# Patient Record
Sex: MALE | Race: BLACK OR AFRICAN AMERICAN | NOT HISPANIC OR LATINO | Employment: OTHER | URBAN - METROPOLITAN AREA
[De-identification: names, ages, dates, MRNs, and addresses within clinical notes are randomized per-mention and may not be internally consistent; named-entity substitution may affect disease eponyms.]

---

## 2018-04-25 ENCOUNTER — HOSPITAL ENCOUNTER (EMERGENCY)
Facility: OTHER | Age: 58
Discharge: HOME | End: 2018-04-25
Attending: EMERGENCY MEDICINE
Payer: MEDICAID

## 2018-04-25 VITALS
DIASTOLIC BLOOD PRESSURE: 78 MMHG | WEIGHT: 164 LBS | TEMPERATURE: 98 F | HEIGHT: 70 IN | BODY MASS INDEX: 23.48 KG/M2 | RESPIRATION RATE: 18 BRPM | HEART RATE: 60 BPM | SYSTOLIC BLOOD PRESSURE: 151 MMHG | OXYGEN SATURATION: 100 %

## 2018-04-25 DIAGNOSIS — M54.2 NECK PAIN: Primary | ICD-10-CM

## 2018-04-25 DIAGNOSIS — R93.89 ABNORMAL CT SCAN: ICD-10-CM

## 2018-04-25 LAB
ANION GAP SERPL CALC-SCNC: 10 MMOL/L
BASOPHILS # BLD AUTO: 0.03 K/UL
BASOPHILS NFR BLD: 0.5 %
BUN SERPL-MCNC: 15 MG/DL
CALCIUM SERPL-MCNC: 10 MG/DL
CHLORIDE SERPL-SCNC: 105 MMOL/L
CO2 SERPL-SCNC: 25 MMOL/L
CREAT SERPL-MCNC: 1.2 MG/DL
DIFFERENTIAL METHOD: ABNORMAL
EOSINOPHIL # BLD AUTO: 0.1 K/UL
EOSINOPHIL NFR BLD: 1.4 %
ERYTHROCYTE [DISTWIDTH] IN BLOOD BY AUTOMATED COUNT: 21.9 %
EST. GFR  (AFRICAN AMERICAN): >60 ML/MIN/1.73 M^2
EST. GFR  (NON AFRICAN AMERICAN): >60 ML/MIN/1.73 M^2
GLUCOSE SERPL-MCNC: 123 MG/DL
HCT VFR BLD AUTO: 37.8 %
HGB BLD-MCNC: 11.7 G/DL
INR PPP: 0.9
LYMPHOCYTES # BLD AUTO: 1.7 K/UL
LYMPHOCYTES NFR BLD: 26.4 %
MCH RBC QN AUTO: 25.4 PG
MCHC RBC AUTO-ENTMCNC: 31 G/DL
MCV RBC AUTO: 82 FL
MONOCYTES # BLD AUTO: 0.3 K/UL
MONOCYTES NFR BLD: 4.8 %
NEUTROPHILS # BLD AUTO: 4.3 K/UL
NEUTROPHILS NFR BLD: 66.7 %
PLATELET # BLD AUTO: 232 K/UL
PMV BLD AUTO: 11 FL
POTASSIUM SERPL-SCNC: 3.8 MMOL/L
PROTHROMBIN TIME: 10.2 SEC
RBC # BLD AUTO: 4.6 M/UL
SODIUM SERPL-SCNC: 140 MMOL/L
WBC # BLD AUTO: 6.41 K/UL

## 2018-04-25 PROCEDURE — 85025 COMPLETE CBC W/AUTO DIFF WBC: CPT

## 2018-04-25 PROCEDURE — 85610 PROTHROMBIN TIME: CPT

## 2018-04-25 PROCEDURE — 25500020 PHARM REV CODE 255: Performed by: EMERGENCY MEDICINE

## 2018-04-25 PROCEDURE — 80048 BASIC METABOLIC PNL TOTAL CA: CPT

## 2018-04-25 PROCEDURE — 99284 EMERGENCY DEPT VISIT MOD MDM: CPT

## 2018-04-25 RX ORDER — AMLODIPINE BESYLATE 10 MG/1
10 TABLET ORAL DAILY
COMMUNITY
End: 2020-03-11 | Stop reason: SDUPTHER

## 2018-04-25 RX ORDER — FLUOXETINE HYDROCHLORIDE 20 MG/1
20 CAPSULE ORAL DAILY
COMMUNITY
End: 2020-03-11 | Stop reason: SDDI

## 2018-04-25 RX ORDER — FERROUS SULFATE 325(65) MG
325 TABLET ORAL 2 TIMES DAILY
COMMUNITY
End: 2020-03-11 | Stop reason: SDDI

## 2018-04-25 RX ORDER — NAPROXEN SODIUM 220 MG/1
81 TABLET, FILM COATED ORAL DAILY
COMMUNITY
End: 2020-03-11 | Stop reason: SDUPTHER

## 2018-04-25 RX ORDER — ATORVASTATIN CALCIUM 80 MG/1
80 TABLET, FILM COATED ORAL DAILY
COMMUNITY
End: 2020-03-11 | Stop reason: SDUPTHER

## 2018-04-25 RX ORDER — CARVEDILOL 6.25 MG/1
6.25 TABLET ORAL 2 TIMES DAILY WITH MEALS
COMMUNITY
End: 2020-03-11 | Stop reason: SDUPTHER

## 2018-04-25 RX ADMIN — IOHEXOL 75 ML: 350 INJECTION, SOLUTION INTRAVENOUS at 12:04

## 2018-04-25 NOTE — ED TRIAGE NOTES
Left neck pain x2 weeks, painful when turning head to left, hx of left carotid stent placement on left side on 3/12/18, denies falling or heavy lifting, no change in range of motion or sensation from baseline

## 2018-04-25 NOTE — DISCHARGE INSTRUCTIONS
Straight to get her neurologist appointment at [facility] that is scheduled today.  Return to the emergency department for any new or worsening symptoms

## 2018-04-25 NOTE — ED NOTES
Pt rounding complete.  Patient lying in bed watching TV, Pain 6/10 on pain scale, Spouse remains at bedside,  Restroom and comfort needs addressed.  Pt updated on plan of care.  Call light within reach.  Will continue to monitor.

## 2018-04-25 NOTE — ED PROVIDER NOTES
"Encounter Date: 4/25/2018       History     Chief Complaint   Patient presents with    Neck Pain     L side neck pain x 4 days. denies any injury     57-year-old male with history of CVA, cerebral aneurysm status post repair, hyperlipidemia and hypertension presents emergency department with complaints of left-sided neck pain.  He states the pain is been present for approximately 1 week.  He reports that he had angiogram performed to the left side of his neck at [facility] March 12.  His wife states that they received a phone call stating that he would "need a second surgery because he was moving too much on the table."  She reports that he is on both aspirin and blood thinner.  He complains of pain that is a 5 out of 10.  No current treatment for the pain.  He admits to left sided upper and lower extremity weakness and numbness that is residual from his stroke however he denies any worsening in the symptoms.  He denies chest pain or shortness of breath.       The history is provided by the patient and the spouse.     Review of patient's allergies indicates:  No Known Allergies  Past Medical History:   Diagnosis Date    High cholesterol     Hypertension     Stroke     sept 3, 2017     Past Surgical History:   Procedure Laterality Date    CEREBRAL ANEURYSM REPAIR       History reviewed. No pertinent family history.  Social History   Substance Use Topics    Smoking status: Current Every Day Smoker    Smokeless tobacco: Never Used    Alcohol use Yes     Review of Systems   Constitutional: Negative for chills and fever.   HENT: Negative for sore throat.    Respiratory: Negative for shortness of breath.    Cardiovascular: Negative for chest pain.   Gastrointestinal: Negative for nausea and vomiting.   Genitourinary: Negative for dysuria.   Musculoskeletal: Positive for neck pain. Negative for back pain and neck stiffness.   Skin: Negative for rash.   Neurological: Positive for weakness and numbness. Negative for " dizziness, syncope, facial asymmetry, speech difficulty, light-headedness and headaches.   Hematological: Does not bruise/bleed easily.   Psychiatric/Behavioral: Negative for confusion.       Physical Exam     Initial Vitals [04/25/18 0956]   BP Pulse Resp Temp SpO2   139/78 76 18 97.9 °F (36.6 °C) 100 %      MAP       98.33         Physical Exam    Nursing note and vitals reviewed.  Constitutional: Vital signs are normal. He appears well-developed and well-nourished. He is not diaphoretic.  Non-toxic appearance. No distress.   HENT:   Head: Normocephalic and atraumatic.   Right Ear: External ear normal.   Left Ear: External ear normal.   Nose: Nose normal.   Mouth/Throat: Oropharynx is clear and moist.   Eyes: Conjunctivae, EOM and lids are normal. Pupils are equal, round, and reactive to light. No scleral icterus.   Neck: Normal range of motion and phonation normal. Neck supple. No spinous process tenderness and no muscular tenderness present. Normal range of motion present. No neck rigidity. Carotid bruit is not present. No JVD present.   Cardiovascular: Normal rate, regular rhythm, normal heart sounds and intact distal pulses. Exam reveals no gallop and no friction rub.    No murmur heard.  Pulmonary/Chest: Breath sounds normal. No respiratory distress. He has no wheezes. He has no rhonchi. He has no rales.   Abdominal: Normal appearance.   Musculoskeletal: Normal range of motion.   No obvious deformities, moving all extremities, normal gait   Neurological: He is alert and oriented to person, place, and time. No sensory deficit.   Skin: Skin is warm, dry and intact. No lesion and no rash noted. No erythema.   Psychiatric: He has a normal mood and affect. His speech is normal and behavior is normal. Judgment normal. Cognition and memory are normal.         ED Course   Procedures  Labs Reviewed   CBC W/ AUTO DIFFERENTIAL - Abnormal; Notable for the following:        Result Value    Hemoglobin 11.7 (*)      Hematocrit 37.8 (*)     MCH 25.4 (*)     MCHC 31.0 (*)     RDW 21.9 (*)     All other components within normal limits   BASIC METABOLIC PANEL - Abnormal; Notable for the following:     Glucose 123 (*)     All other components within normal limits   PROTIME-INR              Imaging Results          CTA Head and Neck (xpd) (Final result)     Abnormal  Result time 04/25/18 13:11:36    Final result by Billy Brian DO (04/25/18 13:11:36)                 Impression:      CT head: Operative change from remote right pterional craniotomy and right para clinoid region aneurysm clipping.    Allowing for artifact from postoperative metal there is no evidence for acute intracranial hemorrhage or sulcal effacement to suggest large territory recent infarction.    Hypoattenuation in the right corona radiata extending to the basal ganglia and thalamus most compatible with prior infarction and wallerian degeneration.    There is no abnormal parenchymal enhancement    CTA head: No evidence for high-grade proximal intracranial arterial stenosis or occlusion.    2-3 mm outpouching of the right communicating segment of the ICA which may represent infundibulum versus aneurysm.    There is limitation by artifact from aneurysm clip in the region of the right carotid terminus.  Clinical correlation correlation with conventional angiography performed at outside institution is advised..    Small caliber distal left vertebral artery which is likely developmentally hypoplastic functionally ending in PICA.    CTA neck: Left carotid stent with atherosclerotic plaquing right carotid bifurcation and proximal ICA with less than 50% ICA stenosis by NASCET criteria allowing for artifact from the stent.    Please note there is moderate narrowing the left proximal subclavian artery with band like opacity concerning for web or possible focal dissection flap.    High-grade stenosis left vertebral artery origin without occlusion.    Mild moderate  narrowing in the right proximal vertebral artery.    Please see above for additional details.      Electronically signed by: Billy DO Roc  Date:    04/25/2018  Time:    13:11             Narrative:    EXAMINATION:  CTA HEAD AND NECK (XPD)    CLINICAL HISTORY:  left neck pain s/p angioplasty with h/o CVA;    TECHNIQUE:  5 mm axial images of the head pre and post contrast with 0.625 mm axial CTA images of the head neck postcontrast.  Coronal and sagittal MPR and MIP imaging was performed 75 ml of Omnipaque 350 contrast was injected intravenously.    COMPARISON:  None    FINDINGS:  CT head with and  without contrast:    Remote operative change with right pterional craniotomy and right paraclinoid region aneurysm clip.  Hypodensity within the right centrum semiovale extending to the thalamus and along the right cortical spinal tract in light of history most compatible with prior infarction with ipsilateral wallerian degeneration.    There is no evidence for acute intracranial hemorrhage or sulcal effacement.  No midline shift or mass effect.    Head:    Anterior circulation: The bilateral distal cervical, petrous, cavernous, and supraclinoid segments of the ICAs are patent without significant focal stenosis.  There is a 2-3 mm inferiorly directed outpouching of the right communicating segment of the ICA which may represent infundibulum versus aneurysm without definite posterior communicating artery identified.  Clinical correlation and correlation with prior angiography advised.  There is limitation of the right M1 MCA and A1 segment of the FRANKY secondary to artifact from aneurysm clip which is in the region of the carotid terminus.  Artifact from the clip limits evaluation for residual recurrent aneurysm.    Allowing for limitation the bilateral anterior and middle cerebral arteries are patent.  There is questionable narrowing or early bifurcation the right distal M1 segment of the MCA.    There is hypoplasia of the  left A1 segment of the FRANKY.    Posterior circulation: The distal right vertebral artery is dominant.  Distal left vertebral artery is small in caliber and likely ends in PICA.    The basilar artery and posterior cerebral arteries are patent without focal high-grade stenosis..    CTA neck: The origin of the  right brachiocephalic and left common carotid artery and origin of the left subclavian artery from the arch are within normal limits.  Please note there is prominent bandlike opacity in the proximal left subclavian artery which may represent web  or focal dissection flap    The origin of the right vertebral artery from the right subclavian arteries within normal limits.  The right vertebral artery is patent throughout its course with mild moderate narrowing in its proximal V2 segment without occlusion.    The origin left vertebral artery from the left subclavian artery is significantly small concerning for stenosis with superimposed developmental hypoplasia.  There is small caliber flow in the distal V3 and proximal V4 segments with left distal vertebral artery ending in PICA.    Right carotid: The right common carotid artery, carotid bifurcation and extracranial portions of the internal carotid artery are patent without significant focal stenosis.    Left carotid: The left common carotid artery, carotid bifurcation and extracranial portions of the internal carotid arteries are patent with metallic stent along the common carotid and proximal ICA.  Allowing for artifact from the stent there is no evidence for significant left ICA stenosis..    There is less than 50% stenosis of the ICAs by NASCET criteria    Pharynx/larynx: Evaluation limited by scatter artifact from dental metal and motion allowing for limitation the nasopharynx, oropharynx, hypopharynx larynx and proximal trachea are within normal limits non-specific asymmetrical fullness in the left vallecula likely represents prominent lingual  tonsils..    Oral cavity and the buccal space      limited by dental metal.    Glands: Bilateral parotid and submandibular glands are within normal limits. Thyroid gland is unremarkable.    No evidence for adenopathy throughout the neck by size criteria.    Multilevel degenerative change of the cervical spine without evidence for acute fracture or subluxation.  No consolidation in the visualized lungs. This report was flagged in Epic as abnormal.                                  Medical Decision Making:   History:   I obtained history from: someone other than patient.       <> Summary of History: wife  Old Medical Records: I decided to obtain old medical records.  Initial Assessment:   57-year-old male with complaints consistent left-sided neck pain with extensive past medical history and abnormal CTA of the head and neck.  Vital signs stable, afebrile, neurovascular intact.  He is alert and healthy and nontoxic appearing.  He is in no apparent distress.  No audible bruits.  Palpable carotid pulse.  No palpable masses.  No erythema, warmth or edema.  No ecchymosis.  Pain at site of recent stent placement.  Clinical Tests:   Lab Tests: Ordered and Reviewed  Radiological Study: Ordered and Reviewed  ED Management:  CTA of the head and neck were obtained as well as basic labs.  CTA shows operative change from remote right pterional craniotomy with right paraclinoid region aneurysm clipping.  No obvious evidence of acute intracranial hemorrhage or sulcal effacement to suggest large territory recent infarction.  He does have a hypoattenuation in the right corona radiata extending to the basal ganglia and thalamus most compatible with prior infarct and valerian degeneration.  No abnormal parenchymal enhancement.  No evidence for high-grade proximal intracranial arterial stenosis or occlusion.  He does have a 2-3 mm outpouching of the right communicating segment of the ICA which may represent infundibulum versus aneurysm.   He has a left carotid stent with atherosclerotic plaquing right carotid bifurcation and proximal ICA with less than 50% ICA stenosis and moderate narrowing of the left proximal subclavian artery with bandlike opacity concerning for Cabrera possible focal dissection flap.  He has high-grade stenosis left vertebral artery origin without occlusion with mild/moderate narrowing in the right proximal vertebral artery. Patient's wife later admits that when called on Monday, he was told to return today for Pre-OP for surgery however admits that they came here instead.  I discussed findings on CTA of the head and neck.  Patient's wife then admits that he is scheduled for surgery on Monday.  At this time I feel patient needs to be seen by his neurologist at [facility] and is urged to go straight there for his appointment that is scheduled today at 3 PM.  Will get copy of CT for patient to bring with him to his appointment.  They state understanding and agree with this plan.  This patient was discussed in detail with the attending physician who agrees with treatment plan.  Other:   I have discussed this case with another health care provider.       <> Summary of the Discussion: Marquez  This note was created using Dragon Medical dictation.  There may be typographical errors secondary to dictation.                        Clinical Impression:     1. Neck pain    2. Abnormal CT scan          Disposition:   Disposition: Discharged  to go straight to [facility] Neurology for his scheduled appointment at 3PM. Stable at time of my evaluation                        Carmen Navarrete PA-C  04/25/18 0482

## 2020-03-11 ENCOUNTER — OFFICE VISIT (OUTPATIENT)
Dept: PRIMARY CARE CLINIC | Facility: CLINIC | Age: 60
End: 2020-03-11
Payer: MEDICARE

## 2020-03-11 VITALS
DIASTOLIC BLOOD PRESSURE: 70 MMHG | SYSTOLIC BLOOD PRESSURE: 127 MMHG | WEIGHT: 175.06 LBS | HEIGHT: 70 IN | TEMPERATURE: 98 F | BODY MASS INDEX: 25.06 KG/M2 | HEART RATE: 68 BPM

## 2020-03-11 DIAGNOSIS — Z01.818 PREOP EXAMINATION: ICD-10-CM

## 2020-03-11 DIAGNOSIS — I25.118 CORONARY ARTERY DISEASE OF NATIVE ARTERY OF NATIVE HEART WITH STABLE ANGINA PECTORIS: ICD-10-CM

## 2020-03-11 DIAGNOSIS — I10 ESSENTIAL HYPERTENSION: ICD-10-CM

## 2020-03-11 DIAGNOSIS — Z12.5 SCREENING FOR MALIGNANT NEOPLASM OF PROSTATE: ICD-10-CM

## 2020-03-11 DIAGNOSIS — E78.2 MIXED HYPERLIPIDEMIA: ICD-10-CM

## 2020-03-11 DIAGNOSIS — Z12.11 ENCOUNTER FOR SCREENING COLONOSCOPY: ICD-10-CM

## 2020-03-11 DIAGNOSIS — Z23 NEED FOR TD VACCINE: ICD-10-CM

## 2020-03-11 DIAGNOSIS — F17.210 CIGARETTE NICOTINE DEPENDENCE WITHOUT COMPLICATION: ICD-10-CM

## 2020-03-11 DIAGNOSIS — Z11.59 NEED FOR HEPATITIS C SCREENING TEST: ICD-10-CM

## 2020-03-11 DIAGNOSIS — Z71.6 ENCOUNTER FOR SMOKING CESSATION COUNSELING: ICD-10-CM

## 2020-03-11 DIAGNOSIS — Z11.4 ENCOUNTER FOR SCREENING FOR HIV: ICD-10-CM

## 2020-03-11 DIAGNOSIS — Z86.73 HISTORY OF STROKE: ICD-10-CM

## 2020-03-11 DIAGNOSIS — Z12.11 SPECIAL SCREENING FOR MALIGNANT NEOPLASMS, COLON: Primary | ICD-10-CM

## 2020-03-11 DIAGNOSIS — Z00.00 ANNUAL PHYSICAL EXAM: Primary | ICD-10-CM

## 2020-03-11 PROCEDURE — 90471 IMMUNIZATION ADMIN: CPT | Mod: HCNC,S$GLB,, | Performed by: FAMILY MEDICINE

## 2020-03-11 PROCEDURE — 3074F PR MOST RECENT SYSTOLIC BLOOD PRESSURE < 130 MM HG: ICD-10-PCS | Mod: HCNC,CPTII,S$GLB, | Performed by: FAMILY MEDICINE

## 2020-03-11 PROCEDURE — 90714 TD VACC NO PRESV 7 YRS+ IM: CPT | Mod: HCNC,S$GLB,, | Performed by: FAMILY MEDICINE

## 2020-03-11 PROCEDURE — 3074F SYST BP LT 130 MM HG: CPT | Mod: HCNC,CPTII,S$GLB, | Performed by: FAMILY MEDICINE

## 2020-03-11 PROCEDURE — 93005 EKG 12-LEAD: ICD-10-PCS | Mod: HCNC,S$GLB,, | Performed by: FAMILY MEDICINE

## 2020-03-11 PROCEDURE — 99386 PREV VISIT NEW AGE 40-64: CPT | Mod: 25,HCNC,S$GLB, | Performed by: FAMILY MEDICINE

## 2020-03-11 PROCEDURE — 93010 EKG 12-LEAD: ICD-10-PCS | Mod: HCNC,S$GLB,, | Performed by: INTERNAL MEDICINE

## 2020-03-11 PROCEDURE — 3078F DIAST BP <80 MM HG: CPT | Mod: HCNC,CPTII,S$GLB, | Performed by: FAMILY MEDICINE

## 2020-03-11 PROCEDURE — 99499 RISK ADDL DX/OHS AUDIT: ICD-10-PCS | Mod: HCNC,S$GLB,, | Performed by: FAMILY MEDICINE

## 2020-03-11 PROCEDURE — 99999 PR PBB SHADOW E&M-EST. PATIENT-LVL III: ICD-10-PCS | Mod: PBBFAC,HCNC,, | Performed by: FAMILY MEDICINE

## 2020-03-11 PROCEDURE — 93005 ELECTROCARDIOGRAM TRACING: CPT | Mod: HCNC,S$GLB,, | Performed by: FAMILY MEDICINE

## 2020-03-11 PROCEDURE — 3078F PR MOST RECENT DIASTOLIC BLOOD PRESSURE < 80 MM HG: ICD-10-PCS | Mod: HCNC,CPTII,S$GLB, | Performed by: FAMILY MEDICINE

## 2020-03-11 PROCEDURE — 99386 PR PREVENTIVE VISIT,NEW,40-64: ICD-10-PCS | Mod: 25,HCNC,S$GLB, | Performed by: FAMILY MEDICINE

## 2020-03-11 PROCEDURE — 93010 ELECTROCARDIOGRAM REPORT: CPT | Mod: HCNC,S$GLB,, | Performed by: INTERNAL MEDICINE

## 2020-03-11 PROCEDURE — 99999 PR PBB SHADOW E&M-EST. PATIENT-LVL III: CPT | Mod: PBBFAC,HCNC,, | Performed by: FAMILY MEDICINE

## 2020-03-11 PROCEDURE — 90714 TD VACCINE GREATER THAN OR EQUAL TO 7YO PRESERVATIVE FREE IM: ICD-10-PCS | Mod: HCNC,S$GLB,, | Performed by: FAMILY MEDICINE

## 2020-03-11 PROCEDURE — 90471 TD VACCINE GREATER THAN OR EQUAL TO 7YO PRESERVATIVE FREE IM: ICD-10-PCS | Mod: HCNC,S$GLB,, | Performed by: FAMILY MEDICINE

## 2020-03-11 PROCEDURE — 99499 UNLISTED E&M SERVICE: CPT | Mod: HCNC,S$GLB,, | Performed by: FAMILY MEDICINE

## 2020-03-11 RX ORDER — NAPROXEN SODIUM 220 MG/1
81 TABLET, FILM COATED ORAL DAILY
Qty: 90 TABLET | Refills: 3 | Status: SHIPPED | OUTPATIENT
Start: 2020-03-11 | End: 2021-03-04 | Stop reason: SDUPTHER

## 2020-03-11 RX ORDER — AMLODIPINE BESYLATE 10 MG/1
10 TABLET ORAL DAILY
Qty: 90 TABLET | Refills: 3 | Status: SHIPPED | OUTPATIENT
Start: 2020-03-11 | End: 2021-03-04 | Stop reason: SDUPTHER

## 2020-03-11 RX ORDER — POLYETHYLENE GLYCOL 3350, SODIUM SULFATE ANHYDROUS, SODIUM BICARBONATE, SODIUM CHLORIDE, POTASSIUM CHLORIDE 236; 22.74; 6.74; 5.86; 2.97 G/4L; G/4L; G/4L; G/4L; G/4L
4 POWDER, FOR SOLUTION ORAL ONCE
Qty: 4000 ML | Refills: 0 | Status: SHIPPED | OUTPATIENT
Start: 2020-03-11 | End: 2020-03-17

## 2020-03-11 RX ORDER — ATORVASTATIN CALCIUM 80 MG/1
80 TABLET, FILM COATED ORAL DAILY
Qty: 90 TABLET | Refills: 3 | Status: SHIPPED | OUTPATIENT
Start: 2020-03-11 | End: 2021-03-04 | Stop reason: SDUPTHER

## 2020-03-11 RX ORDER — CARVEDILOL 6.25 MG/1
6.25 TABLET ORAL 2 TIMES DAILY WITH MEALS
Qty: 180 TABLET | Refills: 3 | Status: SHIPPED | OUTPATIENT
Start: 2020-03-11 | End: 2021-03-04 | Stop reason: SDUPTHER

## 2020-03-11 NOTE — PROGRESS NOTES
Subjective:       Patient ID: Piotr Crespo is a 59 y.o. male.    Chief Complaint: Annual Exam    58 yo male presents for annual physical exam. This is his first visit with me.    He is not up to date with colonoscopy.  [facility] and is willing to release [facility]. States he did not receive Tetanus booster within last 10 years.    He is adherent to his medication and reports no side effects.    He usually gets PSA for prostate screening. No new or worsening urinary symptoms.    The following portions of the patient's history were reviewed and updated as appropriate: allergies, current medications, past family history, past medical history, past social history, past surgical history and problem list.    Review of Systems   Constitutional: Negative for activity change, appetite change, chills, diaphoresis, fatigue, fever and unexpected weight change.   HENT: Negative for congestion, dental problem, facial swelling, nosebleeds, postnasal drip, rhinorrhea, sinus pain, sore throat, tinnitus and trouble swallowing.    Eyes: Negative for pain, discharge, itching and visual disturbance.   Respiratory: Negative for apnea, chest tightness, shortness of breath, wheezing and stridor.    Cardiovascular: Negative for chest pain, palpitations and leg swelling.   Gastrointestinal: Negative for abdominal distention, abdominal pain, constipation, diarrhea, nausea, rectal pain and vomiting.   Endocrine: Negative for cold intolerance, heat intolerance and polyuria.   Genitourinary: Negative for difficulty urinating, dysuria, frequency, hematuria and urgency.   Musculoskeletal: Positive for arthralgias. Negative for gait problem, myalgias, neck pain and neck stiffness.   Skin: Negative for color change and rash.   Neurological: Negative for dizziness, tremors, seizures, syncope, facial asymmetry, weakness and headaches.   Hematological: Negative for adenopathy. Does not bruise/bleed easily.   Psychiatric/Behavioral:  "Negative for agitation, confusion, hallucinations, self-injury and suicidal ideas. The patient is not hyperactive.        Objective:       Vitals:    03/11/20 1010   BP: 127/70   BP Location: Right arm   Pulse: 68   Temp: 97.8 °F (36.6 °C)   Weight: 79.4 kg (175 lb 0.7 oz)   Height: 5' 9.5" (1.765 m)     Physical Exam   Constitutional: He is oriented to person, place, and time. He appears well-developed and well-nourished. No distress.   HENT:   Head: Normocephalic and atraumatic.   Right Ear: External ear normal.   Left Ear: External ear normal.   Mouth/Throat: No oropharyngeal exudate.   Eyes: Pupils are equal, round, and reactive to light. Conjunctivae and EOM are normal. Right eye exhibits no discharge. Left eye exhibits no discharge. No scleral icterus.   Neck: Normal range of motion. Neck supple. No thyromegaly present.   Cardiovascular: Normal rate, regular rhythm, normal heart sounds and intact distal pulses. Exam reveals no gallop and no friction rub.   No murmur heard.  Pulmonary/Chest: Effort normal and breath sounds normal. No respiratory distress. He exhibits no tenderness.   Abdominal: Soft. Bowel sounds are normal. He exhibits no distension and no mass. There is no tenderness. There is no rebound and no guarding.   Musculoskeletal: Normal range of motion. He exhibits no edema.   Lymphadenopathy:     He has no cervical adenopathy.   Neurological: He is alert and oriented to person, place, and time. He displays normal reflexes. No cranial nerve deficit. He exhibits normal muscle tone. Coordination normal.   Skin: Skin is warm. Capillary refill takes less than 2 seconds. No rash noted. He is not diaphoretic.   Psychiatric: He has a normal mood and affect. Judgment and thought content normal.       Assessment:       1. Annual physical exam    2. Encounter for screening colonoscopy    3. Preop examination    4. Need for hepatitis C screening test    5. Encounter for screening for HIV    6. History of " stroke    7. Essential hypertension    8. Coronary artery disease of native artery of native heart with stable angina pectoris    9. Mixed hyperlipidemia    10. Cigarette nicotine dependence without complication    11. Encounter for smoking cessation counseling    12. Screening for malignant neoplasm of prostate    13. Need for Td vaccine        Plan:       1. Annual physical exam  Age appropriate prevention guidelines implemented, unless patient refused  Encourage Advanced directives  Labs ordered   Immunizations reviewed. Encourage yearly influenza vaccine.  Patient Counseling:  --Nutrition: Encourage healthy food choices, adequate water intake, moderate sodium/caffeine intake, diet low in saturated fat and cholesterol. Importance of caloric balance and sufficient intake of fresh fruits, vegetables, fiber, calcium, iron, and 1 mg of folate supplement per day (for females capable of pregnancy).  --Exercise: Stressed the importance of regular exercise.   --Substance Abuse: Abstinence from tobacco products. No more than 2 alcoholic drinks per day in men or 1 alcoholic drink per day in women. Avoid illicit drugs, driving or other dangerous activities under the influence. In the event of abuse, treatment offered.   --Sexuality: Safe sex practices to avoid sexually transmitted diseases; careful partner selection, use of condoms and contraceptive alternatives, avoidance of unintended pregnancy in fertile women of child-bearing age  --Injury prevention: encourage safety belts, safety helmets, smoke detector.  --Dental health: Discussed importance of regular tooth brushing X2 daily, flossing X1 daily, and routine dental visits.  --Encourage use of sun screen, wear sun protective clothing  --Encourage routine eye exams    2. Encounter for screening colonoscopy  -     Case request GI: COLONOSCOPY    3. Preop examination  -     IN OFFICE EKG 12-LEAD (to [place]):  I have independently reviewed and interpreted the EKG which shows  a heart rate of 63 beats per minute, normal sinus rhythm, no acute ischemic changes    4. Need for hepatitis C screening test  -     Hepatitis C Antibody; Future    5. Encounter for screening for HIV  -     HIV 1/2 Ag/Ab (4th Gen); Future    6. History of stroke  Discussed the importance of smoking cessation. Low cholesterol, low salt food choices and exercise.  On aspirin.    7. Essential hypertension  Blood pressure within acceptable range  -     carvediloL (COREG) 6.25 MG tablet; Take 1 tablet (6.25 mg total) by mouth 2 (two) times daily with meals.  Dispense: 180 tablet; Refill: 3  -     amLODIPine (NORVASC) 10 MG tablet; Take 1 tablet (10 mg total) by mouth once daily.  Dispense: 90 tablet; Refill: 3  -     aspirin 81 MG Chew; Take 1 tablet (81 mg total) by mouth once daily.  Dispense: 90 tablet; Refill: 3  -     CBC auto differential; Future  -     Comprehensive metabolic panel; Future    8. Coronary artery disease of native artery of native heart with stable angina pectoris  -     TSH; Future  Stable from cardiopulmonary standpoint    9. Mixed hyperlipidemia  -     atorvastatin (LIPITOR) 80 MG tablet; Take 1 tablet (80 mg total) by mouth once daily.  Dispense: 90 tablet; Refill: 3  -     Lipid panel; Future  -     TSH; Future    10. Nicotine dependence  11. Smoking cessation  Three minutes spent discussing the importance of smoking cessation. Smoking cessation advised. We discussed the many concerns regarding smoking including risk of heart disease and cancer, among many others. Patient is in the precontemplative phase of smoking cessation and can contact us for assistance when a decision to quit smoking is made. He has Chantix at home. Discussed that once he sets his quit date then he should begin Chantix 1 week prior.  He does annual CT scans for screening for lung cancer at an outside facility.    12. Screening for malignant neoplasm of prostate  -     PSA, Screening; Future    13. Need for Td vaccine  -      (In Office Administered) Td Vaccine - Preservative Free    [facility]    Disclaimer: This note has been generated using voice-recognition software. There may be typographical errors that have been missed during proof-reading

## 2020-03-11 NOTE — PATIENT INSTRUCTIONS
Please call [phone] to schedule your Colonoscopy.      Shingles (Herpes Zoster)     Talk to your healthcare provider about the shingles vaccine.     Shingles is also called herpes zoster. It is a painful skin rash caused by the herpes zoster virus. This is the same virus that causes chickenpox. After a person has chickenpox, the virus remains inactive in the nerve cells. Years later, the virus can become active again and travel to the skin. Most people have shingles only once, but it is possible to have it more than once.  What are the risk factors for shingles?  Anyone who has ever had chickenpox can develop shingles. But your risk is greater if you:  · Are 50 years of age or older  · Have an illness that weakens your immune system, such as HIV/AIDS  · Have cancer, especially Hodgkin disease or lymphoma  · Take medicines that weaken your immune system  What are the symptoms of shingles?  · The first sign of shingles is usually pain, burning, tingling, or itching on one part of your face or body. You may also feel as if you have the flu, with fever and chills.  · A red rash with small blisters appears within a few days. The rash may appear as follows:   ¨ The blisters can occur anywhere, but theyre most common on the back, chest, or abdomen.  ¨ They usually appear on only one side of the body, spreading along the nerve pathway where the virus was inactive.   ¨ The rash can also form around an eye, along one side of the face or neck, or in the mouth.  ¨ In a few people, usually those with weakened immune systems, shingles appear on more than one part of the body at once.  · After a few days, the blisters become dry and form a crust. The crust falls off in days to weeks. The blisters generally do not leave scars.  How is shingles treated?  For most people, shingles heals on its own in a few weeks. But treatment is recommended to help relieve pain, speed healing, and reduce the risk of complications. Antiviral  medicines are prescribed within the first 72 hours of the appearance of the rash. To lessen symptoms:  · Apply ice packs (wrapped in a thin towel) or cool compresses, or soak in a cool bath.  · Use calamine lotion to calm itchy skin.  · Ask your healthcare provider about over-the-counter pain relievers. If your pain is severe, your healthcare provider may prescribe stronger pain medicines.  What are the complications of shingles?  Shingles often goes away with no lasting effects. But some people have serious problems long after the blisters have healed:  · Postherpetic neuralgia. This is the most common complication. It is severe nerve pain at the place where the rash used to be. It can last for months, or even years after you have had shingles. Medicines can be prescribed to help relieve the pain and improve quality of life.  · Bacterial infection. Shingles blisters may become infected with bacteria. Antibiotic medicine is used to treat the infection.  · Eye problems. A person with shingles on the face should see his or her healthcare provider right away. Shingles can cause serious problems with vision, and even blindness.  Very rarely shingles can also lead to pneumonia, hearing problems, brain inflammation, or even death.   When to seek medical care  Contact your healthcare provider if you experience any of the following:  · Symptoms that dont go away with treatment  · A rash or blisters near your eye  · Increased drainage, fever, or rash after treatment, or severe pain that doesnt go away   How can shingles be prevented?  You can only get shingles if you have had chicken pox in the past. Those who have never had chickenpox can get the virus from you. Although instead of developing shingles, the person may get chickenpox. Until your blisters form scabs, avoid contact with others, especially the following:  · Pregnant women who have never had chickenpox or the vaccine  · Infants who were born early (prematurely)  or who had low weight at birth  · People with weak immune system (for example, people receiving chemotherapy for cancer, people who have had organ transplants, or people with HIV infections)     The shingles vaccine  If youre 60 years of age or older, ask your healthcare provider if you should receive the shingles vaccine. The vaccine makes it less likely that you will develop shingles. If you do develop shingles, your symptoms will likely be milder than if you hadnt been vaccinated. Note: Although the vaccine is licensed for people 50 years of age or older, the CDC does not recommend the vaccine for those who are 50 to 59 years old.   Date Last Reviewed: 10/1/2016  © 1736-8712 [employer]. [address] [place] [address], [place] [address]. All rights reserved. This information is not intended as a substitute for professional medical care. Always follow your healthcare professional's instructions.    How to Quit Smoking  Smoking is one of the hardest habits to break. About half of all people who have ever smoked have been able to quit. Most people who still smoke want to quit. Here are some of the best ways to stop smoking.    Keep trying  Most smokers make many attempts at quitting before they are successful. Its important not to give up.  Go cold turkey  Most former smokers quit cold turkey (all at once). Trying to cut back gradually doesn't seem to work as well, perhaps because it continues the smoking habit. Also, it is possible to inhale more while smoking fewer cigarettes. This results in the same amount of nicotine in your body.  Get support  Support programs can be a big help, especially for heavy smokers. These groups offer lectures, ways to change behavior, and peer support. Here are some ways to find a support program:  · Free national quitline: 800-QUIT-NOW (928-800-2773).  · Hospital quit-smoking programs.  · American Lung Association: (543.115.9223).  · American Cancer Society  "(381.462.3599).  Support at home is important too. Nonsmokers can offer praise and encouragement. If the smoker in your life finds it hard to quit, encourage them to keep trying.  Over-the-counter medicines  Nicotine replacement therapy may make quitting easier. Certain aids, such as the nicotine patch, gum, and lozenges, are available without a prescription. It is best to use these under a doctors care, though. The skin patch provides a steady supply of nicotine. Nicotine gum and lozenges give temporary bursts of low levels of nicotine. Both methods reduce the craving for cigarettes. Warning: If you have nausea, vomiting, dizziness, weakness, or a fast heartbeat, stop using these products and see your doctor.  Prescription medicines  After reviewing your smoking patterns and past attempts to quit, your doctor may offer a prescription medicine such as bupropion, varenicline, a nicotine inhaler, or nasal spray. Each has advantages and side effects. Your doctor can review these with you.  Health benefits of quitting  The benefits of quitting start right away and keep improving the longer you go without smoking. These benefits occur at any age.  So whether you are 17 or 70, quitting is a good decision. Some of the benefits include:  · 20 minutes: Blood pressure and pulse return to normal.  · 8 hours: Oxygen levels return to normal.  · 2 days: Ability to smell and taste begin to improve as damaged nerves regrow.  · 2 to 3 weeks: Circulation and lung function improve.  · 1 to 9 months: Coughing, congestion, and shortness of breath decrease; tiredness decreases.  · 1 year: Risk of heart attack decreases by half.  · 5 years: Risk of lung cancer decreases by half; risk of stroke becomes the same as a nonsmokers.  For more on how to quit smoking, try these online resources:   · Smokefree.gov  · "Clearing the Air" booklet from the National Cancer [place]: " smokefree.gov/sites/default/files/pdf/clearing-the-air-accessible.pdf  Date Last Reviewed: 3/1/2017  © 6967-6207 [employer]. [address] [place] [address], [place] [address]. All rights reserved. This information is not intended as a substitute for professional medical care. Always follow your healthcare professional's instructions.

## 2020-03-12 ENCOUNTER — LAB VISIT (OUTPATIENT)
Dept: LAB | Facility: HOSPITAL | Age: 60
End: 2020-03-12
Attending: FAMILY MEDICINE
Payer: MEDICARE

## 2020-03-12 DIAGNOSIS — Z11.59 NEED FOR HEPATITIS C SCREENING TEST: ICD-10-CM

## 2020-03-12 DIAGNOSIS — Z12.5 SCREENING FOR MALIGNANT NEOPLASM OF PROSTATE: ICD-10-CM

## 2020-03-12 DIAGNOSIS — E78.5 HYPERLIPIDEMIA LDL GOAL <70: Primary | ICD-10-CM

## 2020-03-12 DIAGNOSIS — I10 ESSENTIAL HYPERTENSION: ICD-10-CM

## 2020-03-12 DIAGNOSIS — I25.118 CORONARY ARTERY DISEASE OF NATIVE ARTERY OF NATIVE HEART WITH STABLE ANGINA PECTORIS: ICD-10-CM

## 2020-03-12 DIAGNOSIS — Z11.4 ENCOUNTER FOR SCREENING FOR HIV: ICD-10-CM

## 2020-03-12 DIAGNOSIS — E78.2 MIXED HYPERLIPIDEMIA: ICD-10-CM

## 2020-03-12 LAB
ALBUMIN SERPL BCP-MCNC: 3.9 G/DL (ref 3.5–5.2)
ALP SERPL-CCNC: 76 U/L (ref 55–135)
ALT SERPL W/O P-5'-P-CCNC: 17 U/L (ref 10–44)
ANION GAP SERPL CALC-SCNC: 9 MMOL/L (ref 8–16)
AST SERPL-CCNC: 20 U/L (ref 10–40)
BASOPHILS # BLD AUTO: 0.05 K/UL (ref 0–0.2)
BASOPHILS NFR BLD: 0.6 % (ref 0–1.9)
BILIRUB SERPL-MCNC: 0.7 MG/DL (ref 0.1–1)
BUN SERPL-MCNC: 12 MG/DL (ref 6–20)
CALCIUM SERPL-MCNC: 9.7 MG/DL (ref 8.7–10.5)
CHLORIDE SERPL-SCNC: 103 MMOL/L (ref 95–110)
CHOLEST SERPL-MCNC: 132 MG/DL (ref 120–199)
CHOLEST/HDLC SERPL: 3.6 {RATIO} (ref 2–5)
CO2 SERPL-SCNC: 28 MMOL/L (ref 23–29)
COMPLEXED PSA SERPL-MCNC: 0.41 NG/ML (ref 0–4)
CREAT SERPL-MCNC: 1.1 MG/DL (ref 0.5–1.4)
DIFFERENTIAL METHOD: ABNORMAL
EOSINOPHIL # BLD AUTO: 0.1 K/UL (ref 0–0.5)
EOSINOPHIL NFR BLD: 1.1 % (ref 0–8)
ERYTHROCYTE [DISTWIDTH] IN BLOOD BY AUTOMATED COUNT: 14.4 % (ref 11.5–14.5)
EST. GFR  (AFRICAN AMERICAN): >60 ML/MIN/1.73 M^2
EST. GFR  (NON AFRICAN AMERICAN): >60 ML/MIN/1.73 M^2
GLUCOSE SERPL-MCNC: 104 MG/DL (ref 70–110)
HCT VFR BLD AUTO: 47 % (ref 40–54)
HDLC SERPL-MCNC: 37 MG/DL (ref 40–75)
HDLC SERPL: 28 % (ref 20–50)
HGB BLD-MCNC: 14.5 G/DL (ref 14–18)
IMM GRANULOCYTES # BLD AUTO: 0.03 K/UL (ref 0–0.04)
IMM GRANULOCYTES NFR BLD AUTO: 0.4 % (ref 0–0.5)
LDLC SERPL CALC-MCNC: 80 MG/DL (ref 63–159)
LYMPHOCYTES # BLD AUTO: 2.2 K/UL (ref 1–4.8)
LYMPHOCYTES NFR BLD: 27.5 % (ref 18–48)
MCH RBC QN AUTO: 29.8 PG (ref 27–31)
MCHC RBC AUTO-ENTMCNC: 30.9 G/DL (ref 32–36)
MCV RBC AUTO: 97 FL (ref 82–98)
MONOCYTES # BLD AUTO: 0.6 K/UL (ref 0.3–1)
MONOCYTES NFR BLD: 8 % (ref 4–15)
NEUTROPHILS # BLD AUTO: 5 K/UL (ref 1.8–7.7)
NEUTROPHILS NFR BLD: 62.4 % (ref 38–73)
NONHDLC SERPL-MCNC: 95 MG/DL
NRBC BLD-RTO: 0 /100 WBC
PLATELET # BLD AUTO: 154 K/UL (ref 150–350)
PMV BLD AUTO: 13.3 FL (ref 9.2–12.9)
POTASSIUM SERPL-SCNC: 4.3 MMOL/L (ref 3.5–5.1)
PROT SERPL-MCNC: 7.4 G/DL (ref 6–8.4)
RBC # BLD AUTO: 4.87 M/UL (ref 4.6–6.2)
SODIUM SERPL-SCNC: 140 MMOL/L (ref 136–145)
TRIGL SERPL-MCNC: 75 MG/DL (ref 30–150)
TSH SERPL DL<=0.005 MIU/L-ACNC: 1.07 UIU/ML (ref 0.4–4)
WBC # BLD AUTO: 7.99 K/UL (ref 3.9–12.7)

## 2020-03-12 PROCEDURE — 80061 LIPID PANEL: CPT | Mod: HCNC

## 2020-03-12 PROCEDURE — 80053 COMPREHEN METABOLIC PANEL: CPT | Mod: HCNC

## 2020-03-12 PROCEDURE — 86703 HIV-1/HIV-2 1 RESULT ANTBDY: CPT | Mod: HCNC

## 2020-03-12 PROCEDURE — 84153 ASSAY OF PSA TOTAL: CPT | Mod: HCNC

## 2020-03-12 PROCEDURE — 83036 HEMOGLOBIN GLYCOSYLATED A1C: CPT | Mod: HCNC

## 2020-03-12 PROCEDURE — 36415 COLL VENOUS BLD VENIPUNCTURE: CPT | Mod: HCNC,PN

## 2020-03-12 PROCEDURE — 84443 ASSAY THYROID STIM HORMONE: CPT | Mod: HCNC

## 2020-03-12 PROCEDURE — 85025 COMPLETE CBC W/AUTO DIFF WBC: CPT | Mod: HCNC

## 2020-03-12 PROCEDURE — 86803 HEPATITIS C AB TEST: CPT | Mod: HCNC

## 2020-03-12 RX ORDER — EZETIMIBE 10 MG/1
10 TABLET ORAL DAILY
Qty: 90 TABLET | Refills: 3 | Status: SHIPPED | OUTPATIENT
Start: 2020-03-12 | End: 2021-03-04 | Stop reason: SDUPTHER

## 2020-03-13 ENCOUNTER — TELEPHONE (OUTPATIENT)
Dept: PRIMARY CARE CLINIC | Facility: CLINIC | Age: 60
End: 2020-03-13

## 2020-03-13 LAB
ESTIMATED AVG GLUCOSE: 117 MG/DL (ref 68–131)
HBA1C MFR BLD HPLC: 5.7 % (ref 4–5.6)
HCV AB SERPL QL IA: NEGATIVE
HIV 1+2 AB+HIV1 P24 AG SERPL QL IA: NEGATIVE

## 2020-03-13 NOTE — TELEPHONE ENCOUNTER
----- Message from Vicky Dowling MD sent at 3/12/2020  6:59 PM CDT -----  Please let patient know that Zetia was sent to his pharmacy.    Message sent to patient's portal    Hello,    Normal thyroid function    Normal liver and kidney function testing    LDL, the bad cholesterol is 80.  We want the LDL cholesterol to be less than 70.  You will benefit from taking Zetia once affordable.  This is on an additional cholesterol medication which is used in combination with atorvastatin to minimize the risk of strokes.     Prostate testing within normal limits    No anemia

## 2020-04-17 PROBLEM — G47.33 OSA (OBSTRUCTIVE SLEEP APNEA): Status: ACTIVE | Noted: 2020-04-17

## 2020-04-17 PROBLEM — Z95.828 HISTORY OF LEFT COMMON CAROTID ARTERY STENT PLACEMENT: Status: ACTIVE | Noted: 2020-04-17

## 2020-04-17 PROBLEM — Z98.890 HISTORY OF LEFT COMMON CAROTID ARTERY STENT PLACEMENT: Status: ACTIVE | Noted: 2020-04-17

## 2020-04-17 PROBLEM — Z91.199 NONCOMPLIANCE WITH CPAP TREATMENT: Status: ACTIVE | Noted: 2020-04-17

## 2020-04-17 PROBLEM — F17.210 CIGARETTE NICOTINE DEPENDENCE WITHOUT COMPLICATION: Status: ACTIVE | Noted: 2020-04-17

## 2020-06-27 ENCOUNTER — LAB VISIT (OUTPATIENT)
Dept: URGENT CARE | Facility: CLINIC | Age: 60
End: 2020-06-27
Payer: MEDICARE

## 2020-06-27 VITALS — TEMPERATURE: 97 F | OXYGEN SATURATION: 99 % | HEART RATE: 75 BPM

## 2020-06-27 PROCEDURE — U0003 INFECTIOUS AGENT DETECTION BY NUCLEIC ACID (DNA OR RNA); SEVERE ACUTE RESPIRATORY SYNDROME CORONAVIRUS 2 (SARS-COV-2) (CORONAVIRUS DISEASE [COVID-19]), AMPLIFIED PROBE TECHNIQUE, MAKING USE OF HIGH THROUGHPUT TECHNOLOGIES AS DESCRIBED BY CMS-2020-01-R: HCPCS | Mod: HCNC

## 2020-06-28 LAB — SARS-COV-2 RNA RESP QL NAA+PROBE: NOT DETECTED

## 2020-06-30 ENCOUNTER — HOSPITAL ENCOUNTER (OUTPATIENT)
Facility: HOSPITAL | Age: 60
Discharge: HOME | End: 2020-06-30
Attending: INTERNAL MEDICINE | Admitting: INTERNAL MEDICINE
Payer: MEDICARE

## 2020-06-30 ENCOUNTER — ANESTHESIA EVENT (OUTPATIENT)
Dept: ENDOSCOPY | Facility: HOSPITAL | Age: 60
End: 2020-06-30
Payer: MEDICARE

## 2020-06-30 ENCOUNTER — ANESTHESIA (OUTPATIENT)
Dept: ENDOSCOPY | Facility: HOSPITAL | Age: 60
End: 2020-06-30
Payer: MEDICARE

## 2020-06-30 VITALS
HEIGHT: 70 IN | BODY MASS INDEX: 24.91 KG/M2 | WEIGHT: 174 LBS | RESPIRATION RATE: 18 BRPM | SYSTOLIC BLOOD PRESSURE: 141 MMHG | TEMPERATURE: 98 F | HEART RATE: 60 BPM | DIASTOLIC BLOOD PRESSURE: 78 MMHG | OXYGEN SATURATION: 97 %

## 2020-06-30 DIAGNOSIS — Z12.11 COLON CANCER SCREENING: ICD-10-CM

## 2020-06-30 PROCEDURE — 45385 COLONOSCOPY W/LESION REMOVAL: CPT | Mod: PT,HCNC,, | Performed by: INTERNAL MEDICINE

## 2020-06-30 PROCEDURE — 27201089 HC SNARE, DISP (ANY): Mod: HCNC | Performed by: INTERNAL MEDICINE

## 2020-06-30 PROCEDURE — 88305 TISSUE EXAM BY PATHOLOGIST: CPT | Mod: HCNC | Performed by: PATHOLOGY

## 2020-06-30 PROCEDURE — 88305 TISSUE EXAM BY PATHOLOGIST: CPT | Mod: 26,HCNC,, | Performed by: PATHOLOGY

## 2020-06-30 PROCEDURE — E9220 PRA ENDO ANESTHESIA: HCPCS | Mod: HCNC,PT,, | Performed by: NURSE ANESTHETIST, CERTIFIED REGISTERED

## 2020-06-30 PROCEDURE — E9220 PRA ENDO ANESTHESIA: ICD-10-PCS | Mod: HCNC,PT,, | Performed by: NURSE ANESTHETIST, CERTIFIED REGISTERED

## 2020-06-30 PROCEDURE — 25000003 PHARM REV CODE 250: Mod: HCNC | Performed by: INTERNAL MEDICINE

## 2020-06-30 PROCEDURE — 88305 TISSUE EXAM BY PATHOLOGIST: ICD-10-PCS | Mod: 26,HCNC,, | Performed by: PATHOLOGY

## 2020-06-30 PROCEDURE — 63600175 PHARM REV CODE 636 W HCPCS: Mod: HCNC | Performed by: NURSE ANESTHETIST, CERTIFIED REGISTERED

## 2020-06-30 PROCEDURE — 45385 COLONOSCOPY W/LESION REMOVAL: CPT | Mod: HCNC | Performed by: INTERNAL MEDICINE

## 2020-06-30 PROCEDURE — 45385 PR COLONOSCOPY,REMV LESN,SNARE: ICD-10-PCS | Mod: PT,HCNC,, | Performed by: INTERNAL MEDICINE

## 2020-06-30 PROCEDURE — 37000008 HC ANESTHESIA 1ST 15 MINUTES: Mod: HCNC | Performed by: INTERNAL MEDICINE

## 2020-06-30 PROCEDURE — 37000009 HC ANESTHESIA EA ADD 15 MINS: Mod: HCNC | Performed by: INTERNAL MEDICINE

## 2020-06-30 RX ORDER — SODIUM CHLORIDE 9 MG/ML
INJECTION, SOLUTION INTRAVENOUS CONTINUOUS
Status: DISCONTINUED | OUTPATIENT
Start: 2020-06-30 | End: 2020-06-30 | Stop reason: HOSPADM

## 2020-06-30 RX ORDER — PROPOFOL 10 MG/ML
VIAL (ML) INTRAVENOUS
Status: DISCONTINUED | OUTPATIENT
Start: 2020-06-30 | End: 2020-06-30

## 2020-06-30 RX ORDER — LIDOCAINE HYDROCHLORIDE 10 MG/ML
1 INJECTION, SOLUTION EPIDURAL; INFILTRATION; INTRACAUDAL; PERINEURAL ONCE
Status: DISCONTINUED | OUTPATIENT
Start: 2020-06-30 | End: 2020-06-30 | Stop reason: HOSPADM

## 2020-06-30 RX ORDER — PROPOFOL 10 MG/ML
VIAL (ML) INTRAVENOUS CONTINUOUS PRN
Status: DISCONTINUED | OUTPATIENT
Start: 2020-06-30 | End: 2020-06-30

## 2020-06-30 RX ORDER — SODIUM CHLORIDE 9 MG/ML
INJECTION, SOLUTION INTRAVENOUS CONTINUOUS
Status: CANCELLED | OUTPATIENT
Start: 2020-06-30

## 2020-06-30 RX ORDER — LIDOCAINE HCL/PF 100 MG/5ML
SYRINGE (ML) INTRAVENOUS
Status: DISCONTINUED | OUTPATIENT
Start: 2020-06-30 | End: 2020-06-30

## 2020-06-30 RX ADMIN — Medication 100 MG: at 01:06

## 2020-06-30 RX ADMIN — PROPOFOL 200 MCG/KG/MIN: 10 INJECTION, EMULSION INTRAVENOUS at 01:06

## 2020-06-30 RX ADMIN — PROPOFOL 100 MG: 10 INJECTION, EMULSION INTRAVENOUS at 01:06

## 2020-06-30 RX ADMIN — SODIUM CHLORIDE: 0.9 INJECTION, SOLUTION INTRAVENOUS at 01:06

## 2020-06-30 NOTE — ANESTHESIA POSTPROCEDURE EVALUATION
Anesthesia Post Evaluation    Patient: Piotr Crespo    Procedure(s) Performed: Procedure(s) (LRB):  COLONOSCOPY (N/A)    Final Anesthesia Type: general    Patient location during evaluation: PACU  Patient participation: Yes- Able to Participate  Level of consciousness: awake and alert  Post-procedure vital signs: reviewed and stable  Pain management: adequate  Airway patency: patent    PONV status at discharge: No PONV  Anesthetic complications: no      Cardiovascular status: hemodynamically stable  Respiratory status: spontaneous ventilation  Follow-up not needed.          Vitals Value Taken Time   /6 06/30/20 1356   Temp 36.6 °C (97.8 °F) 06/30/20 1356   Pulse 75 06/30/20 1356   Resp 18 06/30/20 1356   SpO2 93 % 06/30/20 1356         No case tracking events are documented in the log.      Pain/Connor Score: Connor Score: 10 (6/30/2020  1:58 PM)

## 2020-06-30 NOTE — [employer] PATIENT INSTRUCTIONS
Discharge Summary/Instructions after an Endoscopic Procedure  Patient Name: Piotr Crespo  Patient MRN: [id number]  Patient YOB: 1960 Tuesday, June 30, 2020  Yuan Pizano MD  RESTRICTIONS:  During your procedure today, you received medications for sedation.  These   medications may affect your judgment, balance and coordination.  Therefore,   for 24 hours, you have the following restrictions:   - DO NOT drive a car, operate machinery, make legal/financial decisions,   sign important papers or drink alcohol.    ACTIVITY:  Today: no heavy lifting, straining or running due to procedural   sedation/anesthesia.  The following day: return to full activity including work.  DIET:  Eat and drink normally unless instructed otherwise.     TREATMENT FOR COMMON SIDE EFFECTS:  - Mild abdominal pain, nausea, belching, bloating or excessive gas:  rest,   eat lightly and use a heating pad.  - Sore Throat: treat with throat lozenges and/or gargle with warm salt   water.  - Because air was used during the procedure, expelling large amounts of air   from your rectum or belching is normal.  - If a bowel prep was taken, you may not have a bowel movement for 1-3 days.    This is normal.  SYMPTOMS TO WATCH FOR AND REPORT TO YOUR PHYSICIAN:  1. Abdominal pain or bloating, other than gas cramps.  2. Chest pain.  3. Back pain.  4. Signs of infection such as: chills or fever occurring within 24 hours   after the procedure.  5. Rectal bleeding, which would show as bright red, maroon, or black stools.   (A tablespoon of blood from the rectum is not serious, especially if   hemorrhoids are present.)  6. Vomiting.  7. Weakness or dizziness.  GO DIRECTLY TO THE NEAREST EMERGENCY ROOM IF YOU HAVE ANY OF THE FOLLOWING:      Difficulty breathing              Chills and/or fever over 101 F   Persistent vomiting and/or vomiting blood   Severe abdominal pain   Severe chest pain   Black, tarry stools   Bleeding- more than one tablespoon   Any  other symptom or condition that you feel may need urgent attention  Your doctor recommends these additional instructions:  If any biopsies were taken, your doctors clinic will contact you in 1 to 2   weeks with any results.  - Patient has a contact number available for emergencies.  The signs and   symptoms of potential delayed complications were discussed with the   patient.  Return to normal activities tomorrow.  Written discharge   instructions were provided to the patient.   - Discharge patient to home (ambulatory).   - Resume previous diet.   - Continue present medications.   - Await pathology results.   - Repeat colonoscopy in 5 years for surveillance.  For questions, problems or results please call your physician - Yuan Pizano MD at Work:  [phone].  [facility], EMERGENCY ROOM PHONE NUMBER: [phone]  IF A COMPLICATION OR EMERGENCY SITUATION ARISES AND YOU ARE UNABLE TO REACH   YOUR PHYSICIAN - GO DIRECTLY TO THE EMERGENCY ROOM.  Yuan Pizano MD  6/30/2020 1:50:00 PM  This report has been verified and signed electronically.  [employer]

## 2020-06-30 NOTE — ANESTHESIA PREPROCEDURE EVALUATION
06/30/2020  Piotr Crespo is a 59 y.o., male.    Past Medical History:   Diagnosis Date    Essential hypertension 3/11/2020    High cholesterol     History of stroke 3/11/2020    Hypertension     Mixed hyperlipidemia 3/11/2020    Stroke     sept 3, 2017     Patient Active Problem List   Diagnosis    Mixed hyperlipidemia    Essential hypertension    History of stroke    Cigarette nicotine dependence without complication    KERRY (obstructive sleep apnea)    History of left common carotid artery stent placement    Noncompliance with CPAP treatment     Past Surgical History:   Procedure Laterality Date    CAROTID ARTERY ANGIOPLASTY Left     left Internal carotid artery stent    CEREBRAL ANEURYSM REPAIR           Anesthesia Evaluation    I have reviewed the Patient Summary Reports.      I have reviewed the Medications.     Review of Systems  Anesthesia Hx:  Denies Family Hx of Anesthesia complications.   Denies Personal Hx of Anesthesia complications.   Hematology/Oncology:  Hematology Normal   Oncology Normal     EENT/Dental:EENT/Dental Normal   Cardiovascular:   Hypertension    Pulmonary:  Pulmonary Normal    Renal/:  Renal/ Normal     Hepatic/GI:  Hepatic/GI Normal    Musculoskeletal:  Musculoskeletal Normal    Neurological:   CVA    Endocrine:  Endocrine Normal    Dermatological:  Skin Normal    Psych:  Psychiatric Normal           Physical Exam  General:  Well nourished    Airway/Jaw/Neck:  Airway Findings: Mouth Opening: Normal Tongue: Normal  General Airway Assessment: Adult  Mallampati: II  TM Distance: Normal, at least 6 cm  Jaw/Neck Findings:  Neck ROM: Normal ROM  Neck Findings: Normal     Dental:  Dental Findings: In tact   Chest/Lungs:  Chest/Lungs Clear    Heart/Vascular:  Heart Findings: Normal    Abdomen:  Abdomen Findings: Normal      Mental Status:  Mental Status Findings: Normal         Anesthesia Plan  Type of Anesthesia, risks & benefits discussed:  Anesthesia Type:  general  Patient's Preference:   Intra-op Monitoring Plan: standard ASA monitors  Intra-op Monitoring Plan Comments:   Post Op Pain Control Plan:   Post Op Pain Control Plan Comments:   Induction:   IV  Beta Blocker:  Patient is not currently on a Beta-Blocker (No further documentation required).       Informed Consent: Patient understands risks and agrees with Anesthesia plan.  Questions answered. Anesthesia consent signed with patient.  ASA Score: 2     Day of Surgery Review of History & Physical:    H&P update referred to the provider.         Ready For Surgery From Anesthesia Perspective.

## 2020-06-30 NOTE — TRANSFER OF CARE
"Anesthesia Transfer of Care Note    Patient: Piotr Crespo    Procedure(s) Performed: Procedure(s) (LRB):  COLONOSCOPY (N/A)    Patient location: PACU    Anesthesia Type: general    Transport from OR: Transported from OR on room air with adequate spontaneous ventilation    Post pain: adequate analgesia    Post assessment: no apparent anesthetic complications    Post vital signs: stable    Level of consciousness: sedated    Nausea/Vomiting: no nausea/vomiting    Complications: none    Transfer of care protocol was followed      Last vitals:   Visit Vitals  BP (!) 104/6 (BP Location: Left arm, Patient Position: Lying)   Pulse 75   Temp 36.6 °C (97.8 °F) (Temporal)   Resp 18   Ht 5' 10" (1.778 m)   Wt 78.9 kg (174 lb)   SpO2 (!) 93%   BMI 24.97 kg/m²     "

## 2020-06-30 NOTE — H&P
[facility]  Gastroenterology  H&P    Patient Name: Piotr Crespo  MRN: [id number]  Admission Date: 6/30/2020  Code Status: No Order    Attending Provider: audra lynch  Primary Care Physician: Vicky Dowling MD  Principal Problem:<principal problem not specified>    Subjective:     History of Present Illness: colon cancer screen    Past Medical History:   Diagnosis Date    Essential hypertension 3/11/2020    High cholesterol     History of stroke 3/11/2020    Hypertension     Mixed hyperlipidemia 3/11/2020    Stroke     sept 3, 2017       Past Surgical History:   Procedure Laterality Date    CAROTID ARTERY ANGIOPLASTY Left     left Internal carotid artery stent    CEREBRAL ANEURYSM REPAIR         Review of patient's allergies indicates:  No Known Allergies  Family History     Problem Relation (Age of Onset)    Bell's palsy Brother    Brain cancer Brother    Cancer Father, Brother    Hypertension Mother    No Known Problems Brother, Brother, Sister    Other Sister    Stroke Mother        Tobacco Use    Smoking status: Current Every Day Smoker     Packs/day: 0.40     Years: 45.00     Pack years: 18.00     Types: Cigarettes    Smokeless tobacco: Never Used   Substance and Sexual Activity    Alcohol use: Yes     Comment: once a month    Drug use: No    Sexual activity: Not on file     Review of Systems   Respiratory: Negative.    Cardiovascular: Negative.      Objective:     Vital Signs (Most Recent):  Temp: 98.1 °F (36.7 °C) (06/30/20 1257)  Pulse: 66 (06/30/20 1257)  Resp: 16 (06/30/20 1257)  BP: (!) 173/84 (06/30/20 1257)  SpO2: 99 % (06/30/20 1257) Vital Signs (24h Range):  Temp:  [98.1 °F (36.7 °C)] 98.1 °F (36.7 °C)  Pulse:  [66] 66  Resp:  [16] 16  SpO2:  [99 %] 99 %  BP: (173)/(84) 173/84     Weight: 78.9 kg (174 lb) (06/30/20 1257)  Body mass index is 24.97 kg/m².    No intake or output data in the 24 hours ending 06/30/20 1323    Lines/Drains/Airways     Peripheral Intravenous  Line                 Peripheral IV - Single Lumen 06/30/20 1301 20 G Right Forearm less than 1 day                Physical Exam  Cardiovascular:      Rate and Rhythm: Normal rate and regular rhythm.   Pulmonary:      Effort: Pulmonary effort is normal.      Breath sounds: Normal breath sounds.         Significant Labs:      Significant Imaging:      Assessment/Plan:     There are no hospital problems to display for this patient.      Indication for procedure:    ASA:II  Airway normal  Malampati class:per anes    Personal and family history negative for anesthesia problems    Plan:colon  Anesthesia plan: general      Yuan Pizano MD  Gastroenterology  [facility]

## 2020-07-06 LAB
FINAL PATHOLOGIC DIAGNOSIS: NORMAL
GROSS: NORMAL

## 2020-07-13 ENCOUNTER — NURSE TRIAGE (OUTPATIENT)
Dept: ADMINISTRATIVE | Facility: CLINIC | Age: 60
End: 2020-07-13

## 2020-07-13 NOTE — TELEPHONE ENCOUNTER
Reason for Disposition   Message left on identified voice mail    Protocols used: NO CONTACT OR DUPLICATE CONTACT CALL-A-AH    Attempted to contact pt on behalf of Post Procedural Symptom Tracker. No answer

## 2020-07-15 ENCOUNTER — TELEPHONE (OUTPATIENT)
Dept: PRIMARY CARE CLINIC | Facility: CLINIC | Age: 60
End: 2020-07-15

## 2020-07-15 NOTE — TELEPHONE ENCOUNTER
----- Message from Aung Bourgeois sent at 7/15/2020 11:44 AM CDT -----  Contact: Self [phone]  Patient would like an call back form the nurse in regards to men enhancement, please advise.

## 2020-07-17 ENCOUNTER — OFFICE VISIT (OUTPATIENT)
Dept: PRIMARY CARE CLINIC | Facility: CLINIC | Age: 60
End: 2020-07-17
Payer: MEDICARE

## 2020-07-17 DIAGNOSIS — R73.03 PREDIABETES: ICD-10-CM

## 2020-07-17 DIAGNOSIS — R20.0 NUMBNESS AND TINGLING IN BOTH HANDS: ICD-10-CM

## 2020-07-17 DIAGNOSIS — N52.01 ERECTILE DYSFUNCTION DUE TO ARTERIAL INSUFFICIENCY: Primary | ICD-10-CM

## 2020-07-17 DIAGNOSIS — R20.2 NUMBNESS AND TINGLING IN BOTH HANDS: ICD-10-CM

## 2020-07-17 PROCEDURE — 99441 PR PHYSICIAN TELEPHONE EVALUATION 5-10 MIN: ICD-10-PCS | Mod: HCNC,95,, | Performed by: FAMILY MEDICINE

## 2020-07-17 PROCEDURE — 99441 PR PHYSICIAN TELEPHONE EVALUATION 5-10 MIN: CPT | Mod: HCNC,95,, | Performed by: FAMILY MEDICINE

## 2020-07-17 NOTE — PROGRESS NOTES
"Established Patient - Audio Only Telehealth Visit     The patient location is: home  The chief complaint leading to consultation is: Wants to see a urologist for "male enhancement"  Visit type: Virtual visit with audio only (telephone)  Total time spent with patient: 7 mins       The reason for the audio only service rather than synchronous audio and video virtual visit was related to technical difficulties or patient preference/necessity.     Each patient to whom I provide medical services by telemedicine is:  (1) informed of the relationship between the physician and patient and the respective role of any other health care provider with respect to management of the patient; and (2) notified that they may decline to receive medical services by telemedicine and may withdraw from such care at any time. Patient verbally consented to receive this service via voice-only telephone call.       HPI:  59-year-old male states that he was told he will need a medication from male enhancement.  When asked to describe what he means by male enhancement patient describes symptoms consistent with erectile dysfunction. He has difficulty getting an erection.  He does not want to start a medication at this time after reviewing side effects but instead would like to discuss this with a Urology.    He has a history of stroke, hyperlipidemia, hypertension, nicotine dependence, obstructive sleep apnea, status post left carotid artery stent placement.  He is on appropriate therapy.    He states that he has numbness and tingling in his fingers.  He shakes out his hands at the wrist.  Symptoms have been ongoing for 1 year without any worsening features.  [facility]  He plans to discuss these findings with his neurologist.  He has never formally had nerve conduction study.       Assessment and plan:   1. Erectile dysfunction due to arterial insufficiency  -     Testosterone; Future  -     Testosterone, " Free; Future  Patient had lipid panel, TSH, CBC, CMP, A1c previously drawn.  -     Ambulatory referral/consult to Urology; Future; Expected date: 07/24/2020  He may discuss pills, vacuum, intraurethral alprostadil, injections or surgical options with urology.    2. Numbness and tingling in both hands  3. Patient is prediabetic A1c 5.7 on 3/12/2020  Differentials include carpal tunnel syndrome, peripheral neuropathy, circulatory dysfunction, Raynaud's phenomenon, arthritis  Offered patient x-ray, nerve conduction study.  He states that [facility] will [facility] and declined X-ray or EMG/nerve conduction study.  If no intervention is deemed necessary by neurologist, patient should let me know since a hand clinic evaluation would be warranted.    Disclaimer: This note has been generated using voice-recognition software. There may be typographical errors that have been missed during proof-reading       This service was not originating from a related E/M service provided within the previous 7 days nor will  to an E/M service or procedure within the next 24 hours or my soonest available appointment.  Prevailing standard of care was able to be met in this audio-only visit.

## 2020-07-23 ENCOUNTER — TELEPHONE (OUTPATIENT)
Dept: PRIMARY CARE CLINIC | Facility: CLINIC | Age: 60
End: 2020-07-23

## 2020-07-23 ENCOUNTER — LAB VISIT (OUTPATIENT)
Dept: LAB | Facility: HOSPITAL | Age: 60
End: 2020-07-23
Payer: MEDICARE

## 2020-07-23 DIAGNOSIS — N52.01 ERECTILE DYSFUNCTION DUE TO ARTERIAL INSUFFICIENCY: ICD-10-CM

## 2020-07-23 LAB — TESTOST SERPL-MCNC: 449 NG/DL (ref 304–1227)

## 2020-07-23 PROCEDURE — 84402 ASSAY OF FREE TESTOSTERONE: CPT | Mod: HCNC

## 2020-07-23 PROCEDURE — 36415 COLL VENOUS BLD VENIPUNCTURE: CPT | Mod: HCNC,PN

## 2020-07-23 PROCEDURE — 84403 ASSAY OF TOTAL TESTOSTERONE: CPT | Mod: HCNC

## 2020-07-23 NOTE — TELEPHONE ENCOUNTER
Disability forms were never discussed at his visit. Juan Jose had a stroke and last worked in 2017.  He gets disability paperwork completed every year.  Patient says that he has supporting documentation to support his disability.     I would like his Neurologist opinion on this and a functional capacity evaluation.     I can review his records and will let him know if he should schedule a follow up visit to discuss paperwork.

## 2020-07-27 ENCOUNTER — TELEPHONE (OUTPATIENT)
Dept: PRIMARY CARE CLINIC | Facility: CLINIC | Age: 60
End: 2020-07-27

## 2020-07-27 NOTE — TELEPHONE ENCOUNTER
----- Message from Vicky Dowling MD sent at 7/26/2020  4:04 PM CDT -----  Total testosterone is normal.  Please let patient know results were sent to portal.

## 2020-07-28 ENCOUNTER — OFFICE VISIT (OUTPATIENT)
Dept: UROLOGY | Facility: CLINIC | Age: 60
End: 2020-07-28
Payer: MEDICARE

## 2020-07-28 VITALS
BODY MASS INDEX: 24.91 KG/M2 | DIASTOLIC BLOOD PRESSURE: 79 MMHG | HEIGHT: 70 IN | SYSTOLIC BLOOD PRESSURE: 142 MMHG | HEART RATE: 71 BPM | WEIGHT: 174 LBS

## 2020-07-28 DIAGNOSIS — N52.01 ERECTILE DYSFUNCTION DUE TO ARTERIAL INSUFFICIENCY: ICD-10-CM

## 2020-07-28 LAB — TESTOST FREE SERPL-MCNC: 9.1 PG/ML

## 2020-07-28 PROCEDURE — 3008F PR BODY MASS INDEX (BMI) DOCUMENTED: ICD-10-PCS | Mod: HCNC,CPTII,S$GLB, | Performed by: NURSE PRACTITIONER

## 2020-07-28 PROCEDURE — 3078F DIAST BP <80 MM HG: CPT | Mod: HCNC,CPTII,S$GLB, | Performed by: NURSE PRACTITIONER

## 2020-07-28 PROCEDURE — 99203 PR OFFICE/OUTPT VISIT, NEW, LEVL III, 30-44 MIN: ICD-10-PCS | Mod: HCNC,S$GLB,, | Performed by: NURSE PRACTITIONER

## 2020-07-28 PROCEDURE — 3008F BODY MASS INDEX DOCD: CPT | Mod: HCNC,CPTII,S$GLB, | Performed by: NURSE PRACTITIONER

## 2020-07-28 PROCEDURE — 3077F SYST BP >= 140 MM HG: CPT | Mod: HCNC,CPTII,S$GLB, | Performed by: NURSE PRACTITIONER

## 2020-07-28 PROCEDURE — 3077F PR MOST RECENT SYSTOLIC BLOOD PRESSURE >= 140 MM HG: ICD-10-PCS | Mod: HCNC,CPTII,S$GLB, | Performed by: NURSE PRACTITIONER

## 2020-07-28 PROCEDURE — 3078F PR MOST RECENT DIASTOLIC BLOOD PRESSURE < 80 MM HG: ICD-10-PCS | Mod: HCNC,CPTII,S$GLB, | Performed by: NURSE PRACTITIONER

## 2020-07-28 PROCEDURE — 99203 OFFICE O/P NEW LOW 30 MIN: CPT | Mod: HCNC,S$GLB,, | Performed by: NURSE PRACTITIONER

## 2020-07-28 RX ORDER — TADALAFIL 20 MG/1
20 TABLET ORAL
Qty: 30 TABLET | Refills: 11 | Status: SHIPPED | OUTPATIENT
Start: 2020-07-28 | End: 2022-05-10

## 2020-07-28 NOTE — PROGRESS NOTES
Subjective:      Piotr Crespo is a 59 y.o. male who was referred by Dr. Dowling for evaluation of his ED.      Normal testosterone level earlier this month.     Erectile Dysfunction  Patient complains of erectile dysfunction. Onset of dysfunction was 2 years ago and was sudden in onset.  Patient states the nature of difficulty is both attaining and maintaining erection. Full erections occur never. Partial erections occur with intercourse. Libido is not affected. Risk factors for ED include antihypertensive medications and stroke. Patient denies history of diabetes mellitus, cranial, spinal, or pelvic trauma, pelvic radiation and hypogonadism. Previous treatment of ED includes none.    Denies bothersome urinary symptoms. Denies a family history of prostate cancer.     The following portions of the patient's history were reviewed and updated as appropriate: allergies, current medications, past family history, past medical history, past social history, past surgical history and problem list.    Review of Systems  Constitutional: no fever or chills  ENT: no nasal congestion or sore throat  Respiratory: no cough or shortness of breath  Cardiovascular: no chest pain or palpitations  Gastrointestinal: no nausea or vomiting, tolerating diet  Genitourinary: as per HPI  Hematologic/Lymphatic: no easy bruising or lymphadenopathy  Musculoskeletal: no arthralgias or myalgias  Neurological: no seizures or tremors  Behavioral/Psych: no auditory or visual hallucinations     Objective:   Vitals:   Vitals:    07/28/20 0930   BP: (!) 142/79   Pulse: 71       Physical Exam   General: alert and oriented, no acute distress  Head: normocephalic, atraumatic  Neck: supple, no lymphadenopathy, normal ROM, no masses  Respiratory: Symmetric expansion, non-labored breathing  Cardiovascular: regular rate and rhythm, nomal pulses, no peripheral edema  Abdomen: soft, non tender, non distended, no palpable masses, no hernias, no hepatomegaly  or splenomegaly  Genitourinary: PT DECLINED  Lymphatic: no inguinal nodes  Skin: normal coloration and turgor, no rashes, no suspicious skin lesions noted  Neuro: alert and oriented x3, no gross deficits  Psych: normal judgment and insight, normal mood/affect and non-anxious    Lab Review   Urinalysis demonstrates : no specimen     Lab Results   Component Value Date    WBC 7.99 03/12/2020    HGB 14.5 03/12/2020    HCT 47.0 03/12/2020    MCV 97 03/12/2020     03/12/2020     Lab Results   Component Value Date    CREATININE 1.1 03/12/2020    BUN 12 03/12/2020     Lab Results   Component Value Date    PSA 0.41 03/12/2020     Imaging   None    Assessment:   Erectile dysfunction due to arterial insufficiency     Plan:   Piotr was seen today for establish care.    Diagnoses and all orders for this visit:    Erectile dysfunction due to arterial insufficiency  -     Ambulatory referral/consult to Urology  -     tadalafiL (CIALIS) 20 MG Tab; Take 1 tablet (20 mg total) by mouth every 72 hours as needed.    Plan:  --Explained that his ED is likely related to his age, stroke and BP medications  --Trial of generic cialis, reviewed instructions for use   --Pt declines prostate cancer screening today   --Pt will message with update

## 2020-07-28 NOTE — LETTER
July 28, 2020      Vicky Dowling MD  [address]  [facility] [address]           [facility] [address]  Phone: [phone]  Fax: [phone]          Patient: Piotr Crespo   MR Number: [id number]   YOB: 1960   Date of Visit: 7/28/2020       Dear Dr. Vicky Dowling:    Thank you for referring Piotr Crespo to me for evaluation. Attached you will find relevant portions of my assessment and plan of care.    If you have questions, please do not hesitate to call me. I look forward to following Piotr Crespo along with you.    Sincerely,    Shayla Pascual, RITESH    Enclosure  CC:  No Recipients    If you would like to receive this communication electronically, please contact [email or web address] or [phone] to request more information on [employer] access.    For providers and/or their staff who would like to refer a patient to [facility], please contact us through our one-stop-shop provider referral line, [facility], at [phone].    If you feel you have received this communication in error or would no longer like to receive these types of communications, please e-mail [email or web address]

## 2020-08-14 ENCOUNTER — TELEPHONE (OUTPATIENT)
Dept: PRIMARY CARE CLINIC | Facility: CLINIC | Age: 60
End: 2020-08-14

## 2020-08-20 ENCOUNTER — OFFICE VISIT (OUTPATIENT)
Dept: PRIMARY CARE CLINIC | Facility: CLINIC | Age: 60
End: 2020-08-20
Payer: MEDICARE

## 2020-08-20 VITALS
OXYGEN SATURATION: 98 % | HEIGHT: 69 IN | DIASTOLIC BLOOD PRESSURE: 60 MMHG | HEART RATE: 66 BPM | BODY MASS INDEX: 26.35 KG/M2 | SYSTOLIC BLOOD PRESSURE: 104 MMHG | TEMPERATURE: 98 F | WEIGHT: 177.94 LBS

## 2020-08-20 DIAGNOSIS — F17.210 CIGARETTE NICOTINE DEPENDENCE WITHOUT COMPLICATION: ICD-10-CM

## 2020-08-20 DIAGNOSIS — Z95.828 HISTORY OF LEFT COMMON CAROTID ARTERY STENT PLACEMENT: ICD-10-CM

## 2020-08-20 DIAGNOSIS — Z86.73 HISTORY OF STROKE: ICD-10-CM

## 2020-08-20 DIAGNOSIS — R73.03 PREDIABETES: ICD-10-CM

## 2020-08-20 DIAGNOSIS — G47.33 OSA (OBSTRUCTIVE SLEEP APNEA): ICD-10-CM

## 2020-08-20 DIAGNOSIS — R29.898 LEFT HAND WEAKNESS: Primary | ICD-10-CM

## 2020-08-20 DIAGNOSIS — Z98.890 HISTORY OF LEFT COMMON CAROTID ARTERY STENT PLACEMENT: ICD-10-CM

## 2020-08-20 DIAGNOSIS — N52.01 ERECTILE DYSFUNCTION DUE TO ARTERIAL INSUFFICIENCY: ICD-10-CM

## 2020-08-20 DIAGNOSIS — E78.2 MIXED HYPERLIPIDEMIA: ICD-10-CM

## 2020-08-20 DIAGNOSIS — I10 ESSENTIAL HYPERTENSION: ICD-10-CM

## 2020-08-20 PROCEDURE — 3078F PR MOST RECENT DIASTOLIC BLOOD PRESSURE < 80 MM HG: ICD-10-PCS | Mod: HCNC,CPTII,S$GLB, | Performed by: FAMILY MEDICINE

## 2020-08-20 PROCEDURE — 99499 RISK ADDL DX/OHS AUDIT: ICD-10-PCS | Mod: S$GLB,,, | Performed by: FAMILY MEDICINE

## 2020-08-20 PROCEDURE — 3078F DIAST BP <80 MM HG: CPT | Mod: HCNC,CPTII,S$GLB, | Performed by: FAMILY MEDICINE

## 2020-08-20 PROCEDURE — 3008F BODY MASS INDEX DOCD: CPT | Mod: HCNC,CPTII,S$GLB, | Performed by: FAMILY MEDICINE

## 2020-08-20 PROCEDURE — 99999 PR PBB SHADOW E&M-EST. PATIENT-LVL IV: ICD-10-PCS | Mod: PBBFAC,HCNC,, | Performed by: FAMILY MEDICINE

## 2020-08-20 PROCEDURE — 3074F PR MOST RECENT SYSTOLIC BLOOD PRESSURE < 130 MM HG: ICD-10-PCS | Mod: HCNC,CPTII,S$GLB, | Performed by: FAMILY MEDICINE

## 2020-08-20 PROCEDURE — 3008F PR BODY MASS INDEX (BMI) DOCUMENTED: ICD-10-PCS | Mod: HCNC,CPTII,S$GLB, | Performed by: FAMILY MEDICINE

## 2020-08-20 PROCEDURE — 99999 PR PBB SHADOW E&M-EST. PATIENT-LVL IV: CPT | Mod: PBBFAC,HCNC,, | Performed by: FAMILY MEDICINE

## 2020-08-20 PROCEDURE — 3074F SYST BP LT 130 MM HG: CPT | Mod: HCNC,CPTII,S$GLB, | Performed by: FAMILY MEDICINE

## 2020-08-20 PROCEDURE — 99214 OFFICE O/P EST MOD 30 MIN: CPT | Mod: HCNC,S$GLB,, | Performed by: FAMILY MEDICINE

## 2020-08-20 PROCEDURE — 99499 UNLISTED E&M SERVICE: CPT | Mod: S$GLB,,, | Performed by: FAMILY MEDICINE

## 2020-08-20 PROCEDURE — 99214 PR OFFICE/OUTPT VISIT, EST, LEVL IV, 30-39 MIN: ICD-10-PCS | Mod: HCNC,S$GLB,, | Performed by: FAMILY MEDICINE

## 2020-08-20 NOTE — PROGRESS NOTES
Subjective:       Patient ID: Piotr Crespo is a 59 y.o. male.    Chief Complaint: Neurologic Problem      60 yo male with history of stroke with residual left hand weakness and spasticity, coronary artery disease, hypertension, mixed hyperlipidemia, and nicotine dependence.    He reports that [facility] has [facility]. He has involuntary jerks of the left upper extremity and is unable to appreciate light touch in left hand but is able to appreciate pain and temperature.    The following portions of the patient's history were reviewed and updated as appropriate: allergies, current medications, past family history, past medical history, past social history, past surgical history and problem list.    Review of Systems   Constitutional: Negative for activity change, appetite change, chills, diaphoresis, fatigue, fever and unexpected weight change.   HENT: Negative for nasal congestion, dental problem, facial swelling, nosebleeds, postnasal drip, rhinorrhea, sore throat, tinnitus and trouble swallowing.    Eyes: Negative for pain, discharge, itching and visual disturbance.   Respiratory: Negative for apnea, chest tightness, shortness of breath, wheezing and stridor.    Cardiovascular: Negative for chest pain, palpitations and leg swelling.   Gastrointestinal: Negative for abdominal distention, abdominal pain, constipation, diarrhea, nausea, rectal pain and vomiting.   Endocrine: Negative for cold intolerance, heat intolerance and polyuria.   Genitourinary: Negative for difficulty urinating, dysuria, frequency, hematuria and urgency.   Musculoskeletal: Positive for arthralgias. Negative for gait problem.   Integumentary:  Negative for color change and rash.   Neurological: Positive for weakness. Negative for dizziness, seizures, syncope, facial asymmetry and headaches.   Hematological: Negative for adenopathy. Does not bruise/bleed easily.   Psychiatric/Behavioral: Negative for agitation, confusion,  "hallucinations, self-injury and suicidal ideas. The patient is not hyperactive.           Objective:       Vitals:    08/20/20 1115   BP: 104/60   Pulse: 66   Temp: 97.7 °F (36.5 °C)   TempSrc: Oral   SpO2: 98%   Weight: 80.7 kg (177 lb 14.6 oz)   Height: 5' 8.5" (1.74 m)     Physical Exam  Constitutional:       General: He is not in acute distress.     Appearance: He is well-developed. He is not diaphoretic.   HENT:      Head: Normocephalic and atraumatic.      Right Ear: External ear normal.      Left Ear: External ear normal.   Eyes:      General: No scleral icterus.        Right eye: No discharge.         Left eye: No discharge.      Conjunctiva/sclera: Conjunctivae normal.      Pupils: Pupils are equal, round, and reactive to light.   Neck:      Musculoskeletal: Normal range of motion and neck supple.      Thyroid: No thyromegaly.   Cardiovascular:      Rate and Rhythm: Normal rate and regular rhythm.      Heart sounds: Normal heart sounds. No murmur. No friction rub. No gallop.    Pulmonary:      Effort: Pulmonary effort is normal. No respiratory distress.      Breath sounds: Normal breath sounds.   Chest:      Chest wall: No tenderness.   Abdominal:      General: Bowel sounds are normal. There is no distension.      Palpations: Abdomen is soft. There is no mass.      Tenderness: There is no abdominal tenderness. There is no guarding or rebound.   Lymphadenopathy:      Cervical: No cervical adenopathy.   Skin:     General: Skin is warm.      Capillary Refill: Capillary refill takes less than 2 seconds.      Findings: No rash.   Neurological:      Mental Status: He is alert and oriented to person, place, and time.      Cranial Nerves: No cranial nerve deficit.      Motor: No abnormal muscle tone.      Deep Tendon Reflexes: Reflexes normal.      Comments: Diminished  strength in left hand; normal appreciation of painful stimulus in left upper extremity but diminished when using monofilament testing; flexion " deformity most pronounced in left 4th and 5th digits.   Psychiatric:         Thought Content: Thought content normal.         Judgment: Judgment normal.          Assessment:       1. Left hand weakness    2. History of stroke    3. Mixed hyperlipidemia    4. Essential hypertension    5. History of left common carotid artery stent placement    6. KERRY (obstructive sleep apnea)    7. Cigarette nicotine dependence without complication    8. Prediabetes    9. Erectile dysfunction due to arterial insufficiency        Plan:       1. Left hand weakness  Continue Occupational therapy. 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 In my opinion patient would not be able to perform work as a .    2. History of stroke  Medically optimized. BP controlled. Encouraged to quit smoking.    3. Mixed hyperlipidemia  On statin and zetia    4. Essential hypertension  BP on target. Continue antihypertensive therapy.    5. History of left common carotid artery stent placement  On aspirin. No further events.    6. KERRY (obstructive sleep apnea)  He is aware of complications of untreated sleep apnea.    7. Cigarette nicotine dependence without complication  Smoking cessation advised. Patient is in the precontemplative phase of smoking cessation and can contact us for assistance when a decision to quit smoking is made.     8. Prediabetes  A1c 5.7 on 3/12/2020  Impaired fasting glucose is essentially early type 2 diabetes and needs to be treated as such with main emphasis on diet and exercise.    9. Erectile dysfunction due to arterial insufficiency  On cialis prn             Disclaimer: This note has been generated using voice-recognition software. There may be typographical errors that have been missed during proof-reading

## 2020-09-29 ENCOUNTER — PATIENT MESSAGE (OUTPATIENT)
Dept: OTHER | Facility: OTHER | Age: 60
End: 2020-09-29

## 2020-12-01 ENCOUNTER — OFFICE VISIT (OUTPATIENT)
Dept: URGENT CARE | Facility: CLINIC | Age: 60
End: 2020-12-01
Payer: MEDICARE

## 2020-12-01 ENCOUNTER — TELEPHONE (OUTPATIENT)
Dept: PRIMARY CARE CLINIC | Facility: CLINIC | Age: 60
End: 2020-12-01

## 2020-12-01 VITALS
BODY MASS INDEX: 26.22 KG/M2 | TEMPERATURE: 98 F | SYSTOLIC BLOOD PRESSURE: 144 MMHG | HEART RATE: 74 BPM | HEIGHT: 69 IN | RESPIRATION RATE: 16 BRPM | DIASTOLIC BLOOD PRESSURE: 82 MMHG | WEIGHT: 177 LBS | OXYGEN SATURATION: 96 %

## 2020-12-01 DIAGNOSIS — H57.9 ITCHY EYES: ICD-10-CM

## 2020-12-01 DIAGNOSIS — Z11.59 SCREENING FOR VIRAL DISEASE: Primary | ICD-10-CM

## 2020-12-01 DIAGNOSIS — R51.9 NONINTRACTABLE HEADACHE, UNSPECIFIED CHRONICITY PATTERN, UNSPECIFIED HEADACHE TYPE: ICD-10-CM

## 2020-12-01 LAB
CTP QC/QA: YES
GLUCOSE SERPL-MCNC: 85 MG/DL (ref 70–110)
POC ANION GAP: 15 MMOL/L (ref 10–20)
POC BUN: 13 MMOL/L (ref 8–26)
POC CHLORIDE: 102 MMOL/L (ref 98–109)
POC CREATININE: NORMAL MG/DL (ref 0.6–1.3)
POC HEMATOCRIT: 48 %PCV (ref 42–52)
POC HEMOGLOBIN: 16.3 G/DL (ref 13.5–18)
POC ICA: 1.21 MMOL/L (ref 1.12–1.32)
POC POTASSIUM: 4.1 MMOL/L (ref 3.5–4.9)
POC SODIUM: 141 MMOL/L (ref 138–146)
POC TCO2: 29 MMOL/L (ref 24–29)
SARS-COV-2 RDRP RESP QL NAA+PROBE: NEGATIVE

## 2020-12-01 PROCEDURE — 80047 BASIC METABLC PNL IONIZED CA: CPT | Mod: QW,S$GLB,, | Performed by: NURSE PRACTITIONER

## 2020-12-01 PROCEDURE — 3008F PR BODY MASS INDEX (BMI) DOCUMENTED: ICD-10-PCS | Mod: CPTII,S$GLB,, | Performed by: NURSE PRACTITIONER

## 2020-12-01 PROCEDURE — 3008F BODY MASS INDEX DOCD: CPT | Mod: CPTII,S$GLB,, | Performed by: NURSE PRACTITIONER

## 2020-12-01 PROCEDURE — U0002: ICD-10-PCS | Mod: QW,S$GLB,, | Performed by: NURSE PRACTITIONER

## 2020-12-01 PROCEDURE — 99214 PR OFFICE/OUTPT VISIT, EST, LEVL IV, 30-39 MIN: ICD-10-PCS | Mod: S$GLB,,, | Performed by: NURSE PRACTITIONER

## 2020-12-01 PROCEDURE — U0002 COVID-19 LAB TEST NON-CDC: HCPCS | Mod: QW,S$GLB,, | Performed by: NURSE PRACTITIONER

## 2020-12-01 PROCEDURE — 80047 POCT CHEMISTRY PANEL: ICD-10-PCS | Mod: QW,S$GLB,, | Performed by: NURSE PRACTITIONER

## 2020-12-01 PROCEDURE — 99214 OFFICE O/P EST MOD 30 MIN: CPT | Mod: S$GLB,,, | Performed by: NURSE PRACTITIONER

## 2020-12-01 NOTE — TELEPHONE ENCOUNTER
----- Message from Jenny Ball sent at 12/1/2020 12:21 PM CST -----  Regarding: Pt self Mobile/Home [phone]  Patient is calling in regards to him saying that he had blurry vision, headache, and pain in his left rib area on today. Patient went to [facility] on in [facility].

## 2020-12-01 NOTE — PROGRESS NOTES
"Subjective:       Patient ID: Piotr Crespo is a 59 y.o. male.    Vitals:  height is 5' 8.5" (1.74 m) and weight is 80.3 kg (177 lb). His temperature is 97.5 °F (36.4 °C). His blood pressure is 144/82 (abnormal) and his pulse is 74. His respiration is 16 and oxygen saturation is 96%.     Chief Complaint: URI    Pt states that he has been feeling ill since 11/27/20 and reports having a headaches that has been treatable since then. Reports also having blurred vision since then. Reports he had a stroke 3 years ago states no new weakness or dizziness.     URI   This is a new problem. The current episode started in the past 7 days. The problem has been gradually worsening. There has been no fever. Associated symptoms include headaches. Pertinent negatives include no congestion, coughing, ear pain, nausea, rash, sinus pain, sore throat, vomiting or wheezing. He has tried NSAIDs for the symptoms. The treatment provided moderate relief.       Constitution: Negative for chills, sweating, fatigue and fever.   HENT: Positive for sinus pressure. Negative for ear pain, congestion, sinus pain, sore throat and voice change.    Neck: Negative for painful lymph nodes.   Eyes: Positive for blurred vision. Negative for eye redness.   Respiratory: Negative for chest tightness, cough, sputum production, bloody sputum, COPD, shortness of breath, stridor, wheezing and asthma.    Gastrointestinal: Negative for nausea and vomiting.   Musculoskeletal: Positive for muscle ache.   Skin: Negative for rash.   Allergic/Immunologic: Negative for seasonal allergies and asthma.   Neurological: Positive for headaches.   Hematologic/Lymphatic: Negative for swollen lymph nodes.       Objective:      Physical Exam   Constitutional: He is oriented to person, place, and time. He appears well-developed. He is cooperative.  Non-toxic appearance. He does not appear ill. No distress.   HENT:   Head: Normocephalic and atraumatic.   Ears:   Right Ear: Hearing, " tympanic membrane, external ear and ear canal normal.   Left Ear: Hearing, tympanic membrane, external ear and ear canal normal.   Nose: Nose normal. No mucosal edema, rhinorrhea or nasal deformity. No epistaxis. Right sinus exhibits no maxillary sinus tenderness and no frontal sinus tenderness. Left sinus exhibits no maxillary sinus tenderness and no frontal sinus tenderness.   Mouth/Throat: Uvula is midline, oropharynx is clear and moist and mucous membranes are normal. No trismus in the jaw. Normal dentition. No uvula swelling. No posterior oropharyngeal erythema.   Eyes: Pupils are equal, round, and reactive to light. Conjunctivae and lids are normal. Right eye exhibits no discharge. Left eye exhibits no discharge. Right conjunctiva is not injected. Left conjunctiva is not injected. No scleral icterus.      Comments: Reports itchy and water eyes extraocular movement intact  Neck: Trachea normal, normal range of motion, full passive range of motion without pain and phonation normal. Neck supple.   Cardiovascular: Normal rate, regular rhythm, normal heart sounds and normal pulses.   Pulmonary/Chest: Effort normal and breath sounds normal. No respiratory distress.   Abdominal: Soft. Normal appearance and bowel sounds are normal. He exhibits no distension, no pulsatile midline mass and no mass. There is no abdominal tenderness.   Musculoskeletal: Normal range of motion.         General: No deformity.   Neurological: He is alert and oriented to person, place, and time. He has normal sensation and intact cranial nerves. He displays weakness, atrophy and abnormal reflex. He exhibits normal muscle tone. He has a normal Finger-Nose-Finger Test. Gait and coordination normal. Coordination normal.      Comments: History of stroke with left sided residual weakness.    4/5 strength in left arm and hand    atropy of left forearm    Skin: Skin is warm, dry, intact, not diaphoretic and not pale. Psychiatric: His speech is normal  "and behavior is normal. Judgment and thought content normal.   Nursing note and vitals reviewed.        Results for orders placed or performed in visit on 12/01/20   POCT COVID-19 Rapid Screening   Result Value Ref Range    POC Rapid COVID Negative Negative     Acceptable Yes    POCT Chemistry Panel   Result Value Ref Range    POC Sodium 141 138 - 146 MMOL/L    POC Potassium 4.1 3.5 - 4.9 MMOL/L    POC Chloride 102 98 - 109 MMOL/L    POC BUN 13 8 - 26 MMOL/L    POC Glucose 85 70 - 110 MG/DL    POC Creatinine 1..2 0.6 - 1.3 mg/dL    POC iCA 1.21 1.12 - 1.32 MMOL/L    POC TCO2 29 24 - 29 MMOL/L    POC Hematocrit 48 42 - 52 %PCV    POC Hemoglobin 16.3 13.5 - 18 g/dL    POC Anion Gap 15 10.0 - 20 MMOL/L     Assessment:       1. Screening for viral disease    2. Nonintractable headache, unspecified chronicity pattern, unspecified headache type    3. Itchy eyes        Plan:       Chart reviewed patient states had CTA of head and neck on 11/13/2020. No changes were noted.     Patient denies any new weakness. Asked patient to verify what he meant by "blurred vision" states eyes are itchy and watery.     Patient educated to follow up with primary as soon as possible     Strict ER precautions.    Discussed patient with Dr. Neumann who is in agreement with plan of care.     Screening for viral disease  -     POCT COVID-19 Rapid Screening    Nonintractable headache, unspecified chronicity pattern, unspecified headache type  -     POCT Chemistry Panel    Itchy eyes      Patient Instructions   Use an antihistamine such as Claritin, Zyrtec or Allegra to dry you out.     Take tylenol for minor pain.     Increase fluid intake.     Patient to follow up as soon as possible with primary care provider.       Go to the ER for any worsening symptoms including headache, visual changes, uncontrolled vomiting, uncontrolled dizziness, weakness, numbness, chest pain, shortness of breath.      You must understand that you've " "received an Urgent Care treatment only and that you may be released before all your medical problems are known or treated. You, the patient, will arrange for follow up care as instructed.  Follow up with your PCP or specialty clinic as directed in the next 1-2 weeks if not improved or as needed.  You can call [phone] to schedule an appointment with the appropriate provider.  If your condition worsens we recommend that you receive another evaluation at the emergency room immediately or contact your primary medical clinics after hours call service to discuss your concerns.  Please return here or go to the Emergency Department for any concerns or worsening of condition.      Headache, Unspecified    A number of things can cause headaches. The cause of your headache isnt clear. But it doesnt seem to be a sign of any serious illness.  You could have a tension headache or a migraine headache.  Stress can cause a tension headache. This can happen if you tense the muscles of your shoulders, neck, and scalp without knowing it. If this stress lasts long enough, you may develop a tension headache.  It is not clear why migraines occur, but certain things called" triggers" can raise the risk of having a migraine attack. Migraine triggers may include emotional stress or depression, or by hormone changes during the menstrual cycle. Other triggers include birth control pills and other medicines, alcohol or caffeine, foods with tyramine (such as aged cheese, wine), eyestrain, weather changes, missed meals, and lack of sleep or oversleeping.  Other causes of headache include:  · Viral illness with high fever  · Head injury with concussion  · Sinus, ear, or throat infection  · Dental pain and jaw joint (TMJ) pain  More serious but less common causes of headache include stroke, brain hemorrhage, brain tumor, meningitis, and encephalitis.  Home care  Follow these tips when taking care of yourself at home:  · Dont drive " yourself home if you were given pain medicine for your headache. Instead, have someone else drive you home. Try to sleep when you get home. You should feel much better when you wake up.  · Apply heat to the back of your neck to ease a neck muscle spasm. Take care of a migraine headache by putting an ice pack on your forehead or at the base of your skull.  · If you have nausea or vomiting, eat a light diet until your headache eases.  · If you have a migraine headache, use sunglasses when in the daylight or around bright indoor lighting until your symptoms get better. Bright glaring light can make this type of headache worse.  Follow-up care  Follow up with your healthcare provider, or as advised. Talk with your provider if you have frequent headaches. He or she can help figure out a treatment plan. By knowing the earliest signs of headache, and starting treatment right away, you may be able to stop the pain yourself.  When to seek medical advice  Call your healthcare provider right away if any of these occur:  · Your head pain suddenly gets worse after sexual intercourse or strenuous activity  · Your head pain doesnt get better within 24 hours  · You arent able to keep liquids down (repeated vomiting)  · Fever of 100.4ºF (38ºC) or higher, or as directed by your healthcare provider  · Stiff neck  · Extreme drowsiness, confusion, or fainting  · Dizziness or dizziness with spinning sensation (vertigo)  · Weakness in an arm or leg or one side of your face  · You have trouble talking or seeing  Date Last Reviewed: 8/1/2016 © 2000-2017 [employer]. [address] [place] [address], [place] [address]. All rights reserved. This information is not intended as a substitute for professional medical care. Always follow your healthcare professional's instructions.

## 2020-12-01 NOTE — PATIENT INSTRUCTIONS
"Use an antihistamine such as Claritin, Zyrtec or Allegra to dry you out.     Take tylenol for minor pain.     Increase fluid intake.     Patient to follow up as soon as possible with primary care provider.       Go to the ER for any worsening symptoms including headache, visual changes, uncontrolled vomiting, uncontrolled dizziness, weakness, numbness, chest pain, shortness of breath.      You must understand that you've received an Urgent Care treatment only and that you may be released before all your medical problems are known or treated. You, the patient, will arrange for follow up care as instructed.  Follow up with your PCP or specialty clinic as directed in the next 1-2 weeks if not improved or as needed.  You can call [phone] to schedule an appointment with the appropriate provider.  If your condition worsens we recommend that you receive another evaluation at the emergency room immediately or contact your primary medical clinics after hours call service to discuss your concerns.  Please return here or go to the Emergency Department for any concerns or worsening of condition.      Headache, Unspecified    A number of things can cause headaches. The cause of your headache isnt clear. But it doesnt seem to be a sign of any serious illness.  You could have a tension headache or a migraine headache.  Stress can cause a tension headache. This can happen if you tense the muscles of your shoulders, neck, and scalp without knowing it. If this stress lasts long enough, you may develop a tension headache.  It is not clear why migraines occur, but certain things called" triggers" can raise the risk of having a migraine attack. Migraine triggers may include emotional stress or depression, or by hormone changes during the menstrual cycle. Other triggers include birth control pills and other medicines, alcohol or caffeine, foods with tyramine (such as aged cheese, wine), eyestrain, weather changes, missed meals, " and lack of sleep or oversleeping.  Other causes of headache include:  · Viral illness with high fever  · Head injury with concussion  · Sinus, ear, or throat infection  · Dental pain and jaw joint (TMJ) pain  More serious but less common causes of headache include stroke, brain hemorrhage, brain tumor, meningitis, and encephalitis.  Home care  Follow these tips when taking care of yourself at home:  · Dont drive yourself home if you were given pain medicine for your headache. Instead, have someone else drive you home. Try to sleep when you get home. You should feel much better when you wake up.  · Apply heat to the back of your neck to ease a neck muscle spasm. Take care of a migraine headache by putting an ice pack on your forehead or at the base of your skull.  · If you have nausea or vomiting, eat a light diet until your headache eases.  · If you have a migraine headache, use sunglasses when in the daylight or around bright indoor lighting until your symptoms get better. Bright glaring light can make this type of headache worse.  Follow-up care  Follow up with your healthcare provider, or as advised. Talk with your provider if you have frequent headaches. He or she can help figure out a treatment plan. By knowing the earliest signs of headache, and starting treatment right away, you may be able to stop the pain yourself.  When to seek medical advice  Call your healthcare provider right away if any of these occur:  · Your head pain suddenly gets worse after sexual intercourse or strenuous activity  · Your head pain doesnt get better within 24 hours  · You arent able to keep liquids down (repeated vomiting)  · Fever of 100.4ºF (38ºC) or higher, or as directed by your healthcare provider  · Stiff neck  · Extreme drowsiness, confusion, or fainting  · Dizziness or dizziness with spinning sensation (vertigo)  · Weakness in an arm or leg or one side of your face  · You have trouble talking or seeing  Date Last  Reviewed: 8/1/2016  © 8230-7671 [employer], [employer]. [address] [place] [address], [place] [address]. All rights reserved. This information is not intended as a substitute for professional medical care. Always follow your healthcare professional's instructions.

## 2020-12-03 ENCOUNTER — OFFICE VISIT (OUTPATIENT)
Dept: PRIMARY CARE CLINIC | Facility: CLINIC | Age: 60
End: 2020-12-03
Payer: MEDICARE

## 2020-12-03 ENCOUNTER — TELEPHONE (OUTPATIENT)
Dept: PRIMARY CARE CLINIC | Facility: CLINIC | Age: 60
End: 2020-12-03

## 2020-12-03 VITALS
BODY MASS INDEX: 25.89 KG/M2 | HEART RATE: 68 BPM | SYSTOLIC BLOOD PRESSURE: 120 MMHG | WEIGHT: 174.81 LBS | DIASTOLIC BLOOD PRESSURE: 60 MMHG | TEMPERATURE: 99 F | HEIGHT: 69 IN | OXYGEN SATURATION: 99 %

## 2020-12-03 DIAGNOSIS — E78.5 HYPERLIPIDEMIA LDL GOAL <70: ICD-10-CM

## 2020-12-03 DIAGNOSIS — H54.7 VISION DECREASED: Primary | ICD-10-CM

## 2020-12-03 DIAGNOSIS — I10 ESSENTIAL HYPERTENSION: ICD-10-CM

## 2020-12-03 DIAGNOSIS — E78.2 MIXED HYPERLIPIDEMIA: ICD-10-CM

## 2020-12-03 PROCEDURE — 3078F PR MOST RECENT DIASTOLIC BLOOD PRESSURE < 80 MM HG: ICD-10-PCS | Mod: HCNC,CPTII,S$GLB, | Performed by: FAMILY MEDICINE

## 2020-12-03 PROCEDURE — 99212 PR OFFICE/OUTPT VISIT, EST, LEVL II, 10-19 MIN: ICD-10-PCS | Mod: HCNC,S$GLB,, | Performed by: FAMILY MEDICINE

## 2020-12-03 PROCEDURE — 3078F DIAST BP <80 MM HG: CPT | Mod: HCNC,CPTII,S$GLB, | Performed by: FAMILY MEDICINE

## 2020-12-03 PROCEDURE — 3074F SYST BP LT 130 MM HG: CPT | Mod: HCNC,CPTII,S$GLB, | Performed by: FAMILY MEDICINE

## 2020-12-03 PROCEDURE — 3008F BODY MASS INDEX DOCD: CPT | Mod: HCNC,CPTII,S$GLB, | Performed by: FAMILY MEDICINE

## 2020-12-03 PROCEDURE — 99212 OFFICE O/P EST SF 10 MIN: CPT | Mod: HCNC,S$GLB,, | Performed by: FAMILY MEDICINE

## 2020-12-03 PROCEDURE — 99999 PR PBB SHADOW E&M-EST. PATIENT-LVL V: ICD-10-PCS | Mod: PBBFAC,HCNC,, | Performed by: FAMILY MEDICINE

## 2020-12-03 PROCEDURE — 1126F AMNT PAIN NOTED NONE PRSNT: CPT | Mod: HCNC,S$GLB,, | Performed by: FAMILY MEDICINE

## 2020-12-03 PROCEDURE — 99999 PR PBB SHADOW E&M-EST. PATIENT-LVL V: CPT | Mod: PBBFAC,HCNC,, | Performed by: FAMILY MEDICINE

## 2020-12-03 PROCEDURE — 3008F PR BODY MASS INDEX (BMI) DOCUMENTED: ICD-10-PCS | Mod: HCNC,CPTII,S$GLB, | Performed by: FAMILY MEDICINE

## 2020-12-03 PROCEDURE — 3074F PR MOST RECENT SYSTOLIC BLOOD PRESSURE < 130 MM HG: ICD-10-PCS | Mod: HCNC,CPTII,S$GLB, | Performed by: FAMILY MEDICINE

## 2020-12-03 PROCEDURE — 1126F PR PAIN SEVERITY QUANTIFIED, NO PAIN PRESENT: ICD-10-PCS | Mod: HCNC,S$GLB,, | Performed by: FAMILY MEDICINE

## 2020-12-03 RX ORDER — IBUPROFEN 200 MG
TABLET ORAL
COMMUNITY
Start: 2020-11-03 | End: 2024-02-02

## 2020-12-03 RX ORDER — UREA 200 MG/G
CREAM TOPICAL
COMMUNITY
Start: 2020-10-14

## 2020-12-03 RX ORDER — FAMOTIDINE 20 MG/1
TABLET, FILM COATED ORAL
COMMUNITY
Start: 2020-11-09 | End: 2021-10-14 | Stop reason: ALTCHOICE

## 2020-12-03 RX ORDER — DIPHENHYDRAMINE HCL 25 MG
CAPSULE ORAL
COMMUNITY
Start: 2020-11-03

## 2020-12-03 RX ORDER — NICOTINE 7MG/24HR
PATCH, TRANSDERMAL 24 HOURS TRANSDERMAL
COMMUNITY
Start: 2020-11-04 | End: 2024-02-02

## 2020-12-03 RX ORDER — ALUMINUM HYDROXIDE, MAGNESIUM HYDROXIDE, AND SIMETHICONE 2400; 240; 2400 MG/30ML; MG/30ML; MG/30ML
SUSPENSION ORAL
COMMUNITY
Start: 2020-11-09 | End: 2022-07-21 | Stop reason: ALTCHOICE

## 2020-12-03 NOTE — PROGRESS NOTES
Subjective:       Patient ID: Piotr Crespo is a 59 y.o. male.    Chief Complaint: Transitional Care (URGENT CARE FOLLOW UP) and Medication Refill      60 yo male when to urgent care on 12/1/2020 for URI symptoms and stated he had blurred vision, itchy and watery eyes along with a headache.     Since his urgent care visit he has no headache.   He states that his vision is blurry when reading fine prints. He does not wear his prescription lenses. [facility] He just does not like to wear his lenses. He has no trouble with vision with far sight.     No new neurological deficit. [facility]. States that he always has new doctors with no continuity of care.    The following portions of the patient's history were reviewed and updated as appropriate: allergies, current medications, past family history, past medical history, past social history, past surgical history and problem list.    Review of Systems   Constitutional: Negative for activity change, appetite change, chills, diaphoresis, fatigue, fever and unexpected weight change.   HENT: Negative for nasal congestion, dental problem, facial swelling, nosebleeds, postnasal drip, rhinorrhea, sore throat, tinnitus and trouble swallowing.    Eyes: Positive for visual disturbance. Negative for pain, discharge and itching.   Respiratory: Negative for apnea, chest tightness, shortness of breath, wheezing and stridor.    Cardiovascular: Negative for chest pain, palpitations and leg swelling.   Gastrointestinal: Negative for abdominal distention, abdominal pain, constipation, diarrhea, nausea, rectal pain and vomiting.   Endocrine: Negative for cold intolerance, heat intolerance and polyuria.   Genitourinary: Negative for difficulty urinating, dysuria, frequency, hematuria and urgency.   Musculoskeletal: Positive for arthralgias. Negative for gait problem, myalgias, neck pain and neck stiffness.  "  Integumentary:  Negative for color change and rash.   Neurological: Positive for weakness. Negative for dizziness, tremors, seizures, syncope, facial asymmetry and headaches.   Hematological: Negative for adenopathy. Does not bruise/bleed easily.   Psychiatric/Behavioral: Negative for agitation, confusion, hallucinations, self-injury and suicidal ideas. The patient is not hyperactive.           Objective:       Vitals:    12/03/20 1132   BP: 120/60   Pulse: 68   Temp: 98.5 °F (36.9 °C)   TempSrc: Oral   SpO2: 99%   Weight: 79.3 kg (174 lb 13.2 oz)   Height: 5' 8.5" (1.74 m)     Physical Exam  Constitutional:       General: He is not in acute distress.     Appearance: He is not ill-appearing, toxic-appearing or diaphoretic.   HENT:      Head: Atraumatic.      Right Ear: Tympanic membrane normal.      Left Ear: Tympanic membrane normal.   Eyes:      General: No scleral icterus.        Right eye: No discharge.         Left eye: No discharge.      Extraocular Movements: Extraocular movements intact.      Conjunctiva/sclera: Conjunctivae normal.      Pupils: Pupils are equal, round, and reactive to light.   Cardiovascular:      Rate and Rhythm: Normal rate and regular rhythm.      Pulses: Normal pulses.      Heart sounds: Normal heart sounds.   Pulmonary:      Effort: Pulmonary effort is normal.      Breath sounds: Normal breath sounds.   Abdominal:      General: Bowel sounds are normal.      Palpations: Abdomen is soft.      Tenderness: There is no rebound.   Musculoskeletal:      Right lower leg: No edema.      Left lower leg: No edema.   Neurological:      Mental Status: He is alert and oriented to person, place, and time.      Sensory: No sensory deficit.      Deep Tendon Reflexes: Reflexes normal.      Comments: Power 4+ throughout   Psychiatric:         Mood and Affect: Mood normal.         Thought Content: Thought content normal.         Judgment: Judgment normal.         Assessment:       1. Vision decreased  "       Plan:       1. Vision decreased  20/25 and not wearing his readers. [facility]  He is neurological intact.    Disclaimer: This note has been generated using voice-recognition software. There may be typographical errors that have been missed during proof-reading

## 2020-12-03 NOTE — PROGRESS NOTES
Vision Screening without Correction     Right Eye (OD):  20/30  Left Eye (OS):  20/30  Both Eyes (OU):  20/25

## 2020-12-03 NOTE — TELEPHONE ENCOUNTER
----- Message from Cha Ardon MA sent at 12/3/2020 12:19 PM CST -----  Contact: [phone]  Requesting an RX refill or new RX.  Is this a refill or new RX: refill  RX name and strength:amLODIPine (NORVASC) 10 MG tablet  Is this a 30 day or 90 day RX: 90  Pharmacy name and phone # (copy/paste from chart):  [facility] [phone] (Phone)  [phone] (Fax)  Comments:   Requesting an RX refill or new RX.  Is this a refill or new RX:   RX name and strength:aspirin 81 MG Chew  Is this a 30 day or 90 day RX: 90  Pharmacy name and phone # (copy/paste from chart):  [facility] [phone] (Phone)  [phone] (Fax)  Comments:   Requesting an RX refill or new RX.  Is this a refill or new RX: refill  RX name and strength:carvediloL (COREG) 6.25 MG tablet  Is this a 30 day or 90 day RX: 90  Pharmacy name and phone # (copy/paste from chart):   [facility] [phone] (Phone)  [phone] (Fax)  Comments:   Requesting an RX refill or new RX.  Is this a refill or new RX: refill  RX name and strength:ezetimibe (ZETIA) 10 mg tablet  Is this a 30 day or 90 day RX: 90  Pharmacy name and phone # (copy/paste from chart):  [facility] [phone] (Phone)  [phone] (Fax)  Comments: Requesting an RX refill or new RX.  Is this a refill or new RX:   RX name and strength:atorvastatin (LIPITOR) 80 MG tablet  Is this a 30 day or 90 day RX: 90  Pharmacy name and phone # (copy/paste from chart):  [facility] [phone] (Phone)  [phone] (Fax)  Comments:

## 2020-12-11 ENCOUNTER — PATIENT MESSAGE (OUTPATIENT)
Dept: OTHER | Facility: OTHER | Age: 60
End: 2020-12-11

## 2021-03-04 DIAGNOSIS — E78.5 HYPERLIPIDEMIA LDL GOAL <70: ICD-10-CM

## 2021-03-04 DIAGNOSIS — E78.2 MIXED HYPERLIPIDEMIA: ICD-10-CM

## 2021-03-04 DIAGNOSIS — I10 ESSENTIAL HYPERTENSION: ICD-10-CM

## 2021-03-04 RX ORDER — ATORVASTATIN CALCIUM 80 MG/1
80 TABLET, FILM COATED ORAL DAILY
Qty: 90 TABLET | Refills: 3 | Status: SHIPPED | OUTPATIENT
Start: 2021-03-04 | End: 2021-10-12 | Stop reason: SDUPTHER

## 2021-03-04 RX ORDER — NAPROXEN SODIUM 220 MG/1
81 TABLET, FILM COATED ORAL DAILY
Qty: 90 TABLET | Refills: 3 | Status: SHIPPED | OUTPATIENT
Start: 2021-03-04 | End: 2022-05-10 | Stop reason: SDUPTHER

## 2021-03-04 RX ORDER — AMLODIPINE BESYLATE 10 MG/1
10 TABLET ORAL DAILY
Qty: 90 TABLET | Refills: 3 | Status: SHIPPED | OUTPATIENT
Start: 2021-03-04 | End: 2021-10-12 | Stop reason: SDUPTHER

## 2021-03-04 RX ORDER — CARVEDILOL 6.25 MG/1
6.25 TABLET ORAL 2 TIMES DAILY WITH MEALS
Qty: 180 TABLET | Refills: 3 | Status: SHIPPED | OUTPATIENT
Start: 2021-03-04 | End: 2021-10-12 | Stop reason: SDUPTHER

## 2021-03-04 RX ORDER — EZETIMIBE 10 MG/1
10 TABLET ORAL DAILY
Qty: 90 TABLET | Refills: 3 | Status: SHIPPED | OUTPATIENT
Start: 2021-03-04 | End: 2021-12-07 | Stop reason: SDUPTHER

## 2021-03-26 ENCOUNTER — TELEPHONE (OUTPATIENT)
Dept: PRIMARY CARE CLINIC | Facility: CLINIC | Age: 61
End: 2021-03-26

## 2021-03-26 DIAGNOSIS — R29.898 LEFT HAND WEAKNESS: Primary | ICD-10-CM

## 2021-03-30 ENCOUNTER — TELEPHONE (OUTPATIENT)
Dept: PRIMARY CARE CLINIC | Facility: CLINIC | Age: 61
End: 2021-03-30

## 2021-03-31 ENCOUNTER — TELEPHONE (OUTPATIENT)
Dept: PRIMARY CARE CLINIC | Facility: CLINIC | Age: 61
End: 2021-03-31

## 2021-04-05 ENCOUNTER — TELEPHONE (OUTPATIENT)
Dept: PRIMARY CARE CLINIC | Facility: CLINIC | Age: 61
End: 2021-04-05

## 2021-04-09 ENCOUNTER — TELEPHONE (OUTPATIENT)
Dept: PRIMARY CARE CLINIC | Facility: CLINIC | Age: 61
End: 2021-04-09

## 2021-04-19 ENCOUNTER — TELEPHONE (OUTPATIENT)
Dept: PRIMARY CARE CLINIC | Facility: CLINIC | Age: 61
End: 2021-04-19

## 2021-04-19 DIAGNOSIS — R29.898 LEFT HAND WEAKNESS: Primary | ICD-10-CM

## 2021-04-23 ENCOUNTER — CLINICAL SUPPORT (OUTPATIENT)
Dept: REHABILITATION | Facility: HOSPITAL | Age: 61
End: 2021-04-23
Payer: MEDICARE

## 2021-04-23 DIAGNOSIS — R26.9 IMPAIRED GAIT: Primary | ICD-10-CM

## 2021-04-23 DIAGNOSIS — R29.898 LEFT HAND WEAKNESS: ICD-10-CM

## 2021-04-23 DIAGNOSIS — Z86.73 HISTORY OF STROKE: ICD-10-CM

## 2021-04-23 DIAGNOSIS — G81.94 LEFT HEMIPARESIS: ICD-10-CM

## 2021-04-23 PROCEDURE — 97165 OT EVAL LOW COMPLEX 30 MIN: CPT | Mod: PO

## 2021-04-24 PROBLEM — G81.94 LEFT HEMIPARESIS: Status: ACTIVE | Noted: 2021-04-24

## 2021-04-28 ENCOUNTER — CLINICAL SUPPORT (OUTPATIENT)
Dept: REHABILITATION | Facility: HOSPITAL | Age: 61
End: 2021-04-28
Payer: MEDICARE

## 2021-04-28 ENCOUNTER — PATIENT MESSAGE (OUTPATIENT)
Dept: RESEARCH | Facility: HOSPITAL | Age: 61
End: 2021-04-28

## 2021-04-28 DIAGNOSIS — Z86.73 HISTORY OF STROKE: ICD-10-CM

## 2021-04-28 DIAGNOSIS — G81.94 LEFT HEMIPARESIS: ICD-10-CM

## 2021-04-28 PROCEDURE — 97530 THERAPEUTIC ACTIVITIES: CPT | Mod: PO

## 2021-04-28 PROCEDURE — 97112 NEUROMUSCULAR REEDUCATION: CPT | Mod: PO

## 2021-04-29 ENCOUNTER — LAB VISIT (OUTPATIENT)
Dept: LAB | Facility: HOSPITAL | Age: 61
End: 2021-04-29
Attending: FAMILY MEDICINE
Payer: MEDICARE

## 2021-04-29 ENCOUNTER — OFFICE VISIT (OUTPATIENT)
Dept: PRIMARY CARE CLINIC | Facility: CLINIC | Age: 61
End: 2021-04-29
Payer: MEDICARE

## 2021-04-29 VITALS
TEMPERATURE: 98 F | OXYGEN SATURATION: 98 % | BODY MASS INDEX: 26.02 KG/M2 | SYSTOLIC BLOOD PRESSURE: 126 MMHG | HEIGHT: 69 IN | DIASTOLIC BLOOD PRESSURE: 78 MMHG | HEART RATE: 63 BPM | WEIGHT: 175.69 LBS

## 2021-04-29 DIAGNOSIS — H53.9 VISION DISTURBANCE: ICD-10-CM

## 2021-04-29 DIAGNOSIS — F33.1 MODERATE EPISODE OF RECURRENT MAJOR DEPRESSIVE DISORDER: ICD-10-CM

## 2021-04-29 DIAGNOSIS — I25.10 CORONARY ARTERY DISEASE INVOLVING NATIVE CORONARY ARTERY OF NATIVE HEART WITHOUT ANGINA PECTORIS: ICD-10-CM

## 2021-04-29 DIAGNOSIS — F17.210 CIGARETTE NICOTINE DEPENDENCE WITHOUT COMPLICATION: ICD-10-CM

## 2021-04-29 DIAGNOSIS — R20.0 NUMBNESS OF LEFT HAND: ICD-10-CM

## 2021-04-29 DIAGNOSIS — Z12.5 SCREENING FOR MALIGNANT NEOPLASM OF PROSTATE: ICD-10-CM

## 2021-04-29 DIAGNOSIS — Z86.73 HISTORY OF STROKE: Primary | ICD-10-CM

## 2021-04-29 DIAGNOSIS — E78.2 MIXED HYPERLIPIDEMIA: ICD-10-CM

## 2021-04-29 DIAGNOSIS — N52.01 ERECTILE DYSFUNCTION DUE TO ARTERIAL INSUFFICIENCY: ICD-10-CM

## 2021-04-29 DIAGNOSIS — I10 ESSENTIAL HYPERTENSION: ICD-10-CM

## 2021-04-29 DIAGNOSIS — R73.03 PREDIABETES: ICD-10-CM

## 2021-04-29 DIAGNOSIS — G81.94 LEFT HEMIPARESIS: ICD-10-CM

## 2021-04-29 LAB
ALBUMIN SERPL BCP-MCNC: 4 G/DL (ref 3.5–5.2)
ALBUMIN/CREAT UR: 7.3 UG/MG (ref 0–30)
ALP SERPL-CCNC: 66 U/L (ref 55–135)
ALT SERPL W/O P-5'-P-CCNC: 15 U/L (ref 10–44)
ANION GAP SERPL CALC-SCNC: 6 MMOL/L (ref 8–16)
AST SERPL-CCNC: 17 U/L (ref 10–40)
BASOPHILS # BLD AUTO: 0.05 K/UL (ref 0–0.2)
BASOPHILS NFR BLD: 0.8 % (ref 0–1.9)
BILIRUB SERPL-MCNC: 0.5 MG/DL (ref 0.1–1)
BILIRUB UR QL STRIP: NEGATIVE
BUN SERPL-MCNC: 15 MG/DL (ref 8–23)
CALCIUM SERPL-MCNC: 9.4 MG/DL (ref 8.7–10.5)
CHLORIDE SERPL-SCNC: 108 MMOL/L (ref 95–110)
CHOLEST SERPL-MCNC: 106 MG/DL (ref 120–199)
CHOLEST/HDLC SERPL: 3.1 {RATIO} (ref 2–5)
CLARITY UR REFRACT.AUTO: CLEAR
CO2 SERPL-SCNC: 29 MMOL/L (ref 23–29)
COLOR UR AUTO: YELLOW
COMPLEXED PSA SERPL-MCNC: 0.43 NG/ML (ref 0–4)
CREAT SERPL-MCNC: 1.1 MG/DL (ref 0.5–1.4)
CREAT UR-MCNC: 150 MG/DL (ref 23–375)
DIFFERENTIAL METHOD: ABNORMAL
EOSINOPHIL # BLD AUTO: 0.1 K/UL (ref 0–0.5)
EOSINOPHIL NFR BLD: 1.4 % (ref 0–8)
ERYTHROCYTE [DISTWIDTH] IN BLOOD BY AUTOMATED COUNT: 13.4 % (ref 11.5–14.5)
EST. GFR  (AFRICAN AMERICAN): >60 ML/MIN/1.73 M^2
EST. GFR  (NON AFRICAN AMERICAN): >60 ML/MIN/1.73 M^2
ESTIMATED AVG GLUCOSE: 123 MG/DL (ref 68–131)
GLUCOSE SERPL-MCNC: 99 MG/DL (ref 70–110)
GLUCOSE UR QL STRIP: NEGATIVE
HBA1C MFR BLD: 5.9 % (ref 4–5.6)
HCT VFR BLD AUTO: 45.6 % (ref 40–54)
HDLC SERPL-MCNC: 34 MG/DL (ref 40–75)
HDLC SERPL: 32.1 % (ref 20–50)
HGB BLD-MCNC: 14.6 G/DL (ref 14–18)
HGB UR QL STRIP: NEGATIVE
IMM GRANULOCYTES # BLD AUTO: 0.02 K/UL (ref 0–0.04)
IMM GRANULOCYTES NFR BLD AUTO: 0.3 % (ref 0–0.5)
KETONES UR QL STRIP: NEGATIVE
LDLC SERPL CALC-MCNC: 61.6 MG/DL (ref 63–159)
LEUKOCYTE ESTERASE UR QL STRIP: NEGATIVE
LYMPHOCYTES # BLD AUTO: 2.7 K/UL (ref 1–4.8)
LYMPHOCYTES NFR BLD: 40.7 % (ref 18–48)
MCH RBC QN AUTO: 30 PG (ref 27–31)
MCHC RBC AUTO-ENTMCNC: 32 G/DL (ref 32–36)
MCV RBC AUTO: 94 FL (ref 82–98)
MICROALBUMIN UR DL<=1MG/L-MCNC: 11 UG/ML
MONOCYTES # BLD AUTO: 0.5 K/UL (ref 0.3–1)
MONOCYTES NFR BLD: 8 % (ref 4–15)
NEUTROPHILS # BLD AUTO: 3.2 K/UL (ref 1.8–7.7)
NEUTROPHILS NFR BLD: 48.8 % (ref 38–73)
NITRITE UR QL STRIP: NEGATIVE
NONHDLC SERPL-MCNC: 72 MG/DL
NRBC BLD-RTO: 0 /100 WBC
PH UR STRIP: 5 [PH] (ref 5–8)
PLATELET # BLD AUTO: 154 K/UL (ref 150–450)
PMV BLD AUTO: 13 FL (ref 9.2–12.9)
POTASSIUM SERPL-SCNC: 5 MMOL/L (ref 3.5–5.1)
PROT SERPL-MCNC: 7.4 G/DL (ref 6–8.4)
PROT UR QL STRIP: NEGATIVE
RBC # BLD AUTO: 4.86 M/UL (ref 4.6–6.2)
SODIUM SERPL-SCNC: 143 MMOL/L (ref 136–145)
SP GR UR STRIP: 1.02 (ref 1–1.03)
TRIGL SERPL-MCNC: 52 MG/DL (ref 30–150)
TSH SERPL DL<=0.005 MIU/L-ACNC: 1.49 UIU/ML (ref 0.4–4)
URN SPEC COLLECT METH UR: NORMAL
WBC # BLD AUTO: 6.53 K/UL (ref 3.9–12.7)

## 2021-04-29 PROCEDURE — 83036 HEMOGLOBIN GLYCOSYLATED A1C: CPT | Performed by: FAMILY MEDICINE

## 2021-04-29 PROCEDURE — 99999 PR PBB SHADOW E&M-EST. PATIENT-LVL V: CPT | Mod: PBBFAC,,, | Performed by: FAMILY MEDICINE

## 2021-04-29 PROCEDURE — 85025 COMPLETE CBC W/AUTO DIFF WBC: CPT | Performed by: FAMILY MEDICINE

## 2021-04-29 PROCEDURE — 3008F PR BODY MASS INDEX (BMI) DOCUMENTED: ICD-10-PCS | Mod: CPTII,S$GLB,, | Performed by: FAMILY MEDICINE

## 2021-04-29 PROCEDURE — 84153 ASSAY OF PSA TOTAL: CPT | Performed by: FAMILY MEDICINE

## 2021-04-29 PROCEDURE — 1125F AMNT PAIN NOTED PAIN PRSNT: CPT | Mod: S$GLB,,, | Performed by: FAMILY MEDICINE

## 2021-04-29 PROCEDURE — 99214 OFFICE O/P EST MOD 30 MIN: CPT | Mod: S$GLB,,, | Performed by: FAMILY MEDICINE

## 2021-04-29 PROCEDURE — 1125F PR PAIN SEVERITY QUANTIFIED, PAIN PRESENT: ICD-10-PCS | Mod: S$GLB,,, | Performed by: FAMILY MEDICINE

## 2021-04-29 PROCEDURE — 3008F BODY MASS INDEX DOCD: CPT | Mod: CPTII,S$GLB,, | Performed by: FAMILY MEDICINE

## 2021-04-29 PROCEDURE — 84443 ASSAY THYROID STIM HORMONE: CPT | Performed by: FAMILY MEDICINE

## 2021-04-29 PROCEDURE — 80053 COMPREHEN METABOLIC PANEL: CPT | Performed by: FAMILY MEDICINE

## 2021-04-29 PROCEDURE — 99999 PR PBB SHADOW E&M-EST. PATIENT-LVL V: ICD-10-PCS | Mod: PBBFAC,,, | Performed by: FAMILY MEDICINE

## 2021-04-29 PROCEDURE — 36415 COLL VENOUS BLD VENIPUNCTURE: CPT | Mod: PN | Performed by: FAMILY MEDICINE

## 2021-04-29 PROCEDURE — 82043 UR ALBUMIN QUANTITATIVE: CPT | Performed by: FAMILY MEDICINE

## 2021-04-29 PROCEDURE — 80061 LIPID PANEL: CPT | Performed by: FAMILY MEDICINE

## 2021-04-29 PROCEDURE — 81003 URINALYSIS AUTO W/O SCOPE: CPT | Performed by: FAMILY MEDICINE

## 2021-04-29 PROCEDURE — 82570 ASSAY OF URINE CREATININE: CPT | Performed by: FAMILY MEDICINE

## 2021-04-29 PROCEDURE — 99214 PR OFFICE/OUTPT VISIT, EST, LEVL IV, 30-39 MIN: ICD-10-PCS | Mod: S$GLB,,, | Performed by: FAMILY MEDICINE

## 2021-04-29 RX ORDER — GABAPENTIN 300 MG/1
300 CAPSULE ORAL NIGHTLY
Qty: 90 CAPSULE | Refills: 3 | Status: SHIPPED | OUTPATIENT
Start: 2021-04-29 | End: 2021-12-07 | Stop reason: SDUPTHER

## 2021-04-30 ENCOUNTER — CLINICAL SUPPORT (OUTPATIENT)
Dept: REHABILITATION | Facility: HOSPITAL | Age: 61
End: 2021-04-30
Payer: MEDICARE

## 2021-04-30 ENCOUNTER — TELEPHONE (OUTPATIENT)
Dept: PRIMARY CARE CLINIC | Facility: CLINIC | Age: 61
End: 2021-04-30

## 2021-04-30 DIAGNOSIS — Z86.73 HISTORY OF STROKE: ICD-10-CM

## 2021-04-30 DIAGNOSIS — G81.94 LEFT HEMIPARESIS: ICD-10-CM

## 2021-04-30 PROCEDURE — 97112 NEUROMUSCULAR REEDUCATION: CPT | Mod: PO

## 2021-04-30 PROCEDURE — 97110 THERAPEUTIC EXERCISES: CPT | Mod: PO

## 2021-05-04 ENCOUNTER — TELEPHONE (OUTPATIENT)
Dept: PRIMARY CARE CLINIC | Facility: CLINIC | Age: 61
End: 2021-05-04

## 2021-05-04 ENCOUNTER — CLINICAL SUPPORT (OUTPATIENT)
Dept: REHABILITATION | Facility: HOSPITAL | Age: 61
End: 2021-05-04
Payer: MEDICARE

## 2021-05-04 DIAGNOSIS — G81.94 LEFT HEMIPARESIS: ICD-10-CM

## 2021-05-04 DIAGNOSIS — Z86.73 HISTORY OF STROKE: ICD-10-CM

## 2021-05-04 PROCEDURE — 97112 NEUROMUSCULAR REEDUCATION: CPT | Mod: PO

## 2021-05-07 ENCOUNTER — CLINICAL SUPPORT (OUTPATIENT)
Dept: REHABILITATION | Facility: HOSPITAL | Age: 61
End: 2021-05-07
Payer: MEDICARE

## 2021-05-07 DIAGNOSIS — G81.94 LEFT HEMIPARESIS: ICD-10-CM

## 2021-05-07 DIAGNOSIS — Z86.73 HISTORY OF STROKE: ICD-10-CM

## 2021-05-07 PROCEDURE — 97112 NEUROMUSCULAR REEDUCATION: CPT | Mod: PO

## 2021-05-11 ENCOUNTER — CLINICAL SUPPORT (OUTPATIENT)
Dept: REHABILITATION | Facility: HOSPITAL | Age: 61
End: 2021-05-11
Payer: MEDICARE

## 2021-05-11 DIAGNOSIS — Z86.73 HISTORY OF STROKE: ICD-10-CM

## 2021-05-11 DIAGNOSIS — G81.94 LEFT HEMIPARESIS: ICD-10-CM

## 2021-05-11 PROCEDURE — 97530 THERAPEUTIC ACTIVITIES: CPT | Mod: PO

## 2021-05-11 PROCEDURE — 97112 NEUROMUSCULAR REEDUCATION: CPT | Mod: PO

## 2021-05-13 ENCOUNTER — CLINICAL SUPPORT (OUTPATIENT)
Dept: REHABILITATION | Facility: HOSPITAL | Age: 61
End: 2021-05-13
Payer: MEDICARE

## 2021-05-13 DIAGNOSIS — G81.94 LEFT HEMIPARESIS: ICD-10-CM

## 2021-05-13 DIAGNOSIS — Z86.73 HISTORY OF STROKE: ICD-10-CM

## 2021-05-13 PROCEDURE — 97530 THERAPEUTIC ACTIVITIES: CPT | Mod: PO

## 2021-05-13 PROCEDURE — 97112 NEUROMUSCULAR REEDUCATION: CPT | Mod: PO

## 2021-05-17 ENCOUNTER — CLINICAL SUPPORT (OUTPATIENT)
Dept: REHABILITATION | Facility: HOSPITAL | Age: 61
End: 2021-05-17
Payer: MEDICARE

## 2021-05-17 DIAGNOSIS — Z86.73 HISTORY OF STROKE: ICD-10-CM

## 2021-05-17 DIAGNOSIS — G81.94 LEFT HEMIPARESIS: ICD-10-CM

## 2021-05-17 PROCEDURE — 97530 THERAPEUTIC ACTIVITIES: CPT | Mod: PO

## 2021-05-17 PROCEDURE — 97112 NEUROMUSCULAR REEDUCATION: CPT | Mod: PO

## 2021-05-20 ENCOUNTER — CLINICAL SUPPORT (OUTPATIENT)
Dept: REHABILITATION | Facility: HOSPITAL | Age: 61
End: 2021-05-20
Payer: MEDICARE

## 2021-05-20 DIAGNOSIS — G81.94 LEFT HEMIPARESIS: ICD-10-CM

## 2021-05-20 DIAGNOSIS — Z86.73 HISTORY OF STROKE: ICD-10-CM

## 2021-05-20 PROCEDURE — 97112 NEUROMUSCULAR REEDUCATION: CPT | Mod: PO

## 2021-05-20 PROCEDURE — 97530 THERAPEUTIC ACTIVITIES: CPT | Mod: PO

## 2021-05-25 ENCOUNTER — CLINICAL SUPPORT (OUTPATIENT)
Dept: REHABILITATION | Facility: HOSPITAL | Age: 61
End: 2021-05-25
Attending: FAMILY MEDICINE
Payer: MEDICARE

## 2021-05-25 DIAGNOSIS — G81.94 LEFT HEMIPARESIS: ICD-10-CM

## 2021-05-25 DIAGNOSIS — Z86.73 HISTORY OF STROKE: ICD-10-CM

## 2021-05-25 PROCEDURE — 97530 THERAPEUTIC ACTIVITIES: CPT | Mod: PO

## 2021-05-25 PROCEDURE — 97112 NEUROMUSCULAR REEDUCATION: CPT | Mod: PO

## 2021-05-27 ENCOUNTER — CLINICAL SUPPORT (OUTPATIENT)
Dept: REHABILITATION | Facility: HOSPITAL | Age: 61
End: 2021-05-27
Payer: MEDICARE

## 2021-05-27 DIAGNOSIS — G81.94 LEFT HEMIPARESIS: ICD-10-CM

## 2021-05-27 DIAGNOSIS — Z86.73 HISTORY OF STROKE: ICD-10-CM

## 2021-05-27 PROCEDURE — 97530 THERAPEUTIC ACTIVITIES: CPT | Mod: PO

## 2021-05-27 PROCEDURE — 97112 NEUROMUSCULAR REEDUCATION: CPT | Mod: PO

## 2021-06-01 ENCOUNTER — CLINICAL SUPPORT (OUTPATIENT)
Dept: REHABILITATION | Facility: HOSPITAL | Age: 61
End: 2021-06-01
Payer: MEDICARE

## 2021-06-01 DIAGNOSIS — Z74.09 IMPAIRED FUNCTIONAL MOBILITY, BALANCE, GAIT, AND ENDURANCE: ICD-10-CM

## 2021-06-01 DIAGNOSIS — Z86.73 HISTORY OF STROKE: ICD-10-CM

## 2021-06-01 DIAGNOSIS — G81.94 LEFT HEMIPARESIS: ICD-10-CM

## 2021-06-01 DIAGNOSIS — R26.9 IMPAIRED GAIT: ICD-10-CM

## 2021-06-01 PROCEDURE — 97530 THERAPEUTIC ACTIVITIES: CPT | Mod: PO

## 2021-06-01 PROCEDURE — 97112 NEUROMUSCULAR REEDUCATION: CPT | Mod: PO

## 2021-06-01 PROCEDURE — 97161 PT EVAL LOW COMPLEX 20 MIN: CPT | Mod: PO

## 2021-06-03 ENCOUNTER — CLINICAL SUPPORT (OUTPATIENT)
Dept: REHABILITATION | Facility: HOSPITAL | Age: 61
End: 2021-06-03
Payer: MEDICARE

## 2021-06-03 DIAGNOSIS — Z86.73 HISTORY OF STROKE: ICD-10-CM

## 2021-06-03 DIAGNOSIS — G81.94 LEFT HEMIPARESIS: ICD-10-CM

## 2021-06-03 PROCEDURE — 97530 THERAPEUTIC ACTIVITIES: CPT | Mod: PO

## 2021-06-03 PROCEDURE — 97112 NEUROMUSCULAR REEDUCATION: CPT | Mod: PO

## 2021-06-08 ENCOUNTER — DOCUMENTATION ONLY (OUTPATIENT)
Dept: REHABILITATION | Facility: HOSPITAL | Age: 61
End: 2021-06-08

## 2021-06-08 ENCOUNTER — CLINICAL SUPPORT (OUTPATIENT)
Dept: REHABILITATION | Facility: HOSPITAL | Age: 61
End: 2021-06-08
Payer: MEDICARE

## 2021-06-08 DIAGNOSIS — G81.94 LEFT HEMIPARESIS: ICD-10-CM

## 2021-06-08 DIAGNOSIS — Z86.73 HISTORY OF STROKE: ICD-10-CM

## 2021-06-08 PROCEDURE — 97112 NEUROMUSCULAR REEDUCATION: CPT | Mod: PO

## 2021-06-08 PROCEDURE — 97530 THERAPEUTIC ACTIVITIES: CPT | Mod: PO

## 2021-06-10 ENCOUNTER — CLINICAL SUPPORT (OUTPATIENT)
Dept: REHABILITATION | Facility: HOSPITAL | Age: 61
End: 2021-06-10
Payer: MEDICARE

## 2021-06-10 DIAGNOSIS — Z86.73 HISTORY OF STROKE: ICD-10-CM

## 2021-06-10 DIAGNOSIS — G81.94 LEFT HEMIPARESIS: ICD-10-CM

## 2021-06-10 DIAGNOSIS — Z74.09 IMPAIRED FUNCTIONAL MOBILITY, BALANCE, GAIT, AND ENDURANCE: ICD-10-CM

## 2021-06-10 PROCEDURE — 97110 THERAPEUTIC EXERCISES: CPT | Mod: PO

## 2021-06-10 PROCEDURE — 97530 THERAPEUTIC ACTIVITIES: CPT | Mod: PO

## 2021-06-10 PROCEDURE — 97112 NEUROMUSCULAR REEDUCATION: CPT | Mod: PO

## 2021-06-18 ENCOUNTER — CLINICAL SUPPORT (OUTPATIENT)
Dept: REHABILITATION | Facility: HOSPITAL | Age: 61
End: 2021-06-18
Payer: MEDICARE

## 2021-06-18 DIAGNOSIS — G81.94 LEFT HEMIPARESIS: ICD-10-CM

## 2021-06-18 DIAGNOSIS — Z86.73 HISTORY OF STROKE: ICD-10-CM

## 2021-06-18 DIAGNOSIS — Z74.09 IMPAIRED FUNCTIONAL MOBILITY, BALANCE, GAIT, AND ENDURANCE: ICD-10-CM

## 2021-06-18 PROCEDURE — 97112 NEUROMUSCULAR REEDUCATION: CPT | Mod: PO

## 2021-06-18 PROCEDURE — 97110 THERAPEUTIC EXERCISES: CPT | Mod: PO

## 2021-06-18 PROCEDURE — 97530 THERAPEUTIC ACTIVITIES: CPT | Mod: PO

## 2021-06-21 ENCOUNTER — CLINICAL SUPPORT (OUTPATIENT)
Dept: REHABILITATION | Facility: HOSPITAL | Age: 61
End: 2021-06-21
Payer: MEDICARE

## 2021-06-21 DIAGNOSIS — G81.94 LEFT HEMIPARESIS: ICD-10-CM

## 2021-06-21 DIAGNOSIS — Z86.73 HISTORY OF STROKE: ICD-10-CM

## 2021-06-21 PROCEDURE — 97530 THERAPEUTIC ACTIVITIES: CPT | Mod: PO

## 2021-06-21 PROCEDURE — 97112 NEUROMUSCULAR REEDUCATION: CPT | Mod: PO

## 2021-06-24 ENCOUNTER — CLINICAL SUPPORT (OUTPATIENT)
Dept: REHABILITATION | Facility: HOSPITAL | Age: 61
End: 2021-06-24
Payer: MEDICARE

## 2021-06-24 DIAGNOSIS — G81.94 LEFT HEMIPARESIS: ICD-10-CM

## 2021-06-24 DIAGNOSIS — Z86.73 HISTORY OF STROKE: ICD-10-CM

## 2021-07-01 ENCOUNTER — OFFICE VISIT (OUTPATIENT)
Dept: OPTOMETRY | Facility: CLINIC | Age: 61
End: 2021-07-01
Payer: MEDICARE

## 2021-07-01 DIAGNOSIS — I10 ESSENTIAL HYPERTENSION: Primary | ICD-10-CM

## 2021-07-01 DIAGNOSIS — H52.4 PRESBYOPIA: ICD-10-CM

## 2021-07-01 DIAGNOSIS — H52.222 REGULAR ASTIGMATISM OF LEFT EYE: ICD-10-CM

## 2021-07-01 DIAGNOSIS — H57.12 EYE DISCOMFORT, LEFT: ICD-10-CM

## 2021-07-01 PROCEDURE — 99999 PR PBB SHADOW E&M-EST. PATIENT-LVL III: CPT | Mod: PBBFAC,,, | Performed by: OPTOMETRIST

## 2021-07-01 PROCEDURE — 92015 DETERMINE REFRACTIVE STATE: CPT | Mod: S$GLB,,, | Performed by: OPTOMETRIST

## 2021-07-01 PROCEDURE — 92004 COMPRE OPH EXAM NEW PT 1/>: CPT | Mod: S$GLB,,, | Performed by: OPTOMETRIST

## 2021-07-01 PROCEDURE — 92004 PR EYE EXAM, NEW PATIENT,COMPREHESV: ICD-10-PCS | Mod: S$GLB,,, | Performed by: OPTOMETRIST

## 2021-07-01 PROCEDURE — 1126F PR PAIN SEVERITY QUANTIFIED, NO PAIN PRESENT: ICD-10-PCS | Mod: S$GLB,,, | Performed by: OPTOMETRIST

## 2021-07-01 PROCEDURE — 99999 PR PBB SHADOW E&M-EST. PATIENT-LVL III: ICD-10-PCS | Mod: PBBFAC,,, | Performed by: OPTOMETRIST

## 2021-07-01 PROCEDURE — 92015 PR REFRACTION: ICD-10-PCS | Mod: S$GLB,,, | Performed by: OPTOMETRIST

## 2021-07-01 PROCEDURE — 1126F AMNT PAIN NOTED NONE PRSNT: CPT | Mod: S$GLB,,, | Performed by: OPTOMETRIST

## 2021-07-01 RX ORDER — SENNOSIDES 25 MG/1
TABLET, FILM COATED ORAL
COMMUNITY
Start: 2021-05-12 | End: 2022-07-21 | Stop reason: ALTCHOICE

## 2021-07-01 RX ORDER — CLOTRIMAZOLE 1 G/ML
SOLUTION TOPICAL
COMMUNITY
Start: 2021-02-09

## 2021-07-02 ENCOUNTER — CLINICAL SUPPORT (OUTPATIENT)
Dept: REHABILITATION | Facility: HOSPITAL | Age: 61
End: 2021-07-02
Payer: MEDICARE

## 2021-07-02 DIAGNOSIS — Z74.09 IMPAIRED FUNCTIONAL MOBILITY, BALANCE, GAIT, AND ENDURANCE: ICD-10-CM

## 2021-07-02 PROCEDURE — 97110 THERAPEUTIC EXERCISES: CPT | Mod: PO

## 2021-07-02 PROCEDURE — 97112 NEUROMUSCULAR REEDUCATION: CPT | Mod: PO

## 2021-07-08 ENCOUNTER — OFFICE VISIT (OUTPATIENT)
Dept: NEUROLOGY | Facility: CLINIC | Age: 61
End: 2021-07-08
Payer: MEDICARE

## 2021-07-08 VITALS
HEIGHT: 69 IN | HEART RATE: 69 BPM | SYSTOLIC BLOOD PRESSURE: 116 MMHG | WEIGHT: 177.38 LBS | BODY MASS INDEX: 26.27 KG/M2 | DIASTOLIC BLOOD PRESSURE: 74 MMHG

## 2021-07-08 DIAGNOSIS — R73.03 PREDIABETES: ICD-10-CM

## 2021-07-08 DIAGNOSIS — Z74.09 IMPAIRED FUNCTIONAL MOBILITY, BALANCE, GAIT, AND ENDURANCE: ICD-10-CM

## 2021-07-08 DIAGNOSIS — G81.94 LEFT HEMIPARESIS: Primary | ICD-10-CM

## 2021-07-08 DIAGNOSIS — F17.210 CIGARETTE NICOTINE DEPENDENCE WITHOUT COMPLICATION: ICD-10-CM

## 2021-07-08 DIAGNOSIS — Z86.73 HISTORY OF STROKE: ICD-10-CM

## 2021-07-08 DIAGNOSIS — I10 ESSENTIAL HYPERTENSION: ICD-10-CM

## 2021-07-08 DIAGNOSIS — E78.2 MIXED HYPERLIPIDEMIA: ICD-10-CM

## 2021-07-08 DIAGNOSIS — H54.7 VISION DECREASED: ICD-10-CM

## 2021-07-08 PROCEDURE — 99999 PR PBB SHADOW E&M-EST. PATIENT-LVL IV: ICD-10-PCS | Mod: PBBFAC,GC,, | Performed by: STUDENT IN AN ORGANIZED HEALTH CARE EDUCATION/TRAINING PROGRAM

## 2021-07-08 PROCEDURE — 99999 PR PBB SHADOW E&M-EST. PATIENT-LVL IV: CPT | Mod: PBBFAC,GC,, | Performed by: STUDENT IN AN ORGANIZED HEALTH CARE EDUCATION/TRAINING PROGRAM

## 2021-07-08 PROCEDURE — 99204 PR OFFICE/OUTPT VISIT, NEW, LEVL IV, 45-59 MIN: ICD-10-PCS | Mod: GC,S$GLB,, | Performed by: STUDENT IN AN ORGANIZED HEALTH CARE EDUCATION/TRAINING PROGRAM

## 2021-07-08 PROCEDURE — 99204 OFFICE O/P NEW MOD 45 MIN: CPT | Mod: GC,S$GLB,, | Performed by: STUDENT IN AN ORGANIZED HEALTH CARE EDUCATION/TRAINING PROGRAM

## 2021-07-09 ENCOUNTER — CLINICAL SUPPORT (OUTPATIENT)
Dept: REHABILITATION | Facility: HOSPITAL | Age: 61
End: 2021-07-09
Payer: MEDICARE

## 2021-07-09 DIAGNOSIS — Z74.09 IMPAIRED FUNCTIONAL MOBILITY, BALANCE, GAIT, AND ENDURANCE: ICD-10-CM

## 2021-07-09 PROCEDURE — 97112 NEUROMUSCULAR REEDUCATION: CPT | Mod: PO

## 2021-07-09 PROCEDURE — 97110 THERAPEUTIC EXERCISES: CPT | Mod: PO

## 2021-07-13 ENCOUNTER — CLINICAL SUPPORT (OUTPATIENT)
Dept: REHABILITATION | Facility: HOSPITAL | Age: 61
End: 2021-07-13
Payer: MEDICARE

## 2021-07-13 DIAGNOSIS — Z86.73 HISTORY OF STROKE: ICD-10-CM

## 2021-07-13 DIAGNOSIS — G81.94 LEFT HEMIPARESIS: ICD-10-CM

## 2021-07-13 PROCEDURE — 97530 THERAPEUTIC ACTIVITIES: CPT | Mod: PO

## 2021-07-13 PROCEDURE — 97112 NEUROMUSCULAR REEDUCATION: CPT | Mod: PO

## 2021-07-15 ENCOUNTER — CLINICAL SUPPORT (OUTPATIENT)
Dept: REHABILITATION | Facility: HOSPITAL | Age: 61
End: 2021-07-15
Payer: MEDICARE

## 2021-07-15 DIAGNOSIS — Z74.09 IMPAIRED FUNCTIONAL MOBILITY, BALANCE, GAIT, AND ENDURANCE: ICD-10-CM

## 2021-07-15 DIAGNOSIS — G81.94 LEFT HEMIPARESIS: ICD-10-CM

## 2021-07-15 DIAGNOSIS — Z86.73 HISTORY OF STROKE: ICD-10-CM

## 2021-07-15 PROCEDURE — 97112 NEUROMUSCULAR REEDUCATION: CPT | Mod: PO

## 2021-07-15 PROCEDURE — 97110 THERAPEUTIC EXERCISES: CPT | Mod: PO

## 2021-07-15 PROCEDURE — 97530 THERAPEUTIC ACTIVITIES: CPT | Mod: PO

## 2021-07-22 ENCOUNTER — CLINICAL SUPPORT (OUTPATIENT)
Dept: REHABILITATION | Facility: HOSPITAL | Age: 61
End: 2021-07-22
Payer: MEDICARE

## 2021-07-22 DIAGNOSIS — G81.94 LEFT HEMIPARESIS: ICD-10-CM

## 2021-07-22 DIAGNOSIS — Z86.73 HISTORY OF STROKE: ICD-10-CM

## 2021-07-22 DIAGNOSIS — Z74.09 IMPAIRED FUNCTIONAL MOBILITY, BALANCE, GAIT, AND ENDURANCE: ICD-10-CM

## 2021-07-22 PROCEDURE — 97530 THERAPEUTIC ACTIVITIES: CPT | Mod: PO

## 2021-07-22 PROCEDURE — 97112 NEUROMUSCULAR REEDUCATION: CPT | Mod: PO

## 2021-07-22 PROCEDURE — 97110 THERAPEUTIC EXERCISES: CPT | Mod: PO

## 2021-07-27 ENCOUNTER — CLINICAL SUPPORT (OUTPATIENT)
Dept: REHABILITATION | Facility: HOSPITAL | Age: 61
End: 2021-07-27
Payer: MEDICARE

## 2021-07-27 DIAGNOSIS — Z74.09 IMPAIRED FUNCTIONAL MOBILITY, BALANCE, GAIT, AND ENDURANCE: ICD-10-CM

## 2021-07-27 PROCEDURE — 97112 NEUROMUSCULAR REEDUCATION: CPT | Mod: PO

## 2021-07-27 PROCEDURE — 97110 THERAPEUTIC EXERCISES: CPT | Mod: PO

## 2021-07-29 ENCOUNTER — OFFICE VISIT (OUTPATIENT)
Dept: PRIMARY CARE CLINIC | Facility: CLINIC | Age: 61
End: 2021-07-29
Payer: MEDICARE

## 2021-07-29 VITALS
SYSTOLIC BLOOD PRESSURE: 114 MMHG | HEART RATE: 65 BPM | WEIGHT: 177.25 LBS | TEMPERATURE: 98 F | DIASTOLIC BLOOD PRESSURE: 70 MMHG | OXYGEN SATURATION: 98 % | HEIGHT: 69 IN | BODY MASS INDEX: 26.25 KG/M2

## 2021-07-29 DIAGNOSIS — H52.4 PRESBYOPIA: ICD-10-CM

## 2021-07-29 DIAGNOSIS — F17.210 NICOTINE DEPENDENCE, CIGARETTES, UNCOMPLICATED: ICD-10-CM

## 2021-07-29 DIAGNOSIS — Z12.2 ENCOUNTER FOR SCREENING FOR LUNG CANCER: ICD-10-CM

## 2021-07-29 DIAGNOSIS — N52.01 ERECTILE DYSFUNCTION DUE TO ARTERIAL INSUFFICIENCY: ICD-10-CM

## 2021-07-29 DIAGNOSIS — R20.0 NUMBNESS OF LEFT HAND: ICD-10-CM

## 2021-07-29 DIAGNOSIS — I10 ESSENTIAL HYPERTENSION: ICD-10-CM

## 2021-07-29 DIAGNOSIS — E78.2 MIXED HYPERLIPIDEMIA: ICD-10-CM

## 2021-07-29 DIAGNOSIS — Z86.73 HISTORY OF STROKE: Primary | ICD-10-CM

## 2021-07-29 DIAGNOSIS — R35.1 BENIGN PROSTATIC HYPERPLASIA WITH NOCTURIA: ICD-10-CM

## 2021-07-29 DIAGNOSIS — F33.1 MODERATE EPISODE OF RECURRENT MAJOR DEPRESSIVE DISORDER: ICD-10-CM

## 2021-07-29 DIAGNOSIS — R09.A2 GLOBUS SENSATION: ICD-10-CM

## 2021-07-29 DIAGNOSIS — G81.94 LEFT HEMIPARESIS: ICD-10-CM

## 2021-07-29 DIAGNOSIS — N40.1 BENIGN PROSTATIC HYPERPLASIA WITH NOCTURIA: ICD-10-CM

## 2021-07-29 DIAGNOSIS — I25.10 CORONARY ARTERY DISEASE INVOLVING NATIVE CORONARY ARTERY OF NATIVE HEART WITHOUT ANGINA PECTORIS: ICD-10-CM

## 2021-07-29 DIAGNOSIS — Z12.5 SCREENING PSA (PROSTATE SPECIFIC ANTIGEN): ICD-10-CM

## 2021-07-29 DIAGNOSIS — R73.03 PREDIABETES: ICD-10-CM

## 2021-07-29 PROCEDURE — 1159F PR MEDICATION LIST DOCUMENTED IN MEDICAL RECORD: ICD-10-PCS | Mod: CPTII,S$GLB,, | Performed by: FAMILY MEDICINE

## 2021-07-29 PROCEDURE — 3074F PR MOST RECENT SYSTOLIC BLOOD PRESSURE < 130 MM HG: ICD-10-PCS | Mod: CPTII,S$GLB,, | Performed by: FAMILY MEDICINE

## 2021-07-29 PROCEDURE — 3008F BODY MASS INDEX DOCD: CPT | Mod: CPTII,S$GLB,, | Performed by: FAMILY MEDICINE

## 2021-07-29 PROCEDURE — 99214 PR OFFICE/OUTPT VISIT, EST, LEVL IV, 30-39 MIN: ICD-10-PCS | Mod: S$GLB,,, | Performed by: FAMILY MEDICINE

## 2021-07-29 PROCEDURE — 3044F PR MOST RECENT HEMOGLOBIN A1C LEVEL <7.0%: ICD-10-PCS | Mod: CPTII,S$GLB,, | Performed by: FAMILY MEDICINE

## 2021-07-29 PROCEDURE — 1159F MED LIST DOCD IN RCRD: CPT | Mod: CPTII,S$GLB,, | Performed by: FAMILY MEDICINE

## 2021-07-29 PROCEDURE — 3078F PR MOST RECENT DIASTOLIC BLOOD PRESSURE < 80 MM HG: ICD-10-PCS | Mod: CPTII,S$GLB,, | Performed by: FAMILY MEDICINE

## 2021-07-29 PROCEDURE — 99214 OFFICE O/P EST MOD 30 MIN: CPT | Mod: S$GLB,,, | Performed by: FAMILY MEDICINE

## 2021-07-29 PROCEDURE — 1160F PR REVIEW ALL MEDS BY PRESCRIBER/CLIN PHARMACIST DOCUMENTED: ICD-10-PCS | Mod: CPTII,S$GLB,, | Performed by: FAMILY MEDICINE

## 2021-07-29 PROCEDURE — 1125F AMNT PAIN NOTED PAIN PRSNT: CPT | Mod: CPTII,S$GLB,, | Performed by: FAMILY MEDICINE

## 2021-07-29 PROCEDURE — 3078F DIAST BP <80 MM HG: CPT | Mod: CPTII,S$GLB,, | Performed by: FAMILY MEDICINE

## 2021-07-29 PROCEDURE — 1125F PR PAIN SEVERITY QUANTIFIED, PAIN PRESENT: ICD-10-PCS | Mod: CPTII,S$GLB,, | Performed by: FAMILY MEDICINE

## 2021-07-29 PROCEDURE — 3008F PR BODY MASS INDEX (BMI) DOCUMENTED: ICD-10-PCS | Mod: CPTII,S$GLB,, | Performed by: FAMILY MEDICINE

## 2021-07-29 PROCEDURE — 99999 PR PBB SHADOW E&M-EST. PATIENT-LVL V: CPT | Mod: PBBFAC,,, | Performed by: FAMILY MEDICINE

## 2021-07-29 PROCEDURE — 3074F SYST BP LT 130 MM HG: CPT | Mod: CPTII,S$GLB,, | Performed by: FAMILY MEDICINE

## 2021-07-29 PROCEDURE — 1160F RVW MEDS BY RX/DR IN RCRD: CPT | Mod: CPTII,S$GLB,, | Performed by: FAMILY MEDICINE

## 2021-07-29 PROCEDURE — 3044F HG A1C LEVEL LT 7.0%: CPT | Mod: CPTII,S$GLB,, | Performed by: FAMILY MEDICINE

## 2021-07-29 PROCEDURE — 99999 PR PBB SHADOW E&M-EST. PATIENT-LVL V: ICD-10-PCS | Mod: PBBFAC,,, | Performed by: FAMILY MEDICINE

## 2021-07-29 RX ORDER — TAMSULOSIN HYDROCHLORIDE 0.4 MG/1
0.4 CAPSULE ORAL DAILY
Qty: 90 CAPSULE | Refills: 3 | Status: SHIPPED | OUTPATIENT
Start: 2021-07-29 | End: 2021-12-07 | Stop reason: SDUPTHER

## 2021-07-30 ENCOUNTER — CLINICAL SUPPORT (OUTPATIENT)
Dept: REHABILITATION | Facility: HOSPITAL | Age: 61
End: 2021-07-30
Payer: MEDICARE

## 2021-07-30 DIAGNOSIS — G81.94 LEFT HEMIPARESIS: ICD-10-CM

## 2021-07-30 DIAGNOSIS — Z86.73 HISTORY OF STROKE: ICD-10-CM

## 2021-07-30 PROCEDURE — 97530 THERAPEUTIC ACTIVITIES: CPT | Mod: PO

## 2021-07-30 PROCEDURE — 97112 NEUROMUSCULAR REEDUCATION: CPT | Mod: PO

## 2021-08-03 ENCOUNTER — CLINICAL SUPPORT (OUTPATIENT)
Dept: REHABILITATION | Facility: HOSPITAL | Age: 61
End: 2021-08-03
Payer: MEDICARE

## 2021-08-03 ENCOUNTER — TELEPHONE (OUTPATIENT)
Dept: PRIMARY CARE CLINIC | Facility: CLINIC | Age: 61
End: 2021-08-03

## 2021-08-03 DIAGNOSIS — G81.94 LEFT HEMIPARESIS: ICD-10-CM

## 2021-08-03 DIAGNOSIS — Z86.73 HISTORY OF STROKE: ICD-10-CM

## 2021-08-03 PROCEDURE — 97112 NEUROMUSCULAR REEDUCATION: CPT | Mod: PO

## 2021-08-03 PROCEDURE — 97530 THERAPEUTIC ACTIVITIES: CPT | Mod: PO

## 2021-08-05 ENCOUNTER — CLINICAL SUPPORT (OUTPATIENT)
Dept: REHABILITATION | Facility: HOSPITAL | Age: 61
End: 2021-08-05
Payer: MEDICARE

## 2021-08-05 DIAGNOSIS — Z86.73 HISTORY OF STROKE: ICD-10-CM

## 2021-08-05 DIAGNOSIS — G81.94 LEFT HEMIPARESIS: ICD-10-CM

## 2021-08-05 PROCEDURE — 97530 THERAPEUTIC ACTIVITIES: CPT | Mod: PO

## 2021-08-05 PROCEDURE — 97112 NEUROMUSCULAR REEDUCATION: CPT | Mod: PO

## 2021-08-10 ENCOUNTER — CLINICAL SUPPORT (OUTPATIENT)
Dept: REHABILITATION | Facility: HOSPITAL | Age: 61
End: 2021-08-10
Payer: MEDICARE

## 2021-08-10 DIAGNOSIS — Z86.73 HISTORY OF STROKE: ICD-10-CM

## 2021-08-10 DIAGNOSIS — G81.94 LEFT HEMIPARESIS: ICD-10-CM

## 2021-08-10 PROCEDURE — 97112 NEUROMUSCULAR REEDUCATION: CPT | Mod: PO

## 2021-08-10 PROCEDURE — 97530 THERAPEUTIC ACTIVITIES: CPT | Mod: PO

## 2021-08-17 ENCOUNTER — CLINICAL SUPPORT (OUTPATIENT)
Dept: REHABILITATION | Facility: HOSPITAL | Age: 61
End: 2021-08-17
Payer: MEDICARE

## 2021-08-17 DIAGNOSIS — G81.94 LEFT HEMIPARESIS: ICD-10-CM

## 2021-08-17 DIAGNOSIS — Z86.73 HISTORY OF STROKE: ICD-10-CM

## 2021-08-17 PROCEDURE — 97530 THERAPEUTIC ACTIVITIES: CPT | Mod: PO

## 2021-08-17 PROCEDURE — 97112 NEUROMUSCULAR REEDUCATION: CPT | Mod: PO

## 2021-08-19 ENCOUNTER — CLINICAL SUPPORT (OUTPATIENT)
Dept: REHABILITATION | Facility: HOSPITAL | Age: 61
End: 2021-08-19
Payer: MEDICARE

## 2021-08-19 DIAGNOSIS — G81.94 LEFT HEMIPARESIS: ICD-10-CM

## 2021-08-19 DIAGNOSIS — Z86.73 HISTORY OF STROKE: ICD-10-CM

## 2021-08-19 PROCEDURE — 97112 NEUROMUSCULAR REEDUCATION: CPT | Mod: PO

## 2021-08-19 PROCEDURE — 97530 THERAPEUTIC ACTIVITIES: CPT | Mod: PO

## 2021-08-26 ENCOUNTER — CLINICAL SUPPORT (OUTPATIENT)
Dept: REHABILITATION | Facility: HOSPITAL | Age: 61
End: 2021-08-26
Payer: MEDICARE

## 2021-08-26 DIAGNOSIS — G81.94 LEFT HEMIPARESIS: ICD-10-CM

## 2021-08-26 DIAGNOSIS — Z86.73 HISTORY OF STROKE: ICD-10-CM

## 2021-08-26 PROCEDURE — 97112 NEUROMUSCULAR REEDUCATION: CPT | Mod: PO

## 2021-08-26 PROCEDURE — 97530 THERAPEUTIC ACTIVITIES: CPT | Mod: PO

## 2021-09-14 ENCOUNTER — TELEPHONE (OUTPATIENT)
Dept: REHABILITATION | Facility: HOSPITAL | Age: 61
End: 2021-09-14

## 2021-09-20 ENCOUNTER — CLINICAL SUPPORT (OUTPATIENT)
Dept: REHABILITATION | Facility: HOSPITAL | Age: 61
End: 2021-09-20
Payer: MEDICARE

## 2021-09-20 DIAGNOSIS — Z86.73 HISTORY OF STROKE: ICD-10-CM

## 2021-09-20 DIAGNOSIS — G81.94 LEFT HEMIPARESIS: ICD-10-CM

## 2021-09-20 PROCEDURE — 97530 THERAPEUTIC ACTIVITIES: CPT | Mod: HCNC,PO

## 2021-09-20 PROCEDURE — 97112 NEUROMUSCULAR REEDUCATION: CPT | Mod: HCNC,PO

## 2021-09-27 ENCOUNTER — CLINICAL SUPPORT (OUTPATIENT)
Dept: REHABILITATION | Facility: HOSPITAL | Age: 61
End: 2021-09-27
Payer: MEDICARE

## 2021-09-27 DIAGNOSIS — G81.94 LEFT HEMIPARESIS: ICD-10-CM

## 2021-09-27 DIAGNOSIS — Z86.73 HISTORY OF STROKE: ICD-10-CM

## 2021-09-27 PROCEDURE — 97530 THERAPEUTIC ACTIVITIES: CPT | Mod: HCNC,PO

## 2021-09-27 PROCEDURE — 97112 NEUROMUSCULAR REEDUCATION: CPT | Mod: HCNC,PO

## 2021-09-30 ENCOUNTER — CLINICAL SUPPORT (OUTPATIENT)
Dept: REHABILITATION | Facility: HOSPITAL | Age: 61
End: 2021-09-30
Payer: MEDICARE

## 2021-09-30 DIAGNOSIS — G81.94 LEFT HEMIPARESIS: ICD-10-CM

## 2021-09-30 DIAGNOSIS — Z86.73 HISTORY OF STROKE: ICD-10-CM

## 2021-09-30 PROCEDURE — 97112 NEUROMUSCULAR REEDUCATION: CPT | Mod: HCNC,PO

## 2021-09-30 PROCEDURE — 97535 SELF CARE MNGMENT TRAINING: CPT | Mod: HCNC,PO

## 2021-10-04 ENCOUNTER — OFFICE VISIT (OUTPATIENT)
Dept: UROLOGY | Facility: CLINIC | Age: 61
End: 2021-10-04
Attending: UROLOGY
Payer: MEDICARE

## 2021-10-04 VITALS
HEIGHT: 69 IN | HEART RATE: 76 BPM | WEIGHT: 177 LBS | BODY MASS INDEX: 26.22 KG/M2 | DIASTOLIC BLOOD PRESSURE: 74 MMHG | SYSTOLIC BLOOD PRESSURE: 128 MMHG

## 2021-10-04 DIAGNOSIS — N52.01 ERECTILE DYSFUNCTION DUE TO ARTERIAL INSUFFICIENCY: ICD-10-CM

## 2021-10-04 DIAGNOSIS — R35.1 BENIGN PROSTATIC HYPERPLASIA WITH NOCTURIA: Primary | ICD-10-CM

## 2021-10-04 DIAGNOSIS — N40.1 BENIGN PROSTATIC HYPERPLASIA WITH NOCTURIA: Primary | ICD-10-CM

## 2021-10-04 DIAGNOSIS — Z12.5 SCREENING FOR PROSTATE CANCER: ICD-10-CM

## 2021-10-04 PROCEDURE — 1160F RVW MEDS BY RX/DR IN RCRD: CPT | Mod: HCNC,CPTII,S$GLB, | Performed by: UROLOGY

## 2021-10-04 PROCEDURE — 3074F SYST BP LT 130 MM HG: CPT | Mod: HCNC,CPTII,S$GLB, | Performed by: UROLOGY

## 2021-10-04 PROCEDURE — 3066F NEPHROPATHY DOC TX: CPT | Mod: HCNC,CPTII,S$GLB, | Performed by: UROLOGY

## 2021-10-04 PROCEDURE — 99214 PR OFFICE/OUTPT VISIT, EST, LEVL IV, 30-39 MIN: ICD-10-PCS | Mod: HCNC,S$GLB,, | Performed by: UROLOGY

## 2021-10-04 PROCEDURE — 1160F PR REVIEW ALL MEDS BY PRESCRIBER/CLIN PHARMACIST DOCUMENTED: ICD-10-PCS | Mod: HCNC,CPTII,S$GLB, | Performed by: UROLOGY

## 2021-10-04 PROCEDURE — 3008F BODY MASS INDEX DOCD: CPT | Mod: HCNC,CPTII,S$GLB, | Performed by: UROLOGY

## 2021-10-04 PROCEDURE — 3044F HG A1C LEVEL LT 7.0%: CPT | Mod: HCNC,CPTII,S$GLB, | Performed by: UROLOGY

## 2021-10-04 PROCEDURE — 3078F PR MOST RECENT DIASTOLIC BLOOD PRESSURE < 80 MM HG: ICD-10-PCS | Mod: HCNC,CPTII,S$GLB, | Performed by: UROLOGY

## 2021-10-04 PROCEDURE — 3061F PR NEG MICROALBUMINURIA RESULT DOCUMENTED/REVIEW: ICD-10-PCS | Mod: HCNC,CPTII,S$GLB, | Performed by: UROLOGY

## 2021-10-04 PROCEDURE — 3074F PR MOST RECENT SYSTOLIC BLOOD PRESSURE < 130 MM HG: ICD-10-PCS | Mod: HCNC,CPTII,S$GLB, | Performed by: UROLOGY

## 2021-10-04 PROCEDURE — 3044F PR MOST RECENT HEMOGLOBIN A1C LEVEL <7.0%: ICD-10-PCS | Mod: HCNC,CPTII,S$GLB, | Performed by: UROLOGY

## 2021-10-04 PROCEDURE — 3008F PR BODY MASS INDEX (BMI) DOCUMENTED: ICD-10-PCS | Mod: HCNC,CPTII,S$GLB, | Performed by: UROLOGY

## 2021-10-04 PROCEDURE — 3061F NEG MICROALBUMINURIA REV: CPT | Mod: HCNC,CPTII,S$GLB, | Performed by: UROLOGY

## 2021-10-04 PROCEDURE — 3078F DIAST BP <80 MM HG: CPT | Mod: HCNC,CPTII,S$GLB, | Performed by: UROLOGY

## 2021-10-04 PROCEDURE — 1159F MED LIST DOCD IN RCRD: CPT | Mod: HCNC,CPTII,S$GLB, | Performed by: UROLOGY

## 2021-10-04 PROCEDURE — 99214 OFFICE O/P EST MOD 30 MIN: CPT | Mod: HCNC,S$GLB,, | Performed by: UROLOGY

## 2021-10-04 PROCEDURE — 1159F PR MEDICATION LIST DOCUMENTED IN MEDICAL RECORD: ICD-10-PCS | Mod: HCNC,CPTII,S$GLB, | Performed by: UROLOGY

## 2021-10-04 PROCEDURE — 3066F PR DOCUMENTATION OF TREATMENT FOR NEPHROPATHY: ICD-10-PCS | Mod: HCNC,CPTII,S$GLB, | Performed by: UROLOGY

## 2021-10-12 DIAGNOSIS — I10 ESSENTIAL HYPERTENSION: ICD-10-CM

## 2021-10-12 DIAGNOSIS — E78.2 MIXED HYPERLIPIDEMIA: ICD-10-CM

## 2021-10-12 RX ORDER — AMLODIPINE BESYLATE 10 MG/1
10 TABLET ORAL DAILY
Qty: 90 TABLET | Refills: 3 | Status: SHIPPED | OUTPATIENT
Start: 2021-10-12 | End: 2022-05-10 | Stop reason: SDUPTHER

## 2021-10-12 RX ORDER — ATORVASTATIN CALCIUM 80 MG/1
80 TABLET, FILM COATED ORAL DAILY
Qty: 90 TABLET | Refills: 3 | Status: SHIPPED | OUTPATIENT
Start: 2021-10-12 | End: 2021-10-25 | Stop reason: SDUPTHER

## 2021-10-12 RX ORDER — CARVEDILOL 6.25 MG/1
6.25 TABLET ORAL 2 TIMES DAILY WITH MEALS
Qty: 180 TABLET | Refills: 3 | Status: SHIPPED | OUTPATIENT
Start: 2021-10-12 | End: 2022-05-10 | Stop reason: SDUPTHER

## 2021-10-14 ENCOUNTER — OFFICE VISIT (OUTPATIENT)
Dept: OTOLARYNGOLOGY | Facility: CLINIC | Age: 61
End: 2021-10-14
Payer: MEDICARE

## 2021-10-14 VITALS
TEMPERATURE: 98 F | WEIGHT: 174.69 LBS | SYSTOLIC BLOOD PRESSURE: 128 MMHG | BODY MASS INDEX: 26.18 KG/M2 | DIASTOLIC BLOOD PRESSURE: 70 MMHG | HEART RATE: 68 BPM

## 2021-10-14 DIAGNOSIS — K21.9 LARYNGOPHARYNGEAL REFLUX (LPR): ICD-10-CM

## 2021-10-14 DIAGNOSIS — H93.293 ABNORMAL AUDITORY PERCEPTION OF BOTH EARS: ICD-10-CM

## 2021-10-14 DIAGNOSIS — J34.2 NASAL SEPTAL DEVIATION: ICD-10-CM

## 2021-10-14 DIAGNOSIS — R13.10 DYSPHAGIA, UNSPECIFIED TYPE: Primary | ICD-10-CM

## 2021-10-14 DIAGNOSIS — Z86.73 HISTORY OF STROKE: ICD-10-CM

## 2021-10-14 DIAGNOSIS — R09.A2 GLOBUS SENSATION: ICD-10-CM

## 2021-10-14 PROCEDURE — 3008F BODY MASS INDEX DOCD: CPT | Mod: HCNC,CPTII,S$GLB, | Performed by: SPECIALIST

## 2021-10-14 PROCEDURE — 99499 UNLISTED E&M SERVICE: CPT | Mod: S$GLB,,, | Performed by: SPECIALIST

## 2021-10-14 PROCEDURE — 1159F MED LIST DOCD IN RCRD: CPT | Mod: HCNC,CPTII,S$GLB, | Performed by: SPECIALIST

## 2021-10-14 PROCEDURE — 99499 RISK ADDL DX/OHS AUDIT: ICD-10-PCS | Mod: S$GLB,,, | Performed by: SPECIALIST

## 2021-10-14 PROCEDURE — 3044F HG A1C LEVEL LT 7.0%: CPT | Mod: HCNC,CPTII,S$GLB, | Performed by: SPECIALIST

## 2021-10-14 PROCEDURE — 3074F SYST BP LT 130 MM HG: CPT | Mod: HCNC,CPTII,S$GLB, | Performed by: SPECIALIST

## 2021-10-14 PROCEDURE — 99999 PR PBB SHADOW E&M-EST. PATIENT-LVL V: ICD-10-PCS | Mod: PBBFAC,HCNC,, | Performed by: SPECIALIST

## 2021-10-14 PROCEDURE — 3061F NEG MICROALBUMINURIA REV: CPT | Mod: HCNC,CPTII,S$GLB, | Performed by: SPECIALIST

## 2021-10-14 PROCEDURE — 99204 PR OFFICE/OUTPT VISIT, NEW, LEVL IV, 45-59 MIN: ICD-10-PCS | Mod: 25,HCNC,S$GLB, | Performed by: SPECIALIST

## 2021-10-14 PROCEDURE — 3066F NEPHROPATHY DOC TX: CPT | Mod: HCNC,CPTII,S$GLB, | Performed by: SPECIALIST

## 2021-10-14 PROCEDURE — 3074F PR MOST RECENT SYSTOLIC BLOOD PRESSURE < 130 MM HG: ICD-10-PCS | Mod: HCNC,CPTII,S$GLB, | Performed by: SPECIALIST

## 2021-10-14 PROCEDURE — 1160F PR REVIEW ALL MEDS BY PRESCRIBER/CLIN PHARMACIST DOCUMENTED: ICD-10-PCS | Mod: HCNC,CPTII,S$GLB, | Performed by: SPECIALIST

## 2021-10-14 PROCEDURE — 3078F DIAST BP <80 MM HG: CPT | Mod: HCNC,CPTII,S$GLB, | Performed by: SPECIALIST

## 2021-10-14 PROCEDURE — 3008F PR BODY MASS INDEX (BMI) DOCUMENTED: ICD-10-PCS | Mod: HCNC,CPTII,S$GLB, | Performed by: SPECIALIST

## 2021-10-14 PROCEDURE — 3078F PR MOST RECENT DIASTOLIC BLOOD PRESSURE < 80 MM HG: ICD-10-PCS | Mod: HCNC,CPTII,S$GLB, | Performed by: SPECIALIST

## 2021-10-14 PROCEDURE — 31575 DIAGNOSTIC LARYNGOSCOPY: CPT | Mod: HCNC,S$GLB,, | Performed by: SPECIALIST

## 2021-10-14 PROCEDURE — 99999 PR PBB SHADOW E&M-EST. PATIENT-LVL V: CPT | Mod: PBBFAC,HCNC,, | Performed by: SPECIALIST

## 2021-10-14 PROCEDURE — 1160F RVW MEDS BY RX/DR IN RCRD: CPT | Mod: HCNC,CPTII,S$GLB, | Performed by: SPECIALIST

## 2021-10-14 PROCEDURE — 3066F PR DOCUMENTATION OF TREATMENT FOR NEPHROPATHY: ICD-10-PCS | Mod: HCNC,CPTII,S$GLB, | Performed by: SPECIALIST

## 2021-10-14 PROCEDURE — 3044F PR MOST RECENT HEMOGLOBIN A1C LEVEL <7.0%: ICD-10-PCS | Mod: HCNC,CPTII,S$GLB, | Performed by: SPECIALIST

## 2021-10-14 PROCEDURE — 99204 OFFICE O/P NEW MOD 45 MIN: CPT | Mod: 25,HCNC,S$GLB, | Performed by: SPECIALIST

## 2021-10-14 PROCEDURE — 1159F PR MEDICATION LIST DOCUMENTED IN MEDICAL RECORD: ICD-10-PCS | Mod: HCNC,CPTII,S$GLB, | Performed by: SPECIALIST

## 2021-10-14 PROCEDURE — 31575 LARYNGOSCOPY: ICD-10-PCS | Mod: HCNC,S$GLB,, | Performed by: SPECIALIST

## 2021-10-14 PROCEDURE — 3061F PR NEG MICROALBUMINURIA RESULT DOCUMENTED/REVIEW: ICD-10-PCS | Mod: HCNC,CPTII,S$GLB, | Performed by: SPECIALIST

## 2021-10-14 RX ORDER — PANTOPRAZOLE SODIUM 40 MG/1
TABLET, DELAYED RELEASE ORAL
Qty: 60 TABLET | Refills: 11 | Status: SHIPPED | OUTPATIENT
Start: 2021-10-14 | End: 2021-10-26 | Stop reason: SDUPTHER

## 2021-10-25 DIAGNOSIS — E78.2 MIXED HYPERLIPIDEMIA: ICD-10-CM

## 2021-10-25 RX ORDER — ATORVASTATIN CALCIUM 80 MG/1
80 TABLET, FILM COATED ORAL DAILY
Qty: 90 TABLET | Refills: 3 | Status: SHIPPED | OUTPATIENT
Start: 2021-10-25 | End: 2022-03-19

## 2021-10-26 RX ORDER — PANTOPRAZOLE SODIUM 40 MG/1
TABLET, DELAYED RELEASE ORAL
Qty: 180 TABLET | Refills: 3 | Status: SHIPPED | OUTPATIENT
Start: 2021-10-26 | End: 2022-07-21 | Stop reason: ALTCHOICE

## 2021-12-07 DIAGNOSIS — N40.1 BENIGN PROSTATIC HYPERPLASIA WITH NOCTURIA: ICD-10-CM

## 2021-12-07 DIAGNOSIS — R35.1 BENIGN PROSTATIC HYPERPLASIA WITH NOCTURIA: ICD-10-CM

## 2021-12-07 DIAGNOSIS — E78.5 HYPERLIPIDEMIA LDL GOAL <70: ICD-10-CM

## 2021-12-07 DIAGNOSIS — R20.0 NUMBNESS OF LEFT HAND: ICD-10-CM

## 2021-12-08 RX ORDER — EZETIMIBE 10 MG/1
10 TABLET ORAL DAILY
Qty: 90 TABLET | Refills: 1 | Status: SHIPPED | OUTPATIENT
Start: 2021-12-08 | End: 2022-03-19

## 2021-12-08 RX ORDER — TAMSULOSIN HYDROCHLORIDE 0.4 MG/1
0.4 CAPSULE ORAL DAILY
Qty: 90 CAPSULE | Refills: 2 | Status: SHIPPED | OUTPATIENT
Start: 2021-12-08 | End: 2022-05-10 | Stop reason: SDUPTHER

## 2021-12-09 RX ORDER — GABAPENTIN 300 MG/1
300 CAPSULE ORAL NIGHTLY
Qty: 90 CAPSULE | Refills: 1 | Status: SHIPPED | OUTPATIENT
Start: 2021-12-09 | End: 2022-05-10 | Stop reason: SDUPTHER

## 2022-01-03 PROBLEM — Z95.828 HISTORY OF LEFT COMMON CAROTID ARTERY STENT PLACEMENT: Status: RESOLVED | Noted: 2020-04-17 | Resolved: 2022-01-03

## 2022-01-03 PROBLEM — Z98.890 HISTORY OF LEFT COMMON CAROTID ARTERY STENT PLACEMENT: Status: RESOLVED | Noted: 2020-04-17 | Resolved: 2022-01-03

## 2022-01-03 PROBLEM — Z12.11 COLON CANCER SCREENING: Status: RESOLVED | Noted: 2020-06-30 | Resolved: 2022-01-03

## 2022-01-03 PROBLEM — N13.8 BPH WITH URINARY OBSTRUCTION: Status: ACTIVE | Noted: 2021-07-29

## 2022-01-03 PROBLEM — Z86.73 HISTORY OF STROKE: Status: RESOLVED | Noted: 2020-03-11 | Resolved: 2022-01-03

## 2022-01-04 ENCOUNTER — OFFICE VISIT (OUTPATIENT)
Dept: URGENT CARE | Facility: CLINIC | Age: 62
End: 2022-01-04
Payer: MEDICARE

## 2022-01-04 VITALS
SYSTOLIC BLOOD PRESSURE: 136 MMHG | HEIGHT: 69 IN | HEART RATE: 63 BPM | WEIGHT: 172 LBS | DIASTOLIC BLOOD PRESSURE: 71 MMHG | BODY MASS INDEX: 25.48 KG/M2 | TEMPERATURE: 98 F | RESPIRATION RATE: 16 BRPM | OXYGEN SATURATION: 99 %

## 2022-01-04 DIAGNOSIS — J02.9 SORE THROAT: Primary | ICD-10-CM

## 2022-01-04 DIAGNOSIS — S46.811A TRAPEZIUS STRAIN, RIGHT, INITIAL ENCOUNTER: ICD-10-CM

## 2022-01-04 DIAGNOSIS — S29.011A MUSCLE STRAIN OF CHEST WALL, INITIAL ENCOUNTER: ICD-10-CM

## 2022-01-04 DIAGNOSIS — R05.9 COUGH: ICD-10-CM

## 2022-01-04 LAB
CTP QC/QA: YES
SARS-COV-2 RDRP RESP QL NAA+PROBE: NEGATIVE

## 2022-01-04 PROCEDURE — 1159F MED LIST DOCD IN RCRD: CPT | Mod: CPTII,S$GLB,, | Performed by: PHYSICIAN ASSISTANT

## 2022-01-04 PROCEDURE — 99213 OFFICE O/P EST LOW 20 MIN: CPT | Mod: S$GLB,,, | Performed by: PHYSICIAN ASSISTANT

## 2022-01-04 PROCEDURE — 1160F PR REVIEW ALL MEDS BY PRESCRIBER/CLIN PHARMACIST DOCUMENTED: ICD-10-PCS | Mod: CPTII,S$GLB,, | Performed by: PHYSICIAN ASSISTANT

## 2022-01-04 PROCEDURE — U0002: ICD-10-PCS | Mod: QW,S$GLB,, | Performed by: PHYSICIAN ASSISTANT

## 2022-01-04 PROCEDURE — 3075F PR MOST RECENT SYSTOLIC BLOOD PRESS GE 130-139MM HG: ICD-10-PCS | Mod: CPTII,S$GLB,, | Performed by: PHYSICIAN ASSISTANT

## 2022-01-04 PROCEDURE — 3008F PR BODY MASS INDEX (BMI) DOCUMENTED: ICD-10-PCS | Mod: CPTII,S$GLB,, | Performed by: PHYSICIAN ASSISTANT

## 2022-01-04 PROCEDURE — 1160F RVW MEDS BY RX/DR IN RCRD: CPT | Mod: CPTII,S$GLB,, | Performed by: PHYSICIAN ASSISTANT

## 2022-01-04 PROCEDURE — U0002 COVID-19 LAB TEST NON-CDC: HCPCS | Mod: QW,S$GLB,, | Performed by: PHYSICIAN ASSISTANT

## 2022-01-04 PROCEDURE — 99213 PR OFFICE/OUTPT VISIT, EST, LEVL III, 20-29 MIN: ICD-10-PCS | Mod: S$GLB,,, | Performed by: PHYSICIAN ASSISTANT

## 2022-01-04 PROCEDURE — 3075F SYST BP GE 130 - 139MM HG: CPT | Mod: CPTII,S$GLB,, | Performed by: PHYSICIAN ASSISTANT

## 2022-01-04 PROCEDURE — 1159F PR MEDICATION LIST DOCUMENTED IN MEDICAL RECORD: ICD-10-PCS | Mod: CPTII,S$GLB,, | Performed by: PHYSICIAN ASSISTANT

## 2022-01-04 PROCEDURE — 3078F DIAST BP <80 MM HG: CPT | Mod: CPTII,S$GLB,, | Performed by: PHYSICIAN ASSISTANT

## 2022-01-04 PROCEDURE — 3078F PR MOST RECENT DIASTOLIC BLOOD PRESSURE < 80 MM HG: ICD-10-PCS | Mod: CPTII,S$GLB,, | Performed by: PHYSICIAN ASSISTANT

## 2022-01-04 PROCEDURE — 3008F BODY MASS INDEX DOCD: CPT | Mod: CPTII,S$GLB,, | Performed by: PHYSICIAN ASSISTANT

## 2022-01-04 NOTE — PATIENT INSTRUCTIONS
- Rest.    - Drink plenty of fluids.  - Viral upper respiratory infections typically run their course in 10-14 days.     - Tylenol or Ibuprofen as directed as needed for fever/pain. Avoid tylenol if you have a history of liver disease. Do not take ibuprofen if you have a history of GI bleeding, kidney disease, or if you take blood thinners.     - You can take over-the-counter claritin, zyrtec, allegra, or xyzal as directed. These are antihistamines that can help with runny nose, nasal congestion, sneezing, and helps to dry up post-nasal drip, which usually causes sore throat and cough.   - If you do NOT have high blood pressure, you may use a decongestant form (D)  of this medication (ie. Claritin- D, zyrtec-D, allegra-D) or if you do not take the D form, you can take sudafed (pseudoephedrine) over the counter, which is a decongestant. Do NOT take two decongestant (D) medications at the same time (such as mucinex-D and claritin-D or plain sudafed and claritin D)    - You can use Flonase (fluticasone) nasal spray as directed for sinus congestion and postnasal drip. This is a steroid nasal spray that works locally over time to decrease the inflammation in your nose/sinuses and help with allergic symptoms. This is not an quick- relief spray like afrin, but it works well if used daily.  Discontinue if you develop nose bleed  - use nasal saline prior to Flonase.  - Use Ocean Spray Nasal Saline 1-3 puffs each nostril every 2-3 hours then blow out onto tissue. This is to irrigate the nasal passage way to clear the sinus openings. Use until sinus problem resolved.    - you can take plain Mucinex (guaifenesin) 1200 mg twice a day to help loosen mucous.     -warm salt water gargles can help with sore throat    - warm tea with honey can help with cough. Honey is a natural cough suppressant.    - Dextromethorphan (DM) is a cough suppressant over the counter (ie. mucinex DM, robitussin, delsym; dayquil/nyquil has DM as  well.    - Follow up with your PCP or specialty clinic as directed in the next 1-2 weeks if not improved or as needed.  You can call [phone] to schedule an appointment with the appropriate provider.      - Go to the ER if you develop new or worsening symptoms.     - You must understand that you have received an Urgent Care treatment only and that you may be released before all of your medical problems are known or treated.   - You, the patient, will arrange for follow up care as instructed.   - If your condition worsens or fails to improve we recommend that you receive another evaluation at the ER immediately or contact your PCP to discuss your concerns or return here.

## 2022-01-04 NOTE — PROGRESS NOTES
"Subjective:       Patient ID: Piotr Crespo is a 61 y.o. male.    Vitals:  height is 5' 8.5" (1.74 m) and weight is 78 kg (172 lb). His temperature is 98 °F (36.7 °C). His blood pressure is 136/71 and his pulse is 63. His respiration is 16 and oxygen saturation is 99%.     Chief Complaint: Generalized Body Aches    Patient presents today requesting COVID testing.  Patient states that yesterday he had headaches, sore throat and cough associated with body aches, most notable of right shoulder and left back/lateral chest wall.  States that body aches have improved/nearly resolved.  Denies any substernal chest pain, shortness of breath.  No fever.  Patient states that his wife felt sick over the weekend as well, but is feeling better too.  Patient states that he just wants to make sure that he does not have COVID, because he does not want to spread anything to his grandchildren.      URI   This is a new problem. The current episode started in the past 7 days (2d). The problem has been rapidly improving. There has been no fever. Associated symptoms include coughing, headaches and a sore throat. Pertinent negatives include no abdominal pain, congestion, diarrhea, dysuria, ear pain, joint pain, joint swelling, nausea, neck pain, plugged ear sensation, rash, rhinorrhea, sinus pain, sneezing, swollen glands, vomiting or wheezing. He has tried NSAIDs for the symptoms.       Constitution: Negative for chills, sweating, fatigue and fever.   HENT: Positive for sore throat. Negative for ear pain, congestion and sinus pain.    Neck: Negative for neck pain and neck stiffness.   Cardiovascular: Negative for leg swelling and palpitations.   Eyes: Negative for eye itching, eye pain and eye redness.   Respiratory: Positive for cough. Negative for chest tightness, sputum production, shortness of breath, stridor and wheezing.    Gastrointestinal: Negative for abdominal pain, nausea, vomiting and diarrhea.   Genitourinary: Negative for " dysuria.   Musculoskeletal: Positive for muscle ache.   Skin: Negative for rash.   Allergic/Immunologic: Negative for sneezing.   Neurological: Positive for headaches. Negative for dizziness, light-headedness, altered mental status and loss of consciousness.   Psychiatric/Behavioral: Negative for altered mental status.       Objective:      Physical Exam   Constitutional: He is oriented to person, place, and time. He appears well-developed and well-nourished. He is cooperative.  Non-toxic appearance. He does not have a sickly appearance. He does not appear ill. No distress.      Comments:Pleasant, sitting comfortably in no acute distress.   HENT:   Head: Normocephalic and atraumatic.   Ears:   Right Ear: Hearing, tympanic membrane, external ear and ear canal normal.   Left Ear: Hearing, tympanic membrane, external ear and ear canal normal.   Nose: Nose normal. No mucosal edema, rhinorrhea or nasal deformity. No epistaxis. Right sinus exhibits no maxillary sinus tenderness and no frontal sinus tenderness. Left sinus exhibits no maxillary sinus tenderness and no frontal sinus tenderness.   Mouth/Throat: Uvula is midline, oropharynx is clear and moist and mucous membranes are normal. No trismus in the jaw. Normal dentition. No uvula swelling. No oropharyngeal exudate, posterior oropharyngeal edema or posterior oropharyngeal erythema.   Eyes: Conjunctivae and lids are normal. No scleral icterus.   Neck: Trachea normal and phonation normal. Neck supple. No edema present. No erythema present. No neck rigidity present.   Cardiovascular: Normal rate, regular rhythm, normal heart sounds, intact distal pulses and normal pulses.   Pulses:       Radial pulses are 2+ on the right side and 2+ on the left side.   Pulmonary/Chest: Effort normal and breath sounds normal. No accessory muscle usage or stridor. No tachypnea and no bradypnea. No respiratory distress. He has no decreased breath sounds. He has no wheezes. He has no  rhonchi. He has no rales.   Abdominal: Normal appearance.   Musculoskeletal: Normal range of motion.         General: No deformity or edema. Normal range of motion.        Arms:       Comments: No swelling/edema of extremities.   Neurological: He is alert and oriented to person, place, and time. He exhibits normal muscle tone. Coordination normal.   Skin: Skin is warm, dry, intact, not diaphoretic and not pale.   Psychiatric: He has a normal mood and affect. His speech is normal and behavior is normal. Judgment and thought content normal. Cognition and memory  Nursing note and vitals reviewed.          Results for orders placed or performed in visit on 01/04/22   POCT COVID-19 Rapid Screening   Result Value Ref Range    POC Rapid COVID Negative Negative     Acceptable Yes        Assessment:       1. Sore throat    2. Cough    3. Muscle strain of chest wall, initial encounter    4. Trapezius strain, right, initial encounter        Discussed home care, follow-up precautions with patient.  Plan:       Sore throat  -     POCT COVID-19 Rapid Screening    Cough    Muscle strain of chest wall, initial encounter    Trapezius strain, right, initial encounter      Patient Instructions   - Rest.    - Drink plenty of fluids.  - Viral upper respiratory infections typically run their course in 10-14 days.     - Tylenol or Ibuprofen as directed as needed for fever/pain. Avoid tylenol if you have a history of liver disease. Do not take ibuprofen if you have a history of GI bleeding, kidney disease, or if you take blood thinners.     - You can take over-the-counter claritin, zyrtec, allegra, or xyzal as directed. These are antihistamines that can help with runny nose, nasal congestion, sneezing, and helps to dry up post-nasal drip, which usually causes sore throat and cough.   - If you do NOT have high blood pressure, you may use a decongestant form (D)  of this medication (ie. Claritin- D, zyrtec-D, allegra-D) or if  you do not take the D form, you can take sudafed (pseudoephedrine) over the counter, which is a decongestant. Do NOT take two decongestant (D) medications at the same time (such as mucinex-D and claritin-D or plain sudafed and claritin D)    - You can use Flonase (fluticasone) nasal spray as directed for sinus congestion and postnasal drip. This is a steroid nasal spray that works locally over time to decrease the inflammation in your nose/sinuses and help with allergic symptoms. This is not an quick- relief spray like afrin, but it works well if used daily.  Discontinue if you develop nose bleed  - use nasal saline prior to Flonase.  - Use Ocean Spray Nasal Saline 1-3 puffs each nostril every 2-3 hours then blow out onto tissue. This is to irrigate the nasal passage way to clear the sinus openings. Use until sinus problem resolved.    - you can take plain Mucinex (guaifenesin) 1200 mg twice a day to help loosen mucous.     -warm salt water gargles can help with sore throat    - warm tea with honey can help with cough. Honey is a natural cough suppressant.    - Dextromethorphan (DM) is a cough suppressant over the counter (ie. mucinex DM, robitussin, delsym; dayquil/nyquil has DM as well.    - Follow up with your PCP or specialty clinic as directed in the next 1-2 weeks if not improved or as needed.  You can call [phone] to schedule an appointment with the appropriate provider.      - Go to the ER if you develop new or worsening symptoms.     - You must understand that you have received an Urgent Care treatment only and that you may be released before all of your medical problems are known or treated.   - You, the patient, will arrange for follow up care as instructed.   - If your condition worsens or fails to improve we recommend that you receive another evaluation at the ER immediately or contact your PCP to discuss your concerns or return here.

## 2022-01-04 NOTE — PROGRESS NOTES
"Subjective:       Patient ID: Piotr Crespo is a 61 y.o. male.    Vitals:  height is 5' 8.5" (1.74 m) and weight is 78 kg (172 lb). His temperature is 98 °F (36.7 °C). His blood pressure is 136/71 and his pulse is 63. His respiration is 16 and oxygen saturation is 99%.     Chief Complaint: Generalized Body Aches    HPI  ROS    Objective:      Physical Exam      Assessment:       1. Encounter for laboratory testing for COVID-19 virus          Plan:         Encounter for laboratory testing for COVID-19 virus  -     POCT COVID-19 Rapid Screening                   "

## 2022-01-13 ENCOUNTER — TELEPHONE (OUTPATIENT)
Dept: PRIMARY CARE CLINIC | Facility: CLINIC | Age: 62
End: 2022-01-13
Payer: MEDICARE

## 2022-01-13 NOTE — TELEPHONE ENCOUNTER
----- Message from Bradly Matthew sent at 1/13/2022  3:07 PM CST -----  Contact: [phone] pt  Pt would like to speak with the office. [facility] and [facility] completed it showed something on the right side of his lung. Pt is concerned. Please call and advise

## 2022-01-13 NOTE — TELEPHONE ENCOUNTER
Spoke with patient, had [facility] at the [facility] which showed a nodule on lung.  We did not receive a copy of the report, will need for recommendations.    Patient will drop off report tomorrow.

## 2022-01-27 ENCOUNTER — TELEPHONE (OUTPATIENT)
Dept: PRIMARY CARE CLINIC | Facility: CLINIC | Age: 62
End: 2022-01-27
Payer: MEDICARE

## 2022-01-27 NOTE — TELEPHONE ENCOUNTER
----- Message from Jen Fortune sent at 1/27/2022 10:38 AM CST -----  Contact: [phone]  Patient called, stated that he [facility], and has not receive a call back from pcp. Please call and advise. Thank you

## 2022-02-09 ENCOUNTER — TELEPHONE (OUTPATIENT)
Dept: PRIMARY CARE CLINIC | Facility: CLINIC | Age: 62
End: 2022-02-09
Payer: MEDICARE

## 2022-02-09 NOTE — TELEPHONE ENCOUNTER
----- Message from Karie Bush sent at 2/8/2022  4:51 PM CST -----  Contact: Pt [phone]  Patient called, stated that he lubna his ct scan results from [facility] and has not receive a call back from pcp. Please call and advise. Thank you

## 2022-02-09 NOTE — TELEPHONE ENCOUNTER
Let pt know that the report said :There is a new nodule 4mm in right lung, benign but recommends another CT in 6 months.

## 2022-03-19 DIAGNOSIS — E78.5 HYPERLIPIDEMIA LDL GOAL <70: ICD-10-CM

## 2022-03-19 DIAGNOSIS — E78.2 MIXED HYPERLIPIDEMIA: ICD-10-CM

## 2022-03-19 RX ORDER — EZETIMIBE 10 MG/1
10 TABLET ORAL DAILY
Qty: 90 TABLET | Refills: 0 | Status: SHIPPED | OUTPATIENT
Start: 2022-03-19 | End: 2022-05-10 | Stop reason: SDUPTHER

## 2022-03-19 RX ORDER — ATORVASTATIN CALCIUM 80 MG/1
80 TABLET, FILM COATED ORAL NIGHTLY
Qty: 90 TABLET | Refills: 0 | Status: SHIPPED | OUTPATIENT
Start: 2022-03-19 | End: 2022-05-10 | Stop reason: SDUPTHER

## 2022-03-19 NOTE — TELEPHONE ENCOUNTER
No new care gaps identified.  Powered by [employer]. Reference number: [id number].   3/19/2022 10:30:07 AM CDT

## 2022-03-19 NOTE — TELEPHONE ENCOUNTER
Care Due:                  Date            Visit Type   Department     Provider  --------------------------------------------------------------------------------                                EP -                              PRIMARY      [facility] PRIMARY  Last Visit: 07-      CARE ([facility])   [facility]           Vicky Dowling                               -                              PRIMARY      [facility]  Next Visit: 05-      CARE ([facility])   [facility]           Vicky  HectorSarina                                                            Last  Test          Frequency    Reason                     Performed    Due Date  --------------------------------------------------------------------------------    CMP.........  12 months..  atorvastatin, ezetimibe..  04- 04-    Lipid Panel.  12 months..  atorvastatin, ezetimibe..  04- 04-    Powered by [employer]. Reference number: [id number].   3/19/2022 10:29:34 AM CDT

## 2022-05-10 ENCOUNTER — OFFICE VISIT (OUTPATIENT)
Dept: PRIMARY CARE CLINIC | Facility: CLINIC | Age: 62
End: 2022-05-10
Payer: MEDICARE

## 2022-05-10 ENCOUNTER — LAB VISIT (OUTPATIENT)
Dept: LAB | Facility: HOSPITAL | Age: 62
End: 2022-05-10
Attending: FAMILY MEDICINE
Payer: MEDICARE

## 2022-05-10 VITALS
HEART RATE: 60 BPM | SYSTOLIC BLOOD PRESSURE: 126 MMHG | RESPIRATION RATE: 18 BRPM | OXYGEN SATURATION: 99 % | DIASTOLIC BLOOD PRESSURE: 70 MMHG | HEIGHT: 68 IN | WEIGHT: 181.19 LBS | TEMPERATURE: 98 F | BODY MASS INDEX: 27.46 KG/M2

## 2022-05-10 DIAGNOSIS — I10 ESSENTIAL HYPERTENSION: ICD-10-CM

## 2022-05-10 DIAGNOSIS — H52.222 REGULAR ASTIGMATISM OF LEFT EYE: ICD-10-CM

## 2022-05-10 DIAGNOSIS — N52.01 ERECTILE DYSFUNCTION DUE TO ARTERIAL INSUFFICIENCY: ICD-10-CM

## 2022-05-10 DIAGNOSIS — I25.10 CORONARY ARTERY DISEASE INVOLVING NATIVE CORONARY ARTERY OF NATIVE HEART WITHOUT ANGINA PECTORIS: ICD-10-CM

## 2022-05-10 DIAGNOSIS — N40.1 BENIGN PROSTATIC HYPERPLASIA WITH NOCTURIA: ICD-10-CM

## 2022-05-10 DIAGNOSIS — H52.4 PRESBYOPIA: ICD-10-CM

## 2022-05-10 DIAGNOSIS — R35.1 BENIGN PROSTATIC HYPERPLASIA WITH NOCTURIA: ICD-10-CM

## 2022-05-10 DIAGNOSIS — R20.0 NUMBNESS OF LEFT HAND: ICD-10-CM

## 2022-05-10 DIAGNOSIS — E78.5 HYPERLIPIDEMIA LDL GOAL <70: ICD-10-CM

## 2022-05-10 DIAGNOSIS — Z86.73 HISTORY OF STROKE: Primary | ICD-10-CM

## 2022-05-10 DIAGNOSIS — F33.1 MODERATE EPISODE OF RECURRENT MAJOR DEPRESSIVE DISORDER: ICD-10-CM

## 2022-05-10 DIAGNOSIS — R73.03 PREDIABETES: ICD-10-CM

## 2022-05-10 DIAGNOSIS — F17.210 CIGARETTE NICOTINE DEPENDENCE WITHOUT COMPLICATION: ICD-10-CM

## 2022-05-10 DIAGNOSIS — G81.94 LEFT HEMIPARESIS: ICD-10-CM

## 2022-05-10 LAB
ALBUMIN/CREAT UR: 7.8 UG/MG (ref 0–30)
CREAT UR-MCNC: 115 MG/DL (ref 23–375)
MICROALBUMIN UR DL<=1MG/L-MCNC: 9 UG/ML

## 2022-05-10 PROCEDURE — 99999 PR PBB SHADOW E&M-EST. PATIENT-LVL V: ICD-10-PCS | Mod: PBBFAC,,, | Performed by: FAMILY MEDICINE

## 2022-05-10 PROCEDURE — 99999 PR PBB SHADOW E&M-EST. PATIENT-LVL V: CPT | Mod: PBBFAC,,, | Performed by: FAMILY MEDICINE

## 2022-05-10 PROCEDURE — 3078F DIAST BP <80 MM HG: CPT | Mod: CPTII,S$GLB,, | Performed by: FAMILY MEDICINE

## 2022-05-10 PROCEDURE — 1160F PR REVIEW ALL MEDS BY PRESCRIBER/CLIN PHARMACIST DOCUMENTED: ICD-10-PCS | Mod: CPTII,S$GLB,, | Performed by: FAMILY MEDICINE

## 2022-05-10 PROCEDURE — 1159F MED LIST DOCD IN RCRD: CPT | Mod: CPTII,S$GLB,, | Performed by: FAMILY MEDICINE

## 2022-05-10 PROCEDURE — 1159F PR MEDICATION LIST DOCUMENTED IN MEDICAL RECORD: ICD-10-PCS | Mod: CPTII,S$GLB,, | Performed by: FAMILY MEDICINE

## 2022-05-10 PROCEDURE — 1160F RVW MEDS BY RX/DR IN RCRD: CPT | Mod: CPTII,S$GLB,, | Performed by: FAMILY MEDICINE

## 2022-05-10 PROCEDURE — 82570 ASSAY OF URINE CREATININE: CPT | Performed by: FAMILY MEDICINE

## 2022-05-10 PROCEDURE — 82043 UR ALBUMIN QUANTITATIVE: CPT | Performed by: FAMILY MEDICINE

## 2022-05-10 PROCEDURE — 99499 UNLISTED E&M SERVICE: CPT | Mod: S$GLB,,, | Performed by: FAMILY MEDICINE

## 2022-05-10 PROCEDURE — 3008F PR BODY MASS INDEX (BMI) DOCUMENTED: ICD-10-PCS | Mod: CPTII,S$GLB,, | Performed by: FAMILY MEDICINE

## 2022-05-10 PROCEDURE — 99499 RISK ADDL DX/OHS AUDIT: ICD-10-PCS | Mod: S$GLB,,, | Performed by: FAMILY MEDICINE

## 2022-05-10 PROCEDURE — 99214 OFFICE O/P EST MOD 30 MIN: CPT | Mod: S$GLB,,, | Performed by: FAMILY MEDICINE

## 2022-05-10 PROCEDURE — 99214 PR OFFICE/OUTPT VISIT, EST, LEVL IV, 30-39 MIN: ICD-10-PCS | Mod: S$GLB,,, | Performed by: FAMILY MEDICINE

## 2022-05-10 PROCEDURE — 3008F BODY MASS INDEX DOCD: CPT | Mod: CPTII,S$GLB,, | Performed by: FAMILY MEDICINE

## 2022-05-10 PROCEDURE — 3074F PR MOST RECENT SYSTOLIC BLOOD PRESSURE < 130 MM HG: ICD-10-PCS | Mod: CPTII,S$GLB,, | Performed by: FAMILY MEDICINE

## 2022-05-10 PROCEDURE — 3074F SYST BP LT 130 MM HG: CPT | Mod: CPTII,S$GLB,, | Performed by: FAMILY MEDICINE

## 2022-05-10 PROCEDURE — 3078F PR MOST RECENT DIASTOLIC BLOOD PRESSURE < 80 MM HG: ICD-10-PCS | Mod: CPTII,S$GLB,, | Performed by: FAMILY MEDICINE

## 2022-05-10 RX ORDER — NAPROXEN SODIUM 220 MG/1
81 TABLET, FILM COATED ORAL DAILY
Qty: 90 TABLET | Refills: 3 | Status: SHIPPED | OUTPATIENT
Start: 2022-05-10 | End: 2023-05-23 | Stop reason: SDUPTHER

## 2022-05-10 RX ORDER — CARVEDILOL 6.25 MG/1
6.25 TABLET ORAL 2 TIMES DAILY WITH MEALS
Qty: 180 TABLET | Refills: 3 | Status: SHIPPED | OUTPATIENT
Start: 2022-05-10 | End: 2023-05-23 | Stop reason: SDUPTHER

## 2022-05-10 RX ORDER — ATORVASTATIN CALCIUM 80 MG/1
80 TABLET, FILM COATED ORAL NIGHTLY
Qty: 90 TABLET | Refills: 3 | Status: SHIPPED | OUTPATIENT
Start: 2022-05-10 | End: 2023-03-08 | Stop reason: SDUPTHER

## 2022-05-10 RX ORDER — EZETIMIBE 10 MG/1
10 TABLET ORAL DAILY
Qty: 90 TABLET | Refills: 3 | Status: SHIPPED | OUTPATIENT
Start: 2022-05-10 | End: 2023-03-08 | Stop reason: SDUPTHER

## 2022-05-10 RX ORDER — SILDENAFIL 100 MG/1
50 TABLET, FILM COATED ORAL
COMMUNITY
Start: 2021-11-10 | End: 2022-05-10

## 2022-05-10 RX ORDER — TAMSULOSIN HYDROCHLORIDE 0.4 MG/1
0.4 CAPSULE ORAL DAILY
Qty: 90 CAPSULE | Refills: 3 | Status: SHIPPED | OUTPATIENT
Start: 2022-05-10

## 2022-05-10 RX ORDER — AMLODIPINE BESYLATE 10 MG/1
10 TABLET ORAL DAILY
Qty: 90 TABLET | Refills: 3 | Status: SHIPPED | OUTPATIENT
Start: 2022-05-10 | End: 2023-05-23 | Stop reason: SDUPTHER

## 2022-05-10 RX ORDER — GABAPENTIN 300 MG/1
300 CAPSULE ORAL NIGHTLY
Qty: 90 CAPSULE | Refills: 3 | Status: SHIPPED | OUTPATIENT
Start: 2022-05-10 | End: 2022-07-21 | Stop reason: ALTCHOICE

## 2022-05-10 NOTE — PROGRESS NOTES
Subjective:       Patient ID: Piotr Crespo is a 61 y.o. male.    Chief Complaint: Annual Exam      62 yo male with history of stroke in R cerebral hemisphere in 2017 with residual left hand numbness and decreased  strength, R MCA aneurysm in 2006, prediabetes, left carotid stent, coronary artery disease, hypertension, mixed hyperlipidemia, erectile dysfunction, BPH, depression, pulmonary nodule and nicotine dependence.     He reports continued difficulty with  strength & numbness of left hand but some improvement in range of motion. He has some improvement in lower extremity strength. No falls. He continues with home independent PT/OT exercises.  [facility] performed with small, left lung nodule in Dec 2021 and plans to [facility] (6 month follow up). He continues to smoke. He is not interested in the smoking cessation program.   He has tried nicotine replacement therapy (still has nicotine patches and gum). He tried Chantix in the past.     Next eye exam due in July 2022.    He reports no medication adverse events.    The following portions of the patient's history were reviewed and updated as appropriate: allergies, current medications, past family history, past medical history, past social history, past surgical history and problem list.      Review of Systems   Constitutional: Negative for activity change, appetite change, chills, diaphoresis, fatigue, fever and unexpected weight change.   HENT: Negative for nasal congestion, dental problem, facial swelling, nosebleeds, postnasal drip, rhinorrhea, sore throat, tinnitus and trouble swallowing.    Eyes: Positive for visual disturbance. Negative for pain, discharge and itching.   Respiratory: Negative for apnea, chest tightness, shortness of breath, wheezing and stridor.    Cardiovascular: Negative for chest pain, palpitations and leg swelling.   Gastrointestinal: Negative for abdominal distention,  "abdominal pain, constipation, diarrhea, nausea, rectal pain and vomiting.   Endocrine: Negative for cold intolerance, heat intolerance and polyuria.   Genitourinary: Negative for difficulty urinating, dysuria, frequency, hematuria and urgency.   Musculoskeletal: Positive for arthralgias. Negative for gait problem, myalgias, neck pain and neck stiffness.   Integumentary:  Negative for color change and rash.   Neurological: Positive for weakness. Negative for dizziness, tremors, seizures, syncope, facial asymmetry and headaches.   Hematological: Negative for adenopathy. Does not bruise/bleed easily.   Psychiatric/Behavioral: Negative for agitation, confusion, hallucinations, self-injury and suicidal ideas. The patient is not hyperactive.           Objective:       Vitals:    05/10/22 0951   BP: 126/70   BP Location: Right arm   Patient Position: Sitting   BP Method: Medium (Manual)   Pulse: 60   Resp: 18   Temp: 98 °F (36.7 °C)   SpO2: 99%   Weight: 82.2 kg (181 lb 3.5 oz)   Height: 5' 8" (1.727 m)     Physical Exam  Constitutional:       General: He is not in acute distress.     Appearance: He is not ill-appearing, toxic-appearing or diaphoretic.   HENT:      Head: Atraumatic.      Right Ear: Tympanic membrane normal.      Left Ear: Tympanic membrane normal.   Eyes:      General: No scleral icterus.        Right eye: No discharge.         Left eye: No discharge.      Extraocular Movements: Extraocular movements intact.      Conjunctiva/sclera: Conjunctivae normal.      Pupils: Pupils are equal, round, and reactive to light.   Cardiovascular:      Rate and Rhythm: Normal rate and regular rhythm.      Pulses: Normal pulses.      Heart sounds: Normal heart sounds.   Pulmonary:      Effort: Pulmonary effort is normal.      Breath sounds: Normal breath sounds.   Abdominal:      General: Bowel sounds are normal.      Palpations: Abdomen is soft.      Tenderness: There is no rebound.   Musculoskeletal:      Right lower leg: " No edema.      Left lower leg: No edema.   Neurological:      Mental Status: He is alert and oriented to person, place, and time.      Sensory: Diminished sensation to light touch in left C6/7     Deep Tendon Reflexes: Left sided hyperreflexia      Comments: Power 5+ in right upper and lower extremity, slightly diminished left  strength in comparison to right  Psychiatric:         Mood and Affect: Mood normal.         Thought Content: Thought content normal.         Judgment: Judgment normal.         Assessment:       1. History of stroke    2. Left hemiparesis    3. Numbness of left hand    4. Essential hypertension    5. Hyperlipidemia LDL goal <70    6. Prediabetes    7. Coronary artery disease involving native coronary artery of native heart without angina pectoris    8. Erectile dysfunction due to arterial insufficiency    9. Benign prostatic hyperplasia with nocturia    10. Lung nodule < 6cm on CT    11. Cigarette nicotine dependence without complication    12. Moderate episode of recurrent major depressive disorder    13. Presbyopia    14. Regular astigmatism of left eye        Plan:       1. History of stroke  2. Left hemiparesis  No new or worsening deficits. Continue home PT/OT. Follows with Neurology.    3. Numbness of left hand  -     gabapentin (NEURONTIN) 300 MG capsule; Take 1 capsule (300 mg total) by mouth every evening.  Dispense: 90 capsule; Refill: 3    4. Essential hypertension  -     carvediloL (COREG) 6.25 MG tablet; Take 1 tablet (6.25 mg total) by mouth 2 (two) times daily with meals.  Dispense: 180 tablet; Refill: 3  -     aspirin 81 MG Chew; Chew and swallow 1 tablet (81 mg total) by mouth once daily.  Dispense: 90 tablet; Refill: 3  -     amLODIPine (NORVASC) 10 MG tablet; Take 1 tablet (10 mg total) by mouth once daily.  Dispense: 90 tablet; Refill: 3    5. Hyperlipidemia LDL goal <70  -     ezetimibe (ZETIA) 10 mg tablet; Take 1 tablet (10 mg total) by mouth once daily.  Dispense:  90 tablet; Refill: 3  -     atorvastatin (LIPITOR) 80 MG tablet; Take 1 tablet (80 mg total) by mouth every evening.  Dispense: 90 tablet; Refill: 3    6. Prediabetes  Encourage healthy eating and exercise    7. Coronary artery disease involving native coronary artery of native heart without angina pectoris  On ASA, coreg, statin. Stable and asymptomatic.    8. Erectile dysfunction due to arterial insufficiency  Not using cialis or viagra.     9. Benign prostatic hyperplasia with nocturia  -     tamsulosin (FLOMAX) 0.4 mg Cap; Take 1 capsule (0.4 mg total) by mouth once daily.  Dispense: 90 capsule; Refill: 3  Follow with Urology    10. Lung nodule < 6cm on CT  [facility] with [facility] in 6 months    11. Cigarette nicotine dependence without complication  Encouraged to stop smoking. He is not interested in smoking cessation program and not interested in quitting.    12. Moderate episode of recurrent major depressive disorder  Was referred to psychology in April, 2021; has yet to make appointment. He plans on going to [employer]. Not interested in pharmacotherapy. No suicidal or homicidal ideation.    13. Presbyopia  -     Ambulatory referral/consult to Optometry; Future; Expected date: 05/17/2022    14. Regular astigmatism of left eye  -     Ambulatory referral/consult to Optometry; Future; Expected date: 05/17/2022      Disclaimer: This note has been generated using voice-recognition software. There may be typographical errors that have been missed during proof-reading

## 2022-05-18 ENCOUNTER — TELEPHONE (OUTPATIENT)
Dept: PRIMARY CARE CLINIC | Facility: CLINIC | Age: 62
End: 2022-05-18
Payer: MEDICARE

## 2022-05-18 NOTE — TELEPHONE ENCOUNTER
----- Message from Vicky Dowling MD sent at 5/11/2022  3:02 PM CDT -----  Please let patient know of message below    Hello,    There is no significant protein in the urine.  This is good!.   Take care!

## 2022-05-23 ENCOUNTER — TELEPHONE (OUTPATIENT)
Dept: PRIMARY CARE CLINIC | Facility: CLINIC | Age: 62
End: 2022-05-23
Payer: MEDICARE

## 2022-05-23 NOTE — LETTER
May 23, 2022    Piotr Crespo  [address]  [place] [address]             [facility]  [address]  [facility] [address]  Phone: [phone]  Fax: [phone] Dear Mr. Piotr Crespo:    Below are the results from your recent visit:    Resulted Orders   Microalbumin/Creatinine Ratio, Urine   Result Value Ref Range    Microalbumin, Urine 9.0 ug/mL    Creatinine, Urine 115.0 23.0 - 375.0 mg/dL    Microalb/Creat Ratio 7.8 0.0 - 30.0 ug/mg    Narrative    Specimen Source->Urine     Your results are normal.  Please don't hesitate to call our office if you have any questions or concerns.      Sincerely,        Vicky Dowling MD

## 2022-07-21 ENCOUNTER — OFFICE VISIT (OUTPATIENT)
Dept: PRIMARY CARE CLINIC | Facility: CLINIC | Age: 62
End: 2022-07-21
Payer: MEDICARE

## 2022-07-21 VITALS
WEIGHT: 182.13 LBS | SYSTOLIC BLOOD PRESSURE: 125 MMHG | HEART RATE: 58 BPM | TEMPERATURE: 98 F | OXYGEN SATURATION: 99 % | HEIGHT: 68 IN | RESPIRATION RATE: 18 BRPM | DIASTOLIC BLOOD PRESSURE: 70 MMHG | BODY MASS INDEX: 27.6 KG/M2

## 2022-07-21 DIAGNOSIS — F17.210 CIGARETTE NICOTINE DEPENDENCE WITHOUT COMPLICATION: ICD-10-CM

## 2022-07-21 DIAGNOSIS — I10 ESSENTIAL HYPERTENSION: Primary | ICD-10-CM

## 2022-07-21 DIAGNOSIS — E78.2 MIXED HYPERLIPIDEMIA: ICD-10-CM

## 2022-07-21 DIAGNOSIS — R73.03 PREDIABETES: ICD-10-CM

## 2022-07-21 PROBLEM — E55.9 VITAMIN D DEFICIENCY: Status: ACTIVE | Noted: 2022-07-21

## 2022-07-21 PROCEDURE — 99999 PR PBB SHADOW E&M-EST. PATIENT-LVL V: CPT | Mod: PBBFAC,,, | Performed by: FAMILY MEDICINE

## 2022-07-21 PROCEDURE — 3078F DIAST BP <80 MM HG: CPT | Mod: CPTII,S$GLB,, | Performed by: FAMILY MEDICINE

## 2022-07-21 PROCEDURE — 3044F PR MOST RECENT HEMOGLOBIN A1C LEVEL <7.0%: ICD-10-PCS | Mod: CPTII,S$GLB,, | Performed by: FAMILY MEDICINE

## 2022-07-21 PROCEDURE — 3074F SYST BP LT 130 MM HG: CPT | Mod: CPTII,S$GLB,, | Performed by: FAMILY MEDICINE

## 2022-07-21 PROCEDURE — 3061F NEG MICROALBUMINURIA REV: CPT | Mod: CPTII,S$GLB,, | Performed by: FAMILY MEDICINE

## 2022-07-21 PROCEDURE — 3044F HG A1C LEVEL LT 7.0%: CPT | Mod: CPTII,S$GLB,, | Performed by: FAMILY MEDICINE

## 2022-07-21 PROCEDURE — 99499 RISK ADDL DX/OHS AUDIT: ICD-10-PCS | Mod: S$GLB,,, | Performed by: FAMILY MEDICINE

## 2022-07-21 PROCEDURE — 3074F PR MOST RECENT SYSTOLIC BLOOD PRESSURE < 130 MM HG: ICD-10-PCS | Mod: CPTII,S$GLB,, | Performed by: FAMILY MEDICINE

## 2022-07-21 PROCEDURE — 3066F PR DOCUMENTATION OF TREATMENT FOR NEPHROPATHY: ICD-10-PCS | Mod: CPTII,S$GLB,, | Performed by: FAMILY MEDICINE

## 2022-07-21 PROCEDURE — 99214 PR OFFICE/OUTPT VISIT, EST, LEVL IV, 30-39 MIN: ICD-10-PCS | Mod: S$GLB,,, | Performed by: FAMILY MEDICINE

## 2022-07-21 PROCEDURE — 3008F PR BODY MASS INDEX (BMI) DOCUMENTED: ICD-10-PCS | Mod: CPTII,S$GLB,, | Performed by: FAMILY MEDICINE

## 2022-07-21 PROCEDURE — 3066F NEPHROPATHY DOC TX: CPT | Mod: CPTII,S$GLB,, | Performed by: FAMILY MEDICINE

## 2022-07-21 PROCEDURE — 1160F PR REVIEW ALL MEDS BY PRESCRIBER/CLIN PHARMACIST DOCUMENTED: ICD-10-PCS | Mod: CPTII,S$GLB,, | Performed by: FAMILY MEDICINE

## 2022-07-21 PROCEDURE — 3061F PR NEG MICROALBUMINURIA RESULT DOCUMENTED/REVIEW: ICD-10-PCS | Mod: CPTII,S$GLB,, | Performed by: FAMILY MEDICINE

## 2022-07-21 PROCEDURE — 1160F RVW MEDS BY RX/DR IN RCRD: CPT | Mod: CPTII,S$GLB,, | Performed by: FAMILY MEDICINE

## 2022-07-21 PROCEDURE — 99499 UNLISTED E&M SERVICE: CPT | Mod: S$GLB,,, | Performed by: FAMILY MEDICINE

## 2022-07-21 PROCEDURE — 3078F PR MOST RECENT DIASTOLIC BLOOD PRESSURE < 80 MM HG: ICD-10-PCS | Mod: CPTII,S$GLB,, | Performed by: FAMILY MEDICINE

## 2022-07-21 PROCEDURE — 3008F BODY MASS INDEX DOCD: CPT | Mod: CPTII,S$GLB,, | Performed by: FAMILY MEDICINE

## 2022-07-21 PROCEDURE — 99214 OFFICE O/P EST MOD 30 MIN: CPT | Mod: S$GLB,,, | Performed by: FAMILY MEDICINE

## 2022-07-21 PROCEDURE — 1159F PR MEDICATION LIST DOCUMENTED IN MEDICAL RECORD: ICD-10-PCS | Mod: CPTII,S$GLB,, | Performed by: FAMILY MEDICINE

## 2022-07-21 PROCEDURE — 99999 PR PBB SHADOW E&M-EST. PATIENT-LVL V: ICD-10-PCS | Mod: PBBFAC,,, | Performed by: FAMILY MEDICINE

## 2022-07-21 PROCEDURE — 1159F MED LIST DOCD IN RCRD: CPT | Mod: CPTII,S$GLB,, | Performed by: FAMILY MEDICINE

## 2022-07-21 NOTE — PROGRESS NOTES
[facility] Note    Chief Complaint      Chief Complaint   Patient presents with    Establish Care       History of Present Illness      Piotr Crespo is a 61 y.o. male who presents today for:    Screening:   Colon- colonoscopy 2020 repeat in 5yrs   PSA-  WNL 5/22    [facility] -7/22emphy[facility]ma, no nodules, repeat 1 year     Immunizations:   COVID- boost 7/22   Flu- due september   Shingles- needs second dose   Pneumonia- completed 13 and 23 2017   Tetanus- 2014  HTN - controlled on current meds, no complaints micro neg  HLD - on statin and zetia, LDL 5/22 61  IFG - monitor, diet discussed  Tobacco - using gum and starting patch. Motivated to quit because emphysema on CT    Patient Care Team:  Sandi Clark MD as PCP - General (Internal Medicine)  Ellen Ruiz MA as Care Coordinator     Health Maintenance:  Immunization History   Administered Date(s) Administered    Dtap, Unspecified Formulation 09/15/2014    Hepatitis A, Adult 09/19/2005    Influenza 09/19/2005    Influenza - Quadrivalent 10/05/2017    Pneumococcal Conjugate - 13 Valent 09/14/2017    Pneumococcal Polysaccharide - 23 Valent 03/21/2016    Td (ADULT) 09/19/2005    Td - PF (ADULT) 03/11/2020    Tdap 09/15/2014    Zoster Recombinant 09/24/2019      Health Maintenance   Topic Date Due    LDCT Lung Screen  01/21/2021    High Dose Statin  07/21/2023    Aspirin/Antiplatelet Therapy  07/21/2023    Lipid Panel  05/10/2027    TETANUS VACCINE  03/11/2030    Hepatitis C Screening  Completed        Past Medical History:  Past Medical History:   Diagnosis Date    Erectile dysfunction due to arterial insufficiency 7/17/2020    Essential hypertension 3/11/2020    High cholesterol     History of left common carotid artery stent placement 4/17/2020    History of stroke 3/11/2020    Hypertension     Mixed hyperlipidemia 3/11/2020    Prediabetes 7/17/2020    Stroke     sept 3, 2017       Past Surgical History:   has a past  surgical history that includes Cerebral aneurysm repair; Carotid angioplasty (Left); Colonoscopy (N/A, 06/30/2020); and Lumbar discectomy.    Family History:  family history includes Bell's palsy in his brother; Brain cancer in his brother; Cancer in his brother, brother, and father; Dementia in his mother; Hypertension in his mother; No Known Problems in his brother, brother, and sister; Other in his sister; Stroke in his mother.     Social History:  Social History     Tobacco Use    Smoking status: Current Every Day Smoker     Packs/day: 0.40     Years: 45.00     Pack years: 18.00     Types: Cigarettes    Smokeless tobacco: Never Used   Substance Use Topics    Alcohol use: Yes     Comment: once a month    Drug use: No       Review of Systems   Constitutional: Negative for fever.   Respiratory: Negative for shortness of breath.    Cardiovascular: Negative for chest pain.   Gastrointestinal: Negative for change in bowel habit and change in bowel habit.   Genitourinary: Negative for bladder incontinence.        Medications:    Current Outpatient Medications:     amLODIPine (NORVASC) 10 MG tablet, Take 1 tablet (10 mg total) by mouth once daily., Disp: 90 tablet, Rfl: 3    aspirin 81 MG Chew, Chew and swallow 1 tablet (81 mg total) by mouth once daily., Disp: 90 tablet, Rfl: 3    atorvastatin (LIPITOR) 80 MG tablet, Take 1 tablet (80 mg total) by mouth every evening., Disp: 90 tablet, Rfl: 3    carvediloL (COREG) 6.25 MG tablet, Take 1 tablet (6.25 mg total) by mouth 2 (two) times daily with meals., Disp: 180 tablet, Rfl: 3    cholecalciferol, vitamin D3, 100 mcg (4,000 unit) Tab, TAKE ONE TABLET BY MOUTH EVERY DAY AS A VITAMIN SUPPLEMENT, Disp: , Rfl:     clotrimazole (LOTRIMIN) 1 % Soln, APPLY SMALL AMOUNT TOPICALLY TWICE A DAY FOR FUNGAL INFECTION, Disp: , Rfl:     ezetimibe (ZETIA) 10 mg tablet, Take 1 tablet (10 mg total) by mouth once daily., Disp: 90 tablet, Rfl: 3    nicotine (NICODERM CQ) 14 mg/24  "hr, APPLY 1 PATCH TOPICALLY ONCE DAILY FOR 28 DAYS FOR SMOKING CESSATION. DO NOT SMOKE WHILE USING PATCHES. COMPLETE THIS STRENGTH PRIOR TO STARTING 7 MG NICOTINE PATCH., Disp: , Rfl:     nicotine (NICODERM CQ) 7 mg/24 hr, APPLY 1 PATCH TOPICALLY EVERY DAY FOR 28 DAYS FOR SMOKING CESSATION. DO NOT SMOKE WHILE USING PATCHES. START THIS STRENGTH AFTER COMPLETION OF COURSE OF 14 MG NICOTINE PATCHES., Disp: , Rfl:     nicotine polacrilex (NICORETTE) 4 MG Gum, CHEW ONE 4MG PIECE IN MOUTH EVERY HOUR AS NEEDED, Disp: , Rfl:     tamsulosin (FLOMAX) 0.4 mg Cap, Take 1 capsule (0.4 mg total) by mouth once daily., Disp: 90 capsule, Rfl: 3    urea 20 % Crea, APPLY LIBERAL AMOUNT TOPICALLY TWICE A DAY AS NEEDED FOR CALLOUSED AREA., Disp: , Rfl:      Allergies:  Review of patient's allergies indicates:  No Known Allergies    Physical Exam      Vital Signs  Temp: 97.8 °F (36.6 °C)  Pulse: (!) 58  Resp: 18  SpO2: 99 %  BP: 125/70  BP Location: Right arm  Patient Position: Sitting  Pain Score: 0-No pain  Height and Weight  Height: 5' 8" (172.7 cm)  Weight: 82.6 kg (182 lb 1.6 oz)  BSA (Calculated - sq m): 1.99 sq meters  BMI (Calculated): 27.7  Weight in (lb) to have BMI = 25: 164.1      Patient Position: Sitting      Physical Exam  Constitutional:       General: He is not in acute distress.     Appearance: Normal appearance.   HENT:      Right Ear: Tympanic membrane, ear canal and external ear normal.      Left Ear: Tympanic membrane, ear canal and external ear normal.      Nose: Nose normal.      Mouth/Throat:      Mouth: Mucous membranes are moist.      Pharynx: Oropharynx is clear. No oropharyngeal exudate or posterior oropharyngeal erythema.   Eyes:      Extraocular Movements: Extraocular movements intact.      Conjunctiva/sclera: Conjunctivae normal.      Pupils: Pupils are equal, round, and reactive to light.   Cardiovascular:      Rate and Rhythm: Normal rate and regular rhythm.      Pulses: Normal pulses.      Heart " sounds: No murmur heard.    No friction rub. No gallop.   Pulmonary:      Effort: Pulmonary effort is normal.      Breath sounds: No wheezing, rhonchi or rales.   Abdominal:      General: Abdomen is flat. Bowel sounds are normal. There is no distension.      Palpations: Abdomen is soft. There is no mass.      Tenderness: There is no abdominal tenderness.   Musculoskeletal:      Cervical back: Neck supple.      Right lower leg: No edema.      Left lower leg: No edema.   Lymphadenopathy:      Cervical: No cervical adenopathy.   Skin:     General: Skin is warm and dry.   Neurological:      Mental Status: He is alert.      Comments: Left hemiparesis   Psychiatric:         Mood and Affect: Mood normal.         Behavior: Behavior normal.          Laboratory:  CBC:  Recent Labs   Lab 03/12/20 0915 04/29/21  1032 05/10/22  1030   WBC 7.99 6.53 6.27   RBC 4.87 4.86 4.89   Hemoglobin 14.5 14.6 14.6   Hematocrit 47.0 45.6 45.3   Platelets 154 154 152   MCV 97 94 93   MCH 29.8 30.0 29.9   MCHC 30.9 L 32.0 32.2       CMP:  Recent Labs   Lab 03/12/20  0915 04/29/21  1032 05/10/22  1030   Glucose 104 99 95   Calcium 9.7 9.4 9.7   Albumin 3.9 4.0 4.1   Total Protein 7.4 7.4 7.1   Sodium 140 143 139   Potassium 4.3 5.0 4.8   CO2 28 29 29   Chloride 103 108 103   BUN 12 15 13   Creatinine 1.1 1.1 1.1   Alkaline Phosphatase 76 66 60   ALT 17 15 28   AST 20 17 21   Total Bilirubin 0.7 0.5 0.4           URINALYSIS:  Recent Labs   Lab 07/29/21  1103   Color, UA Yellow   Specific Gravity, UA 1.025   pH, UA 6.0   Protein, UA Negative   Nitrite, UA Negative   Leukocytes, UA Negative        LIPIDS:  Recent Labs   Lab 03/12/20 0915 04/29/21  1032 05/10/22  1030   TSH 1.066 1.490 1.332   HDL 37 L 34 L 42   Cholesterol 132 106 L 117 L   Triglycerides 75 52 61   LDL Cholesterol 80.0 61.6 L 62.8 L   HDL/Cholesterol Ratio 28.0 32.1 35.9   Non-HDL Cholesterol 95 72 75   Total Cholesterol/HDL Ratio 3.6 3.1 2.8       TSH:  Recent Labs   Lab  03/12/20  0915 04/29/21  1032 05/10/22  1030   TSH 1.066 1.490 1.332       A1C:  Recent Labs   Lab 03/12/20  0915 04/29/21  1032 05/10/22  1030   Hemoglobin A1C 5.7 H 5.9 H 5.9 H       Urine Microalbumin/Cr:  Recent Labs   Lab 04/29/21  1007 05/10/22  1038   Microalb/Creat Ratio 7.3 7.8         Other:   Recent Labs   Lab 07/23/20  0859   Testosterone, Total 449     Recent Labs   Lab 03/12/20  0915 04/29/21  1032 05/10/22  1030   PSA, Screen 0.41 0.43 0.50   Hepatitis C Ab Negative  --   --        Assessment/Plan     Piotr Crespo is a 61 y.o.male with:    Essential hypertension  - stable, continue current medication    Mixed hyperlipidemia  - stable, continue current medication    Prediabetes  LSM discussed  Cigarette nicotine dependence without complication  Continue gum prn  and start patch       Chronic conditions status updated as per HPI.  Other than changes above, cont current medications and maintain follow up with specialists.  Return to clinic in Follow up in about 6 months (around 1/21/2023).      Sandi Clark MD  [facility]

## 2022-07-21 NOTE — PATIENT INSTRUCTIONS
Continue smoking cessation  Continue current medications  Watch the sugars and carbohydrates in the diet, dietary recommendations are attached

## 2022-10-31 ENCOUNTER — TELEPHONE (OUTPATIENT)
Dept: PRIMARY CARE CLINIC | Facility: CLINIC | Age: 62
End: 2022-10-31
Payer: MEDICARE

## 2022-10-31 NOTE — TELEPHONE ENCOUNTER
----- Message from Kalie Ardon sent at 10/31/2022  2:53 PM CDT -----  Contact: pt [phone]  Pt is requesting to have lab orders put in and scheduled for the week of 01/02/23 early morning. Pt  would like to have labs done at the [facility] location. Please call.            Thank you

## 2023-01-11 ENCOUNTER — OFFICE VISIT (OUTPATIENT)
Dept: PRIMARY CARE CLINIC | Facility: CLINIC | Age: 63
End: 2023-01-11
Payer: MEDICARE

## 2023-01-11 VITALS
OXYGEN SATURATION: 99 % | BODY MASS INDEX: 27.3 KG/M2 | DIASTOLIC BLOOD PRESSURE: 76 MMHG | HEART RATE: 65 BPM | HEIGHT: 69 IN | WEIGHT: 184.31 LBS | SYSTOLIC BLOOD PRESSURE: 108 MMHG | TEMPERATURE: 98 F

## 2023-01-11 DIAGNOSIS — F17.210 CIGARETTE NICOTINE DEPENDENCE WITHOUT COMPLICATION: ICD-10-CM

## 2023-01-11 DIAGNOSIS — Z76.89 ENCOUNTER TO ESTABLISH CARE WITH NEW DOCTOR: ICD-10-CM

## 2023-01-11 DIAGNOSIS — I25.118 CORONARY ARTERY DISEASE OF NATIVE ARTERY OF NATIVE HEART WITH STABLE ANGINA PECTORIS: ICD-10-CM

## 2023-01-11 DIAGNOSIS — N13.8 BPH WITH URINARY OBSTRUCTION: ICD-10-CM

## 2023-01-11 DIAGNOSIS — N40.1 BPH WITH URINARY OBSTRUCTION: ICD-10-CM

## 2023-01-11 DIAGNOSIS — F33.1 MODERATE EPISODE OF RECURRENT MAJOR DEPRESSIVE DISORDER: ICD-10-CM

## 2023-01-11 DIAGNOSIS — R91.1 LUNG NODULE: ICD-10-CM

## 2023-01-11 DIAGNOSIS — Z00.00 ENCOUNTER FOR PREVENTATIVE ADULT HEALTH CARE EXAMINATION: Primary | ICD-10-CM

## 2023-01-11 DIAGNOSIS — G81.94 LEFT HEMIPARESIS: ICD-10-CM

## 2023-01-11 DIAGNOSIS — Z74.09 IMPAIRED FUNCTIONAL MOBILITY, BALANCE, GAIT, AND ENDURANCE: ICD-10-CM

## 2023-01-11 DIAGNOSIS — E78.2 MIXED HYPERLIPIDEMIA: ICD-10-CM

## 2023-01-11 DIAGNOSIS — I10 ESSENTIAL HYPERTENSION: ICD-10-CM

## 2023-01-11 PROCEDURE — 99999 PR PBB SHADOW E&M-EST. PATIENT-LVL IV: ICD-10-PCS | Mod: PBBFAC,HCNC,, | Performed by: STUDENT IN AN ORGANIZED HEALTH CARE EDUCATION/TRAINING PROGRAM

## 2023-01-11 PROCEDURE — 3078F DIAST BP <80 MM HG: CPT | Mod: HCNC,CPTII,S$GLB, | Performed by: STUDENT IN AN ORGANIZED HEALTH CARE EDUCATION/TRAINING PROGRAM

## 2023-01-11 PROCEDURE — 1159F MED LIST DOCD IN RCRD: CPT | Mod: HCNC,CPTII,S$GLB, | Performed by: STUDENT IN AN ORGANIZED HEALTH CARE EDUCATION/TRAINING PROGRAM

## 2023-01-11 PROCEDURE — 1159F PR MEDICATION LIST DOCUMENTED IN MEDICAL RECORD: ICD-10-PCS | Mod: HCNC,CPTII,S$GLB, | Performed by: STUDENT IN AN ORGANIZED HEALTH CARE EDUCATION/TRAINING PROGRAM

## 2023-01-11 PROCEDURE — 99999 PR PBB SHADOW E&M-EST. PATIENT-LVL IV: CPT | Mod: PBBFAC,HCNC,, | Performed by: STUDENT IN AN ORGANIZED HEALTH CARE EDUCATION/TRAINING PROGRAM

## 2023-01-11 PROCEDURE — 3008F BODY MASS INDEX DOCD: CPT | Mod: HCNC,CPTII,S$GLB, | Performed by: STUDENT IN AN ORGANIZED HEALTH CARE EDUCATION/TRAINING PROGRAM

## 2023-01-11 PROCEDURE — 3078F PR MOST RECENT DIASTOLIC BLOOD PRESSURE < 80 MM HG: ICD-10-PCS | Mod: HCNC,CPTII,S$GLB, | Performed by: STUDENT IN AN ORGANIZED HEALTH CARE EDUCATION/TRAINING PROGRAM

## 2023-01-11 PROCEDURE — 99396 PREV VISIT EST AGE 40-64: CPT | Mod: HCNC,S$GLB,, | Performed by: STUDENT IN AN ORGANIZED HEALTH CARE EDUCATION/TRAINING PROGRAM

## 2023-01-11 PROCEDURE — 3008F PR BODY MASS INDEX (BMI) DOCUMENTED: ICD-10-PCS | Mod: HCNC,CPTII,S$GLB, | Performed by: STUDENT IN AN ORGANIZED HEALTH CARE EDUCATION/TRAINING PROGRAM

## 2023-01-11 PROCEDURE — 3074F PR MOST RECENT SYSTOLIC BLOOD PRESSURE < 130 MM HG: ICD-10-PCS | Mod: HCNC,CPTII,S$GLB, | Performed by: STUDENT IN AN ORGANIZED HEALTH CARE EDUCATION/TRAINING PROGRAM

## 2023-01-11 PROCEDURE — 99396 PR PREVENTIVE VISIT,EST,40-64: ICD-10-PCS | Mod: HCNC,S$GLB,, | Performed by: STUDENT IN AN ORGANIZED HEALTH CARE EDUCATION/TRAINING PROGRAM

## 2023-01-11 PROCEDURE — 1160F PR REVIEW ALL MEDS BY PRESCRIBER/CLIN PHARMACIST DOCUMENTED: ICD-10-PCS | Mod: HCNC,CPTII,S$GLB, | Performed by: STUDENT IN AN ORGANIZED HEALTH CARE EDUCATION/TRAINING PROGRAM

## 2023-01-11 PROCEDURE — 1160F RVW MEDS BY RX/DR IN RCRD: CPT | Mod: HCNC,CPTII,S$GLB, | Performed by: STUDENT IN AN ORGANIZED HEALTH CARE EDUCATION/TRAINING PROGRAM

## 2023-01-11 PROCEDURE — 3074F SYST BP LT 130 MM HG: CPT | Mod: HCNC,CPTII,S$GLB, | Performed by: STUDENT IN AN ORGANIZED HEALTH CARE EDUCATION/TRAINING PROGRAM

## 2023-01-11 NOTE — PROGRESS NOTES
Primary Care  Annual Office Visit - In Person  1/11/2023  Lory Oliveirahlovu        Patient is a 62 y.o.   Piotr Crespo  has a past medical history of Erectile dysfunction due to arterial insufficiency (7/17/2020), Essential hypertension (3/11/2020), High cholesterol, History of left common carotid artery stent placement (4/17/2020), History of stroke (3/11/2020), Hypertension, Mixed hyperlipidemia (3/11/2020), Prediabetes (7/17/2020), and Stroke.    Patient has no acute complaints today     Patient smokes 8 cigarettes/day at most 1/2 PPD   He has been smoking for 25 year. Last low dose CT was 6 months ago     Social History     Social History Narrative    Not on file     Piotr Crespo family history includes Bell's palsy in his brother; Brain cancer in his brother; Cancer in his brother, brother, and father; Dementia in his mother; Hypertension in his mother; No Known Problems in his brother, brother, and sister; Other in his sister; Stroke in his mother.    Active Medications  Review of patient's allergies indicates:  No Known Allergies  Current Outpatient Medications   Medication Instructions    amLODIPine (NORVASC) 10 mg, Oral, Daily    aspirin 81 MG Chew Chew and swallow 1 tablet (81 mg total) by mouth once daily.    atorvastatin (LIPITOR) 80 mg, Oral, Nightly    carvediloL (COREG) 6.25 mg, Oral, 2 times daily with meals    cholecalciferol, vitamin D3, 100 mcg (4,000 unit) Tab TAKE ONE TABLET BY MOUTH EVERY DAY AS A VITAMIN SUPPLEMENT    clotrimazole (LOTRIMIN) 1 % Soln APPLY SMALL AMOUNT TOPICALLY TWICE A DAY FOR FUNGAL INFECTION    ezetimibe (ZETIA) 10 mg, Oral, Daily    nicotine (NICODERM CQ) 14 mg/24 hr APPLY 1 PATCH TOPICALLY ONCE DAILY FOR 28 DAYS FOR SMOKING CESSATION. DO NOT SMOKE WHILE USING PATCHES. COMPLETE THIS STRENGTH PRIOR TO STARTING 7 MG NICOTINE PATCH.    nicotine (NICODERM CQ) 7 mg/24 hr APPLY 1 PATCH TOPICALLY EVERY DAY FOR 28 DAYS FOR SMOKING CESSATION. DO NOT SMOKE WHILE USING PATCHES. START THIS  STRENGTH AFTER COMPLETION OF COURSE OF 14 MG NICOTINE PATCHES.    nicotine polacrilex (NICORETTE) 4 MG Gum CHEW ONE 4MG PIECE IN MOUTH EVERY HOUR AS NEEDED    tamsulosin (FLOMAX) 0.4 mg, Oral, Daily    urea 20 % Crea APPLY LIBERAL AMOUNT TOPICALLY TWICE A DAY AS NEEDED FOR CALLOUSED AREA.       Annual Review of Preventative Care      Health Maintenance Due   Topic Date Due    LDCT Lung Screen  01/21/2021    COVID-19 Vaccine (5 - Booster for Pfizer series) 09/01/2022     Last PSA:   Lab Results   Component Value Date    PSA 0.50 05/10/2022    PSA 0.43 04/29/2021    PSA 0.41 03/12/2020     Diabetes Screening:    Lab Results   Component Value Date    HGBA1C 5.9 (H) 05/10/2022    HGBA1C 5.9 (H) 04/29/2021    HGBA1C 5.7 (H) 03/12/2020     BP Readings from Last 3 Encounters:   01/11/23 108/76   07/21/22 125/70   05/10/22 126/70     Wt Readings from Last 3 Encounters:   01/11/23 0938 83.6 kg (184 lb 4.9 oz)   07/21/22 1024 82.6 kg (182 lb 1.6 oz)   05/10/22 0951 82.2 kg (181 lb 3.5 oz)         Review of Systems   All other systems reviewed and are negative.    Physical Exam  Vitals reviewed.   Cardiovascular:      Rate and Rhythm: Normal rate and regular rhythm.      Pulses: Normal pulses.      Heart sounds: Normal heart sounds.   Pulmonary:      Effort: Pulmonary effort is normal.      Breath sounds: Normal breath sounds.   Abdominal:      General: Abdomen is flat. Bowel sounds are normal.      Palpations: Abdomen is soft.   Musculoskeletal:      Right lower leg: No edema.      Left lower leg: No edema.   Neurological:      Motor: No weakness.               Assessment and Plan     Hx CVA in 2017   CAD  He has residual weakness on left side. Motor strength 5/5 upper and lower extremities   Continue aspirin, Lipitor, and Zetia     HTN   Well controlled   Continue amlodipine 10 mg daily and coreg 6.25 mg BID     Personal Hx of Nicotine Dependence   Benefits to cessation discussed. Patient strongly advised to quit smoking. At  this time they are willing to quit.   Declined referral to smoking cessation program   3 minutes spent counseling patient regarding quitting smoking to improve overall health.    Lung cancer screening   [facility] with [facility]   Plan to fax records   Repeat in June     BPH   Continue flomax    MDD   Patient states that mood has been stable. No issues with sleep or appetite                               Upcoming Scheduled Appointments and Follow Up:    No future appointments.    Follow Up [facility] (with who? when?): Follow up in about 6 months (around 7/11/2023).        Extended Emergency Contact Information  Primary Emergency Contact: Jina Crespo  Address: [address] [place] [address] [id number] [place] [address] [facility]  Home Phone: [phone]  Mobile Phone: [phone]  Relation: Spouse      Lory Pugh MD   Attending Physician   Primary Care  1/11/2023 - 10:11 AM    I spent a total of 17 minutes on the day of the visit.This includes face to face time and non-face to face time preparing to see the patient (eg, review of tests), obtaining and/or reviewing separately obtained history, documenting clinical information in the electronic or other health record, independently interpreting results and communicating results to the patient/family/caregiver, or care coordinator.

## 2023-01-30 ENCOUNTER — PATIENT OUTREACH (OUTPATIENT)
Dept: ADMINISTRATIVE | Facility: HOSPITAL | Age: 63
End: 2023-01-30
Payer: MEDICARE

## 2023-02-07 DIAGNOSIS — Z00.00 ENCOUNTER FOR MEDICARE ANNUAL WELLNESS EXAM: ICD-10-CM

## 2023-02-09 DIAGNOSIS — Z00.00 ENCOUNTER FOR MEDICARE ANNUAL WELLNESS EXAM: ICD-10-CM

## 2023-03-08 DIAGNOSIS — E78.5 HYPERLIPIDEMIA LDL GOAL <70: ICD-10-CM

## 2023-03-08 RX ORDER — ATORVASTATIN CALCIUM 80 MG/1
80 TABLET, FILM COATED ORAL NIGHTLY
Qty: 90 TABLET | Refills: 3 | Status: SHIPPED | OUTPATIENT
Start: 2023-03-08 | End: 2023-05-23 | Stop reason: SDUPTHER

## 2023-03-08 RX ORDER — EZETIMIBE 10 MG/1
10 TABLET ORAL DAILY
Qty: 90 TABLET | Refills: 3 | Status: SHIPPED | OUTPATIENT
Start: 2023-03-08 | End: 2023-05-23 | Stop reason: SDUPTHER

## 2023-05-10 ENCOUNTER — OFFICE VISIT (OUTPATIENT)
Dept: PRIMARY CARE CLINIC | Facility: CLINIC | Age: 63
End: 2023-05-10
Payer: MEDICARE

## 2023-05-10 VITALS
OXYGEN SATURATION: 98 % | WEIGHT: 178.81 LBS | SYSTOLIC BLOOD PRESSURE: 116 MMHG | DIASTOLIC BLOOD PRESSURE: 66 MMHG | HEIGHT: 69 IN | TEMPERATURE: 98 F | BODY MASS INDEX: 26.48 KG/M2 | HEART RATE: 64 BPM

## 2023-05-10 DIAGNOSIS — I10 ESSENTIAL HYPERTENSION: ICD-10-CM

## 2023-05-10 DIAGNOSIS — G81.94 LEFT HEMIPARESIS: ICD-10-CM

## 2023-05-10 DIAGNOSIS — J43.9 PULMONARY EMPHYSEMA, UNSPECIFIED EMPHYSEMA TYPE: ICD-10-CM

## 2023-05-10 DIAGNOSIS — R73.01 IMPAIRED FASTING GLUCOSE: ICD-10-CM

## 2023-05-10 DIAGNOSIS — R79.9 ABNORMAL FINDING OF BLOOD CHEMISTRY, UNSPECIFIED: ICD-10-CM

## 2023-05-10 DIAGNOSIS — R91.1 LUNG NODULE: ICD-10-CM

## 2023-05-10 DIAGNOSIS — Z74.09 IMPAIRED FUNCTIONAL MOBILITY, BALANCE, GAIT, AND ENDURANCE: ICD-10-CM

## 2023-05-10 DIAGNOSIS — N13.8 BPH WITH URINARY OBSTRUCTION: ICD-10-CM

## 2023-05-10 DIAGNOSIS — Z12.5 SCREENING FOR PROSTATE CANCER: ICD-10-CM

## 2023-05-10 DIAGNOSIS — N40.1 BPH WITH URINARY OBSTRUCTION: ICD-10-CM

## 2023-05-10 DIAGNOSIS — Z86.73 HISTORY OF STROKE: ICD-10-CM

## 2023-05-10 DIAGNOSIS — Z00.00 ENCOUNTER FOR PREVENTATIVE ADULT HEALTH CARE EXAMINATION: ICD-10-CM

## 2023-05-10 DIAGNOSIS — I25.118 CORONARY ARTERY DISEASE OF NATIVE ARTERY OF NATIVE HEART WITH STABLE ANGINA PECTORIS: ICD-10-CM

## 2023-05-10 DIAGNOSIS — E78.2 MIXED HYPERLIPIDEMIA: ICD-10-CM

## 2023-05-10 DIAGNOSIS — F17.210 CIGARETTE NICOTINE DEPENDENCE WITHOUT COMPLICATION: Primary | ICD-10-CM

## 2023-05-10 PROCEDURE — 3078F PR MOST RECENT DIASTOLIC BLOOD PRESSURE < 80 MM HG: ICD-10-PCS | Mod: CPTII,S$GLB,, | Performed by: STUDENT IN AN ORGANIZED HEALTH CARE EDUCATION/TRAINING PROGRAM

## 2023-05-10 PROCEDURE — 99213 PR OFFICE/OUTPT VISIT, EST, LEVL III, 20-29 MIN: ICD-10-PCS | Mod: S$GLB,,, | Performed by: STUDENT IN AN ORGANIZED HEALTH CARE EDUCATION/TRAINING PROGRAM

## 2023-05-10 PROCEDURE — 3078F DIAST BP <80 MM HG: CPT | Mod: CPTII,S$GLB,, | Performed by: STUDENT IN AN ORGANIZED HEALTH CARE EDUCATION/TRAINING PROGRAM

## 2023-05-10 PROCEDURE — 3074F PR MOST RECENT SYSTOLIC BLOOD PRESSURE < 130 MM HG: ICD-10-PCS | Mod: CPTII,S$GLB,, | Performed by: STUDENT IN AN ORGANIZED HEALTH CARE EDUCATION/TRAINING PROGRAM

## 2023-05-10 PROCEDURE — 99999 PR PBB SHADOW E&M-EST. PATIENT-LVL IV: CPT | Mod: PBBFAC,,, | Performed by: STUDENT IN AN ORGANIZED HEALTH CARE EDUCATION/TRAINING PROGRAM

## 2023-05-10 PROCEDURE — 3008F PR BODY MASS INDEX (BMI) DOCUMENTED: ICD-10-PCS | Mod: CPTII,S$GLB,, | Performed by: STUDENT IN AN ORGANIZED HEALTH CARE EDUCATION/TRAINING PROGRAM

## 2023-05-10 PROCEDURE — 3074F SYST BP LT 130 MM HG: CPT | Mod: CPTII,S$GLB,, | Performed by: STUDENT IN AN ORGANIZED HEALTH CARE EDUCATION/TRAINING PROGRAM

## 2023-05-10 PROCEDURE — 1159F PR MEDICATION LIST DOCUMENTED IN MEDICAL RECORD: ICD-10-PCS | Mod: CPTII,S$GLB,, | Performed by: STUDENT IN AN ORGANIZED HEALTH CARE EDUCATION/TRAINING PROGRAM

## 2023-05-10 PROCEDURE — 99213 OFFICE O/P EST LOW 20 MIN: CPT | Mod: S$GLB,,, | Performed by: STUDENT IN AN ORGANIZED HEALTH CARE EDUCATION/TRAINING PROGRAM

## 2023-05-10 PROCEDURE — 1159F MED LIST DOCD IN RCRD: CPT | Mod: CPTII,S$GLB,, | Performed by: STUDENT IN AN ORGANIZED HEALTH CARE EDUCATION/TRAINING PROGRAM

## 2023-05-10 PROCEDURE — 3008F BODY MASS INDEX DOCD: CPT | Mod: CPTII,S$GLB,, | Performed by: STUDENT IN AN ORGANIZED HEALTH CARE EDUCATION/TRAINING PROGRAM

## 2023-05-10 PROCEDURE — 99999 PR PBB SHADOW E&M-EST. PATIENT-LVL IV: ICD-10-PCS | Mod: PBBFAC,,, | Performed by: STUDENT IN AN ORGANIZED HEALTH CARE EDUCATION/TRAINING PROGRAM

## 2023-05-10 NOTE — PROGRESS NOTES
Primary Care  Return/Acute Office Visit - In Person  5/10/2023  Lory Ndhlovu      \[facility] [place] [facility]\""    Patient is a 62 y.o.   Piotr Crespo  has a past medical history of Erectile dysfunction due to arterial insufficiency (7/17/2020), Essential hypertension (3/11/2020), High cholesterol, History of left common carotid artery stent placement (4/17/2020), History of stroke (3/11/2020), Hypertension, Mixed hyperlipidemia (3/11/2020), Prediabetes (7/17/2020), and Stroke.    Patient presents with   Chief Complaint   Patient presents with    Foot Pain     Right foot, ongoing     Patient presenting for follow up     He denies any complaints today         Social History     Social History Narrative    Not on file     Piotr Crespo family history includes Bell's palsy in his brother; Brain cancer in his brother; Cancer in his brother, brother, and father; Dementia in his mother; Hypertension in his mother; No Known Problems in his brother, brother, and sister; Other in his sister; Stroke in his mother.    Active Medications:  Review of patient's allergies indicates:  No Known Allergies  Current Outpatient Medications   Medication Instructions    amLODIPine (NORVASC) 10 mg, Oral, Daily    aspirin 81 MG Chew Chew and swallow 1 tablet (81 mg total) by mouth once daily.    atorvastatin (LIPITOR) 80 mg, Oral, Nightly    carvediloL (COREG) 6.25 mg, Oral, 2 times daily with meals    cholecalciferol, vitamin D3, 100 mcg (4,000 unit) Tab TAKE ONE TABLET BY MOUTH EVERY DAY AS A VITAMIN SUPPLEMENT    clotrimazole (LOTRIMIN) 1 % Soln APPLY SMALL AMOUNT TOPICALLY TWICE A DAY FOR FUNGAL INFECTION    ezetimibe (ZETIA) 10 mg, Oral, Daily    nicotine (NICODERM CQ) 14 mg/24 hr APPLY 1 PATCH TOPICALLY ONCE DAILY FOR 28 DAYS FOR SMOKING CESSATION. DO NOT SMOKE WHILE USING PATCHES. COMPLETE THIS STRENGTH PRIOR TO STARTING 7 MG NICOTINE PATCH.    nicotine (NICODERM CQ) 7 mg/24 hr APPLY 1 PATCH TOPICALLY EVERY DAY FOR 28 DAYS FOR SMOKING CESSATION. DO NOT SMOKE  "WHILE USING PATCHES. START THIS STRENGTH AFTER COMPLETION OF COURSE OF 14 MG NICOTINE PATCHES.    nicotine polacrilex (NICORETTE) 4 MG Gum CHEW ONE 4MG PIECE IN MOUTH EVERY HOUR AS NEEDED    tamsulosin (FLOMAX) 0.4 mg, Oral, Daily    urea 20 % Crea APPLY LIBERAL AMOUNT TOPICALLY TWICE A DAY AS NEEDED FOR CALLOUSED AREA.       Review of Systems   All other systems reviewed and are negative.    Vitals:    05/10/23 1000   BP: 116/66   BP Location: Right arm   Pulse: 64   Temp: 98.2 °F (36.8 °C)   SpO2: 98%   Weight: 81.1 kg (178 lb 12.7 oz)   Height: 5' 9" (1.753 m)       Physical Exam  Vitals reviewed.   Constitutional:       General: He is not in acute distress.  Cardiovascular:      Rate and Rhythm: Normal rate and regular rhythm.      Pulses: Normal pulses.      Heart sounds: Normal heart sounds.   Pulmonary:      Effort: Pulmonary effort is normal.      Breath sounds: Normal breath sounds.   Abdominal:      General: Abdomen is flat. Bowel sounds are normal.      Palpations: Abdomen is soft.   Musculoskeletal:      Right lower leg: No edema.      Left lower leg: No edema.   Neurological:      General: No focal deficit present.      Mental Status: He is oriented to person, place, and time.      Gait: Gait abnormal.        Assessment and Plan     Hx CVA in 2017   CAD  He has residual weakness on left side. Motor strength 5/5 upper and lower extremities   Continue aspirin, Lipitor, and Zetia      HTN   Well controlled   Continue amlodipine 10 mg daily and coreg 6.25 mg BID      Personal Hx of Nicotine Dependence   Emphysema   Declined referral to smoking cessation program      Lung cancer screening   CT      BPH   Continue flomax        Orders Placed This Visit  Orders Placed This Encounter   Procedures    CT Chest Lung Screening Low Dose     Standing Status:   Future     Standing Expiration Date:   5/10/2024     Order Specific Question:   Is there documentation of shared decision making for this lung screening exam? "     Answer:   Yes     Order Specific Question:   Is the patient a current smoker?     Answer:   Yes     Order Specific Question:   Does the patient have a 20-pack/year or greater smoke history?     Answer:   Yes     Order Specific Question:   Is the patient between the age 50-80 years old?     Answer:   Yes     Order Specific Question:   Does the patient show any signs or symptoms of lung cancer?     Answer:   No     Order Specific Question:   Is this the first (baseline) CT or an annual exam?     Answer:   Annual [2]     Order Specific Question:   May the Radiologist modify the order per protocol to meet the clinical needs of the patient?     Answer:   Yes     Order Specific Question:   Is this a low dose screening chest CT?     Answer:   Yes    Lipid Panel     Standing Status:   Future     Standing Expiration Date:   7/8/2024    Hemoglobin A1C     Standing Status:   Future     Standing Expiration Date:   7/8/2024    Basic Metabolic Panel     Standing Status:   Future     Standing Expiration Date:   7/8/2024           Upcoming Scheduled Appointments and Follow Up:    Future Appointments   Date Time Provider Department Center   5/19/2023 10:00 AM [facility] CT OP LIMIT 450 LBS [facility]   8/4/2023 12:00 PM LAB, [facility] LAB [facility]   8/11/2023  9:00 AM Lory Pugh MD [facility]       Follow Up [facility] (with who? when?): Follow up in about 3 months (around 8/10/2023).      Extended Emergency Contact Information  Primary Emergency Contact: Jina Crespo  Address: [address] [place] [address] of [facility]  Home Phone: [phone]  Mobile Phone: [phone]  Relation: Spouse      Lory Pugh MD   Attending Physician  Primary Care  5/10/2023 - 10:17 AM    I spent a total of 12 minutes on the day of the visit.This includes face to face time and non-face to face time preparing to see the patient (eg, review of tests), obtaining and/or  reviewing separately obtained history, documenting clinical information in the electronic or other health record, independently interpreting results and communicating results to the patient/family/caregiver, or care coordinator.

## 2023-05-23 ENCOUNTER — TELEPHONE (OUTPATIENT)
Dept: PRIMARY CARE CLINIC | Facility: CLINIC | Age: 63
End: 2023-05-23
Payer: MEDICARE

## 2023-05-23 DIAGNOSIS — E78.5 HYPERLIPIDEMIA LDL GOAL <70: ICD-10-CM

## 2023-05-23 DIAGNOSIS — I10 ESSENTIAL HYPERTENSION: ICD-10-CM

## 2023-05-23 RX ORDER — CARVEDILOL 6.25 MG/1
6.25 TABLET ORAL 2 TIMES DAILY WITH MEALS
Qty: 180 TABLET | Refills: 3 | Status: SHIPPED | OUTPATIENT
Start: 2023-05-23 | End: 2024-03-13

## 2023-05-23 RX ORDER — ATORVASTATIN CALCIUM 80 MG/1
80 TABLET, FILM COATED ORAL NIGHTLY
Qty: 90 TABLET | Refills: 3 | Status: SHIPPED | OUTPATIENT
Start: 2023-05-23 | End: 2024-03-13

## 2023-05-23 RX ORDER — AMLODIPINE BESYLATE 10 MG/1
10 TABLET ORAL DAILY
Qty: 90 TABLET | Refills: 3 | Status: ON HOLD | OUTPATIENT
Start: 2023-05-23 | End: 2023-06-26 | Stop reason: SDUPTHER

## 2023-05-23 RX ORDER — EZETIMIBE 10 MG/1
10 TABLET ORAL DAILY
Qty: 90 TABLET | Refills: 3 | Status: SHIPPED | OUTPATIENT
Start: 2023-05-23 | End: 2024-03-13

## 2023-05-23 RX ORDER — NAPROXEN SODIUM 220 MG/1
81 TABLET, FILM COATED ORAL DAILY
Qty: 90 TABLET | Refills: 3 | Status: ON HOLD | OUTPATIENT
Start: 2023-05-23 | End: 2023-06-26 | Stop reason: SDUPTHER

## 2023-05-23 NOTE — TELEPHONE ENCOUNTER
----- Message from Gladis Mclaen sent at 5/23/2023  1:51 PM CDT -----  Contact: [phone]  Pt is calling he states [facility] is needing [facility] medication list to be sent please give return call

## 2023-05-23 NOTE — TELEPHONE ENCOUNTER
Spoke to pt. He'll call back with phone,, fax number to [facility] to have medication list sent over.

## 2023-05-23 NOTE — TELEPHONE ENCOUNTER
----- Message from Aimee Garnett sent at 5/23/2023  2:30 PM CDT -----  Contact: [phone]  Pt needs new Rx sent to       [facility] - [place], [place] - [address]  [address]  [facility] [address]  Phone: [phone] Fax: [phone]       Rx are 90 day tablets     amLODIPine (NORVASC) 10 MG tablet 90 tablet     carvediloL (COREG) 6.25 MG tablet 180 tablet     atorvastatin (LIPITOR) 80 MG tablet 90 tablet     aspirin 81 MG Chew 90 tablet     ezetimibe (ZETIA) 10 mg tablet 90 tablet       Please call pt and advise @ # [phone]

## 2023-06-23 ENCOUNTER — HOSPITAL ENCOUNTER (EMERGENCY)
Facility: HOSPITAL | Age: 63
End: 2023-06-23
Attending: STUDENT IN AN ORGANIZED HEALTH CARE EDUCATION/TRAINING PROGRAM
Payer: MEDICARE

## 2023-06-23 ENCOUNTER — HOSPITAL ENCOUNTER (INPATIENT)
Facility: HOSPITAL | Age: 63
LOS: 3 days | Discharge: HOME | DRG: 282 | End: 2023-06-26
Attending: INTERNAL MEDICINE | Admitting: INTERNAL MEDICINE
Payer: MEDICARE

## 2023-06-23 VITALS
TEMPERATURE: 98 F | WEIGHT: 178 LBS | BODY MASS INDEX: 26.36 KG/M2 | SYSTOLIC BLOOD PRESSURE: 133 MMHG | HEART RATE: 60 BPM | RESPIRATION RATE: 18 BRPM | HEIGHT: 69 IN | OXYGEN SATURATION: 96 % | DIASTOLIC BLOOD PRESSURE: 63 MMHG

## 2023-06-23 DIAGNOSIS — R03.0 ELEVATED BLOOD PRESSURE READING: ICD-10-CM

## 2023-06-23 DIAGNOSIS — R07.9 CHEST PAIN, UNSPECIFIED TYPE: ICD-10-CM

## 2023-06-23 DIAGNOSIS — R07.89 CHEST WALL PAIN: ICD-10-CM

## 2023-06-23 DIAGNOSIS — I10 ESSENTIAL HYPERTENSION: ICD-10-CM

## 2023-06-23 DIAGNOSIS — R94.31 ABNORMAL EKG: ICD-10-CM

## 2023-06-23 DIAGNOSIS — R07.9 CHEST PAIN: ICD-10-CM

## 2023-06-23 DIAGNOSIS — I21.4 NSTEMI (NON-ST ELEVATED MYOCARDIAL INFARCTION): ICD-10-CM

## 2023-06-23 DIAGNOSIS — I25.10 CAD (CORONARY ARTERY DISEASE): ICD-10-CM

## 2023-06-23 DIAGNOSIS — I21.4 NSTEMI (NON-ST ELEVATED MYOCARDIAL INFARCTION): Primary | ICD-10-CM

## 2023-06-23 LAB
ALBUMIN SERPL BCP-MCNC: 4.6 G/DL (ref 3.5–5.2)
ALP SERPL-CCNC: 76 U/L (ref 55–135)
ALT SERPL W/O P-5'-P-CCNC: 20 U/L (ref 10–44)
ANION GAP SERPL CALC-SCNC: 12 MMOL/L (ref 8–16)
AST SERPL-CCNC: 23 U/L (ref 10–40)
BASOPHILS # BLD AUTO: 0.03 K/UL (ref 0–0.2)
BASOPHILS NFR BLD: 0.4 % (ref 0–1.9)
BILIRUB SERPL-MCNC: 0.6 MG/DL (ref 0.1–1)
BNP SERPL-MCNC: 11 PG/ML (ref 0–99)
BUN SERPL-MCNC: 14 MG/DL (ref 8–23)
CALCIUM SERPL-MCNC: 9.7 MG/DL (ref 8.7–10.5)
CHLORIDE SERPL-SCNC: 105 MMOL/L (ref 95–110)
CO2 SERPL-SCNC: 24 MMOL/L (ref 23–29)
CREAT SERPL-MCNC: 1.2 MG/DL (ref 0.5–1.4)
DIFFERENTIAL METHOD: ABNORMAL
EOSINOPHIL # BLD AUTO: 0.1 K/UL (ref 0–0.5)
EOSINOPHIL NFR BLD: 0.6 % (ref 0–8)
ERYTHROCYTE [DISTWIDTH] IN BLOOD BY AUTOMATED COUNT: 13.4 % (ref 11.5–14.5)
EST. GFR  (NO RACE VARIABLE): >60 ML/MIN/1.73 M^2
GLUCOSE SERPL-MCNC: 94 MG/DL (ref 70–110)
HCT VFR BLD AUTO: 45.2 % (ref 40–54)
HGB BLD-MCNC: 14.9 G/DL (ref 14–18)
IMM GRANULOCYTES # BLD AUTO: 0.03 K/UL (ref 0–0.04)
IMM GRANULOCYTES NFR BLD AUTO: 0.4 % (ref 0–0.5)
LYMPHOCYTES # BLD AUTO: 1.8 K/UL (ref 1–4.8)
LYMPHOCYTES NFR BLD: 21.1 % (ref 18–48)
MCH RBC QN AUTO: 30.5 PG (ref 27–31)
MCHC RBC AUTO-ENTMCNC: 33 G/DL (ref 32–36)
MCV RBC AUTO: 93 FL (ref 82–98)
MONOCYTES # BLD AUTO: 0.6 K/UL (ref 0.3–1)
MONOCYTES NFR BLD: 6.5 % (ref 4–15)
NEUTROPHILS # BLD AUTO: 6 K/UL (ref 1.8–7.7)
NEUTROPHILS NFR BLD: 71 % (ref 38–73)
NRBC BLD-RTO: 0 /100 WBC
PLATELET # BLD AUTO: 143 K/UL (ref 150–450)
PMV BLD AUTO: 11.9 FL (ref 9.2–12.9)
POTASSIUM SERPL-SCNC: 4.3 MMOL/L (ref 3.5–5.1)
PROT SERPL-MCNC: 7.9 G/DL (ref 6–8.4)
RBC # BLD AUTO: 4.88 M/UL (ref 4.6–6.2)
SARS-COV-2 RDRP RESP QL NAA+PROBE: NEGATIVE
SODIUM SERPL-SCNC: 141 MMOL/L (ref 136–145)
TROPONIN I SERPL DL<=0.01 NG/ML-MCNC: 0.04 NG/ML (ref 0–0.03)
TROPONIN I SERPL DL<=0.01 NG/ML-MCNC: 1.1 NG/ML (ref 0–0.03)
WBC # BLD AUTO: 8.45 K/UL (ref 3.9–12.7)

## 2023-06-23 PROCEDURE — 94760 N-INVAS EAR/PLS OXIMETRY 1: CPT | Mod: HCNC

## 2023-06-23 PROCEDURE — 93005 ELECTROCARDIOGRAM TRACING: CPT | Mod: HCNC

## 2023-06-23 PROCEDURE — 93010 EKG 12-LEAD: ICD-10-PCS | Mod: HCNC,76,, | Performed by: SPECIALIST

## 2023-06-23 PROCEDURE — 93010 ELECTROCARDIOGRAM REPORT: CPT | Mod: HCNC,76,, | Performed by: SPECIALIST

## 2023-06-23 PROCEDURE — 25000003 PHARM REV CODE 250: Mod: HCNC | Performed by: STUDENT IN AN ORGANIZED HEALTH CARE EDUCATION/TRAINING PROGRAM

## 2023-06-23 PROCEDURE — 84484 ASSAY OF TROPONIN QUANT: CPT | Mod: 91,HCNC | Performed by: STUDENT IN AN ORGANIZED HEALTH CARE EDUCATION/TRAINING PROGRAM

## 2023-06-23 PROCEDURE — U0002 COVID-19 LAB TEST NON-CDC: HCPCS | Mod: HCNC | Performed by: STUDENT IN AN ORGANIZED HEALTH CARE EDUCATION/TRAINING PROGRAM

## 2023-06-23 PROCEDURE — 21400001 HC TELEMETRY ROOM

## 2023-06-23 PROCEDURE — 36415 COLL VENOUS BLD VENIPUNCTURE: CPT | Mod: HCNC | Performed by: STUDENT IN AN ORGANIZED HEALTH CARE EDUCATION/TRAINING PROGRAM

## 2023-06-23 PROCEDURE — 96374 THER/PROPH/DIAG INJ IV PUSH: CPT | Mod: HCNC

## 2023-06-23 PROCEDURE — 96372 THER/PROPH/DIAG INJ SC/IM: CPT | Performed by: STUDENT IN AN ORGANIZED HEALTH CARE EDUCATION/TRAINING PROGRAM

## 2023-06-23 PROCEDURE — 83880 ASSAY OF NATRIURETIC PEPTIDE: CPT | Mod: HCNC | Performed by: STUDENT IN AN ORGANIZED HEALTH CARE EDUCATION/TRAINING PROGRAM

## 2023-06-23 PROCEDURE — 80053 COMPREHEN METABOLIC PANEL: CPT | Mod: HCNC | Performed by: STUDENT IN AN ORGANIZED HEALTH CARE EDUCATION/TRAINING PROGRAM

## 2023-06-23 PROCEDURE — 63600175 PHARM REV CODE 636 W HCPCS: Mod: HCNC | Performed by: STUDENT IN AN ORGANIZED HEALTH CARE EDUCATION/TRAINING PROGRAM

## 2023-06-23 PROCEDURE — 96375 TX/PRO/DX INJ NEW DRUG ADDON: CPT | Mod: HCNC

## 2023-06-23 PROCEDURE — 25000242 PHARM REV CODE 250 ALT 637 W/ HCPCS: Mod: HCNC | Performed by: STUDENT IN AN ORGANIZED HEALTH CARE EDUCATION/TRAINING PROGRAM

## 2023-06-23 PROCEDURE — 93010 ELECTROCARDIOGRAM REPORT: CPT | Mod: HCNC,,, | Performed by: SPECIALIST

## 2023-06-23 PROCEDURE — 99291 CRITICAL CARE FIRST HOUR: CPT | Mod: HCNC

## 2023-06-23 PROCEDURE — 85025 COMPLETE CBC W/AUTO DIFF WBC: CPT | Mod: HCNC | Performed by: STUDENT IN AN ORGANIZED HEALTH CARE EDUCATION/TRAINING PROGRAM

## 2023-06-23 RX ORDER — ASPIRIN 325 MG
325 TABLET ORAL
Status: COMPLETED | OUTPATIENT
Start: 2023-06-23 | End: 2023-06-23

## 2023-06-23 RX ORDER — HYDRALAZINE HYDROCHLORIDE 20 MG/ML
10 INJECTION INTRAMUSCULAR; INTRAVENOUS
Status: COMPLETED | OUTPATIENT
Start: 2023-06-23 | End: 2023-06-23

## 2023-06-23 RX ORDER — NITROGLYCERIN 0.4 MG/1
0.4 TABLET SUBLINGUAL
Status: COMPLETED | OUTPATIENT
Start: 2023-06-23 | End: 2023-06-23

## 2023-06-23 RX ORDER — MORPHINE SULFATE 2 MG/ML
2 INJECTION, SOLUTION INTRAMUSCULAR; INTRAVENOUS
Status: COMPLETED | OUTPATIENT
Start: 2023-06-23 | End: 2023-06-23

## 2023-06-23 RX ORDER — ENOXAPARIN SODIUM 100 MG/ML
1 INJECTION SUBCUTANEOUS
Status: COMPLETED | OUTPATIENT
Start: 2023-06-23 | End: 2023-06-23

## 2023-06-23 RX ADMIN — HYDRALAZINE HYDROCHLORIDE 10 MG: 20 INJECTION INTRAMUSCULAR; INTRAVENOUS at 08:06

## 2023-06-23 RX ADMIN — NITROGLYCERIN 1 INCH: 20 OINTMENT TOPICAL at 07:06

## 2023-06-23 RX ADMIN — MORPHINE SULFATE 2 MG: 2 INJECTION, SOLUTION INTRAMUSCULAR; INTRAVENOUS at 08:06

## 2023-06-23 RX ADMIN — ENOXAPARIN SODIUM 80 MG: 80 INJECTION SUBCUTANEOUS at 09:06

## 2023-06-23 RX ADMIN — ASPIRIN 325 MG: 325 TABLET ORAL at 07:06

## 2023-06-23 RX ADMIN — NITROGLYCERIN 0.4 MG: 0.4 TABLET, ORALLY DISINTEGRATING SUBLINGUAL at 08:06

## 2023-06-23 NOTE — Clinical Note
..Radial flush given by . Radial flush consists of: 3,000 units heparin, 200 mcg nitroglycerin, and 5 cc NS flush.

## 2023-06-23 NOTE — Clinical Note
The catheter was repositioned into the ostium   right coronary artery. An angiography was performed of the left coronary arteries. Multiple views were taken. The angiography was performed via power injection. The injected amount was 6 mL contrast at 3 mL/s. The PSI from the power injection was 300.

## 2023-06-24 ENCOUNTER — CLINICAL SUPPORT (OUTPATIENT)
Dept: CARDIOLOGY | Facility: HOSPITAL | Age: 63
DRG: 282 | End: 2023-06-24
Attending: INTERNAL MEDICINE
Payer: MEDICARE

## 2023-06-24 VITALS — WEIGHT: 175 LBS | BODY MASS INDEX: 25.92 KG/M2 | HEIGHT: 69 IN

## 2023-06-24 PROBLEM — R07.9 CHEST PAIN: Status: ACTIVE | Noted: 2023-06-24

## 2023-06-24 LAB
ANION GAP SERPL CALC-SCNC: 7 MMOL/L (ref 8–16)
AORTIC ROOT ANNULUS: 3.5 CM
AORTIC VALVE CUSP SEPERATION: 2 CM
APTT PPP: 32 SEC (ref 21–32)
ASCENDING AORTA: 2.5 CM
AV INDEX (PROSTH): 0.81
AV MEAN GRADIENT: 3 MMHG
AV PEAK GRADIENT: 5 MMHG
AV VALVE AREA: 2.54 CM2
AV VELOCITY RATIO: 0.87
BSA FOR ECHO PROCEDURE: 1.97 M2
BUN SERPL-MCNC: 15 MG/DL (ref 8–23)
CALCIUM SERPL-MCNC: 8.9 MG/DL (ref 8.7–10.5)
CHLORIDE SERPL-SCNC: 106 MMOL/L (ref 95–110)
CO2 SERPL-SCNC: 25 MMOL/L (ref 23–29)
CREAT SERPL-MCNC: 1 MG/DL (ref 0.5–1.4)
CV ECHO LV RWT: 0.31 CM
DOP CALC AO PEAK VEL: 1.09 M/S
DOP CALC AO VTI: 24.2 CM
DOP CALC LVOT AREA: 3.1 CM2
DOP CALC LVOT DIAMETER: 2 CM
DOP CALC LVOT PEAK VEL: 0.95 M/S
DOP CALC LVOT STROKE VOLUME: 61.54 CM3
DOP CALC MV VTI: 34 CM
DOP CALCLVOT PEAK VEL VTI: 19.6 CM
E WAVE DECELERATION TIME: 206 MSEC
E/A RATIO: 1.17
E/E' RATIO: 8.38 M/S
ECHO LV POSTERIOR WALL: 0.84 CM (ref 0.6–1.1)
EJECTION FRACTION: 58 %
ERYTHROCYTE [DISTWIDTH] IN BLOOD BY AUTOMATED COUNT: 13.7 % (ref 11.5–14.5)
EST. GFR  (NO RACE VARIABLE): >60 ML/MIN/1.73 M^2
FRACTIONAL SHORTENING: 31 % (ref 28–44)
GLUCOSE SERPL-MCNC: 149 MG/DL (ref 70–110)
GLUCOSE SERPL-MCNC: 152 MG/DL (ref 70–110)
HCT VFR BLD AUTO: 43.1 % (ref 40–54)
HGB BLD-MCNC: 14.1 G/DL (ref 14–18)
INTERVENTRICULAR SEPTUM: 0.84 CM (ref 0.6–1.1)
IVRT: 116 MSEC
LEFT INTERNAL DIMENSION IN SYSTOLE: 3.71 CM (ref 2.1–4)
LEFT VENTRICLE DIASTOLIC VOLUME INDEX: 72.31 ML/M2
LEFT VENTRICLE DIASTOLIC VOLUME: 141 ML
LEFT VENTRICLE MASS INDEX: 85 G/M2
LEFT VENTRICLE SYSTOLIC VOLUME INDEX: 30 ML/M2
LEFT VENTRICLE SYSTOLIC VOLUME: 58.5 ML
LEFT VENTRICULAR INTERNAL DIMENSION IN DIASTOLE: 5.4 CM (ref 3.5–6)
LEFT VENTRICULAR MASS: 164.86 G
LV LATERAL E/E' RATIO: 6.77 M/S
LV SEPTAL E/E' RATIO: 11 M/S
LVOT MG: 2 MMHG
LVOT MV: 0.63 CM/S
MCH RBC QN AUTO: 30.2 PG (ref 27–31)
MCHC RBC AUTO-ENTMCNC: 32.7 G/DL (ref 32–36)
MCV RBC AUTO: 92 FL (ref 82–98)
MV MEAN GRADIENT: 2 MMHG
MV PEAK A VEL: 0.75 M/S
MV PEAK E VEL: 0.88 M/S
MV PEAK GRADIENT: 4 MMHG
MV STENOSIS PRESSURE HALF TIME: 85 MS
MV VALVE AREA BY CONTINUITY EQUATION: 1.81 CM2
MV VALVE AREA P 1/2 METHOD: 2.59 CM2
OHS LV EJECTION FRACTION SIMPSONS BIPLANE MOD: 5 %
PISA MRMAX VEL: 4.8 M/S
PISA TR MAX VEL: 2.36 M/S
PLATELET # BLD AUTO: 134 K/UL (ref 150–450)
PMV BLD AUTO: 12.4 FL (ref 9.2–12.9)
POTASSIUM SERPL-SCNC: 3.5 MMOL/L (ref 3.5–5.1)
PV MV: 0.64 M/S
PV PEAK VELOCITY: 0.97 CM/S
RA MAJOR: 4.25 CM
RA PRESSURE: 3 MMHG
RA WIDTH: 2.04 CM
RBC # BLD AUTO: 4.67 M/UL (ref 4.6–6.2)
RIGHT VENTRICULAR END-DIASTOLIC DIMENSION: 3.21 CM
RIGHT VENTRICULAR LENGTH IN DIASTOLE (APICAL 4-CHAMBER VIEW): 4.7 CM
RV TISSUE DOPPLER FREE WALL SYSTOLIC VELOCITY 1 (APICAL 4 CHAMBER VIEW): 12 CM/S
SINUS: 2.65 CM
SODIUM SERPL-SCNC: 138 MMOL/L (ref 136–145)
STJ: 2.64 CM
TDI LATERAL: 0.13 M/S
TDI SEPTAL: 0.08 M/S
TDI: 0.11 M/S
TR MAX PG: 22 MMHG
TRICUSPID ANNULAR PLANE SYSTOLIC EXCURSION: 3.1 CM
TROPONIN I SERPL HS-MCNC: 3038.8 PG/ML (ref 0–14.9)
TROPONIN I SERPL HS-MCNC: ABNORMAL PG/ML (ref 0–14.9)
TV REST PULMONARY ARTERY PRESSURE: 25 MMHG
WBC # BLD AUTO: 8.36 K/UL (ref 3.9–12.7)

## 2023-06-24 PROCEDURE — C1894 INTRO/SHEATH, NON-LASER: HCPCS | Performed by: INTERNAL MEDICINE

## 2023-06-24 PROCEDURE — 99152 MOD SED SAME PHYS/QHP 5/>YRS: CPT | Performed by: INTERNAL MEDICINE

## 2023-06-24 PROCEDURE — 93010 ELECTROCARDIOGRAM REPORT: CPT | Mod: 76,,, | Performed by: SPECIALIST

## 2023-06-24 PROCEDURE — 93458 PR CATH PLACE/CORON ANGIO, IMG SUPER/INTERP,W LEFT HEART VENTRICULOGRAPHY: ICD-10-PCS | Mod: 26,,, | Performed by: INTERNAL MEDICINE

## 2023-06-24 PROCEDURE — 36415 COLL VENOUS BLD VENIPUNCTURE: CPT | Performed by: INTERNAL MEDICINE

## 2023-06-24 PROCEDURE — 25000003 PHARM REV CODE 250: Performed by: INTERNAL MEDICINE

## 2023-06-24 PROCEDURE — 93458 L HRT ARTERY/VENTRICLE ANGIO: CPT | Performed by: INTERNAL MEDICINE

## 2023-06-24 PROCEDURE — 93306 ECHO (CUPID ONLY): ICD-10-PCS | Mod: 26,,, | Performed by: SPECIALIST

## 2023-06-24 PROCEDURE — 63600175 PHARM REV CODE 636 W HCPCS: Performed by: INTERNAL MEDICINE

## 2023-06-24 PROCEDURE — 93010 ELECTROCARDIOGRAM REPORT: CPT | Mod: ,,, | Performed by: SPECIALIST

## 2023-06-24 PROCEDURE — 93306 TTE W/DOPPLER COMPLETE: CPT

## 2023-06-24 PROCEDURE — 99153 MOD SED SAME PHYS/QHP EA: CPT | Performed by: INTERNAL MEDICINE

## 2023-06-24 PROCEDURE — 99900031 HC PATIENT EDUCATION (STAT)

## 2023-06-24 PROCEDURE — C1769 GUIDE WIRE: HCPCS | Performed by: INTERNAL MEDICINE

## 2023-06-24 PROCEDURE — 99152 MOD SED SAME PHYS/QHP 5/>YRS: CPT | Mod: ,,, | Performed by: INTERNAL MEDICINE

## 2023-06-24 PROCEDURE — 93010 EKG 12-LEAD: ICD-10-PCS | Mod: ,,, | Performed by: SPECIALIST

## 2023-06-24 PROCEDURE — C1887 CATHETER, GUIDING: HCPCS | Performed by: INTERNAL MEDICINE

## 2023-06-24 PROCEDURE — 27201423 OPTIME MED/SURG SUP & DEVICES STERILE SUPPLY: Performed by: INTERNAL MEDICINE

## 2023-06-24 PROCEDURE — 93005 ELECTROCARDIOGRAM TRACING: CPT | Performed by: SPECIALIST

## 2023-06-24 PROCEDURE — 99900035 HC TECH TIME PER 15 MIN (STAT)

## 2023-06-24 PROCEDURE — 84484 ASSAY OF TROPONIN QUANT: CPT | Mod: 91 | Performed by: INTERNAL MEDICINE

## 2023-06-24 PROCEDURE — 85027 COMPLETE CBC AUTOMATED: CPT | Performed by: INTERNAL MEDICINE

## 2023-06-24 PROCEDURE — 84484 ASSAY OF TROPONIN QUANT: CPT | Performed by: INTERNAL MEDICINE

## 2023-06-24 PROCEDURE — 99223 PR INITIAL HOSPITAL CARE,LEVL III: ICD-10-PCS | Mod: ,,, | Performed by: INTERNAL MEDICINE

## 2023-06-24 PROCEDURE — 85730 THROMBOPLASTIN TIME PARTIAL: CPT | Performed by: INTERNAL MEDICINE

## 2023-06-24 PROCEDURE — 99152 PR MOD CONSCIOUS SEDATION, SAME PHYS, 5+ YRS, FIRST 15 MIN: ICD-10-PCS | Mod: ,,, | Performed by: INTERNAL MEDICINE

## 2023-06-24 PROCEDURE — 93306 TTE W/DOPPLER COMPLETE: CPT | Mod: 26,,, | Performed by: SPECIALIST

## 2023-06-24 PROCEDURE — 93458 L HRT ARTERY/VENTRICLE ANGIO: CPT | Mod: 26,,, | Performed by: INTERNAL MEDICINE

## 2023-06-24 PROCEDURE — 36415 COLL VENOUS BLD VENIPUNCTURE: CPT | Performed by: STUDENT IN AN ORGANIZED HEALTH CARE EDUCATION/TRAINING PROGRAM

## 2023-06-24 PROCEDURE — 80048 BASIC METABOLIC PNL TOTAL CA: CPT | Performed by: INTERNAL MEDICINE

## 2023-06-24 PROCEDURE — 99223 1ST HOSP IP/OBS HIGH 75: CPT | Mod: ,,, | Performed by: INTERNAL MEDICINE

## 2023-06-24 PROCEDURE — 21400001 HC TELEMETRY ROOM

## 2023-06-24 PROCEDURE — 25500020 PHARM REV CODE 255: Performed by: INTERNAL MEDICINE

## 2023-06-24 PROCEDURE — 63600175 PHARM REV CODE 636 W HCPCS: Performed by: NURSE PRACTITIONER

## 2023-06-24 PROCEDURE — 25000003 PHARM REV CODE 250: Performed by: NURSE PRACTITIONER

## 2023-06-24 PROCEDURE — 94799 UNLISTED PULMONARY SVC/PX: CPT

## 2023-06-24 PROCEDURE — 94761 N-INVAS EAR/PLS OXIMETRY MLT: CPT

## 2023-06-24 RX ORDER — CLOPIDOGREL BISULFATE 75 MG/1
TABLET ORAL
Status: DISCONTINUED | OUTPATIENT
Start: 2023-06-24 | End: 2023-06-24 | Stop reason: HOSPADM

## 2023-06-24 RX ORDER — SODIUM CHLORIDE 9 MG/ML
INJECTION, SOLUTION INTRAVENOUS CONTINUOUS
Status: DISCONTINUED | OUTPATIENT
Start: 2023-06-24 | End: 2023-06-25

## 2023-06-24 RX ORDER — ONDANSETRON 4 MG/1
8 TABLET, ORALLY DISINTEGRATING ORAL EVERY 8 HOURS PRN
Status: DISCONTINUED | OUTPATIENT
Start: 2023-06-24 | End: 2023-06-26 | Stop reason: HOSPADM

## 2023-06-24 RX ORDER — SODIUM CHLORIDE 0.9 % (FLUSH) 0.9 %
10 SYRINGE (ML) INJECTION EVERY 12 HOURS PRN
Status: DISCONTINUED | OUTPATIENT
Start: 2023-06-24 | End: 2023-06-26 | Stop reason: HOSPADM

## 2023-06-24 RX ORDER — CARVEDILOL 6.25 MG/1
6.25 TABLET ORAL 2 TIMES DAILY WITH MEALS
Status: DISCONTINUED | OUTPATIENT
Start: 2023-06-24 | End: 2023-06-26 | Stop reason: HOSPADM

## 2023-06-24 RX ORDER — TAMSULOSIN HYDROCHLORIDE 0.4 MG/1
0.4 CAPSULE ORAL DAILY
Status: DISCONTINUED | OUTPATIENT
Start: 2023-06-24 | End: 2023-06-25

## 2023-06-24 RX ORDER — SODIUM CHLORIDE 9 MG/ML
INJECTION, SOLUTION INTRAVENOUS ONCE
Status: CANCELLED | OUTPATIENT
Start: 2023-06-24 | End: 2023-06-24

## 2023-06-24 RX ORDER — FENTANYL CITRATE 50 UG/ML
INJECTION, SOLUTION INTRAMUSCULAR; INTRAVENOUS
Status: DISCONTINUED | OUTPATIENT
Start: 2023-06-24 | End: 2023-06-24 | Stop reason: HOSPADM

## 2023-06-24 RX ORDER — NITROGLYCERIN 0.4 MG/1
0.4 TABLET SUBLINGUAL EVERY 5 MIN PRN
Status: DISCONTINUED | OUTPATIENT
Start: 2023-06-24 | End: 2023-06-26 | Stop reason: HOSPADM

## 2023-06-24 RX ORDER — IBUPROFEN 200 MG
16 TABLET ORAL
Status: DISCONTINUED | OUTPATIENT
Start: 2023-06-24 | End: 2023-06-26 | Stop reason: HOSPADM

## 2023-06-24 RX ORDER — CLOPIDOGREL BISULFATE 75 MG/1
75 TABLET ORAL DAILY
Status: DISCONTINUED | OUTPATIENT
Start: 2023-06-25 | End: 2023-06-26 | Stop reason: HOSPADM

## 2023-06-24 RX ORDER — EZETIMIBE 10 MG/1
10 TABLET ORAL DAILY
Status: DISCONTINUED | OUTPATIENT
Start: 2023-06-24 | End: 2023-06-26 | Stop reason: HOSPADM

## 2023-06-24 RX ORDER — IBUPROFEN 200 MG
24 TABLET ORAL
Status: DISCONTINUED | OUTPATIENT
Start: 2023-06-24 | End: 2023-06-26 | Stop reason: HOSPADM

## 2023-06-24 RX ORDER — DIPHENHYDRAMINE HCL 25 MG
50 CAPSULE ORAL
Status: CANCELLED | OUTPATIENT
Start: 2023-06-24

## 2023-06-24 RX ORDER — SODIUM CHLORIDE 450 MG/100ML
INJECTION, SOLUTION INTRAVENOUS CONTINUOUS
Status: DISCONTINUED | OUTPATIENT
Start: 2023-06-24 | End: 2023-06-24

## 2023-06-24 RX ORDER — ATORVASTATIN CALCIUM 40 MG/1
80 TABLET, FILM COATED ORAL NIGHTLY
Status: DISCONTINUED | OUTPATIENT
Start: 2023-06-24 | End: 2023-06-26 | Stop reason: HOSPADM

## 2023-06-24 RX ORDER — AMLODIPINE BESYLATE 5 MG/1
10 TABLET ORAL DAILY
Status: DISCONTINUED | OUTPATIENT
Start: 2023-06-24 | End: 2023-06-26

## 2023-06-24 RX ORDER — TIROFIBAN HYDROCHLORIDE 50 UG/ML
0.15 INJECTION INTRAVENOUS CONTINUOUS
Status: DISCONTINUED | OUTPATIENT
Start: 2023-06-24 | End: 2023-06-25

## 2023-06-24 RX ORDER — GLUCAGON 1 MG
1 KIT INJECTION
Status: DISCONTINUED | OUTPATIENT
Start: 2023-06-24 | End: 2023-06-26 | Stop reason: HOSPADM

## 2023-06-24 RX ORDER — NAPROXEN SODIUM 220 MG/1
81 TABLET, FILM COATED ORAL DAILY
Status: DISCONTINUED | OUTPATIENT
Start: 2023-06-24 | End: 2023-06-26 | Stop reason: HOSPADM

## 2023-06-24 RX ORDER — TIROFIBAN HYDROCHLORIDE 50 UG/ML
INJECTION INTRAVENOUS
Status: DISCONTINUED | OUTPATIENT
Start: 2023-06-24 | End: 2023-06-26 | Stop reason: HOSPADM

## 2023-06-24 RX ORDER — MIDAZOLAM HYDROCHLORIDE 1 MG/ML
INJECTION INTRAMUSCULAR; INTRAVENOUS
Status: DISCONTINUED | OUTPATIENT
Start: 2023-06-24 | End: 2023-06-24 | Stop reason: HOSPADM

## 2023-06-24 RX ORDER — ACETAMINOPHEN 325 MG/1
650 TABLET ORAL EVERY 4 HOURS PRN
Status: DISCONTINUED | OUTPATIENT
Start: 2023-06-24 | End: 2023-06-26 | Stop reason: HOSPADM

## 2023-06-24 RX ORDER — HEPARIN SODIUM,PORCINE/D5W 25000/250
0-40 INTRAVENOUS SOLUTION INTRAVENOUS CONTINUOUS
Status: DISCONTINUED | OUTPATIENT
Start: 2023-06-24 | End: 2023-06-24

## 2023-06-24 RX ORDER — LIDOCAINE HYDROCHLORIDE 10 MG/ML
INJECTION INFILTRATION; PERINEURAL
Status: DISCONTINUED | OUTPATIENT
Start: 2023-06-24 | End: 2023-06-24 | Stop reason: HOSPADM

## 2023-06-24 RX ORDER — NALOXONE HCL 0.4 MG/ML
0.02 VIAL (ML) INJECTION
Status: DISCONTINUED | OUTPATIENT
Start: 2023-06-24 | End: 2023-06-26 | Stop reason: HOSPADM

## 2023-06-24 RX ORDER — CLOPIDOGREL BISULFATE 75 MG/1
300 TABLET ORAL ONCE
Status: DISCONTINUED | OUTPATIENT
Start: 2023-06-24 | End: 2023-06-26 | Stop reason: HOSPADM

## 2023-06-24 RX ADMIN — HEPARIN SODIUM 12 UNITS/KG/HR: 10000 INJECTION, SOLUTION INTRAVENOUS at 09:06

## 2023-06-24 RX ADMIN — ASPIRIN 81 MG 81 MG: 81 TABLET ORAL at 08:06

## 2023-06-24 RX ADMIN — TAMSULOSIN HYDROCHLORIDE 0.4 MG: 0.4 CAPSULE ORAL at 08:06

## 2023-06-24 RX ADMIN — ATORVASTATIN CALCIUM 80 MG: 40 TABLET, FILM COATED ORAL at 08:06

## 2023-06-24 RX ADMIN — SODIUM CHLORIDE: 0.45 INJECTION, SOLUTION INTRAVENOUS at 01:06

## 2023-06-24 RX ADMIN — TIROFIBAN 0.15 MCG/KG/MIN: 5 INJECTION, SOLUTION INTRAVENOUS at 01:06

## 2023-06-24 RX ADMIN — AMLODIPINE BESYLATE 10 MG: 5 TABLET ORAL at 08:06

## 2023-06-24 RX ADMIN — NITROGLYCERIN 0.5 INCH: 20 OINTMENT TOPICAL at 06:06

## 2023-06-24 RX ADMIN — CARVEDILOL 6.25 MG: 6.25 TABLET, FILM COATED ORAL at 08:06

## 2023-06-24 RX ADMIN — NITROGLYCERIN 0.5 INCH: 20 OINTMENT TOPICAL at 11:06

## 2023-06-24 RX ADMIN — SODIUM CHLORIDE: 0.9 INJECTION, SOLUTION INTRAVENOUS at 08:06

## 2023-06-24 RX ADMIN — EZETIMIBE 10 MG: 10 TABLET ORAL at 08:06

## 2023-06-24 RX ADMIN — NITROGLYCERIN 0.5 INCH: 20 OINTMENT TOPICAL at 03:06

## 2023-06-24 NOTE — NURSING
Nurses Note -- 4 Eyes      6/24/2023   2:07 AM      Skin assessed during: Admit      [x] No Altered Skin Integrity Present    []Prevention Measures Documented      [] Yes- Altered Skin Integrity Present or Discovered   [] LDA Added if Not in Epic (Describe Wound)   [] New Altered Skin Integrity was Present on Admit and Documented in LDA   [] Wound Image Taken    Wound Care Consulted? No    Attending Nurse:  Sena Hall RN     Second RN/Staff Member:  [id number]

## 2023-06-24 NOTE — NURSING
Received from cath lab. SB on monitor. Denies any CP. Right wrist band intact, site intact.   Aggrastat drip infusing.  IVF's changed to 0.45%NS per order. CB at side.

## 2023-06-24 NOTE — PLAN OF CARE
06/24/23 1400   Patient Assessment/Suction   Level of Consciousness (AVPU) alert   Respiratory Effort Unlabored   Rhythm/Pattern, Respiratory pattern regular;depth regular   PRE-TX-O2   Device (Oxygen Therapy) room air   SpO2 99 %   Pulse Oximetry Type Continuous   $ Pulse Oximetry - Multiple Charge Pulse Oximetry - Multiple   Oximetry Probe Site Assessed   Pulse (!) 59   Resp 20   /70   Education   $ Education Bronchodilator   Respiratory Evaluation   $ Care Plan Tech Time 15 min   $ Eval/Re-eval Charges Re-evaluation   Evaluation For New Orders

## 2023-06-24 NOTE — NURSING
0829 Dr. Wilkins at bedside. Questions answered. Plan of care discussed.   0920 Heparin started with bolus and continuous as ordered and documented on MAR - witnessed by Trudy, RN charge nurse. Patient States CP 4/10 PAS dull ache but tolerable and improved since last night.   1030 echocardiogram at bedside.   1120 Several attempts to obtain 2nd PIV site unsuccessful by Sn and Cath staff.  Notified by cath staff kaylie. States ok for one PIV. Ordered Heparin stopped for procedure. Continuous IV stopped. Consent obtained by cath lab staff.   1142 Patient transported to cath lab via bed per cath lab staff x2 without incident. NAD. Patient left spouse, Jina, phone number [phone] as contact. Telebox and SCD pump removed and left at bedside. Monitor room notified of tele. Patient personal cell phone, shoes also at bedside.

## 2023-06-24 NOTE — H&P
"[facility]    History & Physical      Patient Name: Piotr Crespo  MRN: [id number]  Admission Date: 6/23/2023  Attending Physician: Jd Butler MD   Primary Care Provider: Lory Pugh MD         Patient information was obtained from patient, past medical records, and ER records.     Subjective:     Principal Problem:Chest pain    Chief Complaint: chest pain    HPI:  62-year-old male history of tobacco use (1 pack per day for 20 years).  Hyperlipidemia, hypertension, prediabetes, stroke presented to ONS with chest pain.     Blood pressure ONS was over 200 EKG showed dynamic T-wave inversions in the inferior leads.  Troponin elevated 0.042.  Patient was given nitroglycerin with resolution of pain.  Also treated with IV hydralazine, morphine, aspirin, weight based Lovenox.    Dr Davison agreed that pt should be transferred to [facility].    Past Medical History:   Diagnosis Date    Erectile dysfunction due to arterial insufficiency 7/17/2020    Essential hypertension 3/11/2020    High cholesterol     History of left common carotid artery stent placement 4/17/2020    History of stroke 3/11/2020    Hypertension     Mixed hyperlipidemia 3/11/2020    Prediabetes 7/17/2020    Stroke     sept 3, 2017       Past Surgical History:   Procedure Laterality Date    CAROTID ARTERY ANGIOPLASTY Left     left Internal carotid artery stent    CEREBRAL ANEURYSM REPAIR      COLONOSCOPY N/A 06/30/2020    Procedure: COLONOSCOPY;  Surgeon: Yuan Pizano MD;  Location: [address];  Service: Endoscopy;  Laterality: N/A;  3/30/20- Per Dr. Catherine, Pt to be r/s for later date, "screening"- [facility]  3/30/20 - LM for Pt to call back to r/s for later date per Dr. Catherine- [facility]  COVID screening on 6/27/20 - [facility] urgent care- [facility]    LUMBAR DISCECTOMY         Review of patient's allergies indicates:  No Known Allergies    Current Facility-Administered Medications on File Prior to Encounter   Medication    [COMPLETED] aspirin tablet 325 " mg    [COMPLETED] enoxaparin injection 80 mg    [COMPLETED] hydrALAZINE injection 10 mg    [COMPLETED] morphine injection 2 mg    [COMPLETED] nitroGLYCERIN 2% TD oint ointment 1 inch    [COMPLETED] nitroGLYCERIN SL tablet 0.4 mg     Current Outpatient Medications on File Prior to Encounter   Medication Sig    amLODIPine (NORVASC) 10 MG tablet Take 1 tablet (10 mg total) by mouth once daily.    aspirin 81 MG Chew Chew and swallow 1 tablet (81 mg total) by mouth once daily.    atorvastatin (LIPITOR) 80 MG tablet Take 1 tablet (80 mg total) by mouth every evening.    carvediloL (COREG) 6.25 MG tablet Take 1 tablet (6.25 mg total) by mouth 2 (two) times daily with meals.    clotrimazole (LOTRIMIN) 1 % Soln APPLY SMALL AMOUNT TOPICALLY TWICE A DAY FOR FUNGAL INFECTION    ezetimibe (ZETIA) 10 mg tablet Take 1 tablet (10 mg total) by mouth once daily.    cholecalciferol, vitamin D3, 100 mcg (4,000 unit) Tab TAKE ONE TABLET BY MOUTH EVERY DAY AS A VITAMIN SUPPLEMENT    nicotine (NICODERM CQ) 14 mg/24 hr APPLY 1 PATCH TOPICALLY ONCE DAILY FOR 28 DAYS FOR SMOKING CESSATION. DO NOT SMOKE WHILE USING PATCHES. COMPLETE THIS STRENGTH PRIOR TO STARTING 7 MG NICOTINE PATCH.    nicotine (NICODERM CQ) 7 mg/24 hr APPLY 1 PATCH TOPICALLY EVERY DAY FOR 28 DAYS FOR SMOKING CESSATION. DO NOT SMOKE WHILE USING PATCHES. START THIS STRENGTH AFTER COMPLETION OF COURSE OF 14 MG NICOTINE PATCHES.    nicotine polacrilex (NICORETTE) 4 MG Gum CHEW ONE 4MG PIECE IN MOUTH EVERY HOUR AS NEEDED    tamsulosin (FLOMAX) 0.4 mg Cap Take 1 capsule (0.4 mg total) by mouth once daily.    urea 20 % Crea APPLY LIBERAL AMOUNT TOPICALLY TWICE A DAY AS NEEDED FOR CALLOUSED AREA.     Family History       Problem Relation (Age of Onset)    Bell's palsy Brother    Brain cancer Brother    Cancer Father, Brother, Brother    Dementia Mother    Hypertension Mother    No Known Problems Sister, Brother, Brother    Other Sister    Stroke Mother          Tobacco Use     Smoking status: Every Day     Packs/day: 0.40     Years: 45.00     Pack years: 18.00     Types: Cigarettes    Smokeless tobacco: Never   Substance and Sexual Activity    Alcohol use: Yes     Comment: once a month    Drug use: No    Sexual activity: Yes     Partners: Female     Review of Systems  Objective:     Vital Signs (Most Recent):  Temp: 97.4 °F (36.3 °C) (06/23/23 2351)  Pulse: (!) 59 (06/23/23 2351)  Resp: 18 (06/23/23 2351)  BP: (!) 145/85 (101) (06/23/23 2351)  SpO2: 98 % (06/23/23 2351) Vital Signs (24h Range):  Temp:  [97.4 °F (36.3 °C)-98.2 °F (36.8 °C)] 97.4 °F (36.3 °C)  Pulse:  [59-70] 59  Resp:  [18-20] 18  SpO2:  [94 %-100 %] 98 %  BP: (106-205)/(59-95) 145/85        There is no height or weight on file to calculate BMI.    Physical Exam  Constitutional:       Appearance: Normal appearance.   HENT:      Head: Normocephalic and atraumatic.      Right Ear: External ear normal.      Left Ear: External ear normal.      Nose: Nose normal.      Mouth/Throat:      Mouth: Mucous membranes are dry.   Eyes:      Extraocular Movements: Extraocular movements intact.      Pupils: Pupils are equal, round, and reactive to light.   Cardiovascular:      Rate and Rhythm: Normal rate and regular rhythm.      Pulses: Normal pulses.   Pulmonary:      Effort: Pulmonary effort is normal.   Abdominal:      General: Abdomen is flat.      Palpations: Abdomen is soft.   Musculoskeletal:         General: Normal range of motion.   Skin:     General: Skin is warm.      Capillary Refill: Capillary refill takes less than 2 seconds.   Neurological:      General: No focal deficit present.      Mental Status: He is alert and oriented to person, place, and time.   Psychiatric:         Mood and Affect: Mood normal.         Behavior: Behavior normal.         CRANIAL NERVES     CN III, IV, VI   Pupils are equal, round, and reactive to light.    Significant Labs: All pertinent labs within the past 24 hours have been  reviewed.    Significant Imaging: I have reviewed all pertinent imaging results/findings within the past 24 hours.    Assessment/Plan:     Active Diagnoses:    Diagnosis Date Noted POA    PRINCIPAL PROBLEM:  Chest pain [R07.9] 06/24/2023 Unknown      Problems Resolved During this Admission:     VTE Risk Mitigation (From admission, onward)           Ordered     IP VTE LOW RISK PATIENT  Once         06/24/23 0054     Place sequential compression device  Until discontinued         06/24/23 0054                  #Chest pain, NSTEMI  #Uncontrolled hypertension, SBP greater than 200s  #History of 20 pack year tobacco use  #History of hypertension, hyperlipidemia  #He has a PSHx of cerebral aneurysm repair, carotid angioplasty, and lumbar discectomy.    Admit to telemetry  Received Lovenox 1 milligram/kilogram x 1 at ONS, will resume heparin drip (at 9am, 12 hours after last dose of Lovenox)  Trend troponins  Echocardiogram  Continue aspirin, statin, beta-blocker, p.r.n. nitroglycerin  NPO after midnight  Restart PTA medications  Rest of plan per hospital course      Jd Butler MD  Department of Hospital Medicine   [facility]

## 2023-06-24 NOTE — NURSING
Critical Troponin results: 12,209.4. Trop critical result(s) repeated. Called and verbal readback obtained from Snow Owens RN/wing waters by S 06/24/2023 07:24 by lab.   0728 SN received critical results from Snow and phoned to Attending Dr. Wilkins at ext [phone].   0713 Dr. Wilkins entered orders to consult Cardiology. SN notified unit secretary. Consult lists Dr. Davison.   0767 SN notified Dr. Davison at [phone] of Troponin current 12,209.4 and previous 3038.8. Dr. Davison instructed to give consult to Dr. Goldberg. Sn notified Unit secretary who called consult in for Dr. Goldberg.   4265 SN notified Dr. Sherwood at [phone] of Troponin current 12,209.4 and previous 3038.8. Reviewed admit date, time and chief complaint. Stat EKG orders entered.   0741 Notified Lorna at [phone] with Respiratory of Stat EKG orders   0750 Stat EKG at bedside.

## 2023-06-24 NOTE — CARE UPDATE
CM attempted to complete dc planning assessment, patient in cath lab at this time. CM will follow.

## 2023-06-24 NOTE — CONSULTS
[facility]  Department of Cardiology  Consult Note      PATIENT NAME: Piotr Crespo  MRN: [id number]  TODAY'S DATE: 06/24/2023  ADMIT DATE: 6/23/2023                          CONSULT REQUESTED BY: Afua Wilkins MD    SUBJECTIVE     PRINCIPAL PROBLEM: Chest pain      REASON FOR CONSULT:  NSTEMI      HPI:        Mr. Crespo is a 62-year-old male patient with a past medical history significant for hypertension, stroke, aneurysm with clip, dyslipidemia, prediabetes, and hypertension presented to the emergency room after experiencing chest pain while cleaning his house.  Pain progressed and he sought emergency evaluation upon arrival troponin was elevated and he was transferred to our facility.  Pain went away after given nitroglycerin on admit blood pressure was extremely elevated at 205.  Patient has been on heparin drip.  In his currently chest pain-free.          FROM H AND P     Principal Problem:Chest pain     Chief Complaint: chest pain     HPI:  62-year-old male history of tobacco use (1 pack per day for 20 years).  Hyperlipidemia, hypertension, prediabetes, stroke presented to ONS with chest pain.      Blood pressure ONS was over 200 EKG showed dynamic T-wave inversions in the inferior leads.  Troponin elevated 0.042.  Patient was given nitroglycerin with resolution of pain.  Also treated with IV hydralazine, morphine, aspirin, weight based Lovenox.     Dr Davison agreed that pt should be transferred to [facility].          Review of patient's allergies indicates:  No Known Allergies    Past Medical History:   Diagnosis Date    Erectile dysfunction due to arterial insufficiency 7/17/2020    Essential hypertension 3/11/2020    High cholesterol     History of left common carotid artery stent placement 4/17/2020    History of stroke 3/11/2020    Hypertension     Mixed hyperlipidemia 3/11/2020    Prediabetes 7/17/2020    Stroke     sept 3, 2017     Past Surgical History:   Procedure Laterality Date    CAROTID ARTERY  "ANGIOPLASTY Left     left Internal carotid artery stent    CEREBRAL ANEURYSM REPAIR      COLONOSCOPY N/A 06/30/2020    Procedure: COLONOSCOPY;  Surgeon: Yuan Pizano MD;  Location: [facility] ([address] [place] [address]);  Service: Endoscopy;  Laterality: N/A;  3/30/20- Per Dr. Catherine, Pt to be r/s for later date, "screening"- ERW  3/30/20 - LM for Pt to call back to r/s for later date per Dr. Catherine- ERW  COVID screening on 6/27/20 - [facility]- ER[facility]    LUMBAR DISCECTOMY       Social History     Tobacco Use    Smoking status: Every Day     Packs/day: 0.40     Years: 45.00     Pack years: 18.00     Types: Cigarettes    Smokeless tobacco: Never   Substance Use Topics    Alcohol use: Yes     Comment: once a month    Drug use: No        REVIEW OF SYSTEMS    Positive chest pain and nausea    OBJECTIVE     VITAL SIGNS (Most Recent)  Temp: 97.6 °F (36.4 °C) (06/24/23 0711)  Pulse: 68 (06/24/23 0711)  Resp: 18 (06/24/23 0711)  BP: 119/64 (06/24/23 0711)  SpO2: 97 % (06/24/23 0711)    VENTILATION STATUS  Resp: 18 (06/24/23 0711)  SpO2: 97 % (06/24/23 0711)           I & O (Last 24H):  Intake/Output Summary (Last 24 hours) at 6/24/2023 1101  Last data filed at 6/24/2023 0847  Gross per 24 hour   Intake --   Output 1 ml   Net -1 ml       WEIGHTS  Wt Readings from Last 3 Encounters:   06/24/23 0348 79.5 kg (175 lb 4.3 oz)   06/24/23 0124 80.7 kg (177 lb 14.6 oz)   06/23/23 1923 80.7 kg (178 lb)   05/10/23 1000 81.1 kg (178 lb 12.7 oz)       PHYSICAL EXAM  GENERAL: well built, well nourished, well-developed in no apparent distress alert and oriented.   HEENT: Normocephalic. Pupils normal and conjunctivae normal.  Mucous membranes normal, no cyanosis or icterus, trachea central,no pallor or icterus is noted..   NECK: No JVD. No bruit..   THYROID: Thyroid not enlarged. No nodules present..   CARDIAC: Regular rate and rhythm. S1 is normal.S2 is normal.No gallops, clicks or murmurs noted at this time.  CHEST ANATOMY: normal.   LUNGS: Clear to " auscultation. No wheezing or rhonchi..   ABDOMEN: Soft no masses or organomegaly.  No abdomen pulsations or bruits.  Normal bowel sounds. No pulsations and no masses felt, No guarding or rebound.   URINARY: No jauregui catheter   EXTREMITIES: No cyanosis, clubbing or edema noted at this time., no calf tenderness bilaterally.   PERIPHERAL VASCULAR SYSTEM: Good palpable distal pulses.   CENTRAL NERVOUS SYSTEM: No focal motor or sensory deficits noted.   SKIN: Skin without lesions, moist, well perfused.   MUSCLE STRENGTH & TONE: No noteable weakness, atrophy or abnormal movement.     HOME MEDICATIONS:  Current Facility-Administered Medications on File Prior to Encounter   Medication Dose Route Frequency Provider Last Rate Last Admin    [COMPLETED] aspirin tablet 325 mg  325 mg Oral ED 1 Time Tigre Hicks Jr., DO   325 mg at 06/23/23 1936    [COMPLETED] enoxaparin injection 80 mg  1 mg/kg Subcutaneous ED 1 Time Tigre Hicks Jr., DO   80 mg at 06/23/23 2115    [COMPLETED] hydrALAZINE injection 10 mg  10 mg Intravenous ED 1 Time Tigre Hicks Jr., DO   10 mg at 06/23/23 2058    [COMPLETED] morphine injection 2 mg  2 mg Intravenous ED 1 Time Tigre Hicks Jr., DO   2 mg at 06/23/23 2058    [COMPLETED] nitroGLYCERIN 2% TD oint ointment 1 inch  1 inch Topical (Top) ED 1 Time Tigre Hicks Jr., DO   1 inch at 06/23/23 1945    [COMPLETED] nitroGLYCERIN SL tablet 0.4 mg  0.4 mg Sublingual ED 1 Time Tigre Hicks Jr., DO   0.4 mg at 06/23/23 2058     Current Outpatient Medications on File Prior to Encounter   Medication Sig Dispense Refill    amLODIPine (NORVASC) 10 MG tablet Take 1 tablet (10 mg total) by mouth once daily. 90 tablet 3    aspirin 81 MG Chew Chew and swallow 1 tablet (81 mg total) by mouth once daily. 90 tablet 3    atorvastatin (LIPITOR) 80 MG tablet Take 1 tablet (80 mg total) by mouth every evening. 90 tablet 3    carvediloL (COREG) 6.25 MG tablet Take 1 tablet (6.25 mg total) by mouth 2  (two) times daily with meals. 180 tablet 3    clotrimazole (LOTRIMIN) 1 % Soln APPLY SMALL AMOUNT TOPICALLY TWICE A DAY FOR FUNGAL INFECTION      ezetimibe (ZETIA) 10 mg tablet Take 1 tablet (10 mg total) by mouth once daily. 90 tablet 3    cholecalciferol, vitamin D3, 100 mcg (4,000 unit) Tab TAKE ONE TABLET BY MOUTH EVERY DAY AS A VITAMIN SUPPLEMENT      nicotine (NICODERM CQ) 14 mg/24 hr APPLY 1 PATCH TOPICALLY ONCE DAILY FOR 28 DAYS FOR SMOKING CESSATION. DO NOT SMOKE WHILE USING PATCHES. COMPLETE THIS STRENGTH PRIOR TO STARTING 7 MG NICOTINE PATCH.      nicotine (NICODERM CQ) 7 mg/24 hr APPLY 1 PATCH TOPICALLY EVERY DAY FOR 28 DAYS FOR SMOKING CESSATION. DO NOT SMOKE WHILE USING PATCHES. START THIS STRENGTH AFTER COMPLETION OF COURSE OF 14 MG NICOTINE PATCHES.      nicotine polacrilex (NICORETTE) 4 MG Gum CHEW ONE 4MG PIECE IN MOUTH EVERY HOUR AS NEEDED      tamsulosin (FLOMAX) 0.4 mg Cap Take 1 capsule (0.4 mg total) by mouth once daily. 90 capsule 3    urea 20 % Crea APPLY LIBERAL AMOUNT TOPICALLY TWICE A DAY AS NEEDED FOR CALLOUSED AREA.         SCHEDULED MEDS:   amLODIPine  10 mg Oral Daily    aspirin  81 mg Oral Daily    atorvastatin  80 mg Oral QHS    carvediloL  6.25 mg Oral BID WM    ezetimibe  10 mg Oral Daily    heparin (PORCINE)  53.5 Units/kg (Adjusted) Intravenous Once    nitroGLYCERIN 2% TD oint  0.5 inch Topical (Top) Q6H    tamsulosin  0.4 mg Oral Daily       CONTINUOUS INFUSIONS:   heparin (porcine) in D5W 12 Units/kg/hr (06/24/23 0914)       PRN MEDS:dextrose 50%, dextrose 50%, glucagon (human recombinant), glucose, glucose, heparin (PORCINE), heparin (PORCINE), naloxone, sodium chloride 0.9%    LABS AND DIAGNOSTICS     CBC LAST 3 DAYS  Recent Labs   Lab 06/23/23  1945 06/24/23  0130   WBC 8.45 8.36   RBC 4.88 4.67   HGB 14.9 14.1   HCT 45.2 43.1   MCV 93 92   MCH 30.5 30.2   MCHC 33.0 32.7   RDW 13.4 13.7   * 134*   MPV 11.9 12.4   GRAN 71.0  6.0  --    LYMPH 21.1  1.8  --    MONO  6.5  0.6  --    BASO 0.03  --    NRBC 0  --        COAGULATION LAST 3 DAYS  Recent Labs   Lab 06/24/23  0130   APTT 32.0       CHEMISTRY LAST 3 DAYS  Recent Labs   Lab 06/23/23 1945 06/24/23 0130    138   K 4.3 3.5    106   CO2 24 25   ANIONGAP 12 7*   BUN 14 15   CREATININE 1.2 1.0   GLU 94 149*   CALCIUM 9.7 8.9   ALBUMIN 4.6  --    PROT 7.9  --    ALKPHOS 76  --    ALT 20  --    AST 23  --    BILITOT 0.6  --        CARDIAC PROFILE LAST 3 DAYS  Recent Labs   Lab 06/23/23 1945 06/23/23  2258 06/24/23  0131 06/24/23  0503   BNP 11  --   --   --    TROPONINI 0.042* 1.105*  --   --    TROPONINIHS  --   --  3038.8* 25209.4*       ENDOCRINE LAST 3 DAYS  No results for input(s): TSH, PROCAL in the last 168 hours.    LAST ARTERIAL BLOOD GAS  ABG  No results for input(s): PH, PO2, PCO2, HCO3, BE in the last 168 hours.    LAST 7 DAYS MICROBIOLOGY   Microbiology Results (last 7 days)       ** No results found for the last 168 hours. **            MOST RECENT IMAGING  X-Ray Chest AP Portable  Narrative: EXAMINATION:  XR CHEST AP PORTABLE    CLINICAL HISTORY:  Chest Pain;    TECHNIQUE:  Single frontal view of the chest was performed.    COMPARISON:  None    FINDINGS:  The lungs are clear, with normal appearance of pulmonary vasculature and no pleural effusion or pneumothorax.    The cardiac silhouette is normal in size. The hilar and mediastinal contours are unremarkable.    Bones are intact.  Impression: No acute abnormality.    Electronically signed by: Walter Wayne  Date:    06/23/2023  Time:    19:58      ECHOCARDIOGRAM RESULTS (last 5)  No results found for this or any previous visit.      CURRENT/PREVIOUS VISIT EKG  Results for orders placed or performed during the hospital encounter of 06/23/23   EKG 12-lead    Collection Time: 06/24/23  7:46 AM    Narrative    Test Reason : R07.9,    Vent. Rate : 057 BPM     Atrial Rate : 057 BPM     P-R Int : 178 ms          QRS Dur : 094 ms      QT Int : 450 ms        P-R-T Axes : 038 -05 -36 degrees     QTc Int : 438 ms    Sinus bradycardia  Minimal voltage criteria for LVH, may be normal variant ( R in aVL )  Nonspecific T wave abnormality  Abnormal ECG  When compared with ECG of 24-JUN-2023 03:19,  No significant change was found    Referred By: LINDA MORALES JR.           Confirmed By:            ASSESSMENT/PLAN:     Active Hospital Problems    Diagnosis    *Chest pain       ASSESSMENT & PLAN:       1. NSTEMI  2. Hypertensive urgency on admit  3. History of stroke  4. History of aneurysm with clip  5. Prediabetes  6. Dyslipidemia   7. Hypertension    RECOMMENDATIONS:      Plan for angiographic assessment today  Patient denies any allergies denies any bleeding tendencies and understands he will need to take blood thinners if stents are placed.  Echocardiogram   Discussed with patient the risks and benefits of the procedure including, but not limited to, the following 1:1000 risk of Heart attack, stroke and death with 3-5% Risk of bleeding, vessel damage, and the need for emergent CABG Surgery.  All questions have been answered to patient's satisfaction.  Informed consent obtained        Michelle Daniel NP  Department of Cardiology  Date of Service: 06/24/2023      I have personally interviewed and examined the patient, I have reviewed the Nurse Practitioner's history and physical, assessment, and plan. I have personally evaluated the patient at bedside and agree with the findings and made appropriate changes as necessary in recommendations.    S/p coronary angiogram today.   Significant 2 vessel CAD. Full report to follow    of mid RCA with right to right and left to right collaterals.   There was a discrete 90-95% stenosis in distal segment of the OM2 (likely culprit lesion).  The lesion was at the vessel bifurcation and not readily amenable to PCI due to small caliber vessel (supplying smaller territory)  Patient was pain-free.  Normal LV systolic function on echo.   Troponin elevation could be also aggravated by uncontrolled hypertension.  Plan is to medically manage the CAD at this point.  Aspirin, Plavix, statin  Aggrastat infusion  Will follow     George Sherwood MD  Department of Cardiology  [facility]  6/24/23

## 2023-06-24 NOTE — CARE UPDATE
Patient currently chest pain-free, cardiology consulted.  Currently on heparin GTT  H&P per previous physician, labs and images reviewed.  Continue current care management.

## 2023-06-24 NOTE — PROVIDER TRANSFER
(Physician in Lead of Transfers)   Outside Transfer Acceptance Note / Regional Referral Center    Referring facility: [facility]   Referring provider: LINDA MORALES JR.  Accepting facility: [facility]  Reason for transfer:  Higher level of care  Transfer diagnosis: NSTEMI  Transfer specialty requested: Interventional Cardiology  Transfer specialty notified: Yes  Transfer level: 2  Isolation status: No active isolations   Admission class or status: IP- Inpatient      Narrative     Patient is a 62 y.o. male who has a past medical history of Erectile dysfunction due to arterial insufficiency, Essential hypertension, High cholesterol, History of left common carotid artery stent placement, History of stroke, Hypertension, Mixed hyperlipidemia, Prediabetes, and Stroke presented with chest pain. On arrival to ED patient SBP was > 200. EKG showed T wave inversions in the inferior leads. Troponin elevated at 0.042. Patient treated with IV hydralazine, nitro, morphine, ASA, and Lovenox. BP improved and currently CP free. Seeking transfer for cardiology consult.      Objective     Vitals: Temp: 98.2 °F (36.8 °C) (06/23/23 1931)  Pulse: 68 (06/23/23 1934)  Resp: 20 (06/23/23 2058)  BP: (!) 175/86 (06/23/23 1934)  SpO2: 100 % (06/23/23 1934)    Recent Labs: see EPIC  CBC:  Recent Labs   Lab 06/23/23 1945   WBC 8.45   HGB 14.9   HCT 45.2   *     CMP:  Recent Labs   Lab 06/23/23 1945   CALCIUM 9.7   ALBUMIN 4.6   PROT 7.9      K 4.3   CO2 24      BUN 14   CREATININE 1.2   ALKPHOS 76   ALT 20   AST 23   BILITOT 0.6     No results for input(s): LACTATE in the last 72 hours.  Recent Labs   Lab 06/23/23 1945   TROPONINI 0.042*     BNP  Recent Labs   Lab 06/23/23 1945   BNP 11     ABG  No results for input(s): PH, PO2, PCO2, HCO3, BE in the last 168 hours.   Lab Results   Component Value Date    INR 0.9 04/25/2018       Recent imaging:   X-Ray Chest AP Portable  Narrative:  EXAMINATION:  XR CHEST AP PORTABLE    CLINICAL HISTORY:  Chest Pain;    TECHNIQUE:  Single frontal view of the chest was performed.    COMPARISON:  None    FINDINGS:  The lungs are clear, with normal appearance of pulmonary vasculature and no pleural effusion or pneumothorax.    The cardiac silhouette is normal in size. The hilar and mediastinal contours are unremarkable.    Bones are intact.  Impression: No acute abnormality.    Electronically signed by: Walter Wayne  Date:    06/23/2023  Time:    19:58      Airway:     Vent settings:         IV access:    Allergies: Review of patient's allergies indicates:  No Known Allergies   NPO: Yes    Anticoagulation:   Anticoagulants       None             Instructions      [facility]  Admit to Hospital Medicine  Upon patient arrival to floor, please contact Hospital Medicine on call.

## 2023-06-24 NOTE — ED PROVIDER NOTES
"Encounter Date: 6/23/2023    SCRIBE #1 NOTE: I, Floridalma Barney, am scribing for, and in the presence of,  Tigre Hicks MD.     History     Chief Complaint   Patient presents with    Chest Pain     Pt c/o chest pain in the middle of chest starting 1.5hr PTA while cleaning his house.      Time seen by provider: 7:31 PM on 06/23/2023    Piotr Crespo is a 62 y.o. male who presents to the ED with an onset of chest pain, body aches, headache, and light-headedness that began 1.5 hours ago. He was cleaning his garage when his symptoms began. Patient has no known sick contact. The patient denies fever or any other symptoms at this time. He has a PMHx of HTN treated with Norvasc and Aspirin, stroke, high cholesterol treated with Lipitor, and prediabetes. He has a PSHx of cerebral aneurysm repair, carotid angioplasty, and lumbar discectomy.    The history is provided by the patient.   Review of patient's allergies indicates:  No Known Allergies  Past Medical History:   Diagnosis Date    Erectile dysfunction due to arterial insufficiency 7/17/2020    Essential hypertension 3/11/2020    High cholesterol     History of left common carotid artery stent placement 4/17/2020    History of stroke 3/11/2020    Hypertension     Mixed hyperlipidemia 3/11/2020    Prediabetes 7/17/2020    Stroke     sept 3, 2017     Past Surgical History:   Procedure Laterality Date    CAROTID ARTERY ANGIOPLASTY Left     left Internal carotid artery stent    CEREBRAL ANEURYSM REPAIR      COLONOSCOPY N/A 06/30/2020    Procedure: COLONOSCOPY;  Surgeon: Yuan Pizano MD;  Location: [facility] ([address] [place] [address]);  Service: Endoscopy;  Laterality: N/A;  3/30/20- Per Dr. Catherine, Pt to be r/s for later date, "screening"- ERW  3/30/20 -  for Pt to call back to r/s for later date per Dr. Catherine- ERW  COVID screening on 6/27/20 - [facility]- ERW    LUMBAR DISCECTOMY       Family History   Problem Relation Age of Onset    Stroke Mother     Hypertension Mother     " Dementia Mother     Cancer Father     Other Sister         stomach issue    No Known Problems Sister     Cancer Brother     Brain cancer Brother     No Known Problems Brother     Bell's palsy Brother     Cancer Brother     No Known Problems Brother     Cataracts Neg Hx     Glaucoma Neg Hx     Macular degeneration Neg Hx     Heart attacks under age 50 Neg Hx      Social History     Tobacco Use    Smoking status: Every Day     Packs/day: 0.40     Years: 45.00     Pack years: 18.00     Types: Cigarettes    Smokeless tobacco: Never   Substance Use Topics    Alcohol use: Yes     Comment: once a month    Drug use: No     Review of Systems   Constitutional:  Negative for fever.   HENT:  Negative for sore throat.    Respiratory:  Negative for shortness of breath.    Cardiovascular:  Positive for chest pain.   Gastrointestinal:  Negative for nausea.   Genitourinary:  Negative for dysuria.   Musculoskeletal:  Positive for myalgias. Negative for back pain.   Skin:  Negative for rash.   Neurological:  Positive for light-headedness and headaches. Negative for weakness.   Hematological:  Does not bruise/bleed easily.     Physical Exam     Initial Vitals   BP Pulse Resp Temp SpO2   06/23/23 1920 06/23/23 1918 06/23/23 1923 06/23/23 1931 06/23/23 1918   (!) 205/95 67 20 98.2 °F (36.8 °C) 100 %      MAP       --                Physical Exam    Nursing note and vitals reviewed.  Constitutional: Vital signs are normal. He appears well-developed and well-nourished.  Non-toxic appearance. No distress.   HENT:   Head: Normocephalic and atraumatic.   Eyes: EOM are normal. Right eye exhibits no discharge. Left eye exhibits no discharge.   Neck:   Normal range of motion.  Cardiovascular:  Normal rate and regular rhythm.           Pulmonary/Chest: No stridor. No respiratory distress.   Bilateral chest rise   Abdominal: Abdomen is soft. There is no abdominal tenderness.   Musculoskeletal:         General: No tenderness or edema.      Cervical  back: Normal range of motion. No rigidity.     Neurological: He is alert and oriented to person, place, and time. No sensory deficit. He exhibits normal muscle tone. Coordination normal. GCS eye subscore is 4. GCS verbal subscore is 5. GCS motor subscore is 6.   Skin: Skin is warm and dry. No rash noted.   Psychiatric: His speech is normal. He is not actively hallucinating.   Not anxious  or agitated       ED Course   Procedures  Labs Reviewed   CBC W/ AUTO DIFFERENTIAL - Abnormal; Notable for the following components:       Result Value    Platelets 143 (*)     All other components within normal limits   TROPONIN I - Abnormal; Notable for the following components:    Troponin I 0.042 (*)     All other components within normal limits   COMPREHENSIVE METABOLIC PANEL   B-TYPE NATRIURETIC PEPTIDE   SARS-COV-2 RNA AMPLIFICATION, QUAL   TROPONIN I     EKG Readings: (Independently Interpreted)   Initial Reading: No STEMI. Rhythm: Normal Sinus Rhythm. Heart Rate: 72. Ectopy: No Ectopy. Conduction: Normal. Axis: Normal.   QTC at 438 ms. QRS at 92 ms. Nonspecific ST wave abnormality in inferior leads 3 and AVF.     Imaging Results              X-Ray Chest AP Portable (Final result)  Result time 06/23/23 19:58:50      Final result by Walter Wayne MD (06/23/23 19:58:50)                   Impression:      No acute abnormality.      Electronically signed by: Walter Wayne  Date:    06/23/2023  Time:    19:58               Narrative:    EXAMINATION:  XR CHEST AP PORTABLE    CLINICAL HISTORY:  Chest Pain;    TECHNIQUE:  Single frontal view of the chest was performed.    COMPARISON:  None    FINDINGS:  The lungs are clear, with normal appearance of pulmonary vasculature and no pleural effusion or pneumothorax.    The cardiac silhouette is normal in size. The hilar and mediastinal contours are unremarkable.    Bones are intact.                                       Medications   aspirin tablet 325 mg (325 mg Oral Given  6/23/23 1936)   nitroGLYCERIN 2% TD oint ointment 1 inch (1 inch Topical (Top) Given 6/23/23 1945)   hydrALAZINE injection 10 mg (10 mg Intravenous Given 6/23/23 2058)   morphine injection 2 mg (2 mg Intravenous Given 6/23/23 2058)   enoxaparin injection 80 mg (80 mg Subcutaneous Given 6/23/23 2115)   nitroGLYCERIN SL tablet 0.4 mg (0.4 mg Sublingual Given 6/23/23 2058)     Medical Decision Making:   History:   Old Medical Records: I decided to obtain old medical records.  Initial Assessment:   62-year-old male presents with chest pain, elevated blood pressure reading, stable oxygen on arrival with no respiratory distress  Differential Diagnosis:   ACS versus hypertensive emergency versus hypertensive urgency versus pneumothorax  Independently Interpreted Test(s):   I have ordered and independently interpreted X-rays - see prior notes.  I have ordered and independently interpreted EKG Reading(s) - see prior notes  Clinical Tests:   Lab Tests: Ordered and Reviewed  Radiological Study: Ordered and Reviewed  Medical Tests: Ordered and Reviewed  ED Management:  Sixty-three with elevated blood pressure reading chest pain.  EKG with worsening inverted T-waves in inferior leads.  Blood pressure controlled with oral nitroglycerin topical nitroglycerin and IV hydralazine.  Patient administered oral aspirin and IV Lovenox 1 mg/kg.  Spoke with cardiologist on-call Dr. Davison.  We will proceed with transfer to [facility] for further evaluation and management including stress test and possible heart catheterization if needed.  Initial troponin elevated.  Patient updated and agrees with plan.  Spoke with transfer center, accepting physician Dr. Ferraro and Dr. Butler        Scribe Attestation:   Scribe #1: I performed the above scribed service and the documentation accurately describes the services I performed. I attest to the accuracy of the note.    Attending Attestation:         Attending Critical Care:    Critical Care Times:   Direct Patient Care (initial evaluation, reassessments, and time considering the case)................................................................30 minutes.   Ordering, Reviewing, and Interpreting Diagnostic Studies...............................................................................................................10 minutes.   Documentation..................................................................................................................................................................................10 minutes.   Consultation with other Physicians. .................................................................................................................................................10 minutes.   ==============================================================  Total Critical Care Time - exclusive of procedural time: 60 minutes.  ==============================================================  Critical care was time spent personally by me on the following activities: obtaining history from patient or relative, ordering lab, x-rays, and/or EKG, ordering and performing treatments and interventions, evaluation of patient's response to treatment, discussion with consultants and re-evaluation of patient's conition.   Critical Care Condition: potentially life-threatening     Physician Attestation for Jessica:      Comments: I, Dr. Tigre Hicks DO personally performed the services described in this documentation. All medical record entries made by the scribe were at my direction and in my presence.  I have reviewed the chart and agree that the record reflects my personal performance and is accurate and complete. Tigre Hicks DO.  8:08 PM 06/23/2023       DISCLAIMER: This note was prepared with [employer] Direct voice recognition transcription software. Garbled syntax, mangled pronouns, and other bizarre constructions may be attributed to that software  system.        ED Course as of 06/23/23 2235 Fri Jun 23, 2023 2035 Troponin elevated 0.042.  Patient updated.  Patient reassessed blood pressure did decrease to 160s systolic with topical nitroglycerin and aspirin however back up to 183 systolic.  Patient is still symptomatic, we will administer IV hydralazine 10 mg, IV morphine 2 mg and repeat EKG and speak with cardiologist on-call.  Patient updated and agrees with plan [KB]   2035 Troponin I(!): 0.042 [KB]   2052 Repeat EKG obtained, rate 61, normal sinus rhythm, inverted T-waves lead 3 and AVF worsening.  Cardiologist on-call notified Dr. Davison  Patient also administered additional nitro glycerin sublingual per cardiology request and Lovenox subQ 1 mg/kg for ACS [KB]   2116 Repeat blood pressure 120s/ 70s after oral nitro and IV hydralazine.  Chest pain improved after morphine [KB]   2117 Speaking with Dr. Smiley AT [facility] for Transfer. [KB]   2122 Patient accepted to [facility] as transfer [KB]   2235 Repeat blood pressure 130 systolic, patient asymptomatic pending EMS at this time for transfer [KB]      ED Course User Index  [KB] Tigre Hicks Jr., DO                   Clinical Impression:   Final diagnoses:  [R07.89] Chest wall pain  [R07.9] Chest pain, unspecified type  [R03.0] Elevated blood pressure reading  [R07.9] Chest pain  [I21.4] NSTEMI (non-ST elevated myocardial infarction) (Primary)        ED Disposition Condition    Transfer to Another Facility Stable                Tigre Hicks Jr., DO  06/23/23 2126       Tigre Hicks Jr., DO  06/23/23 2235

## 2023-06-25 LAB
ANION GAP SERPL CALC-SCNC: 6 MMOL/L (ref 8–16)
BUN SERPL-MCNC: 12 MG/DL (ref 8–23)
CALCIUM SERPL-MCNC: 8.5 MG/DL (ref 8.7–10.5)
CHLORIDE SERPL-SCNC: 110 MMOL/L (ref 95–110)
CO2 SERPL-SCNC: 24 MMOL/L (ref 23–29)
CREAT SERPL-MCNC: 1 MG/DL (ref 0.5–1.4)
ERYTHROCYTE [DISTWIDTH] IN BLOOD BY AUTOMATED COUNT: 13.6 % (ref 11.5–14.5)
EST. GFR  (NO RACE VARIABLE): >60 ML/MIN/1.73 M^2
GLUCOSE SERPL-MCNC: 108 MG/DL (ref 70–110)
GLUCOSE SERPL-MCNC: 109 MG/DL (ref 70–110)
GLUCOSE SERPL-MCNC: 122 MG/DL (ref 70–110)
GLUCOSE SERPL-MCNC: 89 MG/DL (ref 70–110)
GLUCOSE SERPL-MCNC: 91 MG/DL (ref 70–110)
HCT VFR BLD AUTO: 40.6 % (ref 40–54)
HGB BLD-MCNC: 13.5 G/DL (ref 14–18)
MCH RBC QN AUTO: 30.3 PG (ref 27–31)
MCHC RBC AUTO-ENTMCNC: 33.3 G/DL (ref 32–36)
MCV RBC AUTO: 91 FL (ref 82–98)
PLATELET # BLD AUTO: 134 K/UL (ref 150–450)
PMV BLD AUTO: 12.4 FL (ref 9.2–12.9)
POTASSIUM SERPL-SCNC: 3.7 MMOL/L (ref 3.5–5.1)
RBC # BLD AUTO: 4.46 M/UL (ref 4.6–6.2)
SODIUM SERPL-SCNC: 140 MMOL/L (ref 136–145)
TROPONIN I SERPL HS-MCNC: 2718.6 PG/ML (ref 0–14.9)
TROPONIN I SERPL HS-MCNC: 4049.6 PG/ML (ref 0–14.9)
WBC # BLD AUTO: 4.85 K/UL (ref 3.9–12.7)

## 2023-06-25 PROCEDURE — 21400001 HC TELEMETRY ROOM

## 2023-06-25 PROCEDURE — 36415 COLL VENOUS BLD VENIPUNCTURE: CPT | Performed by: INTERNAL MEDICINE

## 2023-06-25 PROCEDURE — 25000003 PHARM REV CODE 250: Performed by: NURSE PRACTITIONER

## 2023-06-25 PROCEDURE — 80048 BASIC METABOLIC PNL TOTAL CA: CPT | Performed by: INTERNAL MEDICINE

## 2023-06-25 PROCEDURE — 84484 ASSAY OF TROPONIN QUANT: CPT | Performed by: NURSE PRACTITIONER

## 2023-06-25 PROCEDURE — 99233 PR SUBSEQUENT HOSPITAL CARE,LEVL III: ICD-10-PCS | Mod: ,,, | Performed by: INTERNAL MEDICINE

## 2023-06-25 PROCEDURE — 99233 SBSQ HOSP IP/OBS HIGH 50: CPT | Mod: ,,, | Performed by: INTERNAL MEDICINE

## 2023-06-25 PROCEDURE — 25000003 PHARM REV CODE 250: Performed by: INTERNAL MEDICINE

## 2023-06-25 PROCEDURE — 85027 COMPLETE CBC AUTOMATED: CPT | Performed by: INTERNAL MEDICINE

## 2023-06-25 PROCEDURE — 36415 COLL VENOUS BLD VENIPUNCTURE: CPT | Performed by: NURSE PRACTITIONER

## 2023-06-25 RX ORDER — TAMSULOSIN HYDROCHLORIDE 0.4 MG/1
0.4 CAPSULE ORAL DAILY
Status: DISCONTINUED | OUTPATIENT
Start: 2023-06-25 | End: 2023-06-26 | Stop reason: HOSPADM

## 2023-06-25 RX ORDER — CARVEDILOL 6.25 MG/1
6.25 TABLET ORAL 2 TIMES DAILY WITH MEALS
Status: CANCELLED | OUTPATIENT
Start: 2023-06-25

## 2023-06-25 RX ORDER — ISOSORBIDE MONONITRATE 30 MG/1
30 TABLET, EXTENDED RELEASE ORAL DAILY
Status: DISCONTINUED | OUTPATIENT
Start: 2023-06-25 | End: 2023-06-26 | Stop reason: HOSPADM

## 2023-06-25 RX ADMIN — ASPIRIN 81 MG 81 MG: 81 TABLET ORAL at 08:06

## 2023-06-25 RX ADMIN — SODIUM CHLORIDE: 0.9 INJECTION, SOLUTION INTRAVENOUS at 08:06

## 2023-06-25 RX ADMIN — ISOSORBIDE MONONITRATE 30 MG: 30 TABLET, EXTENDED RELEASE ORAL at 02:06

## 2023-06-25 RX ADMIN — EZETIMIBE 10 MG: 10 TABLET ORAL at 08:06

## 2023-06-25 RX ADMIN — TAMSULOSIN HYDROCHLORIDE 0.4 MG: 0.4 CAPSULE ORAL at 09:06

## 2023-06-25 RX ADMIN — CARVEDILOL 6.25 MG: 6.25 TABLET, FILM COATED ORAL at 08:06

## 2023-06-25 RX ADMIN — AMLODIPINE BESYLATE 10 MG: 5 TABLET ORAL at 08:06

## 2023-06-25 RX ADMIN — NITROGLYCERIN 0.5 INCH: 20 OINTMENT TOPICAL at 12:06

## 2023-06-25 RX ADMIN — CARVEDILOL 6.25 MG: 6.25 TABLET, FILM COATED ORAL at 05:06

## 2023-06-25 RX ADMIN — ATORVASTATIN CALCIUM 80 MG: 40 TABLET, FILM COATED ORAL at 09:06

## 2023-06-25 RX ADMIN — TAMSULOSIN HYDROCHLORIDE 0.4 MG: 0.4 CAPSULE ORAL at 08:06

## 2023-06-25 RX ADMIN — NITROGLYCERIN 0.5 INCH: 20 OINTMENT TOPICAL at 05:06

## 2023-06-25 RX ADMIN — CLOPIDOGREL BISULFATE 75 MG: 75 TABLET, FILM COATED ORAL at 08:06

## 2023-06-25 NOTE — NURSING
Notified RITESH Martinez: Lab phoned with Troponin of 0581.6. Previous troponin on 06/24 at 23:29 was 4049.6 and on 06/24 at 05:03 was 12,209.4.

## 2023-06-25 NOTE — PROGRESS NOTES
[facility]  Department of Cardiology  Consult Note      PATIENT NAME: Piotr Crespo  MRN: [id number]  TODAY'S DATE: 06/25/2023  ADMIT DATE: 6/23/2023                          CONSULT REQUESTED BY: Afua Wilkins MD    SUBJECTIVE     PRINCIPAL PROBLEM: Chest pain      REASON FOR CONSULT:  NSTEMI        6/25/2023    Patient is CP free. Troponin has decreased.  Aggrastat was given over 18 hours. Right wrist soft no hematoma or bruising noted.      HPI:        Mr. Crespo is a 62-year-old male patient with a past medical history significant for hypertension, stroke, aneurysm with clip, dyslipidemia, prediabetes, and hypertension presented to the emergency room after experiencing chest pain while cleaning his house.  Pain progressed and he sought emergency evaluation upon arrival troponin was elevated and he was transferred to our facility.  Pain went away after given nitroglycerin on admit blood pressure was extremely elevated at 205.  Patient has been on heparin drip.  In his currently chest pain-free.          FROM H AND P     Principal Problem:Chest pain     Chief Complaint: chest pain     HPI:  62-year-old male history of tobacco use (1 pack per day for 20 years).  Hyperlipidemia, hypertension, prediabetes, stroke presented to ONS with chest pain.      Blood pressure ONS was over 200 EKG showed dynamic T-wave inversions in the inferior leads.  Troponin elevated 0.042.  Patient was given nitroglycerin with resolution of pain.  Also treated with IV hydralazine, morphine, aspirin, weight based Lovenox.     Dr Davison agreed that pt should be transferred to [facility].          Review of patient's allergies indicates:  No Known Allergies    Past Medical History:   Diagnosis Date    Erectile dysfunction due to arterial insufficiency 7/17/2020    Essential hypertension 3/11/2020    High cholesterol     History of left common carotid artery stent placement 4/17/2020    History of stroke 3/11/2020    Hypertension     Mixed  "hyperlipidemia 3/11/2020    Prediabetes 7/17/2020    Stroke     sept 3, 2017     Past Surgical History:   Procedure Laterality Date    CAROTID ARTERY ANGIOPLASTY Left     left Internal carotid artery stent    CEREBRAL ANEURYSM REPAIR      COLONOSCOPY N/A 06/30/2020    Procedure: COLONOSCOPY;  Surgeon: Yuan Pizano MD;  Location: [facility] ([address] [place] [address]);  Service: Endoscopy;  Laterality: N/A;  3/30/20- Per Dr. Catherine, Pt to be r/s for later date, "screening"- ERW  3/30/20 - LM for Pt to call back to r/s for later date per Dr. Catherine- ERW  COVID screening on 6/27/20 - [facility] urgent care- ERW    LUMBAR DISCECTOMY       Social History     Tobacco Use    Smoking status: Every Day     Packs/day: 0.40     Years: 45.00     Pack years: 18.00     Types: Cigarettes    Smokeless tobacco: Never   Substance Use Topics    Alcohol use: Yes     Comment: once a month    Drug use: No        REVIEW OF SYSTEMS    Per hpi    OBJECTIVE     VITAL SIGNS (Most Recent)  Temp: 98.3 °F (36.8 °C) (06/25/23 0700)  Pulse: (!) 51 (06/25/23 0700)  Resp: 17 (06/25/23 0700)  BP: 132/69 (06/25/23 0700)  SpO2: 96 % (06/25/23 0700)    VENTILATION STATUS  Resp: 17 (06/25/23 0700)  SpO2: 96 % (06/25/23 0700)           I & O (Last 24H):  Intake/Output Summary (Last 24 hours) at 6/25/2023 0930  Last data filed at 6/25/2023 0611  Gross per 24 hour   Intake 2103.66 ml   Output 1675 ml   Net 428.66 ml       WEIGHTS  Wt Readings from Last 3 Encounters:   06/24/23 0348 79.5 kg (175 lb 4.3 oz)   06/24/23 0124 80.7 kg (177 lb 14.6 oz)   06/24/23 1130 79.4 kg (175 lb)   06/23/23 1923 80.7 kg (178 lb)       PHYSICAL EXAM  GENERAL: well built, well nourished, well-developed in no apparent distress alert and oriented.   HEENT: Normocephalic.   NECK: No JVD. No bruit..   CARDIAC: Regular rate and rhythm. S1 is normal.S2 is normal.No gallops, clicks or murmurs noted at this time.  CHEST ANATOMY: normal.   LUNGS: Clear to auscultation. No wheezing or rhonchi..   ABDOMEN: Soft " .   URINARY: No jauregui catheter   EXTREMITIES: No cyanosis, clubbing or edema noted at this time., no calf tenderness bilaterally.    CENTRAL NERVOUS SYSTEM: No focal motor or sensory deficits noted. .   MUSCLE STRENGTH & TONE: No noteable weakness, atrophy or abnormal movement.     HOME MEDICATIONS:  No current facility-administered medications on file prior to encounter.     Current Outpatient Medications on File Prior to Encounter   Medication Sig Dispense Refill    amLODIPine (NORVASC) 10 MG tablet Take 1 tablet (10 mg total) by mouth once daily. 90 tablet 3    aspirin 81 MG Chew Chew and swallow 1 tablet (81 mg total) by mouth once daily. 90 tablet 3    atorvastatin (LIPITOR) 80 MG tablet Take 1 tablet (80 mg total) by mouth every evening. 90 tablet 3    carvediloL (COREG) 6.25 MG tablet Take 1 tablet (6.25 mg total) by mouth 2 (two) times daily with meals. 180 tablet 3    clotrimazole (LOTRIMIN) 1 % Soln APPLY SMALL AMOUNT TOPICALLY TWICE A DAY FOR FUNGAL INFECTION      ezetimibe (ZETIA) 10 mg tablet Take 1 tablet (10 mg total) by mouth once daily. 90 tablet 3    cholecalciferol, vitamin D3, 100 mcg (4,000 unit) Tab TAKE ONE TABLET BY MOUTH EVERY DAY AS A VITAMIN SUPPLEMENT      nicotine (NICODERM CQ) 14 mg/24 hr APPLY 1 PATCH TOPICALLY ONCE DAILY FOR 28 DAYS FOR SMOKING CESSATION. DO NOT SMOKE WHILE USING PATCHES. COMPLETE THIS STRENGTH PRIOR TO STARTING 7 MG NICOTINE PATCH.      nicotine (NICODERM CQ) 7 mg/24 hr APPLY 1 PATCH TOPICALLY EVERY DAY FOR 28 DAYS FOR SMOKING CESSATION. DO NOT SMOKE WHILE USING PATCHES. START THIS STRENGTH AFTER COMPLETION OF COURSE OF 14 MG NICOTINE PATCHES.      nicotine polacrilex (NICORETTE) 4 MG Gum CHEW ONE 4MG PIECE IN MOUTH EVERY HOUR AS NEEDED      tamsulosin (FLOMAX) 0.4 mg Cap Take 1 capsule (0.4 mg total) by mouth once daily. 90 capsule 3    urea 20 % Crea APPLY LIBERAL AMOUNT TOPICALLY TWICE A DAY AS NEEDED FOR CALLOUSED AREA.         SCHEDULED MEDS:   amLODIPine  10 mg  Oral Daily    aspirin  81 mg Oral Daily    atorvastatin  80 mg Oral QHS    carvediloL  6.25 mg Oral BID WM    clopidogreL  300 mg Oral Once    clopidogreL  75 mg Oral Daily    ezetimibe  10 mg Oral Daily    nitroGLYCERIN 2% TD oint  0.5 inch Topical (Top) Q6H    tamsulosin  0.4 mg Oral Daily    tirofiban-0.9% sodium chloride 12.5 mg/250ml  25 mcg/kg Intravenous Once       CONTINUOUS INFUSIONS:   sodium chloride 0.9% 100 mL/hr at 06/25/23 0834    sodium chloride 0.9% Stopped (06/24/23 1330)    tirofiban-0.9% sodium chloride 12.5 mg/250ml Stopped (06/24/23 1330)    tirofiban-0.9% sodium chloride 12.5 mg/250ml Stopped (06/25/23 0630)       PRN MEDS:acetaminophen, dextrose 50%, dextrose 50%, glucagon (human recombinant), glucose, glucose, naloxone, nitroGLYCERIN, ondansetron, sodium chloride 0.9%, sodium chloride 0.9%, tirofiban-0.9% sodium chloride 12.5 mg/250ml    LABS AND DIAGNOSTICS     CBC LAST 3 DAYS  Recent Labs   Lab 06/23/23 1945 06/24/23 0130 06/25/23 0629   WBC 8.45 8.36 4.85   RBC 4.88 4.67 4.46*   HGB 14.9 14.1 13.5*   HCT 45.2 43.1 40.6   MCV 93 92 91   MCH 30.5 30.2 30.3   MCHC 33.0 32.7 33.3   RDW 13.4 13.7 13.6   * 134* 134*   MPV 11.9 12.4 12.4   GRAN 71.0  6.0  --   --    LYMPH 21.1  1.8  --   --    MONO 6.5  0.6  --   --    BASO 0.03  --   --    NRBC 0  --   --        COAGULATION LAST 3 DAYS  Recent Labs   Lab 06/24/23 0130   APTT 32.0       CHEMISTRY LAST 3 DAYS  Recent Labs   Lab 06/23/23 1945 06/24/23 0130 06/25/23 0629    138 140   K 4.3 3.5 3.7    106 110   CO2 24 25 24   ANIONGAP 12 7* 6*   BUN 14 15 12   CREATININE 1.2 1.0 1.0   GLU 94 149* 91   CALCIUM 9.7 8.9 8.5*   ALBUMIN 4.6  --   --    PROT 7.9  --   --    ALKPHOS 76  --   --    ALT 20  --   --    AST 23  --   --    BILITOT 0.6  --   --        CARDIAC PROFILE LAST 3 DAYS  Recent Labs   Lab 06/23/23  1945 06/23/23  2258 06/24/23  0131 06/24/23  0503 06/24/23  2329   BNP 11  --   --   --   --    TROPONINI  0.042* 1.105*  --   --   --    TROPONINIHS  --   --  3038.8* 72659.4* 4049.6*       ENDOCRINE LAST 3 DAYS  No results for input(s): TSH, PROCAL in the last 168 hours.    LAST ARTERIAL BLOOD GAS  ABG  No results for input(s): PH, PO2, PCO2, HCO3, BE in the last 168 hours.    LAST 7 DAYS MICROBIOLOGY   Microbiology Results (last 7 days)       ** No results found for the last 168 hours. **            MOST RECENT IMAGING  Echo Saline Bubble? No  · The left ventricle is normal in size with normal systolic function.  · The estimated ejection fraction is 58%.  · Normal left ventricular diastolic function.  · Atrial fibrillation not observed.  · Normal right ventricular size with normal right ventricular systolic   function.  · Normal central venous pressure (3 mmHg).  · The estimated PA systolic pressure is 25 mmHg.  · No wall motion abnormality. No pulmonary hypertension, mean left atrial   pressure normal         ECHOCARDIOGRAM RESULTS (last 5)  No results found for this or any previous visit.      CURRENT/PREVIOUS VISIT EKG  Results for orders placed or performed during the hospital encounter of 06/23/23   EKG 12-lead    Collection Time: 06/24/23  1:35 PM    Narrative    Test Reason : R07.9,    Vent. Rate : 051 BPM     Atrial Rate : 051 BPM     P-R Int : 176 ms          QRS Dur : 096 ms      QT Int : 464 ms       P-R-T Axes : 037 -17 -50 degrees     QTc Int : 427 ms    Sinus bradycardia  T wave abnormality, consider inferior ischemia  Abnormal ECG  When compared with ECG of 24-JUN-2023 07:46,  No significant change was found    Referred By: LINDA MORALES JR.           Confirmed By:            ASSESSMENT/PLAN:     Active Hospital Problems    Diagnosis    *Chest pain       ASSESSMENT & PLAN:       1. NSTEMI  2. Hypertensive urgency on admit  3. History of stroke  4. History of aneurysm with clip  5. Prediabetes  6. Dyslipidemia   7. Hypertension  8. Sinus Bradycardia- asymptomatic- consider decreasing coreg If  needed    RECOMMENDATIONS:      Continue ASA 81 mg daily  Continue Statin 80 mg daily  Continue Zetia 10 mg daily  Continue Coreg 6.25 mg PO BID  Continue Plavix 75 mg daily  Change nitro patch to imdur 30 mg daily  Walk around unit if he remains asymptomatic will DC in am        Michelle Daniel NP  Department of Cardiology  Date of Service: 06/25/2023      I have personally interviewed and examined the patient, I have reviewed the Nurse Practitioner's history and physical, assessment, and plan. I have personally evaluated the patient at bedside and agree with the findings and made appropriate changes as necessary in recommendations.    S/p coronary angiogram yesterday.   Significant 2 vessel CAD.    of mid RCA with right to right and left to right collaterals.   There was a discrete 90-95% stenosis in distal segment of the OM2 (likely culprit lesion).  The lesion was at the vessel bifurcation and not readily amenable to PCI due to small caliber vessel (supplying smaller territory)  Patient was pain-free.  Normal LV systolic function on echo.  Troponin elevation could be also aggravated by uncontrolled hypertension.  Plan is to medically manage the CAD at this point.  Aspirin, Plavix, statin  Aggrastat infusion completed today. Pt remains chest pain free.  Continue monitoring today    George Sherwood MD  Department of Cardiology  [facility]  6/25/23

## 2023-06-25 NOTE — PLAN OF CARE
Met with patient at bedside to complete initial assessment. Patient / family reports patient DOES NOT have a living will and NO ONE is medical POA.     [facility]  Initial Discharge Assessment       Primary Care Provider: Lory Pugh MD    Admission Diagnosis: NSTEMI (non-ST elevated myocardial infarction) [I21.4]    Admission Date: 6/23/2023  Expected Discharge Date:     Transition of Care Barriers: (P) None    Payor: [employer] MANAGED MEDICARE / Plan: [employer] TOTAL CARE ADVANTAGE / Product Type: Medicare Advantage /     Extended Emergency Contact Information  Primary Emergency Contact: Jina Crespo  Address: [address] [place] [address] [facility]  Home Phone: [phone]  Mobile Phone: [phone]  Relation: Spouse    Discharge Plan A: (P) Home  Discharge Plan B: (P) Home      [facility] [address] [place] [address]  Phone: [phone] Fax: [phone]    [facility] Mail Delivery - [place] [id number] [facility]  [address] [facility] [address]  Phone: [phone] Fax: [phone]      Initial Assessment (most recent)       Adult Discharge Assessment - 06/25/23 1107          Discharge Assessment    Assessment Type Discharge Planning Assessment (P)      Confirmed/corrected address, phone number and insurance Yes (P)      Confirmed Demographics Correct on Facesheet (P)      Source of Information patient (P)      Communicated LD with patient/caregiver No (P)      Reason For Admission chest pain (P)      People in Home spouse (P)      Facility Arrived From: [facility] (P)      Do you expect to return to your current living situation? Yes (P)      Do you have help at home or someone to help you manage your care at home? Yes (P)      Who are your caregiver(s) and their phone number(s)? Jina Crespo (Spouse)   [phone] (Mobile) (P)      Current cognitive status: Alert/Oriented (P)      Equipment  Currently Used at Home none (P)      Readmission within 30 days? No (P)      Patient currently being followed by outpatient case management? No (P)      Do you currently have service(s) that help you manage your care at home? No (P)      Do you take prescription medications? Yes (P)      Do you have prescription coverage? Yes (P)      Coverage [employer] (P)      Who is going to help you get home at discharge? Jina Crespo (Spouse)   [phone] (Mobile) (P)      How do you get to doctors appointments? car, drives self (P)      Are you on dialysis? No (P)      Do you take coumadin? No (P)      Discharge Plan A Home (P)      Discharge Plan B Home (P)      DME Needed Upon Discharge  none (P)      Discharge Plan discussed with: Patient (P)      Transition of Care Barriers None (P)         OTHER    Name(s) of People in Home Jina Crespo (Spouse)   [phone] (Mobile) (P)

## 2023-06-25 NOTE — NURSING
Transferred back to room 2513 via w/c.  Right wrist site intact, no hematoma.   Denies CP. SB on monitor.   Report to Antoine.

## 2023-06-25 NOTE — PROGRESS NOTES
[facility] Medicine    Progress Note    Patient Name: Piotr Crespo  MRN: [id number]  Patient Class: IP- Inpatient   Admission Date: 6/23/2023 11:32 PM  Length of Stay: 2  Attending Physician: Afua Wilkins MD  Primary Care Provider: Lory Pugh MD  Face-to-Face encounter date: 06/25/2023  Code status:  Chief Complaint: No chief complaint on file.        Subjective:    HPI:62-year-old male history of tobacco use (1 pack per day for 20 years).  Hyperlipidemia, hypertension, prediabetes, stroke presented to ONS with chest pain.   Blood pressure ONS was over 200 EKG showed dynamic T-wave inversions in the inferior leads.  Troponin elevated 0.042.  Patient was given nitroglycerin with resolution of pain.  Also treated with IV hydralazine, morphine, aspirin, weight based Lovenox.  Dr Davison agreed that pt should be transferred to [facility].    Interval History:   6/25: Patient is doing well and had heart catheterization done yesterday. No concerns/issues overnight reported by the patient or the nursing staff.    Review of Systems All other Review of Systems were found to be negative expect for that mentioned already in HPI.     Objective:     Vitals:    06/25/23 0326 06/25/23 0700 06/25/23 0705 06/25/23 1223   BP: (!) 141/71  Comment: Tristin notified 132/69 132/69 (!) 161/93   BP Location: Left arm Left arm Left arm    Patient Position: Lying Lying Lying    Pulse: (!) 54 (!) 51 (!) 51 (!) 58   Resp: 17 17 17 17   Temp: 98 °F (36.7 °C) 98.3 °F (36.8 °C) 98.3 °F (36.8 °C) 97.9 °F (36.6 °C)   TempSrc: Oral Oral Oral Oral   SpO2: 95% 96% 96% 100%   Weight:       Height:            Vitals reviewed.  Constitutional: No distress.   HENT: NC  Head: Atraumatic.   Cardiovascular: Normal rate, regular rhythm and normal heart sounds.   Pulmonary/Chest: Effort normal. No wheezes.   Abdominal: Soft. Bowel sounds are normal. No distension and no mass. No tenderness  Neurological: Alert.   Skin: Skin is warm and dry.    Psych: Appropriate mood and affect    Following labs were Reviewed   CBC:  Recent Labs   Lab 06/25/23  0629   WBC 4.85   HGB 13.5*   HCT 40.6   *     CMP:  Recent Labs   Lab 06/25/23  0629   CALCIUM 8.5*      K 3.7   CO2 24      BUN 12   CREATININE 1.0       Micro Results  Microbiology Results (last 7 days)       ** No results found for the last 168 hours. **             Radiology Reports  X-Ray Chest AP Portable    Result Date: 6/23/2023  EXAMINATION: XR CHEST AP PORTABLE CLINICAL HISTORY: Chest Pain; TECHNIQUE: Single frontal view of the chest was performed. COMPARISON: None FINDINGS: The lungs are clear, with normal appearance of pulmonary vasculature and no pleural effusion or pneumothorax. The cardiac silhouette is normal in size. The hilar and mediastinal contours are unremarkable. Bones are intact.     No acute abnormality. Electronically signed by: Walter Wayne Date:    06/23/2023 Time:    19:58    Echo Saline Bubble? No    Result Date: 6/24/2023  · The left ventricle is normal in size with normal systolic function. · The estimated ejection fraction is 58%. · Normal left ventricular diastolic function. · Atrial fibrillation not observed. · Normal right ventricular size with normal right ventricular systolic function. · Normal central venous pressure (3 mmHg). · The estimated PA systolic pressure is 25 mmHg. · No wall motion abnormality. No pulmonary hypertension, mean left atrial pressure normal         Meds  Scheduled Meds:   amLODIPine  10 mg Oral Daily    aspirin  81 mg Oral Daily    atorvastatin  80 mg Oral QHS    carvediloL  6.25 mg Oral BID WM    clopidogreL  300 mg Oral Once    clopidogreL  75 mg Oral Daily    ezetimibe  10 mg Oral Daily    isosorbide mononitrate  30 mg Oral Daily    tamsulosin  0.4 mg Oral Daily    tirofiban-0.9% sodium chloride 12.5 mg/250ml  25 mcg/kg Intravenous Once     Continuous Infusions:   sodium chloride 0.9% Stopped (06/24/23 1330)    tirofiban-0.9%  sodium chloride 12.5 mg/250ml Stopped (06/24/23 1330)     PRN Meds:.acetaminophen, dextrose 50%, dextrose 50%, glucagon (human recombinant), glucose, glucose, naloxone, nitroGLYCERIN, ondansetron, sodium chloride 0.9%, sodium chloride 0.9%, tirofiban-0.9% sodium chloride 12.5 mg/250ml.    Active PT: No  Active OT: No  Active SLP: No  Assessment & Plan:     Active Hospital Problems    Diagnosis    *Chest pain   Continue current management  Cardiology on board        Discharge Planning:   Is the patient medically ready for discharge?: no    Reason for patient still in hospital (select all that apply): Patient trending condition and Treatment    Above encounter included review of the medical records, interviewing and examining the patient face-to-face, discussion with family and other health care providers, ordering and interpreting lab/test results and formulating a plan of care.     Medical Decision Making:      [_] Low Complexity  [_] Moderate Complexity  [x] High Complexity      Afua Wilkins MD  Department of Hospital Medicine   [facility]

## 2023-06-25 NOTE — NURSING
1045 Report received from [facility]. Left heart cath: several lesions. Will be medically managed. R radial dressing. Tirofiban drip infusing x18 hours. 0.45NS a@ 100mL/hr. 1845 Patient in room Charge nurse Trudy reports patient returned to room while SN off unit with another patient.   Assessment of patient complete. NAD. Patient states back about 20 minutes. Patient finished eating. Sn assisted patient to return to bed. See flow sheet assessment. Bedside Report given to oncoming nurse Tristin.

## 2023-06-25 NOTE — PLAN OF CARE
Problem: Adult Inpatient Plan of Care  Goal: Plan of Care Review  Outcome: Ongoing, Progressing  Flowsheets (Taken 6/25/2023 1046)  Plan of Care Reviewed With: patient  Goal: Optimal Comfort and Wellbeing  Outcome: Ongoing, Progressing  Intervention: Provide Person-Centered Care  Flowsheets (Taken 6/25/2023 1046)  Trust Relationship/Rapport:   care explained   choices provided   questions answered     Problem: Fall Injury Risk  Goal: Absence of Fall and Fall-Related Injury  Outcome: Ongoing, Progressing  Intervention: Promote Injury-Free Environment  Flowsheets (Taken 6/25/2023 1046)  Safety Promotion/Fall Prevention:   bed alarm refused   assistive device/personal item within reach

## 2023-06-26 ENCOUNTER — TELEPHONE (OUTPATIENT)
Dept: CARDIOLOGY | Facility: CLINIC | Age: 63
End: 2023-06-26
Payer: MEDICARE

## 2023-06-26 VITALS
SYSTOLIC BLOOD PRESSURE: 117 MMHG | HEART RATE: 59 BPM | BODY MASS INDEX: 25.65 KG/M2 | HEIGHT: 69 IN | DIASTOLIC BLOOD PRESSURE: 70 MMHG | RESPIRATION RATE: 20 BRPM | TEMPERATURE: 98 F | OXYGEN SATURATION: 99 % | WEIGHT: 173.19 LBS

## 2023-06-26 PROBLEM — I21.4 NSTEMI (NON-ST ELEVATED MYOCARDIAL INFARCTION): Status: ACTIVE | Noted: 2023-06-26

## 2023-06-26 LAB
ANION GAP SERPL CALC-SCNC: 5 MMOL/L (ref 8–16)
BUN SERPL-MCNC: 11 MG/DL (ref 8–23)
CALCIUM SERPL-MCNC: 8.6 MG/DL (ref 8.7–10.5)
CHLORIDE SERPL-SCNC: 112 MMOL/L (ref 95–110)
CO2 SERPL-SCNC: 23 MMOL/L (ref 23–29)
CREAT SERPL-MCNC: 0.9 MG/DL (ref 0.5–1.4)
ERYTHROCYTE [DISTWIDTH] IN BLOOD BY AUTOMATED COUNT: 13.5 % (ref 11.5–14.5)
EST. GFR  (NO RACE VARIABLE): >60 ML/MIN/1.73 M^2
GLUCOSE SERPL-MCNC: 101 MG/DL (ref 70–110)
GLUCOSE SERPL-MCNC: 103 MG/DL (ref 70–110)
GLUCOSE SERPL-MCNC: 106 MG/DL (ref 70–110)
GLUCOSE SERPL-MCNC: 91 MG/DL (ref 70–110)
HCT VFR BLD AUTO: 38.6 % (ref 40–54)
HGB BLD-MCNC: 12.9 G/DL (ref 14–18)
MCH RBC QN AUTO: 30.7 PG (ref 27–31)
MCHC RBC AUTO-ENTMCNC: 33.4 G/DL (ref 32–36)
MCV RBC AUTO: 92 FL (ref 82–98)
PLATELET # BLD AUTO: 120 K/UL (ref 150–450)
PMV BLD AUTO: 12.3 FL (ref 9.2–12.9)
POTASSIUM SERPL-SCNC: 3.8 MMOL/L (ref 3.5–5.1)
RBC # BLD AUTO: 4.2 M/UL (ref 4.6–6.2)
SODIUM SERPL-SCNC: 140 MMOL/L (ref 136–145)
WBC # BLD AUTO: 6.43 K/UL (ref 3.9–12.7)

## 2023-06-26 PROCEDURE — 99232 PR SUBSEQUENT HOSPITAL CARE,LEVL II: ICD-10-PCS | Mod: ,,, | Performed by: INTERNAL MEDICINE

## 2023-06-26 PROCEDURE — 93010 EKG 12-LEAD: ICD-10-PCS | Mod: ,,, | Performed by: INTERNAL MEDICINE

## 2023-06-26 PROCEDURE — 36415 COLL VENOUS BLD VENIPUNCTURE: CPT | Performed by: INTERNAL MEDICINE

## 2023-06-26 PROCEDURE — 99232 SBSQ HOSP IP/OBS MODERATE 35: CPT | Mod: ,,, | Performed by: INTERNAL MEDICINE

## 2023-06-26 PROCEDURE — 85027 COMPLETE CBC AUTOMATED: CPT | Performed by: INTERNAL MEDICINE

## 2023-06-26 PROCEDURE — 93005 ELECTROCARDIOGRAM TRACING: CPT | Performed by: INTERNAL MEDICINE

## 2023-06-26 PROCEDURE — 80048 BASIC METABOLIC PNL TOTAL CA: CPT | Performed by: INTERNAL MEDICINE

## 2023-06-26 PROCEDURE — 25000003 PHARM REV CODE 250: Performed by: NURSE PRACTITIONER

## 2023-06-26 PROCEDURE — 25000003 PHARM REV CODE 250: Performed by: INTERNAL MEDICINE

## 2023-06-26 PROCEDURE — 93010 ELECTROCARDIOGRAM REPORT: CPT | Mod: ,,, | Performed by: INTERNAL MEDICINE

## 2023-06-26 RX ORDER — CLOPIDOGREL BISULFATE 75 MG/1
75 TABLET ORAL DAILY
Qty: 90 TABLET | Refills: 0 | Status: SHIPPED | OUTPATIENT
Start: 2023-06-27 | End: 2023-08-11 | Stop reason: SDUPTHER

## 2023-06-26 RX ORDER — NAPROXEN SODIUM 220 MG/1
81 TABLET, FILM COATED ORAL DAILY
Qty: 90 TABLET | Refills: 0 | Status: SHIPPED | OUTPATIENT
Start: 2023-06-26 | End: 2023-08-11 | Stop reason: SDUPTHER

## 2023-06-26 RX ORDER — AMLODIPINE BESYLATE 5 MG/1
5 TABLET ORAL DAILY
Status: DISCONTINUED | OUTPATIENT
Start: 2023-06-26 | End: 2023-06-26 | Stop reason: HOSPADM

## 2023-06-26 RX ORDER — AMLODIPINE BESYLATE 5 MG/1
5 TABLET ORAL DAILY
Qty: 90 TABLET | Refills: 0 | Status: SHIPPED | OUTPATIENT
Start: 2023-06-26 | End: 2023-08-11 | Stop reason: SDUPTHER

## 2023-06-26 RX ORDER — ISOSORBIDE MONONITRATE 30 MG/1
30 TABLET, EXTENDED RELEASE ORAL DAILY
Qty: 90 TABLET | Refills: 0 | Status: SHIPPED | OUTPATIENT
Start: 2023-06-27 | End: 2023-08-11 | Stop reason: SDUPTHER

## 2023-06-26 RX ADMIN — EZETIMIBE 10 MG: 10 TABLET ORAL at 12:06

## 2023-06-26 RX ADMIN — ISOSORBIDE MONONITRATE 30 MG: 30 TABLET, EXTENDED RELEASE ORAL at 12:06

## 2023-06-26 RX ADMIN — CARVEDILOL 6.25 MG: 6.25 TABLET, FILM COATED ORAL at 12:06

## 2023-06-26 RX ADMIN — AMLODIPINE BESYLATE 5 MG: 5 TABLET ORAL at 12:06

## 2023-06-26 NOTE — NURSING
Medicated as ordered by CHAPIS Gilbert NP for [place] with amlodipine 5, coreg 6.25, and imdur 30.. patient denies complaints. Ambulating unit without distress. Denies SOB. EKG order entered by NP.

## 2023-06-26 NOTE — PLAN OF CARE
06/26/23 1047   Post-Acute Status   Post-Acute Authorization Other   Other Status No Post-Acute Service Needs     PCP appt scheduled and added to avs. No other discharge needs noted at this time.

## 2023-06-26 NOTE — NURSING
Patient discharged home via wheelchair per staff without incident with personal belongings to wife.

## 2023-06-26 NOTE — PROGRESS NOTES
[facility]  Department of Cardiology  Progress Note      PATIENT NAME: Piotr Crespo  MRN: [id number]  TODAY'S DATE: 06/26/2023  ADMIT DATE: 6/23/2023                          CONSULT REQUESTED BY: Afua Wilkins MD    SUBJECTIVE     PRINCIPAL PROBLEM: Chest pain      REASON FOR CONSULT:  NSTEMI    6/26/23:  Patient seen sitting up in room and then walking in the hallway. Patient ambulating without angina.     6/25/2023    Patient is CP free. Troponin has decreased.  Aggrastat was given over 18 hours. Right wrist soft no hematoma or bruising noted.      HPI:    Mr. Crespo is a 62-year-old male patient with a past medical history significant for hypertension, stroke, aneurysm with clip, dyslipidemia, prediabetes, and hypertension presented to the emergency room after experiencing chest pain while cleaning his house.  Pain progressed and he sought emergency evaluation upon arrival troponin was elevated and he was transferred to our facility.  Pain went away after given nitroglycerin on admit blood pressure was extremely elevated at 205.  Patient has been on heparin drip.  In his currently chest pain-free.          FROM H AND P     Principal Problem:Chest pain     Chief Complaint: chest pain     HPI:  62-year-old male history of tobacco use (1 pack per day for 20 years).  Hyperlipidemia, hypertension, prediabetes, stroke presented to ONS with chest pain.      Blood pressure ONS was over 200 EKG showed dynamic T-wave inversions in the inferior leads.  Troponin elevated 0.042.  Patient was given nitroglycerin with resolution of pain.  Also treated with IV hydralazine, morphine, aspirin, weight based Lovenox.     Dr Davison agreed that pt should be transferred to [facility].          Review of patient's allergies indicates:  No Known Allergies    Past Medical History:   Diagnosis Date    Erectile dysfunction due to arterial insufficiency 7/17/2020    Essential hypertension 3/11/2020    High cholesterol     History of left  "common carotid artery stent placement 4/17/2020    History of stroke 3/11/2020    Hypertension     Mixed hyperlipidemia 3/11/2020    Prediabetes 7/17/2020    Stroke     sept 3, 2017     Past Surgical History:   Procedure Laterality Date    ANGIOGRAM, CORONARY, WITH LEFT HEART CATHETERIZATION Left 6/24/2023    Procedure: Angiogram, Coronary, with Left Heart Cath;  Surgeon: George Sherwood MD;  Location: [facility] CATH/EP LAB;  Service: Cardiology;  Laterality: Left;    CAROTID ARTERY ANGIOPLASTY Left     left Internal carotid artery stent    CEREBRAL ANEURYSM REPAIR      COLONOSCOPY N/A 06/30/2020    Procedure: COLONOSCOPY;  Surgeon: Yuan Pizano MD;  Location: [facility] ENDO ([facility] FLR);  Service: Endoscopy;  Laterality: N/A;  3/30/20- Per Dr. Catherine, Pt to be r/s for later date, "screening"- ERW  3/30/20 - LM for Pt to call back to r/s for later date per Dr. Catherine- ERW  COVID screening on 6/27/20 - [facility] urgent care- ERW    LUMBAR DISCECTOMY       Social History     Tobacco Use    Smoking status: Every Day     Packs/day: 0.40     Years: 45.00     Pack years: 18.00     Types: Cigarettes    Smokeless tobacco: Never   Substance Use Topics    Alcohol use: Yes     Comment: once a month    Drug use: No        REVIEW OF SYSTEMS    Per hpi  Denies chest pain  Breathing is stable.     OBJECTIVE     VITAL SIGNS (Most Recent)  Temp: 97.5 °F (36.4 °C) (06/26/23 0716)  Pulse: (!) 55 (06/26/23 0925)  Resp: 18 (06/26/23 0925)  BP: 106/63 (06/26/23 0925)  SpO2: 98 % (06/26/23 0925)    VENTILATION STATUS  Resp: 18 (06/26/23 0925)  SpO2: 98 % (06/26/23 0925)           I & O (Last 24H):  Intake/Output Summary (Last 24 hours) at 6/26/2023 1029  Last data filed at 6/26/2023 0845  Gross per 24 hour   Intake 800 ml   Output 2250 ml   Net -1450 ml         WEIGHTS  Wt Readings from Last 3 Encounters:   06/26/23 0500 78.6 kg (173 lb 3.2 oz)   06/24/23 0348 79.5 kg (175 lb 4.3 oz)   06/24/23 0124 80.7 kg (177 lb 14.6 oz)   06/24/23 1130 79.4 " kg (175 lb)   06/23/23 1923 80.7 kg (178 lb)       PHYSICAL EXAM  GENERAL: well built, well nourished, well-developed in no apparent distress alert and oriented.   HEENT: Normocephalic.   NECK: No JVD. No bruit..   CARDIAC: Regular rate and rhythm. S1 is normal.S2 is normal.  CHEST ANATOMY: normal.   LUNGS: Clear to auscultation. No wheezing or rhonchi..   ABDOMEN: Soft .   URINARY: No jauregui catheter   EXTREMITIES: No cyanosis, clubbing or edema noted at this time.    CENTRAL NERVOUS SYSTEM: No focal motor or sensory deficits noted. .   MUSCLE STRENGTH & TONE: No noteable weakness, atrophy or abnormal movement.     HOME MEDICATIONS:  No current facility-administered medications on file prior to encounter.     Current Outpatient Medications on File Prior to Encounter   Medication Sig Dispense Refill    amLODIPine (NORVASC) 10 MG tablet Take 1 tablet (10 mg total) by mouth once daily. 90 tablet 3    aspirin 81 MG Chew Chew and swallow 1 tablet (81 mg total) by mouth once daily. 90 tablet 3    atorvastatin (LIPITOR) 80 MG tablet Take 1 tablet (80 mg total) by mouth every evening. 90 tablet 3    carvediloL (COREG) 6.25 MG tablet Take 1 tablet (6.25 mg total) by mouth 2 (two) times daily with meals. 180 tablet 3    clotrimazole (LOTRIMIN) 1 % Soln APPLY SMALL AMOUNT TOPICALLY TWICE A DAY FOR FUNGAL INFECTION      ezetimibe (ZETIA) 10 mg tablet Take 1 tablet (10 mg total) by mouth once daily. 90 tablet 3    cholecalciferol, vitamin D3, 100 mcg (4,000 unit) Tab TAKE ONE TABLET BY MOUTH EVERY DAY AS A VITAMIN SUPPLEMENT      nicotine (NICODERM CQ) 14 mg/24 hr APPLY 1 PATCH TOPICALLY ONCE DAILY FOR 28 DAYS FOR SMOKING CESSATION. DO NOT SMOKE WHILE USING PATCHES. COMPLETE THIS STRENGTH PRIOR TO STARTING 7 MG NICOTINE PATCH.      nicotine (NICODERM CQ) 7 mg/24 hr APPLY 1 PATCH TOPICALLY EVERY DAY FOR 28 DAYS FOR SMOKING CESSATION. DO NOT SMOKE WHILE USING PATCHES. START THIS STRENGTH AFTER COMPLETION OF COURSE OF 14 MG  NICOTINE PATCHES.      nicotine polacrilex (NICORETTE) 4 MG Gum CHEW ONE 4MG PIECE IN MOUTH EVERY HOUR AS NEEDED      tamsulosin (FLOMAX) 0.4 mg Cap Take 1 capsule (0.4 mg total) by mouth once daily. 90 capsule 3    urea 20 % Crea APPLY LIBERAL AMOUNT TOPICALLY TWICE A DAY AS NEEDED FOR CALLOUSED AREA.         SCHEDULED MEDS:   amLODIPine  10 mg Oral Daily    aspirin  81 mg Oral Daily    atorvastatin  80 mg Oral QHS    carvediloL  6.25 mg Oral BID WM    clopidogreL  300 mg Oral Once    clopidogreL  75 mg Oral Daily    ezetimibe  10 mg Oral Daily    isosorbide mononitrate  30 mg Oral Daily    tamsulosin  0.4 mg Oral Daily    tirofiban-0.9% sodium chloride 12.5 mg/250ml  25 mcg/kg Intravenous Once       CONTINUOUS INFUSIONS:   sodium chloride 0.9% Stopped (06/24/23 1330)    tirofiban-0.9% sodium chloride 12.5 mg/250ml Stopped (06/24/23 1330)       PRN MEDS:acetaminophen, dextrose 50%, dextrose 50%, glucagon (human recombinant), glucose, glucose, naloxone, nitroGLYCERIN, ondansetron, sodium chloride 0.9%, sodium chloride 0.9%, tirofiban-0.9% sodium chloride 12.5 mg/250ml    LABS AND DIAGNOSTICS     CBC LAST 3 DAYS  Recent Labs   Lab 06/23/23  1945 06/24/23  0130 06/25/23  0629 06/26/23  0459   WBC 8.45 8.36 4.85 6.43   RBC 4.88 4.67 4.46* 4.20*   HGB 14.9 14.1 13.5* 12.9*   HCT 45.2 43.1 40.6 38.6*   MCV 93 92 91 92   MCH 30.5 30.2 30.3 30.7   MCHC 33.0 32.7 33.3 33.4   RDW 13.4 13.7 13.6 13.5   * 134* 134* 120*   MPV 11.9 12.4 12.4 12.3   GRAN 71.0  6.0  --   --   --    LYMPH 21.1  1.8  --   --   --    MONO 6.5  0.6  --   --   --    BASO 0.03  --   --   --    NRBC 0  --   --   --          COAGULATION LAST 3 DAYS  Recent Labs   Lab 06/24/23  0130   APTT 32.0         CHEMISTRY LAST 3 DAYS  Recent Labs   Lab 06/23/23  1945 06/24/23  0130 06/25/23  0629 06/26/23  0458    138 140 140   K 4.3 3.5 3.7 3.8    106 110 112*   CO2 24 25 24 23   ANIONGAP 12 7* 6* 5*   BUN 14 15 12 11   CREATININE 1.2 1.0 1.0  0.9   GLU 94 149* 91 101   CALCIUM 9.7 8.9 8.5* 8.6*   ALBUMIN 4.6  --   --   --    PROT 7.9  --   --   --    ALKPHOS 76  --   --   --    ALT 20  --   --   --    AST 23  --   --   --    BILITOT 0.6  --   --   --          CARDIAC PROFILE LAST 3 DAYS  Recent Labs   Lab 06/23/23  1945 06/23/23  2258 06/24/23  0131 06/24/23  0503 06/24/23  2329 06/25/23  0950   BNP 11  --   --   --   --   --    TROPONINI 0.042* 1.105*  --   --   --   --    TROPONINIHS  --   --    < > 56313.4* 4049.6* 2718.6*    < > = values in this interval not displayed.         ENDOCRINE LAST 3 DAYS  No results for input(s): TSH, PROCAL in the last 168 hours.    LAST ARTERIAL BLOOD GAS  ABG  No results for input(s): PH, PO2, PCO2, HCO3, BE in the last 168 hours.    LAST 7 DAYS MICROBIOLOGY   Microbiology Results (last 7 days)       ** No results found for the last 168 hours. **            MOST RECENT IMAGING  Echo Saline Bubble? No  · The left ventricle is normal in size with normal systolic function.  · The estimated ejection fraction is 58%.  · Normal left ventricular diastolic function.  · Atrial fibrillation not observed.  · Normal right ventricular size with normal right ventricular systolic   function.  · Normal central venous pressure (3 mmHg).  · The estimated PA systolic pressure is 25 mmHg.  · No wall motion abnormality. No pulmonary hypertension, mean left atrial   pressure normal         ECHOCARDIOGRAM RESULTS (last 5)  No results found for this or any previous visit.      CURRENT/PREVIOUS VISIT EKG  Results for orders placed or performed during the hospital encounter of 06/23/23   EKG 12-lead    Collection Time: 06/24/23  1:35 PM    Narrative    Test Reason : R07.9,    Vent. Rate : 051 BPM     Atrial Rate : 051 BPM     P-R Int : 176 ms          QRS Dur : 096 ms      QT Int : 464 ms       P-R-T Axes : 037 -17 -50 degrees     QTc Int : 427 ms    Sinus bradycardia  T wave abnormality, consider inferior ischemia  Abnormal ECG  When compared  with ECG of 24-JUN-2023 07:46,  No significant change was found    Referred By: LINDA MORALES JR.           Confirmed By:            ASSESSMENT/PLAN:     Active Hospital Problems    Diagnosis    *Chest pain       ASSESSMENT & PLAN:       1. NSTEMI- medical management  2. Hypertensive urgency on admit  3. History of stroke  4. History of aneurysm with clip  5. Prediabetes  6. Dyslipidemia   7. Hypertension  8. Sinus Bradycardia- asymptomatic- consider decreasing coreg If needed    RECOMMENDATIONS:    Continue medical management. Bp on the soft side today so we will decrease amlodipine to 5 mg po daily.   Continue coreg 6.25 mg po BID and Imdur 30 mg po daily.   Continue Plavix 75 mg po daily and aspirin 81 mg po daily.   Continue atorvastatin 80 mg po daily and Zetia 10 mg po daily.   Recommend 1-2 week follow up with cardiology.   Refer to cardiac rehab.       Airam Brush NP  Department of Cardiology  Date of Service: 06/26/2023      I have personally interviewed and examined the patient, I have reviewed the Nurse Practitioner's history and physical, assessment, and plan. I have personally evaluated the patient at bedside and agree with the findings and made appropriate changes as necessary in recommendations.  Patient remained chest pain free even with prolonged ambulation.  Continue GDMT with DAPT, high intensity statin, BB, antianginal therapy.   Advised to avoid strenuous activity for now.  Advised cardiac rehab  Outpatient follow up with Cardiology Clinic in 1 to 2 weeks     George Sherwood MD  Department of Cardiology  [facility]  6/26/23

## 2023-06-26 NOTE — NURSING
Patient is discharging to home.  Medications delivered by pharmacy. EKG complete and results reviewed with Cardio who cleared for discharge. Patient is able to ambulate without feeling dizzy and no issues. Discharge orders reviewed with patient. Questions answered. Verbalized understanding and intent of compliance. PIV removed. Tolerated well. Telemetry monitor and leads removed and returned to monitor room. Patient packed belongings in personal bag including cell phone and  and personal clothing. Patient awaiting his wife to pick him up.

## 2023-06-26 NOTE — DISCHARGE SUMMARY
[facility] Medicine  Discharge Summary      Patient Name: Piotr Crespo  MRN: [id number]  KB: [id number]  Patient Class: IP- Inpatient  Admission Date: 6/23/2023  Hospital Length of Stay: 3 days  Discharge Date and Time:  06/26/2023 4:01 PM  Attending Physician: Afua Wilkins MD   Discharging Provider: Afua Wilkins MD  Primary Care Provider: Lory Pugh MD    Primary Care Team: Networked reference to record PCT     HPI:   62-year-old male history of tobacco use (1 pack per day for 20 years).  Hyperlipidemia, hypertension, prediabetes, stroke presented to ONS with chest pain.   Blood pressure ONS was over 200 EKG showed dynamic T-wave inversions in the inferior leads.  Troponin elevated 0.042.  Patient was given nitroglycerin with resolution of pain.  Also treated with IV hydralazine, morphine, aspirin, weight based Lovenox.  Dr Davison agreed that pt should be transferred to [facility].    Procedure(s) (LRB):  Angiogram, Coronary, with Left Heart Cath (Left)      Hospital Course:    6/25: Patient is doing well and had heart catheterization done yesterday. No concerns/issues overnight reported by the patient or the nursing staff.    6/26:  Cardiology adjusted patient's medications. Aspirin and Plavix was sent to our pharmacy to be delivered by bedside prior to discharge.  Patient subsequently discharged to follow-up with cardiology and Cardiac rehab.    Physical examination on discharge:  Constitutional: No distress.   HENT: NC  Head: Atraumatic.   Cardiovascular: Normal rate, regular rhythm and normal heart sounds.   Pulmonary/Chest: Effort normal. No wheezes.   Abdominal: Soft. Bowel sounds are normal. No distension and no mass. No tenderness  Neurological: Alert.   Skin: Skin is warm and dry.   Psych: Appropriate mood and affect    I have seen the patient on the day of discharge and reviewed the discharge instructions as outlined.      Goals of Care Treatment Preferences:  Code Status:  Full Code      Consults:   Consults (From admission, onward)        Status Ordering Provider     Inpatient consult to Cardiology  Once        Provider:  George Sherwood MD    Acknowledged ERNESTOJOSEF WADE          No new Assessment & Plan notes have been filed under this hospital service since the last note was generated.  Service: Hospital Medicine    Final Active Diagnoses:    Diagnosis Date Noted POA    PRINCIPAL PROBLEM:  NSTEMI (non-ST elevated myocardial infarction) [I21.4] 06/26/2023 Yes    Chest pain [R07.9] 06/24/2023 Yes      Problems Resolved During this Admission:       Discharged Condition: good    Disposition: Home or Self Care    Follow Up:   Follow-up Information     Lory Pugh MD. Go on 6/29/2023.    Specialty: Internal Medicine  Why: hospital follow up appt scheduled 6/29 at 10:30 AM  Contact information:  [address]  [facility] [address]  [phone]             George Sherwood MD Follow up in 2 week(s).    Specialty: Cardiology  Contact information:  [address] [facility]  [address] [place] [address]  [phone]                       Patient Instructions:      Ambulatory referral/consult to Cardiac Rehab   Standing Status: Future   Referral Priority: Routine Referral Type: Consultation   Referral Reason: Specialty Services Required   Requested Specialty: Cardiac Rehabilitation   Number of Visits Requested: 1     Ambulatory referral/consult to Smoking Cessation Program   Standing Status: Future   Referral Priority: Routine Referral Type: Consultation   Referral Reason: Specialty Services Required   Requested Specialty: CTTS   Number of Visits Requested: 1     Activity as tolerated       Significant Diagnostic Studies: Labs:   BMP:   Recent Labs   Lab 06/25/23  0629 06/26/23  0458   GLU 91 101    140   K 3.7 3.8    112*   CO2 24 23   BUN 12 11   CREATININE 1.0 0.9   CALCIUM 8.5* 8.6*   , CMP   Recent Labs   Lab 06/25/23  0629 06/26/23  0458    140   K  3.7 3.8    112*   CO2 24 23   GLU 91 101   BUN 12 11   CREATININE 1.0 0.9   CALCIUM 8.5* 8.6*   ANIONGAP 6* 5*   , CBC   Recent Labs   Lab 06/25/23  0629 06/26/23  0459   WBC 4.85 6.43   HGB 13.5* 12.9*   HCT 40.6 38.6*   * 120*   , Troponin   Recent Labs   Lab 06/23/23  1945 06/23/23  2258   TROPONINI 0.042* 1.105*    and All labs within the past 24 hours have been reviewed  Microbiology:   Blood Culture No results found for: LABBLOO, Sputum Culture No results found for: GSRESP, RESPIRATORYC and Urine Culture    Lab Results   Component Value Date    LABURIN No growth 07/29/2021     Radiology: X-Ray: CXR: X-Ray Chest 1 View (CXR): No results found for this visit on 06/23/23. and X-Ray Chest PA and Lateral (CXR): No results found for this visit on 06/23/23.  CT scan: CT ABDOMEN PELVIS WITH CONTRAST: No results found for this visit on 06/23/23. and CT ABDOMEN PELVIS WITHOUT CONTRAST: No results found for this visit on 06/23/23.  Cardiac Graphics: Echocardiogram:   2D echo with color flow doppler: No results found for this or any previous visit. and Transthoracic echo (TTE) complete (Cupid Only):   Results for orders placed or performed during the hospital encounter of 06/23/23   Echo Saline Bubble? No   Result Value Ref Range    BSA 1.97 m2    TDI SEPTAL 0.08 m/s    LV LATERAL E/E' RATIO 6.77 m/s    LV SEPTAL E/E' RATIO 11.00 m/s    Left Ventricular Outflow Tract Mean Velocity 0.63 cm/s    Left Ventricular Outflow Tract Mean Gradient 2.00 mmHg    Pulmonary Valve Mean Velocity 0.64 m/s    AORTIC VALVE CUSP SEPERATION 2.00 cm    TDI LATERAL 0.13 m/s    PV PEAK VELOCITY 0.97 cm/s    LVIDd 5.40 3.5 - 6.0 cm    IVS 0.84 0.6 - 1.1 cm    Posterior Wall 0.84 0.6 - 1.1 cm    Ao root annulus 3.50 cm    LVIDs 3.71 2.1 - 4.0 cm    FS 31 28 - 44 %    Sinus 2.65 cm    STJ 2.64 cm    Ascending aorta 2.50 cm    LV mass 164.86 g    RVDD 3.21 cm    TAPSE 3.10 cm    Right ventricular length in diastole (apical 4-chamber  view) 4.70 cm    RV S' 12.00 cm/s    Left Ventricle Relative Wall Thickness 0.31 cm    AV mean gradient 3 mmHg    AV valve area 2.54 cm2    AV Velocity Ratio 0.87     AV index (prosthetic) 0.81     MV mean gradient 2 mmHg    MV valve area p 1/2 method 2.59 cm2    MV valve area by continuity eq 1.81 cm2    E/A ratio 1.17     Mean e' 0.11 m/s    E wave deceleration time 206.00 msec    IVRT 116.00 msec    LVOT diameter 2.00 cm    LVOT area 3.1 cm2    LVOT peak sagar 0.95 m/s    LVOT peak VTI 19.60 cm    Ao peak sagar 1.09 m/s    Ao VTI 24.2 cm    Mr max sagar 4.80 m/s    LVOT stroke volume 61.54 cm3    AV peak gradient 5 mmHg    MV peak gradient 4 mmHg    E/E' ratio 8.38 m/s    MV Peak E Sagar 0.88 m/s    TR Max Sagar 2.36 m/s    MV VTI 34.0 cm    MV stenosis pressure 1/2 time 85.00 ms    MV Peak A Sagar 0.75 m/s    LV Systolic Volume 58.50 mL    LV Systolic Volume Index 30.0 mL/m2    LV Diastolic Volume 141.00 mL    LV Diastolic Volume Index 72.31 mL/m2    LV Mass Index 85 g/m2    RA Major Axis 4.25 cm    Triscuspid Valve Regurgitation Peak Gradient 22 mmHg    RA Width 2.04 cm    Torres's Biplane MOD Ejection Fraction 5 %    Right Atrial Pressure (from IVC) 3 mmHg    EF 58 %    TV rest pulmonary artery pressure 25 mmHg    Narrative    · The left ventricle is normal in size with normal systolic function.  · The estimated ejection fraction is 58%.  · Normal left ventricular diastolic function.  · Atrial fibrillation not observed.  · Normal right ventricular size with normal right ventricular systolic   function.  · Normal central venous pressure (3 mmHg).  · The estimated PA systolic pressure is 25 mmHg.  · No wall motion abnormality. No pulmonary hypertension, mean left atrial   pressure normal          Pending Diagnostic Studies:     Procedure Component Value Units Date/Time    APTT [[id number]] Collected: 06/24/23 1723    Order Status: Sent Lab Status: In process Updated: 06/24/23 1723    Specimen: Blood     EKG 12-lead  [[id number]]     Order Status: Sent Lab Status: No result     Troponin I High Sensitivity [[id number]] Collected: 06/24/23 1723    Order Status: Sent Lab Status: In process Updated: 06/24/23 1723    Specimen: Blood     Narrative:      Collection has been rescheduled by ALD1 at 06/24/2023 11:27 Reason:   Patient unavailable  Pt in Cath lab  Per nurse Dorian  Collection has been rescheduled by ALD1 at 06/24/2023 11:29 Reason:   Will draw in Cath lab         Medications:  Reconciled Home Medications:      Medication List      START taking these medications    clopidogreL 75 mg tablet  Commonly known as: PLAVIX  Take 1 tablet (75 mg total) by mouth once daily.  Start taking on: June 27, 2023     isosorbide mononitrate 30 MG 24 hr tablet  Commonly known as: IMDUR  Take 1 tablet (30 mg total) by mouth once daily.  Start taking on: June 27, 2023        CHANGE how you take these medications    amLODIPine 5 MG tablet  Commonly known as: NORVASC  Take 1 tablet (5 mg total) by mouth once daily.  What changed:   · medication strength  · how much to take        CONTINUE taking these medications    aspirin 81 MG Chew  Chew and swallow 1 tablet (81 mg total) by mouth once daily.     atorvastatin 80 MG tablet  Commonly known as: LIPITOR  Take 1 tablet (80 mg total) by mouth every evening.     carvediloL 6.25 MG tablet  Commonly known as: COREG  Take 1 tablet (6.25 mg total) by mouth 2 (two) times daily with meals.     cholecalciferol (vitamin D3) 100 mcg (4,000 unit) Tab  TAKE ONE TABLET BY MOUTH EVERY DAY AS A VITAMIN SUPPLEMENT     clotrimazole 1 % Soln  Commonly known as: LOTRIMIN  APPLY SMALL AMOUNT TOPICALLY TWICE A DAY FOR FUNGAL INFECTION     ezetimibe 10 mg tablet  Commonly known as: ZETIA  Take 1 tablet (10 mg total) by mouth once daily.     * nicotine 14 mg/24 hr  Commonly known as: NICODERM CQ  APPLY 1 PATCH TOPICALLY ONCE DAILY FOR 28 DAYS FOR SMOKING CESSATION. DO NOT SMOKE WHILE USING PATCHES. COMPLETE THIS STRENGTH  PRIOR TO STARTING 7 MG NICOTINE PATCH.     * nicotine 7 mg/24 hr  Commonly known as: NICODERM CQ  APPLY 1 PATCH TOPICALLY EVERY DAY FOR 28 DAYS FOR SMOKING CESSATION. DO NOT SMOKE WHILE USING PATCHES. START THIS STRENGTH AFTER COMPLETION OF COURSE OF 14 MG NICOTINE PATCHES.     nicotine polacrilex 4 MG Gum  Commonly known as: NICORETTE  CHEW ONE 4MG PIECE IN MOUTH EVERY HOUR AS NEEDED     tamsulosin 0.4 mg Cap  Commonly known as: FLOMAX  Take 1 capsule (0.4 mg total) by mouth once daily.     urea 20 % Crea  APPLY LIBERAL AMOUNT TOPICALLY TWICE A DAY AS NEEDED FOR CALLOUSED AREA.         * This list has 2 medication(s) that are the same as other medications prescribed for you. Read the directions carefully, and ask your doctor or other care provider to review them with you.                Indwelling Lines/Drains at time of discharge:   Lines/Drains/Airways     None                 Time spent on the discharge of patient: 35 minutes         Afua Wilkins MD  Department of Hospital Medicine  [facility]

## 2023-06-26 NOTE — PLAN OF CARE
06/26/23 1604   Final Note   Assessment Type Final Discharge Note   Anticipated Discharge Disposition Home   What phone number can be called within the next 1-3 days to see how you are doing after discharge? [phone]   Hospital Resources/Appts/Education Provided Appointments scheduled and added to AVS     Discharge orders and chart reviewed. No other discharge needs noted at this time. Pt is clear for discharge from case management. Pt is discharging to home.    PCP appt scheduled and added to avs. Inbasket message sent to Dr. Sherwood's clinic requesting hospital follow up appt. Office to contact patient with appt information.

## 2023-06-27 ENCOUNTER — TELEPHONE (OUTPATIENT)
Dept: PRIMARY CARE CLINIC | Facility: CLINIC | Age: 63
End: 2023-06-27
Payer: MEDICARE

## 2023-06-27 NOTE — TELEPHONE ENCOUNTER
Pt advised to take medication as recommended upon D/C from the hospital. Pt advised to keep Hosp F/U appt scheduled for 06/29.

## 2023-06-27 NOTE — TELEPHONE ENCOUNTER
----- Message from Primitivo Gloria sent at 6/27/2023  8:34 AM CDT -----  Contact: [phone]  1MEDICALADVICE     Patient is calling for Medical Advice regarding:About meds    How long has patient had these symptoms:n/a     Pharmacy name and phone#:    Would like response via [facility] [employer]:  Call back     Comments:    Pt just got out of hosp and wants to make sure he is taking the meds right. Chrissie call pt back.

## 2023-06-29 ENCOUNTER — OFFICE VISIT (OUTPATIENT)
Dept: PRIMARY CARE CLINIC | Facility: CLINIC | Age: 63
End: 2023-06-29
Payer: MEDICARE

## 2023-06-29 VITALS
OXYGEN SATURATION: 98 % | HEART RATE: 65 BPM | SYSTOLIC BLOOD PRESSURE: 100 MMHG | DIASTOLIC BLOOD PRESSURE: 60 MMHG | WEIGHT: 177.5 LBS | BODY MASS INDEX: 26.29 KG/M2 | HEIGHT: 69 IN | TEMPERATURE: 98 F

## 2023-06-29 DIAGNOSIS — R73.03 PREDIABETES: ICD-10-CM

## 2023-06-29 DIAGNOSIS — F17.210 CIGARETTE NICOTINE DEPENDENCE WITHOUT COMPLICATION: Primary | ICD-10-CM

## 2023-06-29 DIAGNOSIS — I10 ESSENTIAL HYPERTENSION: ICD-10-CM

## 2023-06-29 DIAGNOSIS — I21.4 NSTEMI (NON-ST ELEVATED MYOCARDIAL INFARCTION): ICD-10-CM

## 2023-06-29 DIAGNOSIS — I25.118 CORONARY ARTERY DISEASE OF NATIVE ARTERY OF NATIVE HEART WITH STABLE ANGINA PECTORIS: ICD-10-CM

## 2023-06-29 DIAGNOSIS — E78.2 MIXED HYPERLIPIDEMIA: ICD-10-CM

## 2023-06-29 PROCEDURE — 99214 PR OFFICE/OUTPT VISIT, EST, LEVL IV, 30-39 MIN: ICD-10-PCS | Mod: HCNC,S$GLB,, | Performed by: STUDENT IN AN ORGANIZED HEALTH CARE EDUCATION/TRAINING PROGRAM

## 2023-06-29 PROCEDURE — 3078F PR MOST RECENT DIASTOLIC BLOOD PRESSURE < 80 MM HG: ICD-10-PCS | Mod: HCNC,CPTII,S$GLB, | Performed by: STUDENT IN AN ORGANIZED HEALTH CARE EDUCATION/TRAINING PROGRAM

## 2023-06-29 PROCEDURE — 99999 PR PBB SHADOW E&M-EST. PATIENT-LVL III: CPT | Mod: PBBFAC,HCNC,, | Performed by: STUDENT IN AN ORGANIZED HEALTH CARE EDUCATION/TRAINING PROGRAM

## 2023-06-29 PROCEDURE — 1159F MED LIST DOCD IN RCRD: CPT | Mod: HCNC,CPTII,S$GLB, | Performed by: STUDENT IN AN ORGANIZED HEALTH CARE EDUCATION/TRAINING PROGRAM

## 2023-06-29 PROCEDURE — 1111F DSCHRG MED/CURRENT MED MERGE: CPT | Mod: HCNC,CPTII,S$GLB, | Performed by: STUDENT IN AN ORGANIZED HEALTH CARE EDUCATION/TRAINING PROGRAM

## 2023-06-29 PROCEDURE — 99214 OFFICE O/P EST MOD 30 MIN: CPT | Mod: HCNC,S$GLB,, | Performed by: STUDENT IN AN ORGANIZED HEALTH CARE EDUCATION/TRAINING PROGRAM

## 2023-06-29 PROCEDURE — 3074F SYST BP LT 130 MM HG: CPT | Mod: HCNC,CPTII,S$GLB, | Performed by: STUDENT IN AN ORGANIZED HEALTH CARE EDUCATION/TRAINING PROGRAM

## 2023-06-29 PROCEDURE — 3008F BODY MASS INDEX DOCD: CPT | Mod: HCNC,CPTII,S$GLB, | Performed by: STUDENT IN AN ORGANIZED HEALTH CARE EDUCATION/TRAINING PROGRAM

## 2023-06-29 PROCEDURE — 99999 PR PBB SHADOW E&M-EST. PATIENT-LVL III: ICD-10-PCS | Mod: PBBFAC,HCNC,, | Performed by: STUDENT IN AN ORGANIZED HEALTH CARE EDUCATION/TRAINING PROGRAM

## 2023-06-29 PROCEDURE — 1159F PR MEDICATION LIST DOCUMENTED IN MEDICAL RECORD: ICD-10-PCS | Mod: HCNC,CPTII,S$GLB, | Performed by: STUDENT IN AN ORGANIZED HEALTH CARE EDUCATION/TRAINING PROGRAM

## 2023-06-29 PROCEDURE — 3074F PR MOST RECENT SYSTOLIC BLOOD PRESSURE < 130 MM HG: ICD-10-PCS | Mod: HCNC,CPTII,S$GLB, | Performed by: STUDENT IN AN ORGANIZED HEALTH CARE EDUCATION/TRAINING PROGRAM

## 2023-06-29 PROCEDURE — 1111F PR DISCHARGE MEDS RECONCILED W/ CURRENT OUTPATIENT MED LIST: ICD-10-PCS | Mod: HCNC,CPTII,S$GLB, | Performed by: STUDENT IN AN ORGANIZED HEALTH CARE EDUCATION/TRAINING PROGRAM

## 2023-06-29 PROCEDURE — 3078F DIAST BP <80 MM HG: CPT | Mod: HCNC,CPTII,S$GLB, | Performed by: STUDENT IN AN ORGANIZED HEALTH CARE EDUCATION/TRAINING PROGRAM

## 2023-06-29 PROCEDURE — 3008F PR BODY MASS INDEX (BMI) DOCUMENTED: ICD-10-PCS | Mod: HCNC,CPTII,S$GLB, | Performed by: STUDENT IN AN ORGANIZED HEALTH CARE EDUCATION/TRAINING PROGRAM

## 2023-06-29 NOTE — PROGRESS NOTES
Primary Care  Transition of Care - In Person  6/29/2023  Lory Pugh      Patient is a 62 y.o.     Piotr Crespo has   Past Medical History:   Diagnosis Date    Erectile dysfunction due to arterial insufficiency 7/17/2020    Essential hypertension 3/11/2020    High cholesterol     History of left common carotid artery stent placement 4/17/2020    History of stroke 3/11/2020    Hypertension     Mixed hyperlipidemia 3/11/2020    Prediabetes 7/17/2020    Stroke     sept 3, 2017     Social History     Social History Narrative    Not on file       Medications  Review of patient's allergies indicates:  No Known Allergies  Outpatient Medications Marked as Taking for the 6/29/23 encounter (Office Visit) with Lory Pugh MD   Medication Sig Dispense Refill    amLODIPine (NORVASC) 5 MG tablet Take 1 tablet (5 mg total) by mouth once daily. 90 tablet 0    aspirin 81 MG Chew Chew and swallow 1 tablet (81 mg total) by mouth once daily. 90 tablet 0    atorvastatin (LIPITOR) 80 MG tablet Take 1 tablet (80 mg total) by mouth every evening. 90 tablet 3    carvediloL (COREG) 6.25 MG tablet Take 1 tablet (6.25 mg total) by mouth 2 (two) times daily with meals. 180 tablet 3    cholecalciferol, vitamin D3, 100 mcg (4,000 unit) Tab TAKE ONE TABLET BY MOUTH EVERY DAY AS A VITAMIN SUPPLEMENT      clopidogreL (PLAVIX) 75 mg tablet Take 1 tablet (75 mg total) by mouth once daily. 90 tablet 0    clotrimazole (LOTRIMIN) 1 % Soln APPLY SMALL AMOUNT TOPICALLY TWICE A DAY FOR FUNGAL INFECTION      ezetimibe (ZETIA) 10 mg tablet Take 1 tablet (10 mg total) by mouth once daily. 90 tablet 3    isosorbide mononitrate (IMDUR) 30 MG 24 hr tablet Take 1 tablet (30 mg total) by mouth once daily. 90 tablet 0    nicotine (NICODERM CQ) 14 mg/24 hr APPLY 1 PATCH TOPICALLY ONCE DAILY FOR 28 DAYS FOR SMOKING CESSATION. DO NOT SMOKE WHILE USING PATCHES. COMPLETE THIS STRENGTH PRIOR TO STARTING 7 MG NICOTINE PATCH.      nicotine (NICODERM CQ) 7 mg/24 hr  "APPLY 1 PATCH TOPICALLY EVERY DAY FOR 28 DAYS FOR SMOKING CESSATION. DO NOT SMOKE WHILE USING PATCHES. START THIS STRENGTH AFTER COMPLETION OF COURSE OF 14 MG NICOTINE PATCHES.      nicotine polacrilex (NICORETTE) 4 MG Gum CHEW ONE 4MG PIECE IN MOUTH EVERY HOUR AS NEEDED      tamsulosin (FLOMAX) 0.4 mg Cap Take 1 capsule (0.4 mg total) by mouth once daily. 90 capsule 3    urea 20 % Crea APPLY LIBERAL AMOUNT TOPICALLY TWICE A DAY AS NEEDED FOR CALLOUSED AREA.         Summary of Hospitalization  Date of Admission - 6/23/23  Date of D/C- 6/26/23  D/C diagnoses -   NSTEMI     I have reviewed the discharge summary    A brief summary of their hospitalization is as follows   Patient presented to the ED with chest pain. EKG significant for T wave inversions in inferior leads. Patient underwent LHC without immediate complication.       Since discharge patient has not been experiencing any chest pain       Review of Systems   Constitutional:  Negative for chills and fever.   Respiratory:  Negative for shortness of breath.    Cardiovascular:  Negative for chest pain, orthopnea, leg swelling and PND.     Physical Exam   Vitals:    06/29/23 1012   BP: 100/60   BP Location: Left arm   Pulse: 65   Temp: 97.9 °F (36.6 °C)   SpO2: 98%   Weight: 80.5 kg (177 lb 7.5 oz)   Height: 5' 9" (1.753 m)         Physical Exam  Vitals reviewed.   Constitutional:       General: He is not in acute distress.     Appearance: He is not ill-appearing.   Neck:      Vascular: No JVD.   Cardiovascular:      Rate and Rhythm: Normal rate and regular rhythm.      Pulses: Normal pulses.      Heart sounds: No murmur heard.  Pulmonary:      Effort: Pulmonary effort is normal. No respiratory distress.      Breath sounds: No rales.   Abdominal:      General: Abdomen is flat. Bowel sounds are normal.      Palpations: Abdomen is soft.   Musculoskeletal:      Right lower leg: No edema.      Left lower leg: No edema.         Results  Lab Results   Component Value " Date    CREATININE 0.9 06/26/2023     Lab Results   Component Value Date     06/26/2023    K 3.8 06/26/2023     (H) 06/26/2023    CO2 23 06/26/2023    ANIONGAP 5 (L) 06/26/2023     Lab Results   Component Value Date    WBC 6.43 06/26/2023    RBC 4.20 (L) 06/26/2023    HGB 12.9 (L) 06/26/2023    HCT 38.6 (L) 06/26/2023    MCV 92 06/26/2023    MCH 30.7 06/26/2023    MCHC 33.4 06/26/2023    RDW 13.5 06/26/2023     (L) 06/26/2023     Lab Results   Component Value Date     06/26/2023     Lab Results   Component Value Date    HGBA1C 5.9 (H) 05/10/2022    HGBA1C 5.9 (H) 04/29/2021    HGBA1C 5.7 (H) 03/12/2020       Imaging reviewed     Assessment and Plan    NSTEMI  S/p [facility] 6/24   Continue aspirin, plavix, imdur 30 mg daily, and Coreg 6.25 mg BID     Personal hx nicotine dependence   Patient counseled on cessation   He has not smoked cigarettes since hospitalization     Prediabetes   F/u HbA1C     HLD   Continue Lipitor 80 mg daily and Zetia 10 mg daily     HTN   Well controlled on amlodipine 5 mg daily               Future Appointments   Date Time Provider Department Center   7/17/2023 11:20 AM George Sherwood MD [facility] CARDIO O at [facility]   8/4/2023 12:00 PM LAB, [facility] LAB [facility]   8/11/2023  9:00 AM oLry Pugh MD [facility]         Follow Up [facility] (with who? when?): No follow-ups on file.        Extended Emergency Contact Information  Primary Emergency Contact: ZakJina  Address: [address] [place] [address] [place]  Home Phone: [phone]  Mobile Phone: [phone]  Relation: Spouse      Lory Pugh MD   Attending Physician  Primary Care   6/29/2023 - 12:36 PM    I spent a total of 19 minutes on the day of the visit.This includes face to face time and non-face to face time preparing to see the patient (eg, review of tests), obtaining and/or reviewing separately obtained history, documenting  clinical information in the electronic or other health record, independently interpreting results and communicating results to the patient/family/caregiver, or care coordinator.

## 2023-07-06 ENCOUNTER — TELEPHONE (OUTPATIENT)
Dept: CARDIOLOGY | Facility: CLINIC | Age: 63
End: 2023-07-06
Payer: MEDICARE

## 2023-07-10 ENCOUNTER — OFFICE VISIT (OUTPATIENT)
Dept: CARDIOLOGY | Facility: CLINIC | Age: 63
End: 2023-07-10
Payer: MEDICARE

## 2023-07-10 VITALS
HEIGHT: 69 IN | BODY MASS INDEX: 26.45 KG/M2 | HEART RATE: 64 BPM | DIASTOLIC BLOOD PRESSURE: 60 MMHG | SYSTOLIC BLOOD PRESSURE: 102 MMHG | WEIGHT: 178.56 LBS

## 2023-07-10 DIAGNOSIS — F17.200 SMOKING: ICD-10-CM

## 2023-07-10 DIAGNOSIS — I21.4 NSTEMI (NON-ST ELEVATED MYOCARDIAL INFARCTION): Primary | ICD-10-CM

## 2023-07-10 PROCEDURE — 99214 OFFICE O/P EST MOD 30 MIN: CPT | Mod: HCNC,S$GLB,, | Performed by: INTERNAL MEDICINE

## 2023-07-10 PROCEDURE — 3008F PR BODY MASS INDEX (BMI) DOCUMENTED: ICD-10-PCS | Mod: HCNC,CPTII,S$GLB, | Performed by: INTERNAL MEDICINE

## 2023-07-10 PROCEDURE — 1160F RVW MEDS BY RX/DR IN RCRD: CPT | Mod: HCNC,CPTII,S$GLB, | Performed by: INTERNAL MEDICINE

## 2023-07-10 PROCEDURE — 99999 PR PBB SHADOW E&M-EST. PATIENT-LVL III: CPT | Mod: PBBFAC,HCNC,, | Performed by: INTERNAL MEDICINE

## 2023-07-10 PROCEDURE — 99214 PR OFFICE/OUTPT VISIT, EST, LEVL IV, 30-39 MIN: ICD-10-PCS | Mod: HCNC,S$GLB,, | Performed by: INTERNAL MEDICINE

## 2023-07-10 PROCEDURE — 3074F SYST BP LT 130 MM HG: CPT | Mod: HCNC,CPTII,S$GLB, | Performed by: INTERNAL MEDICINE

## 2023-07-10 PROCEDURE — 99999 PR PBB SHADOW E&M-EST. PATIENT-LVL III: ICD-10-PCS | Mod: PBBFAC,HCNC,, | Performed by: INTERNAL MEDICINE

## 2023-07-10 PROCEDURE — 3078F PR MOST RECENT DIASTOLIC BLOOD PRESSURE < 80 MM HG: ICD-10-PCS | Mod: HCNC,CPTII,S$GLB, | Performed by: INTERNAL MEDICINE

## 2023-07-10 PROCEDURE — 93000 ELECTROCARDIOGRAM COMPLETE: CPT | Mod: HCNC,S$GLB,, | Performed by: INTERNAL MEDICINE

## 2023-07-10 PROCEDURE — 93000 EKG 12-LEAD: ICD-10-PCS | Mod: HCNC,S$GLB,, | Performed by: INTERNAL MEDICINE

## 2023-07-10 PROCEDURE — 1159F PR MEDICATION LIST DOCUMENTED IN MEDICAL RECORD: ICD-10-PCS | Mod: HCNC,CPTII,S$GLB, | Performed by: INTERNAL MEDICINE

## 2023-07-10 PROCEDURE — 3008F BODY MASS INDEX DOCD: CPT | Mod: HCNC,CPTII,S$GLB, | Performed by: INTERNAL MEDICINE

## 2023-07-10 PROCEDURE — 1111F PR DISCHARGE MEDS RECONCILED W/ CURRENT OUTPATIENT MED LIST: ICD-10-PCS | Mod: HCNC,CPTII,S$GLB, | Performed by: INTERNAL MEDICINE

## 2023-07-10 PROCEDURE — 3078F DIAST BP <80 MM HG: CPT | Mod: HCNC,CPTII,S$GLB, | Performed by: INTERNAL MEDICINE

## 2023-07-10 PROCEDURE — 1111F DSCHRG MED/CURRENT MED MERGE: CPT | Mod: HCNC,CPTII,S$GLB, | Performed by: INTERNAL MEDICINE

## 2023-07-10 PROCEDURE — 3074F PR MOST RECENT SYSTOLIC BLOOD PRESSURE < 130 MM HG: ICD-10-PCS | Mod: HCNC,CPTII,S$GLB, | Performed by: INTERNAL MEDICINE

## 2023-07-10 PROCEDURE — 1159F MED LIST DOCD IN RCRD: CPT | Mod: HCNC,CPTII,S$GLB, | Performed by: INTERNAL MEDICINE

## 2023-07-10 PROCEDURE — 1160F PR REVIEW ALL MEDS BY PRESCRIBER/CLIN PHARMACIST DOCUMENTED: ICD-10-PCS | Mod: HCNC,CPTII,S$GLB, | Performed by: INTERNAL MEDICINE

## 2023-07-10 NOTE — PROGRESS NOTES
Subjective:    Patient ID:  Piotr Crespo is a 62 y.o. male patient here for evaluation Coronary Artery Disease, Hyperlipidemia, and Hypertension      History of Present Illness:     62-year-old male came for clinic follow up after the hospital discharge.  Patient with past medical history of hypertension, hyperlipidemia, chronic smoking, stroke, aneurysm with clip, prediabetes recently presented to [facility] with chest pain and NSTEMI underwent cardiac catheterization which showed  of mid RCA right to right and left-to-right collaterals and there was severe stenosis of distal part of the OM2 which was managed medically (with DAPT and Aggrastat infusion) due to small-caliber vessel, trending down troponin, normal LV function on echo and complete resolution of symptoms even with ambulation.  Patient was discharged home with medical therapy on outpatient follow up.  Patient is doing well since the discharge.  Complains of very rare episodes of brief chest heaviness however denies any chest pain with regular physical activities and exertion.  He is scheduled to start cardiac rehab tomorrow.  Denies any dyspnea.  Compliant with medications however still smoking.     Cardiac catheterization         Significant two-vessel coronary artery disease    The pre-procedure left ventricular end diastolic pressure was 2.        Dominance:  Right        Left Main:  No disease     LAD:  Mild disease in proximal LAD.  Tubular 30-40% stenosis in mid LAD.  Mild disease in distal LAD.   D1:  Small-sized, patent  D2:  Medium-sized, minor luminal irregularities     Ramus intermedius:  Medium to large-sized, mild disease     LCx:  Mild disease in proximal circumflex.  Diffuse 40% stenosis in distal circumflex.  OM1:  Small-sized, mild disease.  OM2:  Medium-sized.  There was a discrete 90-95% stenosis in distal segment of the vessel at the bifurcation of a small branch.  There was RAQUEL 3 flow through the upper branch and  "RAQUEL 2 flow through the lower small branch. This lesion is likely culprit for the MI. The lesion is not readily amenable to intervention due to small-caliber vessel.     RCA:  Tubular 80% stenosis in proximal RCA.   of mid RCA with left-to-right and right to right collaterals.     Review of patient's allergies indicates:  No Known Allergies    Past Medical History:   Diagnosis Date    Erectile dysfunction due to arterial insufficiency 7/17/2020    Essential hypertension 3/11/2020    High cholesterol     History of left common carotid artery stent placement 4/17/2020    History of stroke 3/11/2020    Hypertension     Mixed hyperlipidemia 3/11/2020    Prediabetes 7/17/2020    Stroke     sept 3, 2017     Past Surgical History:   Procedure Laterality Date    ANGIOGRAM, CORONARY, WITH LEFT HEART CATHETERIZATION Left 6/24/2023    Procedure: Angiogram, Coronary, with Left Heart Cath;  Surgeon: George Sherwood MD;  Location: [facility] CATH/EP LAB;  Service: Cardiology;  Laterality: Left;    CAROTID ARTERY ANGIOPLASTY Left     left Internal carotid artery stent    CEREBRAL ANEURYSM REPAIR      COLONOSCOPY N/A 06/30/2020    Procedure: COLONOSCOPY;  Surgeon: Yuan Pizano MD;  Location: [facility] ENDO ([address] [place] [address]);  Service: Endoscopy;  Laterality: N/A;  3/30/20- Per Dr. Catherine, Pt to be r/s for later date, "screening"- ER  3/30/20 -  for Pt to call back to r/s for later date per Dr. Catherine- [facility]  COVID screening on 6/27/20 - [facility] urgent care- ER[facility]    LUMBAR DISCECTOMY       Social History     Tobacco Use    Smoking status: Every Day     Packs/day: 0.40     Years: 45.00     Pack years: 18.00     Types: Cigarettes    Smokeless tobacco: Never   Substance Use Topics    Alcohol use: Yes     Comment: once a month    Drug use: No        Review of Systems   Negative except as mentioned in HPI         Objective        Vitals:    07/10/23 1006   BP: 102/60   Pulse: 64       LIPIDS - LAST 2   Lab Results   Component Value Date    CHOL " 117 (L) 05/10/2022    CHOL 106 (L) 04/29/2021    HDL 42 05/10/2022    HDL 34 (L) 04/29/2021    LDLCALC 62.8 (L) 05/10/2022    LDLCALC 61.6 (L) 04/29/2021    TRIG 61 05/10/2022    TRIG 52 04/29/2021    CHOLHDL 35.9 05/10/2022    CHOLHDL 32.1 04/29/2021       CBC - LAST 2  Lab Results   Component Value Date    WBC 6.43 06/26/2023    WBC 4.85 06/25/2023    RBC 4.20 (L) 06/26/2023    RBC 4.46 (L) 06/25/2023    HGB 12.9 (L) 06/26/2023    HGB 13.5 (L) 06/25/2023    HCT 38.6 (L) 06/26/2023    HCT 40.6 06/25/2023    MCV 92 06/26/2023    MCV 91 06/25/2023    MCH 30.7 06/26/2023    MCH 30.3 06/25/2023    MCHC 33.4 06/26/2023    MCHC 33.3 06/25/2023    RDW 13.5 06/26/2023    RDW 13.6 06/25/2023     (L) 06/26/2023     (L) 06/25/2023    MPV 12.3 06/26/2023    MPV 12.4 06/25/2023    GRAN 6.0 06/23/2023    GRAN 71.0 06/23/2023    LYMPH 1.8 06/23/2023    LYMPH 21.1 06/23/2023    MONO 0.6 06/23/2023    MONO 6.5 06/23/2023    BASO 0.03 06/23/2023    BASO 0.04 05/10/2022    NRBC 0 06/23/2023    NRBC 0 05/10/2022       CHEMISTRY & LIVER FUNCTION - LAST 2  Lab Results   Component Value Date     06/26/2023     06/25/2023    K 3.8 06/26/2023    K 3.7 06/25/2023     (H) 06/26/2023     06/25/2023    CO2 23 06/26/2023    CO2 24 06/25/2023    ANIONGAP 5 (L) 06/26/2023    ANIONGAP 6 (L) 06/25/2023    BUN 11 06/26/2023    BUN 12 06/25/2023    CREATININE 0.9 06/26/2023    CREATININE 1.0 06/25/2023     06/26/2023    GLU 91 06/25/2023    CALCIUM 8.6 (L) 06/26/2023    CALCIUM 8.5 (L) 06/25/2023    ALBUMIN 4.6 06/23/2023    ALBUMIN 4.1 05/10/2022    PROT 7.9 06/23/2023    PROT 7.1 05/10/2022    ALKPHOS 76 06/23/2023    ALKPHOS 60 05/10/2022    ALT 20 06/23/2023    ALT 28 05/10/2022    AST 23 06/23/2023    AST 21 05/10/2022    BILITOT 0.6 06/23/2023    BILITOT 0.4 05/10/2022        CARDIAC PROFILE - LAST 2  Lab Results   Component Value Date    BNP 11 06/23/2023    TROPONINI 1.105 (H) 06/23/2023     TROPONINI 0.042 (H) 06/23/2023    TROPONINIHS 2718.6 (HH) 06/25/2023    TROPONINIHS 4049.6 (HH) 06/24/2023        COAGULATION - LAST 2  Lab Results   Component Value Date    INR 0.9 04/25/2018    APTT 32.0 06/24/2023       ENDOCRINE & PSA - LAST 2  Lab Results   Component Value Date    HGBA1C 5.9 (H) 05/10/2022    HGBA1C 5.9 (H) 04/29/2021    TSH 1.332 05/10/2022    TSH 1.490 04/29/2021    PSA 0.50 05/10/2022    PSA 0.43 04/29/2021        ECHOCARDIOGRAM RESULTS  Results for orders placed during the hospital encounter of 06/23/23    Echo Saline Bubble? No    Interpretation Summary  · The left ventricle is normal in size with normal systolic function.  · The estimated ejection fraction is 58%.  · Normal left ventricular diastolic function.  · Atrial fibrillation not observed.  · Normal right ventricular size with normal right ventricular systolic function.  · Normal central venous pressure (3 mmHg).  · The estimated PA systolic pressure is 25 mmHg.  · No wall motion abnormality. No pulmonary hypertension, mean left atrial pressure normal      CURRENT/PREVIOUS VISIT EKG  Results for orders placed or performed during the hospital encounter of 06/23/23   EKG 12-lead    Collection Time: 06/26/23  4:24 PM    Narrative    Test Reason : I25.10,    Vent. Rate : 051 BPM     Atrial Rate : 051 BPM     P-R Int : 178 ms          QRS Dur : 092 ms      QT Int : 432 ms       P-R-T Axes : 045 -08 -60 degrees     QTc Int : 398 ms    Sinus bradycardia  ST and T wave abnormality, consider inferior ischemia  Abnormal ECG  When compared with ECG of 24-JUN-2023 13:35,  Non-specific change in ST segment in Lateral leads  Nonspecific T wave abnormality now evident in Lateral leads  Confirmed by Guido Richardson MD ([id number]) on 6/30/2023 6:03:27 PM    Referred By: LINDA MORALES JR.           Confirmed By:Guido Richardson MD     No valid procedures specified.   No results found for this or any previous visit.    No valid procedures  specified.          PREVIOUS STRESS TEST              PREVIOUS ANGIOGRAM        PHYSICAL EXAM    CONSTITUTIONAL: Well built, well nourished in no apparent distress  HEENT: No pallor  NECK: no JVD  LUNGS: CTA b/l  HEART: regular rate and rhythm, S1, S2 normal, no murmur   ABDOMEN: soft, non-tender; bowel sounds normal  EXTREMITIES: No edema.  Access site normal  NEURO: AAO X 3   SKIN:  No rash  Psych:  Normal affect    I HAVE REVIEWED :    The vital signs, nurses notes, and all the pertinent radiology and labs.        Current Outpatient Medications   Medication Instructions    amLODIPine (NORVASC) 5 mg, Oral, Daily    aspirin 81 MG Chew Chew and swallow 1 tablet (81 mg total) by mouth once daily.    atorvastatin (LIPITOR) 80 mg, Oral, Nightly    carvediloL (COREG) 6.25 mg, Oral, 2 times daily with meals    cholecalciferol, vitamin D3, 100 mcg (4,000 unit) Tab TAKE ONE TABLET BY MOUTH EVERY DAY AS A VITAMIN SUPPLEMENT    clopidogreL (PLAVIX) 75 mg, Oral, Daily    clotrimazole (LOTRIMIN) 1 % Soln APPLY SMALL AMOUNT TOPICALLY TWICE A DAY FOR FUNGAL INFECTION    ezetimibe (ZETIA) 10 mg, Oral, Daily    isosorbide mononitrate (IMDUR) 30 mg, Oral, Daily    nicotine (NICODERM CQ) 14 mg/24 hr APPLY 1 PATCH TOPICALLY ONCE DAILY FOR 28 DAYS FOR SMOKING CESSATION. DO NOT SMOKE WHILE USING PATCHES. COMPLETE THIS STRENGTH PRIOR TO STARTING 7 MG NICOTINE PATCH.    nicotine (NICODERM CQ) 7 mg/24 hr APPLY 1 PATCH TOPICALLY EVERY DAY FOR 28 DAYS FOR SMOKING CESSATION. DO NOT SMOKE WHILE USING PATCHES. START THIS STRENGTH AFTER COMPLETION OF COURSE OF 14 MG NICOTINE PATCHES.    nicotine polacrilex (NICORETTE) 4 MG Gum CHEW ONE 4MG PIECE IN MOUTH EVERY HOUR AS NEEDED    tamsulosin (FLOMAX) 0.4 mg, Oral, Daily    urea 20 % Crea APPLY LIBERAL AMOUNT TOPICALLY TWICE A DAY AS NEEDED FOR CALLOUSED AREA.        ECG reviewed by me:  Sinus rhythm, inferior T-wave inversion, no significant change from prior ECG  Assessment & Plan     62-year-old  male came for clinic follow up after the hospital discharge.  Patient with past medical history of hypertension, hyperlipidemia, chronic smoking, stroke, aneurysm with clip, prediabetes [facility]I underwent cardiac catheterization which showed  of mid RCA right to right and left-to-right collaterals and there was severe stenosis of distal part of the OM2 which was managed medically (with DAPT and Aggrastat infusion) due to small-caliber vessel, trending down troponin, normal LV function on echo and complete resolution of symptoms even with ambulation.  Patient was discharged home with medical therapy on outpatient follow up.  Patient is doing well since the discharge.  Complains of very rare episodes of brief chest heaviness however denies any chest pain with regular physical activities and exertion.  He is scheduled to start cardiac rehab tomorrow.  Denies any dyspnea.  Compliant with medications however still smoking.     Recent NSTEMI- two-vessel CAD (distal OM2 lesion and mid RCA )- medically managed  Hypertension  Dyslipidemia  History of stroke  Chronic smoking    -blood pressure normal  -Continue current medications  -repeat blood work with the PCP including lipid profile  -he starting cardiac rehab tomorrow  -counseled patient regarding smoking cessation and referred to smoking cessation referral program  -follow up after cardiac rehab  -PCI of mid RCA  will be considered in future if clinically indicated    Follow up in about 3 months (around 10/10/2023).

## 2023-07-28 ENCOUNTER — TELEPHONE (OUTPATIENT)
Dept: CARDIAC REHAB | Facility: HOSPITAL | Age: 63
End: 2023-07-28

## 2023-08-02 ENCOUNTER — TELEPHONE (OUTPATIENT)
Dept: CARDIAC REHAB | Facility: HOSPITAL | Age: 63
End: 2023-08-02

## 2023-08-02 NOTE — TELEPHONE ENCOUNTER
PT INSURANCE IS OUT OF NETWORK.  CALLED; PT STATED THAT HE'S ALREADY DOING CARDIO REHAB AT [facility].  REFERRAL CLOSED, PAPERWORK FILED IN ADVERSE.

## 2023-08-03 ENCOUNTER — CLINICAL SUPPORT (OUTPATIENT)
Dept: SMOKING CESSATION | Facility: CLINIC | Age: 63
End: 2023-08-03

## 2023-08-03 DIAGNOSIS — F17.200 NICOTINE DEPENDENCE: Primary | ICD-10-CM

## 2023-08-03 PROCEDURE — 99404 PR PREVENT COUNSEL,INDIV,60 MIN: ICD-10-PCS | Mod: S$GLB,,,

## 2023-08-03 PROCEDURE — 99404 PREV MED CNSL INDIV APPRX 60: CPT | Mod: S$GLB,,,

## 2023-08-03 NOTE — PROGRESS NOTES
Patient was seen in clinic today for intake.  Patient states smoking .5 ppd for about 48 years.  Patient also states that he quitted for 2 years.  Patient's CO monitor was 28  ppm.  Patient reports he has  nicotine patches, nicotine gums and lozenges at home.  Patient states that he only uses the nicotine gums.  Educated the usage and proper placement of the patch. Patient verbalized understanding and willingness to use the patch.  Patient will  begin the  tobacco cessation medication regimen of 14 mg nicotine patch QD and 4 mg nicotine gums which were prescribed  by his PCP via [facility]. Completion of TCRS (Tobacco Cessation Rating Scale) reviewed learned addiction, model, personal reasons for quitting, medications, goals and quit date. Patient will continue bi weekly sessions.

## 2023-08-03 NOTE — Clinical Note
Patient was seen in clinic today for intake.  Patient states smoking .5 ppd for about 48 years.  Patient also states that he quitted for 2 years.  Patient's CO monitor was 28  ppm.  Patient reports he has  nicotine patches, nicotine gums and lozenges at home.  Patient states that he only uses the nicotine gums.  Educated the usage and proper placement of the patch. Patient verbalized understanding and willingness to use the patch.  Patient will  begin the  tobacco cessation medication regimen of 14 mg nicotine patch QD and 4 mg nicotine gums which were prescribed  by his PCP via [facility]. Completion of TCRS (Tobacco Cessation Rating Scale) reviewed learned addiction, model, personal reasons for quitting, medications, goals and quit date. Patient will continue bi weekly sessions.

## 2023-08-04 ENCOUNTER — LAB VISIT (OUTPATIENT)
Dept: LAB | Facility: HOSPITAL | Age: 63
End: 2023-08-04
Attending: STUDENT IN AN ORGANIZED HEALTH CARE EDUCATION/TRAINING PROGRAM
Payer: MEDICARE

## 2023-08-04 DIAGNOSIS — Z00.00 ENCOUNTER FOR PREVENTATIVE ADULT HEALTH CARE EXAMINATION: ICD-10-CM

## 2023-08-04 DIAGNOSIS — Z86.73 HISTORY OF STROKE: ICD-10-CM

## 2023-08-04 DIAGNOSIS — R79.9 ABNORMAL FINDING OF BLOOD CHEMISTRY, UNSPECIFIED: ICD-10-CM

## 2023-08-04 DIAGNOSIS — R73.01 IMPAIRED FASTING GLUCOSE: ICD-10-CM

## 2023-08-04 DIAGNOSIS — Z12.5 SCREENING FOR PROSTATE CANCER: ICD-10-CM

## 2023-08-04 LAB
ANION GAP SERPL CALC-SCNC: 9 MMOL/L (ref 8–16)
BUN SERPL-MCNC: 13 MG/DL (ref 8–23)
CALCIUM SERPL-MCNC: 9.4 MG/DL (ref 8.7–10.5)
CHLORIDE SERPL-SCNC: 104 MMOL/L (ref 95–110)
CHOLEST SERPL-MCNC: 117 MG/DL (ref 120–199)
CHOLEST/HDLC SERPL: 3 {RATIO} (ref 2–5)
CO2 SERPL-SCNC: 25 MMOL/L (ref 23–29)
COMPLEXED PSA SERPL-MCNC: 0.39 NG/ML (ref 0–4)
CREAT SERPL-MCNC: 1.1 MG/DL (ref 0.5–1.4)
EST. GFR  (NO RACE VARIABLE): >60 ML/MIN/1.73 M^2
ESTIMATED AVG GLUCOSE: 114 MG/DL (ref 68–131)
GLUCOSE SERPL-MCNC: 125 MG/DL (ref 70–110)
HBA1C MFR BLD: 5.6 % (ref 4–5.6)
HDLC SERPL-MCNC: 39 MG/DL (ref 40–75)
HDLC SERPL: 33.3 % (ref 20–50)
LDLC SERPL CALC-MCNC: 65 MG/DL (ref 63–159)
NONHDLC SERPL-MCNC: 78 MG/DL
POTASSIUM SERPL-SCNC: 4.2 MMOL/L (ref 3.5–5.1)
SODIUM SERPL-SCNC: 138 MMOL/L (ref 136–145)
TRIGL SERPL-MCNC: 65 MG/DL (ref 30–150)

## 2023-08-04 PROCEDURE — 84153 ASSAY OF PSA TOTAL: CPT | Mod: HCNC | Performed by: STUDENT IN AN ORGANIZED HEALTH CARE EDUCATION/TRAINING PROGRAM

## 2023-08-04 PROCEDURE — 83036 HEMOGLOBIN GLYCOSYLATED A1C: CPT | Mod: HCNC | Performed by: STUDENT IN AN ORGANIZED HEALTH CARE EDUCATION/TRAINING PROGRAM

## 2023-08-04 PROCEDURE — 80048 BASIC METABOLIC PNL TOTAL CA: CPT | Mod: HCNC | Performed by: STUDENT IN AN ORGANIZED HEALTH CARE EDUCATION/TRAINING PROGRAM

## 2023-08-04 PROCEDURE — 80061 LIPID PANEL: CPT | Mod: HCNC | Performed by: STUDENT IN AN ORGANIZED HEALTH CARE EDUCATION/TRAINING PROGRAM

## 2023-08-04 PROCEDURE — 36415 COLL VENOUS BLD VENIPUNCTURE: CPT | Mod: HCNC,PN | Performed by: STUDENT IN AN ORGANIZED HEALTH CARE EDUCATION/TRAINING PROGRAM

## 2023-08-07 NOTE — PROGRESS NOTES
Dr Pugh is out of the office so I am reviewing your blood work:    Your blood work is unremarkable.     She will review your blood work in detail when you see her in office in 4 days.

## 2023-08-11 ENCOUNTER — OFFICE VISIT (OUTPATIENT)
Dept: PRIMARY CARE CLINIC | Facility: CLINIC | Age: 63
End: 2023-08-11
Payer: MEDICARE

## 2023-08-11 VITALS
HEIGHT: 69 IN | SYSTOLIC BLOOD PRESSURE: 110 MMHG | TEMPERATURE: 98 F | HEART RATE: 67 BPM | OXYGEN SATURATION: 97 % | WEIGHT: 174.81 LBS | BODY MASS INDEX: 25.89 KG/M2 | DIASTOLIC BLOOD PRESSURE: 76 MMHG

## 2023-08-11 DIAGNOSIS — I21.4 NSTEMI (NON-ST ELEVATED MYOCARDIAL INFARCTION): ICD-10-CM

## 2023-08-11 DIAGNOSIS — F17.200 NICOTINE DEPENDENCE WITH CURRENT USE: ICD-10-CM

## 2023-08-11 DIAGNOSIS — I10 ESSENTIAL HYPERTENSION: ICD-10-CM

## 2023-08-11 DIAGNOSIS — R73.03 PREDIABETES: ICD-10-CM

## 2023-08-11 DIAGNOSIS — E78.2 MIXED HYPERLIPIDEMIA: Primary | ICD-10-CM

## 2023-08-11 DIAGNOSIS — D69.6 THROMBOCYTOPENIA, UNSPECIFIED: ICD-10-CM

## 2023-08-11 PROCEDURE — 1159F MED LIST DOCD IN RCRD: CPT | Mod: HCNC,CPTII,S$GLB, | Performed by: STUDENT IN AN ORGANIZED HEALTH CARE EDUCATION/TRAINING PROGRAM

## 2023-08-11 PROCEDURE — 99999 PR PBB SHADOW E&M-EST. PATIENT-LVL IV: ICD-10-PCS | Mod: PBBFAC,HCNC,, | Performed by: STUDENT IN AN ORGANIZED HEALTH CARE EDUCATION/TRAINING PROGRAM

## 2023-08-11 PROCEDURE — 3044F HG A1C LEVEL LT 7.0%: CPT | Mod: HCNC,CPTII,S$GLB, | Performed by: STUDENT IN AN ORGANIZED HEALTH CARE EDUCATION/TRAINING PROGRAM

## 2023-08-11 PROCEDURE — 99999 PR PBB SHADOW E&M-EST. PATIENT-LVL IV: CPT | Mod: PBBFAC,HCNC,, | Performed by: STUDENT IN AN ORGANIZED HEALTH CARE EDUCATION/TRAINING PROGRAM

## 2023-08-11 PROCEDURE — 3074F PR MOST RECENT SYSTOLIC BLOOD PRESSURE < 130 MM HG: ICD-10-PCS | Mod: HCNC,CPTII,S$GLB, | Performed by: STUDENT IN AN ORGANIZED HEALTH CARE EDUCATION/TRAINING PROGRAM

## 2023-08-11 PROCEDURE — 3074F SYST BP LT 130 MM HG: CPT | Mod: HCNC,CPTII,S$GLB, | Performed by: STUDENT IN AN ORGANIZED HEALTH CARE EDUCATION/TRAINING PROGRAM

## 2023-08-11 PROCEDURE — 3008F BODY MASS INDEX DOCD: CPT | Mod: HCNC,CPTII,S$GLB, | Performed by: STUDENT IN AN ORGANIZED HEALTH CARE EDUCATION/TRAINING PROGRAM

## 2023-08-11 PROCEDURE — 3078F DIAST BP <80 MM HG: CPT | Mod: HCNC,CPTII,S$GLB, | Performed by: STUDENT IN AN ORGANIZED HEALTH CARE EDUCATION/TRAINING PROGRAM

## 2023-08-11 PROCEDURE — 3044F PR MOST RECENT HEMOGLOBIN A1C LEVEL <7.0%: ICD-10-PCS | Mod: HCNC,CPTII,S$GLB, | Performed by: STUDENT IN AN ORGANIZED HEALTH CARE EDUCATION/TRAINING PROGRAM

## 2023-08-11 PROCEDURE — 1159F PR MEDICATION LIST DOCUMENTED IN MEDICAL RECORD: ICD-10-PCS | Mod: HCNC,CPTII,S$GLB, | Performed by: STUDENT IN AN ORGANIZED HEALTH CARE EDUCATION/TRAINING PROGRAM

## 2023-08-11 PROCEDURE — 3008F PR BODY MASS INDEX (BMI) DOCUMENTED: ICD-10-PCS | Mod: HCNC,CPTII,S$GLB, | Performed by: STUDENT IN AN ORGANIZED HEALTH CARE EDUCATION/TRAINING PROGRAM

## 2023-08-11 PROCEDURE — 3078F PR MOST RECENT DIASTOLIC BLOOD PRESSURE < 80 MM HG: ICD-10-PCS | Mod: HCNC,CPTII,S$GLB, | Performed by: STUDENT IN AN ORGANIZED HEALTH CARE EDUCATION/TRAINING PROGRAM

## 2023-08-11 PROCEDURE — 99214 PR OFFICE/OUTPT VISIT, EST, LEVL IV, 30-39 MIN: ICD-10-PCS | Mod: HCNC,S$GLB,, | Performed by: STUDENT IN AN ORGANIZED HEALTH CARE EDUCATION/TRAINING PROGRAM

## 2023-08-11 PROCEDURE — 99214 OFFICE O/P EST MOD 30 MIN: CPT | Mod: HCNC,S$GLB,, | Performed by: STUDENT IN AN ORGANIZED HEALTH CARE EDUCATION/TRAINING PROGRAM

## 2023-08-11 RX ORDER — ISOSORBIDE MONONITRATE 30 MG/1
30 TABLET, EXTENDED RELEASE ORAL DAILY
Qty: 90 TABLET | Refills: 3 | Status: SHIPPED | OUTPATIENT
Start: 2023-08-11 | End: 2024-01-04

## 2023-08-11 RX ORDER — CLOPIDOGREL BISULFATE 75 MG/1
75 TABLET ORAL DAILY
Qty: 90 TABLET | Refills: 3 | Status: SHIPPED | OUTPATIENT
Start: 2023-08-11 | End: 2024-08-10

## 2023-08-11 RX ORDER — NAPROXEN SODIUM 220 MG/1
81 TABLET, FILM COATED ORAL DAILY
Qty: 90 TABLET | Refills: 3 | Status: SHIPPED | OUTPATIENT
Start: 2023-08-11 | End: 2024-01-04

## 2023-08-11 RX ORDER — AMLODIPINE BESYLATE 5 MG/1
5 TABLET ORAL DAILY
Qty: 90 TABLET | Refills: 3 | Status: SHIPPED | OUTPATIENT
Start: 2023-08-11

## 2023-08-11 NOTE — PROGRESS NOTES
Primary Care  Return/Acute Office Visit - In Person  8/11/2023  Lory Ndhlovu      [facility]    Patient is a 62 y.o.   Piotr Crespo  has a past medical history of Erectile dysfunction due to arterial insufficiency (7/17/2020), Essential hypertension (3/11/2020), High cholesterol, History of left common carotid artery stent placement (4/17/2020), History of stroke (3/11/2020), Hypertension, Mixed hyperlipidemia (3/11/2020), Prediabetes (7/17/2020), and Stroke.    Patient presents with   Chief Complaint   Patient presents with    Follow up     Patient presenting for follow up     He reports that he has resumed smoking about 8 cigarettes/day   He is enrolled in smoking cessation and has nicotine replacement   Reports barrier is habit         Social History     Social History Narrative    Not on file     Piotr Crespo family history includes Bell's palsy in his brother; Brain cancer in his brother; Cancer in his brother, brother, and father; Dementia in his mother; Hypertension in his mother; No Known Problems in his brother, brother, and sister; Other in his sister; Stroke in his mother.    Active Medications:  Review of patient's allergies indicates:  No Known Allergies  Current Outpatient Medications   Medication Instructions    amLODIPine (NORVASC) 5 mg, Oral, Daily    aspirin 81 MG Chew Chew and swallow 1 tablet (81 mg total) by mouth once daily.    atorvastatin (LIPITOR) 80 mg, Oral, Nightly    carvediloL (COREG) 6.25 mg, Oral, 2 times daily with meals    cholecalciferol, vitamin D3, 100 mcg (4,000 unit) Tab TAKE ONE TABLET BY MOUTH EVERY DAY AS A VITAMIN SUPPLEMENT    clopidogreL (PLAVIX) 75 mg, Oral, Daily    clotrimazole (LOTRIMIN) 1 % Soln APPLY SMALL AMOUNT TOPICALLY TWICE A DAY FOR FUNGAL INFECTION    ezetimibe (ZETIA) 10 mg, Oral, Daily    isosorbide mononitrate (IMDUR) 30 mg, Oral, Daily    nicotine (NICODERM CQ) 14 mg/24 hr APPLY 1 PATCH TOPICALLY ONCE DAILY FOR 28 DAYS FOR SMOKING CESSATION. DO NOT SMOKE WHILE  "USING PATCHES. COMPLETE THIS STRENGTH PRIOR TO STARTING 7 MG NICOTINE PATCH.    nicotine (NICODERM CQ) 7 mg/24 hr APPLY 1 PATCH TOPICALLY EVERY DAY FOR 28 DAYS FOR SMOKING CESSATION. DO NOT SMOKE WHILE USING PATCHES. START THIS STRENGTH AFTER COMPLETION OF COURSE OF 14 MG NICOTINE PATCHES.    nicotine polacrilex (NICORETTE) 4 MG Gum CHEW ONE 4MG PIECE IN MOUTH EVERY HOUR AS NEEDED    tamsulosin (FLOMAX) 0.4 mg, Oral, Daily    urea 20 % Crea APPLY LIBERAL AMOUNT TOPICALLY TWICE A DAY AS NEEDED FOR CALLOUSED AREA.       Review of Systems   Respiratory:  Negative for shortness of breath.    Cardiovascular:  Negative for chest pain, orthopnea, leg swelling and PND.   All other systems reviewed and are negative.      Vitals:    08/11/23 0854   BP: 110/76   BP Location: Left arm   Pulse: 67   Temp: 98.1 °F (36.7 °C)   SpO2: 97%   Weight: 79.3 kg (174 lb 13.2 oz)   Height: 5' 9" (1.753 m)       Physical Exam  Vitals reviewed.   Constitutional:       General: He is not in acute distress.  Cardiovascular:      Rate and Rhythm: Normal rate and regular rhythm.      Pulses: Normal pulses.      Heart sounds: Normal heart sounds. No murmur heard.  Pulmonary:      Effort: Pulmonary effort is normal.      Breath sounds: Normal breath sounds. No rales.   Abdominal:      General: Abdomen is flat. Bowel sounds are normal.      Palpations: Abdomen is soft.   Musculoskeletal:      Right lower leg: No edema.      Left lower leg: No edema.   Neurological:      General: No focal deficit present.      Mental Status: He is oriented to person, place, and time.          Assessment and Plan     NSTEMI  S/p [facility] 6/24   Continue aspirin, plavix, imdur 30 mg daily, and Coreg 6.25 mg BID   F/u with cardiology in October   Duration of DAPT not clearly stated     Personal Hx of Nicotine Dependence   Benefits to cessation discussed. Patient strongly advised to quit smoking. At this time they are willing to quit.   3 minutes spent counseling patient " regarding quitting smoking to improve overall health.    Prediabetes   Well controlled with diet   HbA1C 5.6      HLD   LDL 62   Continue Lipitor 80 mg daily and Zetia 10 mg daily      HTN   Well controlled on amlodipine 5 mg daily     Thrombocytopenia   Continue to monitor   LFTs normal range   Hepatitis C serology negative             Upcoming Scheduled Appointments and Follow Up:    Future Appointments   Date Time Provider Department Center   8/17/2023 11:00 AM Rach Lam CTTS [facility] S[facility]   8/29/2023 10:00 AM Rach Lam CTTS [facility] SMO[facility]   10/18/2023  9:40 [facility]   1/12/2024  9:00 AM Lory Pugh MD [facility]       Follow Up [facility] (with who? when?): Follow up in about 5 months (around 1/11/2024).      Extended Emergency Contact Information  Primary Emergency Contact: Jina Crespo  Address: [address] [place] [address] of [place]  Home Phone: [phone]  Mobile Phone: [phone]  Relation: Spouse      Lory Pugh MD   Attending Physician  Primary Care  8/11/2023 - 8:50 AM    42 minutes   This includes face to face time and non-face to face time preparing to see the patient (eg, review of tests), obtaining and/or reviewing separately obtained history, documenting clinical information in the electronic or other health record, independently interpreting results and communicating results to the patient/family/caregiver, or care coordinator.

## 2023-08-17 ENCOUNTER — PATIENT OUTREACH (OUTPATIENT)
Dept: ADMINISTRATIVE | Facility: HOSPITAL | Age: 63
End: 2023-08-17
Payer: MEDICARE

## 2023-08-17 ENCOUNTER — CLINICAL SUPPORT (OUTPATIENT)
Dept: SMOKING CESSATION | Facility: CLINIC | Age: 63
End: 2023-08-17

## 2023-08-17 DIAGNOSIS — F17.200 NICOTINE DEPENDENCE: Primary | ICD-10-CM

## 2023-08-17 PROCEDURE — 99402 PR PREVENT COUNSEL,INDIV,30 MIN: ICD-10-PCS | Mod: S$GLB,,,

## 2023-08-17 PROCEDURE — 99402 PREV MED CNSL INDIV APPRX 30: CPT | Mod: S$GLB,,,

## 2023-08-17 NOTE — PROGRESS NOTES
Individual Follow-Up Form    8/17/2023    Quit Date:     Clinical Status of Patient: Outpatient    Continuing Medication: yes  Patches or Nicotine gum     Target Symptoms: Withdrawal and medication side effects. The following were  rated moderate (3) to severe (4) on TCRS:  Moderate (3): none  Severe (4): none    Comments:  Patient was seen in clinic today for follow up. Patient states smoking 7-8  cpd down from  15  cpd.  Commended patient on his  accomplishment thus far.  Pt remains on the prescribed tobacco cessation medication regimen of 14 mg nicotine patch QD and 4 mg nicotine gums which [facility] without any negative side effects at this time. Encouraged patient to try a rate reduction to about 5  cpd for next visit. Patient reports his wife smokes which increases his desire for tobacco.   Reviewed strategies, cues, and triggers. Introduced the negative impact of tobacco on health, the health advantages of discontinuing the use of tobacco, timeline improved health changes after a quit, withdrawal issues to expect from nicotine and habit, and ways to achieve the of a quit.  Patient will continue bi weekly sessions.    Diagnosis: F17.200    Next Visit: 2 weeks

## 2023-08-17 NOTE — PROGRESS NOTES
Patient due for the following    LDCT Lung Screen       [facility].    Immunizations: reviewed and updated  Care Everywhere: triggered  Care Teams: up to date  Outreach: none needed

## 2023-08-17 NOTE — Clinical Note
Patient was seen in clinic today for follow up. Patient states smoking 7-8  cpd down from  15  cpd.  Commended patient on his  accomplishment thus far.  Pt remains on the prescribed tobacco cessation medication regimen of 14 mg nicotine patch QD and 4 mg nicotine gums which were [facility] without any negative side effects at this time. Encouraged patient to try a rate reduction to about 5  cpd for next visit. Patient reports his wife smokes which increases his desire for tobacco.   Reviewed strategies, cues, and triggers. Introduced the negative impact of tobacco on health, the health advantages of discontinuing the use of tobacco, timeline improved health changes after a quit, withdrawal issues to expect from nicotine and habit, and ways to achieve the of a quit.  Patient will continue bi weekly sessions.

## 2023-08-17 NOTE — LETTER
AUTHORIZATION FOR RELEASE OF   CONFIDENTIAL INFORMATION    TO: [facility] Records,    We are seeing Piotr Crespo, date of birth 1960, in the clinic at [facility] PRIMARY [facility]. Lory Pugh MD is the patient's PCP. Piotr Crespo has an outstanding lab/procedure at the time we reviewed his chart. In order to help keep his health information updated, he has authorized us to request the following medical record(s):        (  )  MAMMOGRAM                                      (  )  COLONOSCOPY      (  )  PAP SMEAR                                          ( X )   LAB RESULTS     (  )  DEXA SCAN                                          (  )  EYE EXAM            (  )  FOOT EXAM                                          (  )  ENTIRE RECORD     (  )  OUTSIDE IMMUNIZATIONS                 (  )  _______________         Please fax records to [facility], Ndhlovu, [place], [place], [phone]     ATTN: STEPHENIE        Patient Name: Piotr Crespo  : 1960  Patient Phone #: [phone]

## 2023-08-29 ENCOUNTER — CLINICAL SUPPORT (OUTPATIENT)
Dept: SMOKING CESSATION | Facility: CLINIC | Age: 63
End: 2023-08-29

## 2023-08-29 DIAGNOSIS — F17.200 NICOTINE DEPENDENCE: Primary | ICD-10-CM

## 2023-08-29 PROCEDURE — 99403 PREV MED CNSL INDIV APPRX 45: CPT | Mod: S$GLB,,,

## 2023-08-29 PROCEDURE — 99403 PR PREVENT COUNSEL,INDIV,45 MIN: ICD-10-PCS | Mod: S$GLB,,,

## 2023-08-29 NOTE — Clinical Note
Patient was seen in clinic today for follow up. Patient continues  to smoke 7-8 cigarettes per day. Patient remains on the prescribed tobacco cessation medication regimen of 4 mg nicotine gum which was [facility] without any negative side effects at this time.  Patient reports not using the 14 mg nicotine patches due to insomnia.  Reminded patient the importance of use nicotine patch every day in order  to becoming tobacco free.  Informed patient to remove the nicotine patch 2 hours before bedtime. Patient agreed to resume nicotine patch. Patient refused alternative therapies.  Encouraged patient to move his cigarettes so they are out of site. We reviewed the importance of quitting and health benefits to expect.  Encouraged patient to try a rate reduction to about   5 cpd for next visit. Patient will continue bi weekly sessions.

## 2023-08-29 NOTE — PROGRESS NOTES
Individual Follow-Up Form    8/29/2023    Quit Date:     Clinical Status of Patient: Outpatient    Continuing Medication: yes  Nicotine gum     Target Symptoms: Withdrawal and medication side effects. The following were  rated moderate (3) to severe (4) on TCRS:  Moderate (3): none  Severe (4): none    Comments:  Patient was seen in clinic today for follow up. Patient continues  to smoke 7-8 cigarettes per day. Patient remains on the prescribed tobacco cessation medication regimen of 4 mg nicotine gum which was [facility] without any negative side effects at this time.  Patient reports not using the 14 mg nicotine patches due to insomnia.  Reminded patient the importance of use nicotine patch every day in order  to becoming tobacco free.  Informed patient to remove the nicotine patch 2 hours before bedtime. Patient agreed to resume nicotine patch. Patient refused alternative therapies.  Encouraged patient to move his cigarettes so they are out of site. We reviewed the importance of quitting and health benefits to expect.  Encouraged patient to try a rate reduction to about   5 cpd for next visit. Patient will continue bi weekly sessions.    Diagnosis: F17.200    Next Visit: 2 weeks

## 2023-09-12 ENCOUNTER — CLINICAL SUPPORT (OUTPATIENT)
Dept: SMOKING CESSATION | Facility: CLINIC | Age: 63
End: 2023-09-12

## 2023-09-12 DIAGNOSIS — F17.200 NICOTINE DEPENDENCE: Primary | ICD-10-CM

## 2023-09-12 PROCEDURE — 99403 PREV MED CNSL INDIV APPRX 45: CPT | Mod: S$GLB,,,

## 2023-09-12 PROCEDURE — 99403 PR PREVENT COUNSEL,INDIV,45 MIN: ICD-10-PCS | Mod: S$GLB,,,

## 2023-09-12 NOTE — Clinical Note
Patient was seen in clinic today for follow up.  Patient states smoking 10  cpd up from  7-8  cpd.  Patient also states that he went to the [employer] and was  drinking this past Saturday and smoked 12 cigarettes.  Patient reports using 4 mg nicotine gums which were prescribed by  [facility].  Patient also reports having nicotine patches at home, but not mental ready to use them.  We reviewed the importance of quitting and health benefits to expect.  We discussed willpower and willingness to quit.  We discussed the importance of behavior change and building willpower. Patient will continue bi weekly sessions.

## 2023-09-12 NOTE — PROGRESS NOTES
Individual Follow-Up Form    9/12/2023    Quit Date:     Clinical Status of Patient: Outpatient    Continuing Medication: yes  Nicotine gum     Target Symptoms: Withdrawal and medication side effects. The following were  rated moderate (3) to severe (4) on TCRS:  Moderate (3): none  Severe (4): none    Comments:  Patient was seen in clinic today for follow up.  Patient states smoking 10  cpd up from  7-8  cpd.  Patient also states that he went to the [employer] and was  drinking this past Saturday and smoked 12 cigarettes.  Patient reports using 4 mg nicotine gums which were prescribed by  his [facility].  Patient also reports having nicotine patches at home, but not mental ready to use them.  We reviewed the importance of quitting and health benefits to expect.  We discussed willpower and willingness to quit.  We discussed the importance of behavior change and building willpower. Patient will continue bi weekly sessions.    Diagnosis: F17.200    Next Visit: 2 weeks

## 2023-09-25 PROBLEM — I21.4 NSTEMI (NON-ST ELEVATED MYOCARDIAL INFARCTION): Status: RESOLVED | Noted: 2023-06-26 | Resolved: 2023-09-25

## 2023-09-26 ENCOUNTER — CLINICAL SUPPORT (OUTPATIENT)
Dept: SMOKING CESSATION | Facility: CLINIC | Age: 63
End: 2023-09-26

## 2023-09-26 DIAGNOSIS — F17.200 NICOTINE DEPENDENCE: Primary | ICD-10-CM

## 2023-09-26 PROCEDURE — 99403 PREV MED CNSL INDIV APPRX 45: CPT | Mod: S$GLB,,,

## 2023-09-26 PROCEDURE — 99403 PR PREVENT COUNSEL,INDIV,45 MIN: ICD-10-PCS | Mod: S$GLB,,,

## 2023-09-26 NOTE — PROGRESS NOTES
Individual Follow-Up Form    9/26/2023    Quit Date:     Clinical Status of Patient: Outpatient    Continuing Medication: yes  Nicotine gum     Target Symptoms: Withdrawal and medication side effects. The following were  rated moderate (3) to severe (4) on TCRS:  Moderate (3): none  Severe (4): none    Comments:  Patient was seen in clinic today for follow up. Patient continues  to smoke  10  cigarettes per day.  Patient remains on the prescribed tobacco cessation medication regimen of 4 mg nicotine gums which were given by [facility] without any negative side effects at this time.  Patient states that he has 14 mg nicotine patches and Chantix at home, but not mentally ready to take them. Patient promised that he will try the patch and Chantix starting tomorrow.  Educated the usage and proper placement of the patch.  Patient was given the instructions how to take Chantix.  Patient also states that his wife is a smoker which is so hard for him to quit.  Encouraged patient to stay focus his quit and using better strategies.  We discussed the importance of behavior change and building willpower. Patient will continue bi weekly sessions.     Diagnosis: F17.200    Next Visit: 2 weeks

## 2023-09-26 NOTE — Clinical Note
Patient was seen in clinic today for follow up. Patient continues  to smoke  10  cigarettes per day.  Patient remains on the prescribed tobacco cessation medication regimen of 4 mg nicotine gums which were given by [facility] P[facility] without any negative side effects at this time.  Patient states that he has 14 mg nicotine patches and Chantix at home, but not mentally ready to take them. Patient promised that he will try the patch and Chantix starting tomorrow.  Educated the usage and proper placement of the patch.  Patient was given the instructions how to take Chantix.  Patient also states that his wife is a smoker which is so hard for him to quit.  Encouraged patient to stay focus his quit and using better strategies.  We discussed the importance of behavior change and building willpower. Patient will continue bi weekly sessions.

## 2023-10-10 ENCOUNTER — CLINICAL SUPPORT (OUTPATIENT)
Dept: SMOKING CESSATION | Facility: CLINIC | Age: 63
End: 2023-10-10

## 2023-10-10 DIAGNOSIS — F17.200 NICOTINE DEPENDENCE: Primary | ICD-10-CM

## 2023-10-10 PROCEDURE — 99407 PR TOBACCO USE CESSATION INTENSIVE >10 MINUTES: ICD-10-PCS | Mod: S$GLB,,,

## 2023-10-10 PROCEDURE — 99407 BEHAV CHNG SMOKING > 10 MIN: CPT | Mod: S$GLB,,,

## 2023-10-10 NOTE — PROGRESS NOTES
Patient called and states that he is going out of town soon and would like to reschedule his appointment.  Patient continues  to smoke  10 cigarettes per day.  Patient remains on the prescribed tobacco cessation medication regimen of 4 mg nicotine gum from his PCP  without any negative side effects at this time.  Patient reports not mental ready to resume Chantix or nicotine patches. We discussed willpower and willingness to quit.  Patient is rescheduled on 10- at 10:30 AM.

## 2023-10-16 ENCOUNTER — TELEPHONE (OUTPATIENT)
Dept: SMOKING CESSATION | Facility: CLINIC | Age: 63
End: 2023-10-16
Payer: MEDICARE

## 2023-10-17 ENCOUNTER — TELEPHONE (OUTPATIENT)
Dept: SMOKING CESSATION | Facility: CLINIC | Age: 63
End: 2023-10-17

## 2023-10-17 NOTE — TELEPHONE ENCOUNTER
[facility]-called patient for missed follow up appointment, no answer.  Left message and contact number was given.

## 2023-10-18 ENCOUNTER — OFFICE VISIT (OUTPATIENT)
Dept: CARDIOLOGY | Facility: CLINIC | Age: 63
End: 2023-10-18
Payer: MEDICARE

## 2023-10-18 VITALS
OXYGEN SATURATION: 97 % | HEART RATE: 60 BPM | BODY MASS INDEX: 25.8 KG/M2 | SYSTOLIC BLOOD PRESSURE: 128 MMHG | HEIGHT: 69 IN | DIASTOLIC BLOOD PRESSURE: 80 MMHG | WEIGHT: 174.19 LBS

## 2023-10-18 DIAGNOSIS — F17.200 SMOKING: ICD-10-CM

## 2023-10-18 DIAGNOSIS — I10 ESSENTIAL HYPERTENSION: ICD-10-CM

## 2023-10-18 DIAGNOSIS — I25.10 CORONARY ARTERY DISEASE INVOLVING NATIVE CORONARY ARTERY OF NATIVE HEART WITHOUT ANGINA PECTORIS: Primary | ICD-10-CM

## 2023-10-18 PROCEDURE — 99999 PR PBB SHADOW E&M-EST. PATIENT-LVL IV: CPT | Mod: PBBFAC,HCNC,, | Performed by: INTERNAL MEDICINE

## 2023-10-18 PROCEDURE — 1160F PR REVIEW ALL MEDS BY PRESCRIBER/CLIN PHARMACIST DOCUMENTED: ICD-10-PCS | Mod: HCNC,CPTII,S$GLB, | Performed by: INTERNAL MEDICINE

## 2023-10-18 PROCEDURE — 1160F RVW MEDS BY RX/DR IN RCRD: CPT | Mod: HCNC,CPTII,S$GLB, | Performed by: INTERNAL MEDICINE

## 2023-10-18 PROCEDURE — 1159F MED LIST DOCD IN RCRD: CPT | Mod: HCNC,CPTII,S$GLB, | Performed by: INTERNAL MEDICINE

## 2023-10-18 PROCEDURE — 99214 OFFICE O/P EST MOD 30 MIN: CPT | Mod: HCNC,S$GLB,, | Performed by: INTERNAL MEDICINE

## 2023-10-18 PROCEDURE — 3074F SYST BP LT 130 MM HG: CPT | Mod: HCNC,CPTII,S$GLB, | Performed by: INTERNAL MEDICINE

## 2023-10-18 PROCEDURE — 3044F HG A1C LEVEL LT 7.0%: CPT | Mod: HCNC,CPTII,S$GLB, | Performed by: INTERNAL MEDICINE

## 2023-10-18 PROCEDURE — 3079F PR MOST RECENT DIASTOLIC BLOOD PRESSURE 80-89 MM HG: ICD-10-PCS | Mod: HCNC,CPTII,S$GLB, | Performed by: INTERNAL MEDICINE

## 2023-10-18 PROCEDURE — 3008F BODY MASS INDEX DOCD: CPT | Mod: HCNC,CPTII,S$GLB, | Performed by: INTERNAL MEDICINE

## 2023-10-18 PROCEDURE — 3074F PR MOST RECENT SYSTOLIC BLOOD PRESSURE < 130 MM HG: ICD-10-PCS | Mod: HCNC,CPTII,S$GLB, | Performed by: INTERNAL MEDICINE

## 2023-10-18 PROCEDURE — 99214 PR OFFICE/OUTPT VISIT, EST, LEVL IV, 30-39 MIN: ICD-10-PCS | Mod: HCNC,S$GLB,, | Performed by: INTERNAL MEDICINE

## 2023-10-18 PROCEDURE — 1159F PR MEDICATION LIST DOCUMENTED IN MEDICAL RECORD: ICD-10-PCS | Mod: HCNC,CPTII,S$GLB, | Performed by: INTERNAL MEDICINE

## 2023-10-18 PROCEDURE — 99999 PR PBB SHADOW E&M-EST. PATIENT-LVL IV: ICD-10-PCS | Mod: PBBFAC,HCNC,, | Performed by: INTERNAL MEDICINE

## 2023-10-18 PROCEDURE — 3079F DIAST BP 80-89 MM HG: CPT | Mod: HCNC,CPTII,S$GLB, | Performed by: INTERNAL MEDICINE

## 2023-10-18 PROCEDURE — 3044F PR MOST RECENT HEMOGLOBIN A1C LEVEL <7.0%: ICD-10-PCS | Mod: HCNC,CPTII,S$GLB, | Performed by: INTERNAL MEDICINE

## 2023-10-18 PROCEDURE — 3008F PR BODY MASS INDEX (BMI) DOCUMENTED: ICD-10-PCS | Mod: HCNC,CPTII,S$GLB, | Performed by: INTERNAL MEDICINE

## 2023-10-18 NOTE — PROGRESS NOTES
Subjective:    Patient ID:  Piotr Crespo is a 62 y.o. male patient here for evaluation Follow-up, Carotid Artery Disease, Hyperlipidemia, and Hypertension    Follow up visit 10/18/2023:  Patient came for routine follow-up.  He is almost finishing up with cardiac rehab.  He denies any angina while doing rehab.  Compliant with the medications.  Still smoking.    History of Present Illness during initial clinic visit:     62-year-old male came for clinic follow up after the hospital discharge.  Patient with past medical history of hypertension, hyperlipidemia, chronic smoking, stroke, aneurysm with clip, prediabetes recently presented to [facility] with chest pain and NSTEMI underwent cardiac catheterization which showed  of mid RCA right to right and left-to-right collaterals and there was severe stenosis of distal part of the OM2 which was managed medically (with DAPT and Aggrastat infusion) due to small-caliber vessel, trending down troponin, normal LV function on echo and complete resolution of symptoms even with ambulation.  Patient was discharged home with medical therapy on outpatient follow up.  Patient is doing well since the discharge.  Complains of very rare episodes of brief chest heaviness however denies any chest pain with regular physical activities and exertion.  He is scheduled to start cardiac rehab tomorrow.  Denies any dyspnea.  Compliant with medications however still smoking.     Cardiac catheterization         Significant two-vessel coronary artery disease    The pre-procedure left ventricular end diastolic pressure was 2.        Dominance:  Right        Left Main:  No disease     LAD:  Mild disease in proximal LAD.  Tubular 30-40% stenosis in mid LAD.  Mild disease in distal LAD.   D1:  Small-sized, patent  D2:  Medium-sized, minor luminal irregularities     Ramus intermedius:  Medium to large-sized, mild disease     LCx:  Mild disease in proximal circumflex.  Diffuse 40%  "stenosis in distal circumflex.  OM1:  Small-sized, mild disease.  OM2:  Medium-sized.  There was a discrete 90-95% stenosis in distal segment of the vessel at the bifurcation of a small branch.  There was RAQUEL 3 flow through the upper branch and RAQUEL 2 flow through the lower small branch. This lesion is likely culprit for the MI. The lesion is not readily amenable to intervention due to small-caliber vessel.     RCA:  Tubular 80% stenosis in proximal RCA.   of mid RCA with left-to-right and right to right collaterals.     Review of patient's allergies indicates:  No Known Allergies    Past Medical History:   Diagnosis Date    Erectile dysfunction due to arterial insufficiency 7/17/2020    Essential hypertension 3/11/2020    High cholesterol     History of left common carotid artery stent placement 4/17/2020    History of stroke 3/11/2020    Hypertension     Mixed hyperlipidemia 3/11/2020    Prediabetes 7/17/2020    Stroke     sept 3, 2017     Past Surgical History:   Procedure Laterality Date    ANGIOGRAM, CORONARY, WITH LEFT HEART CATHETERIZATION Left 6/24/2023    Procedure: Angiogram, Coronary, with Left Heart Cath;  Surgeon: George Sherwood MD;  Location: [facility] CATH/EP LAB;  Service: Cardiology;  Laterality: Left;    CAROTID ARTERY ANGIOPLASTY Left     left Internal carotid artery stent    CEREBRAL ANEURYSM REPAIR      COLONOSCOPY N/A 06/30/2020    Procedure: COLONOSCOPY;  Surgeon: Yuan Pizano MD;  Location: [facility] ENDO ([address] [place] [address]);  Service: Endoscopy;  Laterality: N/A;  3/30/20- Per Dr. Catherine, Pt to be r/s for later date, "screening"- [facility]  3/30/20 -  for Pt to call back to r/s for later date per Dr. Catherine- [facility]  COVID screening on 6/27/20 - [facility] urgent care- [facility]    LUMBAR DISCECTOMY       Social History     Tobacco Use    Smoking status: Every Day     Current packs/day: 0.35     Average packs/day: 0.5 packs/day for 48.9 years (24.4 ttl pk-yrs)     Types: Cigarettes     Start date: 12/8/1972     " Last attempt to quit: 12/8/1987    Smokeless tobacco: Never   Substance Use Topics    Alcohol use: Yes     Comment: once a month    Drug use: No        Review of Systems   Negative except as mentioned in HPI         Objective        Vitals:    10/18/23 0934   BP: 128/80   Pulse: 60       LIPIDS - LAST 2   Lab Results   Component Value Date    CHOL 117 (L) 08/04/2023    CHOL 117 (L) 05/10/2022    HDL 39 (L) 08/04/2023    HDL 42 05/10/2022    LDLCALC 65.0 08/04/2023    LDLCALC 62.8 (L) 05/10/2022    TRIG 65 08/04/2023    TRIG 61 05/10/2022    CHOLHDL 33.3 08/04/2023    CHOLHDL 35.9 05/10/2022       CBC - LAST 2  Lab Results   Component Value Date    WBC 6.43 06/26/2023    WBC 4.85 06/25/2023    RBC 4.20 (L) 06/26/2023    RBC 4.46 (L) 06/25/2023    HGB 12.9 (L) 06/26/2023    HGB 13.5 (L) 06/25/2023    HCT 38.6 (L) 06/26/2023    HCT 40.6 06/25/2023    MCV 92 06/26/2023    MCV 91 06/25/2023    MCH 30.7 06/26/2023    MCH 30.3 06/25/2023    MCHC 33.4 06/26/2023    MCHC 33.3 06/25/2023    RDW 13.5 06/26/2023    RDW 13.6 06/25/2023     (L) 06/26/2023     (L) 06/25/2023    MPV 12.3 06/26/2023    MPV 12.4 06/25/2023    GRAN 6.0 06/23/2023    GRAN 71.0 06/23/2023    LYMPH 1.8 06/23/2023    LYMPH 21.1 06/23/2023    MONO 0.6 06/23/2023    MONO 6.5 06/23/2023    BASO 0.03 06/23/2023    BASO 0.04 05/10/2022    NRBC 0 06/23/2023    NRBC 0 05/10/2022       CHEMISTRY & LIVER FUNCTION - LAST 2  Lab Results   Component Value Date     08/04/2023     06/26/2023    K 4.2 08/04/2023    K 3.8 06/26/2023     08/04/2023     (H) 06/26/2023    CO2 25 08/04/2023    CO2 23 06/26/2023    ANIONGAP 9 08/04/2023    ANIONGAP 5 (L) 06/26/2023    BUN 13 08/04/2023    BUN 11 06/26/2023    CREATININE 1.1 08/04/2023    CREATININE 0.9 06/26/2023     (H) 08/04/2023     06/26/2023    CALCIUM 9.4 08/04/2023    CALCIUM 8.6 (L) 06/26/2023    ALBUMIN 4.6 06/23/2023    ALBUMIN 4.1 05/10/2022    PROT 7.9 06/23/2023     PROT 7.1 05/10/2022    ALKPHOS 76 06/23/2023    ALKPHOS 60 05/10/2022    ALT 20 06/23/2023    ALT 28 05/10/2022    AST 23 06/23/2023    AST 21 05/10/2022    BILITOT 0.6 06/23/2023    BILITOT 0.4 05/10/2022        CARDIAC PROFILE - LAST 2  Lab Results   Component Value Date    BNP 11 06/23/2023    TROPONINI 1.105 (H) 06/23/2023    TROPONINI 0.042 (H) 06/23/2023    TROPONINIHS 2718.6 (HH) 06/25/2023    TROPONINIHS 4049.6 (HH) 06/24/2023        COAGULATION - LAST 2  Lab Results   Component Value Date    INR 0.9 04/25/2018    APTT 32.0 06/24/2023       ENDOCRINE & PSA - LAST 2  Lab Results   Component Value Date    HGBA1C 5.6 08/04/2023    HGBA1C 5.9 (H) 05/10/2022    TSH 1.332 05/10/2022    TSH 1.490 04/29/2021    PSA 0.39 08/04/2023    PSA 0.50 05/10/2022        ECHOCARDIOGRAM RESULTS  Results for orders placed during the hospital encounter of 06/23/23    Echo Saline Bubble? No    Interpretation Summary  · The left ventricle is normal in size with normal systolic function.  · The estimated ejection fraction is 58%.  · Normal left ventricular diastolic function.  · Atrial fibrillation not observed.  · Normal right ventricular size with normal right ventricular systolic function.  · Normal central venous pressure (3 mmHg).  · The estimated PA systolic pressure is 25 mmHg.  · No wall motion abnormality. No pulmonary hypertension, mean left atrial pressure normal      CURRENT/PREVIOUS VISIT EKG  Results for orders placed or performed in visit on 07/10/23   IN OFFICE EKG 12-LEAD (to [place])    Collection Time: 07/10/23 10:14 AM    Narrative    Test Reason : I21.4,    Vent. Rate : 064 BPM     Atrial Rate : 064 BPM     P-R Int : 190 ms          QRS Dur : 094 ms      QT Int : 400 ms       P-R-T Axes : 053 -04 -35 degrees     QTc Int : 412 ms    Normal sinus rhythm  Minimal voltage criteria for LVH, may be normal variant  T wave abnormality, consider inferior ischemia  Abnormal ECG  When compared with ECG of 26-JUN-2023  16:24,  No significant change was found  Confirmed by Guido Richardson MD ([id number]) on 7/16/2023 1:40:32 PM    Referred By:             Confirmed By:Gudio Richardson MD     No valid procedures specified.   No results found for this or any previous visit.    No valid procedures specified.          PREVIOUS STRESS TEST              PREVIOUS ANGIOGRAM        PHYSICAL EXAM    CONSTITUTIONAL: Well built, well nourished in no apparent distress  HEENT: No pallor  NECK: no JVD  LUNGS: CTA b/l  HEART: regular rate and rhythm, S1, S2 normal, no murmur   ABDOMEN: soft, non-tender; bowel sounds normal  EXTREMITIES: No edema.   NEURO: AAO X 3   SKIN:  No rash  Psych:  Normal affect    I HAVE REVIEWED :    The vital signs, nurses notes, and all the pertinent radiology and labs.        Current Outpatient Medications   Medication Instructions    amLODIPine (NORVASC) 5 mg, Oral, Daily    aspirin 81 MG Chew Chew and swallow 1 tablet (81 mg total) by mouth once daily.    atorvastatin (LIPITOR) 80 mg, Oral, Nightly    carvediloL (COREG) 6.25 mg, Oral, 2 times daily with meals    cholecalciferol, vitamin D3, 100 mcg (4,000 unit) Tab TAKE ONE TABLET BY MOUTH EVERY DAY AS A VITAMIN SUPPLEMENT    clopidogreL (PLAVIX) 75 mg, Oral, Daily    clotrimazole (LOTRIMIN) 1 % Soln APPLY SMALL AMOUNT TOPICALLY TWICE A DAY FOR FUNGAL INFECTION    ezetimibe (ZETIA) 10 mg, Oral, Daily    isosorbide mononitrate (IMDUR) 30 mg, Oral, Daily    nicotine (NICODERM CQ) 14 mg/24 hr APPLY 1 PATCH TOPICALLY ONCE DAILY FOR 28 DAYS FOR SMOKING CESSATION. DO NOT SMOKE WHILE USING PATCHES. COMPLETE THIS STRENGTH PRIOR TO STARTING 7 MG NICOTINE PATCH.    nicotine (NICODERM CQ) 7 mg/24 hr APPLY 1 PATCH TOPICALLY EVERY DAY FOR 28 DAYS FOR SMOKING CESSATION. DO NOT SMOKE WHILE USING PATCHES. START THIS STRENGTH AFTER COMPLETION OF COURSE OF 14 MG NICOTINE PATCHES.    nicotine polacrilex (NICORETTE) 4 MG Gum CHEW ONE 4MG PIECE IN MOUTH EVERY HOUR AS NEEDED    tamsulosin  (FLOMAX) 0.4 mg, Oral, Daily    urea 20 % Crea APPLY LIBERAL AMOUNT TOPICALLY TWICE A DAY AS NEEDED FOR CALLOUSED AREA.          Assessment & Plan     NSTEMI in June 2023- two-vessel CAD (distal OM2 lesion and mid RCA )- medically managed:  Normal LV function.  Currently denies angina.   Hypertension  Dyslipidemia  History of stroke  Chronic smoking      -Continue DAPT, statin, beta-blocker, amlodipine, Imdur  -he is almost finishing his cardiac rehab.  He denies any anginal symptoms.  -heart healthy diet discussed  -counseled patient regarding smoking cessation and he is following up with cessation program  -PCI of mid RCA  will be considered in future if clinically indicated based on the symptoms    Follow up in about 4 months (around 2/18/2024).

## 2023-11-06 ENCOUNTER — TELEPHONE (OUTPATIENT)
Dept: SMOKING CESSATION | Facility: CLINIC | Age: 63
End: 2023-11-06
Payer: MEDICARE

## 2023-11-07 ENCOUNTER — TELEPHONE (OUTPATIENT)
Dept: SMOKING CESSATION | Facility: CLINIC | Age: 63
End: 2023-11-07
Payer: MEDICARE

## 2023-11-13 ENCOUNTER — TELEPHONE (OUTPATIENT)
Dept: SMOKING CESSATION | Facility: CLINIC | Age: 63
End: 2023-11-13
Payer: MEDICARE

## 2023-11-29 ENCOUNTER — CLINICAL SUPPORT (OUTPATIENT)
Dept: SMOKING CESSATION | Facility: CLINIC | Age: 63
End: 2023-11-29

## 2023-11-29 DIAGNOSIS — F17.200 NICOTINE DEPENDENCE: Primary | ICD-10-CM

## 2023-11-29 PROCEDURE — 99407 BEHAV CHNG SMOKING > 10 MIN: CPT | Mod: S$GLB,,,

## 2023-11-29 PROCEDURE — 99407 PR TOBACCO USE CESSATION INTENSIVE >10 MINUTES: ICD-10-PCS | Mod: S$GLB,,,

## 2023-11-29 NOTE — PROGRESS NOTES
Spoke with patient today in regard to smoking cessation progress for 3 month telephone follow up, he states not tobacco free. Patient states not being able to cut down on smoking and not ready to return to the program at this time. Informed patient of benefit period, future follow ups, and contact information if any further help or support is needed. Will complete smart form for 3 month follow up on Quit attempt #1.

## 2023-12-27 ENCOUNTER — TELEPHONE (OUTPATIENT)
Dept: PRIMARY CARE CLINIC | Facility: CLINIC | Age: 63
End: 2023-12-27
Payer: MEDICARE

## 2023-12-27 NOTE — TELEPHONE ENCOUNTER
----- Message from Gladis Mclean sent at 12/27/2023 11:07 AM CST -----  Contact: [phone]  [email or web address]     Patient is calling for Medical Advice regarding:paper work     How long has patient had these symptoms:    Pharmacy name and phone#:    Would like response via [employer]:  no     Comments:  Pt is calling he states he dropped some paper work off last week and is asking if this has been signed yet please advise

## 2024-01-03 ENCOUNTER — TELEPHONE (OUTPATIENT)
Dept: CARDIOLOGY | Facility: CLINIC | Age: 64
End: 2024-01-03
Payer: MEDICARE

## 2024-01-03 DIAGNOSIS — R00.2 PALPITATIONS: Primary | ICD-10-CM

## 2024-01-04 ENCOUNTER — OFFICE VISIT (OUTPATIENT)
Dept: CARDIOLOGY | Facility: CLINIC | Age: 64
End: 2024-01-04
Payer: MEDICARE

## 2024-01-04 VITALS
WEIGHT: 179.44 LBS | HEART RATE: 66 BPM | SYSTOLIC BLOOD PRESSURE: 120 MMHG | BODY MASS INDEX: 26.58 KG/M2 | OXYGEN SATURATION: 97 % | HEIGHT: 69 IN | DIASTOLIC BLOOD PRESSURE: 72 MMHG

## 2024-01-04 DIAGNOSIS — Z86.73 HISTORY OF STROKE: ICD-10-CM

## 2024-01-04 DIAGNOSIS — I25.118 CORONARY ARTERY DISEASE OF NATIVE ARTERY OF NATIVE HEART WITH STABLE ANGINA PECTORIS: ICD-10-CM

## 2024-01-04 DIAGNOSIS — G81.94 LEFT HEMIPARESIS: ICD-10-CM

## 2024-01-04 DIAGNOSIS — Z95.828 HISTORY OF LEFT COMMON CAROTID ARTERY STENT PLACEMENT: ICD-10-CM

## 2024-01-04 DIAGNOSIS — J43.9 PULMONARY EMPHYSEMA, UNSPECIFIED EMPHYSEMA TYPE: ICD-10-CM

## 2024-01-04 DIAGNOSIS — F17.210 CIGARETTE NICOTINE DEPENDENCE WITHOUT COMPLICATION: ICD-10-CM

## 2024-01-04 DIAGNOSIS — E78.00 PURE HYPERCHOLESTEROLEMIA: ICD-10-CM

## 2024-01-04 DIAGNOSIS — R73.03 PREDIABETES: ICD-10-CM

## 2024-01-04 DIAGNOSIS — Z74.09 IMPAIRED FUNCTIONAL MOBILITY, BALANCE, GAIT, AND ENDURANCE: ICD-10-CM

## 2024-01-04 DIAGNOSIS — I21.4 NSTEMI (NON-ST ELEVATED MYOCARDIAL INFARCTION): Primary | ICD-10-CM

## 2024-01-04 DIAGNOSIS — I10 ESSENTIAL HYPERTENSION: ICD-10-CM

## 2024-01-04 DIAGNOSIS — E78.2 MIXED HYPERLIPIDEMIA: ICD-10-CM

## 2024-01-04 DIAGNOSIS — Z98.890 HISTORY OF LEFT COMMON CAROTID ARTERY STENT PLACEMENT: ICD-10-CM

## 2024-01-04 DIAGNOSIS — G47.33 OSA (OBSTRUCTIVE SLEEP APNEA): ICD-10-CM

## 2024-01-04 PROCEDURE — 3008F BODY MASS INDEX DOCD: CPT | Mod: CPTII,S$GLB,, | Performed by: INTERNAL MEDICINE

## 2024-01-04 PROCEDURE — 99999 PR PBB SHADOW E&M-EST. PATIENT-LVL IV: CPT | Mod: PBBFAC,,, | Performed by: INTERNAL MEDICINE

## 2024-01-04 PROCEDURE — 99214 OFFICE O/P EST MOD 30 MIN: CPT | Mod: S$GLB,,, | Performed by: INTERNAL MEDICINE

## 2024-01-04 PROCEDURE — 3074F SYST BP LT 130 MM HG: CPT | Mod: CPTII,S$GLB,, | Performed by: INTERNAL MEDICINE

## 2024-01-04 PROCEDURE — 3078F DIAST BP <80 MM HG: CPT | Mod: CPTII,S$GLB,, | Performed by: INTERNAL MEDICINE

## 2024-01-04 PROCEDURE — 1159F MED LIST DOCD IN RCRD: CPT | Mod: CPTII,S$GLB,, | Performed by: INTERNAL MEDICINE

## 2024-01-04 NOTE — PROGRESS NOTES
Subjective:    Patient ID:  Piotr Crespo is a 63 y.o. male who presents for evaluation of CAD    HPI  The patient is a 63 year male who had been under care of Dr Wayne at [facility].  He presented to [facility] 6/24/23 with chest pain and NSTEMI [ tn 1.1]underwent cardiac catheterization which showed  of mid RCA right to right and left-to-right collaterals and there was severe stenosis of distal part of the OM2 which was managed medically.He is a smoker, post CVA and left Carotid stent , hyperlipidemia, hypertension and prediabetes.He completed cardiac rehab a [facility] and has discontinued smoking but not walking. He denies chest pain or SAL. There is no family history  of CAD.        Summary 6/24/23    The left ventricle is normal in size with normal systolic function.  The estimated ejection fraction is 58%.  Normal left ventricular diastolic function.  Atrial fibrillation not observed.  Normal right ventricular size with normal right ventricular systolic function.  Normal central venous pressure (3 mmHg).  The estimated PA systolic pressure is 25 mmHg.  No wall motion abnormality. No pulmonary hypertension, mean left atrial pressure normal     6/25/23  Left Main:  No disease     LAD:  Mild disease in proximal LAD.  Tubular 30-40% stenosis in mid LAD.  Mild disease in distal LAD.   D1:  Small-sized, patent  D2:  Medium-sized, minor luminal irregularities     Ramus intermedius:  Medium to large-sized, mild disease     LCx:  Mild disease in proximal circumflex.  Diffuse 40% stenosis in distal circumflex.  OM1:  Small-sized, mild disease.  OM2:  Medium-sized.  There was a discrete 90-95% stenosis in distal segment of the vessel at the bifurcation of a small branch.  There was RAQUEL 3 flow through the upper branch and RAQUEL 2 flow through the lower small branch. This lesion is likely culprit for the MI. The lesion is not readily amenable to intervention due to small-caliber vessel.     RCA:  Tubular 80% stenosis  in proximal RCA.   of mid RCA with left-to-right and right to right collaterals.         The procedure log was documented by Documenter: RT Maki and verified by George Sherwood MD.     Date: 6/26/2023  Time: 10:33 AM  Lab Results   Component Value Date     08/04/2023    K 4.2 08/04/2023     08/04/2023    CO2 25 08/04/2023    BUN 13 08/04/2023    CREATININE 1.1 08/04/2023     (H) 08/04/2023    HGBA1C 5.6 08/04/2023    AST 23 06/23/2023    ALT 20 06/23/2023    ALBUMIN 4.6 06/23/2023    PROT 7.9 06/23/2023    BILITOT 0.6 06/23/2023    WBC 6.43 06/26/2023    HGB 12.9 (L) 06/26/2023    HCT 38.6 (L) 06/26/2023    MCV 92 06/26/2023     (L) 06/26/2023    INR 0.9 04/25/2018    PSA 0.39 08/04/2023    TSH 1.332 05/10/2022         Lab Results   Component Value Date    CHOL 117 (L) 08/04/2023    HDL 39 (L) 08/04/2023    TRIG 65 08/04/2023       Lab Results   Component Value Date    LDLCALC 65.0 08/04/2023       Past Medical History:   Diagnosis Date    Erectile dysfunction due to arterial insufficiency 7/17/2020    Essential hypertension 3/11/2020    High cholesterol     History of left common carotid artery stent placement 4/17/2020    History of stroke 3/11/2020    Hypertension     Mixed hyperlipidemia 3/11/2020    Prediabetes 7/17/2020    Stroke     sept 3, 2017       Current Outpatient Medications:     amLODIPine (NORVASC) 5 MG tablet, Take 1 tablet (5 mg total) by mouth once daily., Disp: 90 tablet, Rfl: 3    atorvastatin (LIPITOR) 80 MG tablet, Take 1 tablet (80 mg total) by mouth every evening., Disp: 90 tablet, Rfl: 3    carvediloL (COREG) 6.25 MG tablet, Take 1 tablet (6.25 mg total) by mouth 2 (two) times daily with meals., Disp: 180 tablet, Rfl: 3    cholecalciferol, vitamin D3, 100 mcg (4,000 unit) Tab, TAKE ONE TABLET BY MOUTH EVERY DAY AS A VITAMIN SUPPLEMENT, Disp: , Rfl:     clopidogreL (PLAVIX) 75 mg tablet, Take 1 tablet (75 mg total) by mouth once daily., Disp: 90  tablet, Rfl: 3    clotrimazole (LOTRIMIN) 1 % Soln, APPLY SMALL AMOUNT TOPICALLY TWICE A DAY FOR FUNGAL INFECTION, Disp: , Rfl:     ezetimibe (ZETIA) 10 mg tablet, Take 1 tablet (10 mg total) by mouth once daily., Disp: 90 tablet, Rfl: 3    nicotine (NICODERM CQ) 14 mg/24 hr, APPLY 1 PATCH TOPICALLY ONCE DAILY FOR 28 DAYS FOR SMOKING CESSATION. DO NOT SMOKE WHILE USING PATCHES. COMPLETE THIS STRENGTH PRIOR TO STARTING 7 MG NICOTINE PATCH., Disp: , Rfl:     nicotine (NICODERM CQ) 7 mg/24 hr, APPLY 1 PATCH TOPICALLY EVERY DAY FOR 28 DAYS FOR SMOKING CESSATION. DO NOT SMOKE WHILE USING PATCHES. START THIS STRENGTH AFTER COMPLETION OF COURSE OF 14 MG NICOTINE PATCHES., Disp: , Rfl:     nicotine polacrilex (NICORETTE) 4 MG Gum, CHEW ONE 4MG PIECE IN MOUTH EVERY HOUR AS NEEDED, Disp: , Rfl:     tamsulosin (FLOMAX) 0.4 mg Cap, Take 1 capsule (0.4 mg total) by mouth once daily., Disp: 90 capsule, Rfl: 3    urea 20 % Crea, APPLY LIBERAL AMOUNT TOPICALLY TWICE A DAY AS NEEDED FOR CALLOUSED AREA., Disp: , Rfl:           Review of Systems   Constitutional: Negative for decreased appetite, diaphoresis, fever, malaise/fatigue, weight gain and weight loss.   HENT:  Negative for congestion, ear discharge, ear pain and nosebleeds.    Eyes:  Negative for blurred vision, double vision and visual disturbance.   Cardiovascular:  Negative for chest pain, claudication, cyanosis, dyspnea on exertion, irregular heartbeat, leg swelling, near-syncope, orthopnea, palpitations, paroxysmal nocturnal dyspnea and syncope.   Respiratory:  Negative for cough, hemoptysis, shortness of breath, sleep disturbances due to breathing, snoring, sputum production and wheezing.    Endocrine: Negative for polydipsia, polyphagia and polyuria.   Hematologic/Lymphatic: Negative for adenopathy and bleeding problem. Does not bruise/bleed easily.   Skin:  Negative for color change, nail changes, poor wound healing and rash.   Musculoskeletal:  Negative for muscle  "cramps and muscle weakness.   Gastrointestinal:  Negative for abdominal pain, anorexia, change in bowel habit, hematochezia, nausea and vomiting.   Genitourinary:  Negative for dysuria, frequency and hematuria.   Neurological:  Negative for brief paralysis, difficulty with concentration, excessive daytime sleepiness, dizziness, focal weakness, headaches, light-headedness, seizures, vertigo and weakness.   Psychiatric/Behavioral:  Negative for altered mental status and depression.    Allergic/Immunologic: Negative for persistent infections.        Objective:/72   Pulse 66   Ht 5' 9" (1.753 m)   Wt 81.4 kg (179 lb 7.3 oz)   SpO2 97%   BMI 26.50 kg/m²             Physical Exam  Constitutional:       Appearance: He is well-developed and normal weight.   HENT:      Head: Normocephalic.      Right Ear: External ear normal.      Left Ear: External ear normal.      Nose: Nose normal.   Eyes:      General: No scleral icterus.     Pupils: Pupils are equal, round, and reactive to light.   Neck:      Thyroid: No thyromegaly.      Vascular: No JVD.      Trachea: No tracheal deviation.   Cardiovascular:      Rate and Rhythm: Normal rate and regular rhythm.      Pulses: Intact distal pulses.           Carotid pulses are 2+ on the right side and 2+ on the left side.       Dorsalis pedis pulses are 0 on the right side and 0 on the left side.        Posterior tibial pulses are 2+ on the right side and 2+ on the left side.      Heart sounds: No murmur heard.     No friction rub. No gallop.      Comments: JVP normal  Pulmonary:      Effort: Pulmonary effort is normal.      Breath sounds: Normal breath sounds.   Abdominal:      General: Bowel sounds are normal. There is no distension.      Tenderness: There is no abdominal tenderness. There is no guarding.   Musculoskeletal:         General: No tenderness. Normal range of motion.      Cervical back: Normal range of motion and neck supple.   Lymphadenopathy:      Comments: " Palpation of neck and groin lymph nodes normal   Skin:     General: Skin is dry.      Comments: Palpation of skin normal   Neurological:      Mental Status: He is alert and oriented to person, place, and time.      Cranial Nerves: No cranial nerve deficit.      Motor: No abnormal muscle tone.      Coordination: Coordination normal.   Psychiatric:         Behavior: Behavior normal.         Thought Content: Thought content normal.         Judgment: Judgment normal.           Assessment:       1. NSTEMI (non-ST elevated myocardial infarction)    2. History of stroke    3. Left hemiparesis    4. Pulmonary emphysema, unspecified emphysema type    5. Mixed hyperlipidemia    6. Essential hypertension    7. Coronary artery disease of native artery of native heart with stable angina pectoris    8. History of left common carotid artery stent placement    9. Prediabetes    10. Cigarette nicotine dependence without complication    11. KERRY (obstructive sleep apnea)    12. Impaired functional mobility, balance, gait, and endurance    13. Pure hypercholesterolemia         Plan:       Piotr was seen today for coronary artery disease.    Diagnoses and all orders for this visit:    NSTEMI (non-ST elevated myocardial infarction)    History of stroke    Left hemiparesis    Pulmonary emphysema, unspecified emphysema type    Mixed hyperlipidemia    Essential hypertension  -     Basic Metabolic Panel; Future; Expected date: 01/04/2024  -     CBC Auto Differential; Future; Expected date: 07/05/2024  -     Comprehensive Metabolic Panel; Future; Expected date: 07/05/2024    Coronary artery disease of native artery of native heart with stable angina pectoris    History of left common carotid artery stent placement    Prediabetes    Cigarette nicotine dependence without complication    KERRY (obstructive sleep apnea)    Impaired functional mobility, balance, gait, and endurance    Pure hypercholesterolemia  -     Lipid Panel; Future; Expected  date: 01/04/2024  -     Lipid Panel; Future; Expected date: 07/05/2024

## 2024-01-05 ENCOUNTER — LAB VISIT (OUTPATIENT)
Dept: LAB | Facility: HOSPITAL | Age: 64
End: 2024-01-05
Attending: INTERNAL MEDICINE
Payer: MEDICARE

## 2024-01-05 DIAGNOSIS — E78.00 PURE HYPERCHOLESTEROLEMIA: ICD-10-CM

## 2024-01-05 DIAGNOSIS — I10 ESSENTIAL HYPERTENSION: ICD-10-CM

## 2024-01-05 LAB
ANION GAP SERPL CALC-SCNC: 9 MMOL/L (ref 8–16)
BUN SERPL-MCNC: 14 MG/DL (ref 8–23)
CALCIUM SERPL-MCNC: 9.8 MG/DL (ref 8.7–10.5)
CHLORIDE SERPL-SCNC: 105 MMOL/L (ref 95–110)
CHOLEST SERPL-MCNC: 158 MG/DL (ref 120–199)
CHOLEST/HDLC SERPL: 3.4 {RATIO} (ref 2–5)
CO2 SERPL-SCNC: 28 MMOL/L (ref 23–29)
CREAT SERPL-MCNC: 1.2 MG/DL (ref 0.5–1.4)
EST. GFR  (NO RACE VARIABLE): >60 ML/MIN/1.73 M^2
GLUCOSE SERPL-MCNC: 100 MG/DL (ref 70–110)
HDLC SERPL-MCNC: 47 MG/DL (ref 40–75)
HDLC SERPL: 29.7 % (ref 20–50)
LDLC SERPL CALC-MCNC: 94.6 MG/DL (ref 63–159)
NONHDLC SERPL-MCNC: 111 MG/DL
POTASSIUM SERPL-SCNC: 4.4 MMOL/L (ref 3.5–5.1)
SODIUM SERPL-SCNC: 142 MMOL/L (ref 136–145)
TRIGL SERPL-MCNC: 82 MG/DL (ref 30–150)

## 2024-01-05 PROCEDURE — 36415 COLL VENOUS BLD VENIPUNCTURE: CPT | Mod: PN | Performed by: INTERNAL MEDICINE

## 2024-01-05 PROCEDURE — 80048 BASIC METABOLIC PNL TOTAL CA: CPT | Performed by: INTERNAL MEDICINE

## 2024-01-05 PROCEDURE — 80061 LIPID PANEL: CPT | Performed by: INTERNAL MEDICINE

## 2024-02-02 ENCOUNTER — CLINICAL SUPPORT (OUTPATIENT)
Dept: SMOKING CESSATION | Facility: CLINIC | Age: 64
End: 2024-02-02

## 2024-02-02 ENCOUNTER — OFFICE VISIT (OUTPATIENT)
Dept: PRIMARY CARE CLINIC | Facility: CLINIC | Age: 64
End: 2024-02-02
Payer: MEDICARE

## 2024-02-02 VITALS
HEART RATE: 77 BPM | TEMPERATURE: 98 F | DIASTOLIC BLOOD PRESSURE: 80 MMHG | OXYGEN SATURATION: 98 % | SYSTOLIC BLOOD PRESSURE: 134 MMHG | HEIGHT: 69 IN | WEIGHT: 180.75 LBS | BODY MASS INDEX: 26.77 KG/M2

## 2024-02-02 DIAGNOSIS — E78.2 MIXED HYPERLIPIDEMIA: ICD-10-CM

## 2024-02-02 DIAGNOSIS — I21.4 NSTEMI (NON-ST ELEVATED MYOCARDIAL INFARCTION): ICD-10-CM

## 2024-02-02 DIAGNOSIS — F17.200 NICOTINE DEPENDENCE WITH CURRENT USE: ICD-10-CM

## 2024-02-02 DIAGNOSIS — F17.200 NICOTINE DEPENDENCE: Primary | ICD-10-CM

## 2024-02-02 DIAGNOSIS — R05.3 CHRONIC COUGH: Primary | ICD-10-CM

## 2024-02-02 DIAGNOSIS — Z87.891 PERSONAL HISTORY OF NICOTINE DEPENDENCE: ICD-10-CM

## 2024-02-02 DIAGNOSIS — R73.03 PREDIABETES: ICD-10-CM

## 2024-02-02 DIAGNOSIS — D69.6 THROMBOCYTOPENIA, UNSPECIFIED: ICD-10-CM

## 2024-02-02 PROBLEM — F33.1 MODERATE EPISODE OF RECURRENT MAJOR DEPRESSIVE DISORDER: Status: RESOLVED | Noted: 2021-07-29 | Resolved: 2024-02-02

## 2024-02-02 LAB
CTP QC/QA: YES
CTP QC/QA: YES
FLUAV AG NPH QL: NEGATIVE
FLUBV AG NPH QL: NEGATIVE
SARS-COV-2 RDRP RESP QL NAA+PROBE: NEGATIVE

## 2024-02-02 PROCEDURE — 99396 PREV VISIT EST AGE 40-64: CPT | Mod: S$GLB,,, | Performed by: STUDENT IN AN ORGANIZED HEALTH CARE EDUCATION/TRAINING PROGRAM

## 2024-02-02 PROCEDURE — 1159F MED LIST DOCD IN RCRD: CPT | Mod: CPTII,S$GLB,, | Performed by: STUDENT IN AN ORGANIZED HEALTH CARE EDUCATION/TRAINING PROGRAM

## 2024-02-02 PROCEDURE — 3008F BODY MASS INDEX DOCD: CPT | Mod: CPTII,S$GLB,, | Performed by: STUDENT IN AN ORGANIZED HEALTH CARE EDUCATION/TRAINING PROGRAM

## 2024-02-02 PROCEDURE — 99999 PR PBB SHADOW E&M-EST. PATIENT-LVL IV: CPT | Mod: PBBFAC,,, | Performed by: STUDENT IN AN ORGANIZED HEALTH CARE EDUCATION/TRAINING PROGRAM

## 2024-02-02 PROCEDURE — 99999 PR PBB SHADOW E&M-EST. PATIENT-LVL I: CPT | Mod: PBBFAC,,,

## 2024-02-02 PROCEDURE — 87804 INFLUENZA ASSAY W/OPTIC: CPT | Mod: QW,S$GLB,, | Performed by: STUDENT IN AN ORGANIZED HEALTH CARE EDUCATION/TRAINING PROGRAM

## 2024-02-02 PROCEDURE — 3079F DIAST BP 80-89 MM HG: CPT | Mod: CPTII,S$GLB,, | Performed by: STUDENT IN AN ORGANIZED HEALTH CARE EDUCATION/TRAINING PROGRAM

## 2024-02-02 PROCEDURE — 87635 SARS-COV-2 COVID-19 AMP PRB: CPT | Mod: QW,S$GLB,, | Performed by: STUDENT IN AN ORGANIZED HEALTH CARE EDUCATION/TRAINING PROGRAM

## 2024-02-02 PROCEDURE — 3075F SYST BP GE 130 - 139MM HG: CPT | Mod: CPTII,S$GLB,, | Performed by: STUDENT IN AN ORGANIZED HEALTH CARE EDUCATION/TRAINING PROGRAM

## 2024-02-02 RX ORDER — BENZONATATE 200 MG/1
200 CAPSULE ORAL 3 TIMES DAILY PRN
Qty: 30 CAPSULE | Refills: 0 | Status: SHIPPED | OUTPATIENT
Start: 2024-02-02 | End: 2024-02-12

## 2024-02-02 RX ORDER — ISOSORBIDE MONONITRATE 30 MG/1
30 TABLET, EXTENDED RELEASE ORAL
COMMUNITY
Start: 2024-01-27

## 2024-02-02 NOTE — PROGRESS NOTES
Spoke with patient today in regard to smoking cessation progress for 6 month telephone follow up, he states not tobacco free.  Patient states he has a lot going on and not interested in returning to the program at this time.  Patient states he will contact me in a couple months to schedule an appointment. Informed patient of benefit period, future follow up, and contact information if any further help or support is needed.  Will complete smart form for 6 month follow up on Quit attempt #1.

## 2024-02-02 NOTE — PROGRESS NOTES
Primary Care  Annual Office Visit - In Person  2/2/2024        Patient is a 63 y.o.   Piotr Crespo  has a past medical history of Erectile dysfunction due to arterial insufficiency (7/17/2020), Essential hypertension (3/11/2020), High cholesterol, History of left common carotid artery stent placement (4/17/2020), History of stroke (3/11/2020), Hypertension, Mixed hyperlipidemia (3/11/2020), Prediabetes (7/17/2020), and Stroke.    Patient reports worsening cough and nasal congestion over the past 3 months   He states that he is currently smoking 1/2 PPD       Active Medications  Current Outpatient Medications   Medication Instructions    amLODIPine (NORVASC) 5 mg, Oral, Daily    atorvastatin (LIPITOR) 80 mg, Oral, Nightly    benzonatate (TESSALON) 200 mg, Oral, 3 times daily PRN    carvediloL (COREG) 6.25 mg, Oral, 2 times daily with meals    cholecalciferol, vitamin D3, 100 mcg (4,000 unit) Tab TAKE ONE TABLET BY MOUTH EVERY DAY AS A VITAMIN SUPPLEMENT    clopidogreL (PLAVIX) 75 mg, Oral, Daily    clotrimazole (LOTRIMIN) 1 % Soln APPLY SMALL AMOUNT TOPICALLY TWICE A DAY FOR FUNGAL INFECTION    ezetimibe (ZETIA) 10 mg, Oral, Daily    isosorbide mononitrate (IMDUR) 30 mg, Oral    nicotine polacrilex (NICORETTE) 4 MG Gum CHEW ONE 4MG PIECE IN MOUTH EVERY HOUR AS NEEDED    tamsulosin (FLOMAX) 0.4 mg, Oral, Daily    urea 20 % Crea APPLY LIBERAL AMOUNT TOPICALLY TWICE A DAY AS NEEDED FOR CALLOUSED AREA.       Annual Review of Preventative Care    Health Maintenance Due   Topic Date Due    RSV Vaccine (Age 60+ and Pregnant patients) (1 - 1-dose 60+ series) Never done    LDCT Lung Screen  07/07/2023    Influenza Vaccine (1) 09/01/2023    COVID-19 Vaccine (6 - 2023-24 season) 09/01/2023     Last PSA:   Lab Results   Component Value Date    PSA 0.39 08/04/2023    PSA 0.50 05/10/2022    PSA 0.43 04/29/2021     Diabetes Screening:    Lab Results   Component Value Date    HGBA1C 5.6 08/04/2023    HGBA1C 5.9 (H) 05/10/2022     HGBA1C 5.9 (H) 04/29/2021     BP Readings from Last 3 Encounters:   02/02/24 134/80   01/04/24 120/72   10/18/23 128/80     Wt Readings from Last 3 Encounters:   02/02/24 0859 82 kg (180 lb 12.4 oz)   01/04/24 1248 81.4 kg (179 lb 7.3 oz)   10/18/23 0934 79 kg (174 lb 2.6 oz)     Colon Cancer Screening:  Next Due 2025    Physical Exam  Vitals reviewed.   Constitutional:       General: He is not in acute distress.  Eyes:      Pupils: Pupils are equal, round, and reactive to light.   Cardiovascular:      Rate and Rhythm: Normal rate and regular rhythm.      Pulses: Normal pulses.      Heart sounds: Normal heart sounds.   Pulmonary:      Effort: Pulmonary effort is normal.      Breath sounds: Normal breath sounds.   Abdominal:      General: Abdomen is flat. Bowel sounds are normal.      Palpations: Abdomen is soft.   Musculoskeletal:      Right lower leg: No edema.      Left lower leg: No edema.                 Assessment and Plan     1. Chronic cough  -     POCT Influenza A/B  -     POCT COVID-19 Rapid Screening; Future; Expected date: 02/02/2024  -     benzonatate (TESSALON) 200 MG capsule; Take 1 capsule (200 mg total) by mouth 3 (three) times daily as needed for Cough.  Dispense: 30 capsule; Refill: 0    2. Nicotine dependence with current use  Comments:  Schedule lung cancer screening  Benefits to cessation discussed. Strongly advised to quit  3 minutes spent counseling on quitting smoking to improve health.    3. NSTEMI (non-ST elevated myocardial infarction)  Comments:  S/p [facility] 6/24   Continue plavix and Coreg 6.25 mg BID   Patient states that he was informed by cardiology to discontinue aspirin and Imdur    4. Thrombocytopenia, unspecified  Comments:  Continue to monitor   LFTs normal range   Hepatitis C serology negative    5. Mixed hyperlipidemia  Comments:  LDL 94  Continue Lipitor 80 mg daily and Zetia 10 mg daily    6. Prediabetes  Comments:  Well controlled with diet   HbA1C 5.6                                      Upcoming Scheduled Appointments and Follow Up:    Future Appointments   Date Time Provider Department Center   3/22/2024 10:00 AM Anjelica Briggs DPM [facility] [place]   6/4/2024  9:00 AM Lory Pugh MD [facility] [place]       Follow Up [facility] (with who? when?): Follow up in about 4 months (around 6/2/2024).        Extended Emergency Contact Information  Primary Emergency Contact: ZakJina  Address: [address] [place] [address]  Home Phone: [phone]  Mobile Phone: [phone]  Relation: Spouse      Lory Pugh MD   Internal Medicine  2/2/2024 - 9:08 AM    I spent a total of 30 minutes on the day of the visit.This includes face to face time and non-face to face time preparing to see the patient (eg, review of tests), obtaining and/or reviewing separately obtained history, documenting clinical information in the electronic or other health record, independently interpreting results and communicating results to the patient/family/caregiver, or care coordinator.    While patients have the right to access their medical record, it is essential to recognize that progress notes primarily serve as a means of communication among healthcare professionals.

## 2024-02-08 ENCOUNTER — HOSPITAL ENCOUNTER (OUTPATIENT)
Dept: RADIOLOGY | Facility: HOSPITAL | Age: 64
Discharge: HOME | End: 2024-02-08
Attending: STUDENT IN AN ORGANIZED HEALTH CARE EDUCATION/TRAINING PROGRAM
Payer: MEDICARE

## 2024-02-08 DIAGNOSIS — F17.200 NICOTINE DEPENDENCE WITH CURRENT USE: ICD-10-CM

## 2024-02-08 DIAGNOSIS — Z87.891 PERSONAL HISTORY OF NICOTINE DEPENDENCE: ICD-10-CM

## 2024-02-08 PROCEDURE — 71271 CT THORAX LUNG CANCER SCR C-: CPT | Mod: 26,,, | Performed by: STUDENT IN AN ORGANIZED HEALTH CARE EDUCATION/TRAINING PROGRAM

## 2024-02-08 PROCEDURE — 71271 CT THORAX LUNG CANCER SCR C-: CPT | Mod: TC

## 2024-02-12 ENCOUNTER — TELEPHONE (OUTPATIENT)
Dept: PRIMARY CARE CLINIC | Facility: CLINIC | Age: 64
End: 2024-02-12
Payer: MEDICARE

## 2024-02-12 NOTE — TELEPHONE ENCOUNTER
----- Message from Varsha Llanos sent at 2/12/2024 10:11 AM CST -----  Regarding: CT LUNG LOW DOSE [[id number]]  2/12/2024       Hello,         I received a call from [facility],  regarding CT LUNG LOW DOSE [[id number]] regarding his test results from his recent CT of the Lung. Please give him a call. He can be reached at [phone].        Thank you,   Varsha TORREZ   Access Navigator

## 2024-03-13 DIAGNOSIS — E78.5 HYPERLIPIDEMIA LDL GOAL <70: ICD-10-CM

## 2024-03-13 DIAGNOSIS — I10 ESSENTIAL HYPERTENSION: ICD-10-CM

## 2024-03-13 RX ORDER — ATORVASTATIN CALCIUM 80 MG/1
80 TABLET, FILM COATED ORAL NIGHTLY
Qty: 90 TABLET | Refills: 1 | Status: SHIPPED | OUTPATIENT
Start: 2024-03-13

## 2024-03-13 RX ORDER — EZETIMIBE 10 MG/1
10 TABLET ORAL
Qty: 90 TABLET | Refills: 1 | Status: SHIPPED | OUTPATIENT
Start: 2024-03-13

## 2024-03-13 RX ORDER — CARVEDILOL 6.25 MG/1
6.25 TABLET ORAL 2 TIMES DAILY WITH MEALS
Qty: 180 TABLET | Refills: 3 | Status: SHIPPED | OUTPATIENT
Start: 2024-03-13

## 2024-03-13 NOTE — TELEPHONE ENCOUNTER
No care due was identified.  Health [facility] Embedded Care Due Messages. Reference number: [id number].   3/13/2024 1:35:49 AM CDT

## 2024-03-13 NOTE — TELEPHONE ENCOUNTER
Refill Decision Note   Piotr Crespo  is requesting a refill authorization.  Brief Assessment and Rationale for Refill:  Approve     Medication Therapy Plan:         Pharmacist review requested: Yes   Extended chart review required: Yes   Comments:     Note composed:1:11 PM 03/13/2024

## 2024-03-13 NOTE — TELEPHONE ENCOUNTER
Refill Routing Note   Medication(s) are not appropriate for processing by [facility] for the following reason(s):        Drug-disease interaction    [facility] action(s):  Defer  Approve        Medication Therapy Plan: Drug-Disease: carvediloL and Pulmonary emphysema, unspecified emphysema type    Pharmacist review requested: Yes     Appointments  past 12m or future 3m with PCP    Date Provider   Last Visit   2/2/2024 Lory Pugh MD   Next Visit   6/4/2024 Lory Pugh MD   ED visits in past 90 days: 0        Note composed:11:15 AM 03/13/2024

## 2024-03-22 ENCOUNTER — OFFICE VISIT (OUTPATIENT)
Dept: PODIATRY | Facility: CLINIC | Age: 64
End: 2024-03-22
Payer: MEDICARE

## 2024-03-22 VITALS
HEART RATE: 79 BPM | DIASTOLIC BLOOD PRESSURE: 81 MMHG | RESPIRATION RATE: 18 BRPM | WEIGHT: 176 LBS | BODY MASS INDEX: 26.07 KG/M2 | HEIGHT: 69 IN | SYSTOLIC BLOOD PRESSURE: 123 MMHG

## 2024-03-22 DIAGNOSIS — L84 CORN OR CALLUS: ICD-10-CM

## 2024-03-22 DIAGNOSIS — Z86.73 HISTORY OF STROKE: Primary | ICD-10-CM

## 2024-03-22 DIAGNOSIS — B35.1 ONYCHOMYCOSIS DUE TO DERMATOPHYTE: ICD-10-CM

## 2024-03-22 DIAGNOSIS — Z79.01 CHRONIC ANTICOAGULATION: ICD-10-CM

## 2024-03-22 DIAGNOSIS — Z74.09 IMPAIRED FUNCTIONAL MOBILITY, BALANCE, GAIT, AND ENDURANCE: ICD-10-CM

## 2024-03-22 DIAGNOSIS — Z72.0 TOBACCO ABUSE: ICD-10-CM

## 2024-03-22 PROCEDURE — 99204 OFFICE O/P NEW MOD 45 MIN: CPT | Mod: 25,S$GLB,, | Performed by: PODIATRIST

## 2024-03-22 PROCEDURE — 11721 DEBRIDE NAIL 6 OR MORE: CPT | Mod: 59,Q9,S$GLB, | Performed by: PODIATRIST

## 2024-03-22 PROCEDURE — 1159F MED LIST DOCD IN RCRD: CPT | Mod: CPTII,S$GLB,, | Performed by: PODIATRIST

## 2024-03-22 PROCEDURE — 3074F SYST BP LT 130 MM HG: CPT | Mod: CPTII,S$GLB,, | Performed by: PODIATRIST

## 2024-03-22 PROCEDURE — 3008F BODY MASS INDEX DOCD: CPT | Mod: CPTII,S$GLB,, | Performed by: PODIATRIST

## 2024-03-22 PROCEDURE — 1160F RVW MEDS BY RX/DR IN RCRD: CPT | Mod: CPTII,S$GLB,, | Performed by: PODIATRIST

## 2024-03-22 PROCEDURE — 3079F DIAST BP 80-89 MM HG: CPT | Mod: CPTII,S$GLB,, | Performed by: PODIATRIST

## 2024-03-22 PROCEDURE — 11056 PARNG/CUTG B9 HYPRKR LES 2-4: CPT | Mod: Q9,S$GLB,, | Performed by: PODIATRIST

## 2024-03-22 PROCEDURE — 99999 PR PBB SHADOW E&M-EST. PATIENT-LVL IV: CPT | Mod: PBBFAC,,, | Performed by: PODIATRIST

## 2024-03-22 RX ORDER — ERYTHROMYCIN 5 MG/G
OINTMENT OPHTHALMIC
COMMUNITY
Start: 2024-03-04

## 2024-03-22 RX ORDER — CROMOLYN SODIUM 40 MG/ML
SOLUTION/ DROPS OPHTHALMIC
COMMUNITY
Start: 2024-03-04

## 2024-03-22 NOTE — PROGRESS NOTES
Subjective:      Patient ID: Piotr Crespo is a 63 y.o. male.    Chief Complaint:   Diabetes Mellitus (Pre diabetic ), Callouses, and Foot Pain (Rt foot )    Piotr is a 63 y.o. male who presents to the clinic for evaluation and treatment of high risk feet. Piotr has a past medical history of Erectile dysfunction due to arterial insufficiency (7/17/2020), Essential hypertension (3/11/2020), High cholesterol, History of left common carotid artery stent placement (4/17/2020), History of stroke (3/11/2020), Hypertension, Mixed hyperlipidemia (3/11/2020), Prediabetes (7/17/2020), and Stroke.     Patient new to podiatry here at [facility]  Relates [facility] the [facility]  He relates he has had some right foot pain from a callus on the bottom that [facility] would occasionally shaved down  He is using diabetic cream  He is prediabetic  About a year ago he saw a podiatrist in [facility] who wanted to perform Surgery of his foot  He was not sure he was interested in that option    He relates that he did have [facility] however they did not fit in his shoes he would have to buy bigger shoes he currently wears Nikes but does have new balance at home  He is unsure if they helped  His left side is weak from his stroke he denies any history of AFO he has done a lot of physical therapy in the past but nothing recent    His wife helps cut his nails occasionally since he has been waiting to come see Podiatry his wife also bottom some callus type pads to try    Results  Order Name Results Value Reference Range Date Interpretation Specimen Comments Source   GLYCOLATED HEMOGLOBIN A1C HEMOGLOBIN A1C/HEMOGLOBIN.TOTAL IN BLOOD BY HPLC 6.2 4.2 - 5.8 03/09/2023           PCP: Lory Pugh MD    Date Last Seen by PCP: 2/2/24    Cardiology: 1/4/24      Current shoe gear:  Affected Foot: Tennis shoes     Unaffected Foot: Tennis shoes        Hemoglobin A1C    Date Value Ref Range Status   08/04/2023 5.6 4.0 - 5.6 % Final     Comment:     ADA Screening Guidelines:  5.7-6.4%  Consistent with prediabetes  >or=6.5%  Consistent with diabetes    High levels of fetal hemoglobin interfere with the HbA1C  assay. Heterozygous hemoglobin variants (HbS, HgC, etc)do  not significantly interfere with this assay.   However, presence of multiple variants may affect accuracy.     05/10/2022 5.9 (H) 4.0 - 5.6 % Final     Comment:     ADA Screening Guidelines:  5.7-6.4%  Consistent with prediabetes  >or=6.5%  Consistent with diabetes    High levels of fetal hemoglobin interfere with the HbA1C  assay. Heterozygous hemoglobin variants (HbS, HgC, etc)do  not significantly interfere with this assay.   However, presence of multiple variants may affect accuracy.     04/29/2021 5.9 (H) 4.0 - 5.6 % Final     Comment:     ADA Screening Guidelines:  5.7-6.4%  Consistent with prediabetes  >or=6.5%  Consistent with diabetes    High levels of fetal hemoglobin interfere with the HbA1C  assay. Heterozygous hemoglobin variants (HbS, HgC, etc)do  not significantly interfere with this assay.   However, presence of multiple variants may affect accuracy.          Past Medical History:   Diagnosis Date    Erectile dysfunction due to arterial insufficiency 7/17/2020    Essential hypertension 3/11/2020    High cholesterol     History of left common carotid artery stent placement 4/17/2020    History of stroke 3/11/2020    Hypertension     Mixed hyperlipidemia 3/11/2020    Prediabetes 7/17/2020    Stroke     sept 3, 2017     Past Surgical History:   Procedure Laterality Date    ANGIOGRAM, CORONARY, WITH LEFT HEART CATHETERIZATION Left 6/24/2023    Procedure: Angiogram, Coronary, with Left Heart Cath;  Surgeon: George Sherwood MD;  Location: [facility] CATH/EP LAB;  Service: Cardiology;  Laterality: Left;    CAROTID ARTERY ANGIOPLASTY Left     left Internal carotid artery stent    CEREBRAL ANEURYSM REPAIR    "   COLONOSCOPY N/A 06/30/2020    Procedure: COLONOSCOPY;  Surgeon: Yuan Pizano MD;  Location: [facility] ([address] [place] [address]);  Service: Endoscopy;  Laterality: N/A;  3/30/20- Per Dr. Catherine, Pt to be r/s for later date, "screening"- [facility]  3/30/20 - LM for Pt to call back to r/s for later date per Dr. Catherine- [facility]  COVID screening on 6/27/20 - [facility] urgent care- [facility]    LUMBAR DISCECTOMY       Current Outpatient Medications on File Prior to Visit   Medication Sig Dispense Refill    amLODIPine (NORVASC) 5 MG tablet Take 1 tablet (5 mg total) by mouth once daily. 90 tablet 3    atorvastatin (LIPITOR) 80 MG tablet TAKE 1 TABLET EVERY EVENING 90 tablet 1    carvediloL (COREG) 6.25 MG tablet TAKE 1 TABLET TWICE DAILY WITH MEALS 180 tablet 3    cholecalciferol, vitamin D3, 100 mcg (4,000 unit) Tab TAKE ONE TABLET BY MOUTH EVERY DAY AS A VITAMIN SUPPLEMENT      clopidogreL (PLAVIX) 75 mg tablet Take 1 tablet (75 mg total) by mouth once daily. 90 tablet 3    clotrimazole (LOTRIMIN) 1 % Soln APPLY SMALL AMOUNT TOPICALLY TWICE A DAY FOR FUNGAL INFECTION      cromolyn (OPTICROM) 4 % ophthalmic solution INSTILL 2 DROPS IN EACH EYE TWICE A DAY FOR ALLERGIC CONJUNCTIVITIS      erythromycin (ROMYCIN) ophthalmic ointment APPLY THIN RIBBON TO EACH EYE THREE TIMES A DAY FOR EYE INFECTION      ezetimibe (ZETIA) 10 mg tablet TAKE 1 TABLET ONE TIME DAILY 90 tablet 1    isosorbide mononitrate (IMDUR) 30 MG 24 hr tablet Take 30 mg by mouth.      nicotine polacrilex (NICORETTE) 4 MG Gum CHEW ONE 4MG PIECE IN MOUTH EVERY HOUR AS NEEDED      tamsulosin (FLOMAX) 0.4 mg Cap Take 1 capsule (0.4 mg total) by mouth once daily. 90 capsule 3    urea 20 % Crea APPLY LIBERAL AMOUNT TOPICALLY TWICE A DAY AS NEEDED FOR CALLOUSED AREA.       No current facility-administered medications on file prior to visit.     Review of patient's allergies indicates:  No Known Allergies    Review of Systems   Constitutional: Negative for chills, decreased appetite, fever, " "malaise/fatigue, night sweats, weight gain and weight loss.   Cardiovascular:  Negative for chest pain, claudication, dyspnea on exertion, leg swelling, palpitations and syncope.   Respiratory:  Negative for cough and shortness of breath.    Endocrine: Negative for cold intolerance and heat intolerance.   Hematologic/Lymphatic: Negative for bleeding problem. Bruises/bleeds easily.   Skin:  Positive for dry skin and nail changes. Negative for color change, flushing, itching, poor wound healing, rash, skin cancer, suspicious lesions and unusual hair distribution.   Musculoskeletal:  Positive for muscle weakness and stiffness. Negative for arthritis, back pain, falls, gout, joint pain, joint swelling, muscle cramps, myalgias and neck pain.   Gastrointestinal:  Negative for diarrhea, nausea and vomiting.   Neurological:  Positive for numbness and paresthesias. Negative for dizziness, focal weakness, light-headedness, tremors, vertigo and weakness.   Psychiatric/Behavioral:  Negative for altered mental status and depression. The patient does not have insomnia.    Allergic/Immunologic: Negative.            Objective:       Vitals:    03/22/24 1009   BP: 123/81   Pulse: 79   Resp: 18   Weight: 79.8 kg (176 lb)   Height: 5' 9" (1.753 m)   PainSc:   8   PainLoc: Foot   79.8 kg (176 lb)     Physical Exam  Vitals reviewed.   Constitutional:       General: He is not in acute distress.     Appearance: He is well-developed. He is not ill-appearing, toxic-appearing or diaphoretic.      Comments: Tennis shoes    Cardiovascular:      Pulses:           Dorsalis pedis pulses are 2+ on the right side and 2+ on the left side.        Posterior tibial pulses are 1+ on the right side and 1+ on the left side.   Musculoskeletal:         General: No swelling.      Right lower leg: No edema.      Left lower leg: No edema (Left ankle nonpitting).      Right ankle: Normal.      Right Achilles Tendon: Normal.      Left ankle: Normal.      Left " Achilles Tendon: Normal.      Right foot: Decreased range of motion. Deformity, bunion and tenderness present. No bony tenderness.      Left foot: Decreased range of motion. Deformity, bunion and foot drop present. No tenderness or bony tenderness.      Comments: Pain on palpation to right sub 4th metatarsophalangeal joint porokeratosis    No pain to digits a foot with range of motion    Left foot decreased eversion plantar flexion and dorsiflexion/strength decreased   Feet:      Right foot:      Protective Sensation: 10 sites tested.  10 sites sensed.      Skin integrity: Callus and dry skin present. No ulcer, blister, skin breakdown, erythema or warmth.      Toenail Condition: Right toenails are abnormally thick. Fungal disease present.     Left foot:      Protective Sensation: 10 sites tested.  10 sites sensed.      Skin integrity: Callus and dry skin present. No ulcer, blister, skin breakdown, erythema or warmth.      Toenail Condition: Left toenails are abnormally thick. Fungal disease present.     Comments: Mild hypersensitivity left foot  Some decreased sensation digits    Focal hyperkeratotic lesion with painful central core consisting entirely of hyperkeratotic tissue without open skin, drainage, pus, fluctuance, malodor, or signs of infection: sub4th right MTPJ  No signs of verrucae    Focal hyperkeratotic lesion with painful central core consisting entirely of hyperkeratotic tissue without open skin, drainage, pus, fluctuance, malodor, or signs of infection: right 1st IPJ hallux                Skin:     General: Skin is warm and dry.      Capillary Refill: Capillary refill takes 2 to 3 seconds.      Coloration: Skin is not pale.      Findings: No erythema or rash.      Nails: There is no clubbing.   Neurological:      Mental Status: He is alert and oriented to person, place, and time.      Gait: Gait abnormal.   Psychiatric:         Attention and Perception: Attention normal.         Mood and Affect: Mood  normal.         Speech: Speech normal.         Behavior: Behavior normal.         Thought Content: Thought content normal.         Cognition and Memory: Cognition normal.         Judgment: Judgment normal.               Assessment:       Encounter Diagnoses   Name Primary?    History of stroke Yes    Impaired functional mobility, balance, gait, and endurance     Tobacco abuse     Onychomycosis due to dermatophyte     Corn or callus     Chronic anticoagulation          Plan:       Piotr was seen today for diabetes mellitus, callouses and foot pain.    Diagnoses and all orders for this visit:    History of stroke    Impaired functional mobility, balance, gait, and endurance    Tobacco abuse    Onychomycosis due to dermatophyte    Corn or callus    Chronic anticoagulation      I counseled the patient on his conditions, their implications and medical management.     Foot exam patient prediabetic    Discussed with patient drop foot consider physical therapy repeat in his assessment for ankle-foot orthosis    Ideally patient would benefit from custom molded orthotics patient relates he has some but they did not fit in his shoe agree that he would need a wider shoe  Patient will look at them and bring in for next visit if needed [facility]    With patient's permission, the toenails mentioned above were aggressively reduced and debrided using a nail nipper, removing all offending nail and debris. Utilizing a #15 scalpel, I trimmed the corns and calluses at the above mentioned location x 2.      The patient will continue to monitor the areas daily, inspect the feet, wear protective shoe gear when ambulatory, and moisturizer to maintain skin integrity.     Continue topical cream patient has urea listed in his med list as well as discusses he has diabetic foot cream      MT offloading pad to try for right foot    Consider x-rays  Consider surgical consult however patient relates he had  previously been worked up and chose the conservative route        Follow up in about 2 months (around 5/22/2024).

## 2024-05-22 ENCOUNTER — TELEPHONE (OUTPATIENT)
Dept: PODIATRY | Facility: CLINIC | Age: 64
End: 2024-05-22
Payer: MEDICARE

## 2024-05-22 NOTE — TELEPHONE ENCOUNTER
Spoke with patient to confirm his appointment on 05/24/2024 with Dr. Briggs(Podiatry), patient has verbally confirmed appt.

## 2024-05-24 ENCOUNTER — OFFICE VISIT (OUTPATIENT)
Dept: PODIATRY | Facility: CLINIC | Age: 64
End: 2024-05-24
Payer: MEDICARE

## 2024-05-24 VITALS
HEART RATE: 68 BPM | BODY MASS INDEX: 26.97 KG/M2 | SYSTOLIC BLOOD PRESSURE: 126 MMHG | HEIGHT: 69 IN | WEIGHT: 182.13 LBS | DIASTOLIC BLOOD PRESSURE: 79 MMHG

## 2024-05-24 DIAGNOSIS — Z74.09 IMPAIRED FUNCTIONAL MOBILITY, BALANCE, GAIT, AND ENDURANCE: ICD-10-CM

## 2024-05-24 DIAGNOSIS — Z79.01 CHRONIC ANTICOAGULATION: ICD-10-CM

## 2024-05-24 DIAGNOSIS — Z86.73 HISTORY OF STROKE: Primary | ICD-10-CM

## 2024-05-24 PROCEDURE — 1159F MED LIST DOCD IN RCRD: CPT | Mod: HCNC,CPTII,S$GLB, | Performed by: PODIATRIST

## 2024-05-24 PROCEDURE — 1160F RVW MEDS BY RX/DR IN RCRD: CPT | Mod: HCNC,CPTII,S$GLB, | Performed by: PODIATRIST

## 2024-05-24 PROCEDURE — 3078F DIAST BP <80 MM HG: CPT | Mod: HCNC,CPTII,S$GLB, | Performed by: PODIATRIST

## 2024-05-24 PROCEDURE — 99214 OFFICE O/P EST MOD 30 MIN: CPT | Mod: HCNC,S$GLB,, | Performed by: PODIATRIST

## 2024-05-24 PROCEDURE — 3074F SYST BP LT 130 MM HG: CPT | Mod: HCNC,CPTII,S$GLB, | Performed by: PODIATRIST

## 2024-05-24 PROCEDURE — 3008F BODY MASS INDEX DOCD: CPT | Mod: HCNC,CPTII,S$GLB, | Performed by: PODIATRIST

## 2024-05-24 PROCEDURE — 99999 PR PBB SHADOW E&M-EST. PATIENT-LVL III: CPT | Mod: PBBFAC,HCNC,, | Performed by: PODIATRIST

## 2024-05-24 RX ORDER — ASPIRIN/CALCIUM CARB/MAGNESIUM 325 MG
TABLET ORAL
COMMUNITY
Start: 2024-04-22

## 2024-05-24 RX ORDER — TAMSULOSIN HYDROCHLORIDE 0.4 MG/1
1 CAPSULE ORAL DAILY
COMMUNITY
Start: 2024-04-22 | End: 2024-06-04 | Stop reason: SDUPTHER

## 2024-05-24 RX ORDER — BENZONATATE 200 MG/1
1 CAPSULE ORAL 3 TIMES DAILY PRN
COMMUNITY
Start: 2024-04-22

## 2024-05-24 RX ORDER — IBUPROFEN 200 MG
TABLET ORAL
COMMUNITY
Start: 2024-04-22

## 2024-05-24 NOTE — PROGRESS NOTES
Subjective:      Patient ID: Piotr Crespo is a 63 y.o. male.    Chief Complaint:   Follow-up (Bilateral/PCP- 02/02/2024/Lory Pugh MD)    Piotr is a 63 y.o. male who presents to the clinic for evaluation and treatment of high risk feet. Piotr has a past medical history of Erectile dysfunction due to arterial insufficiency (7/17/2020), Essential hypertension (3/11/2020), High cholesterol, History of left common carotid artery stent placement (4/17/2020), History of stroke (3/11/2020), Hypertension, Mixed hyperlipidemia (3/11/2020), Prediabetes (7/17/2020), and Stroke.     Pt rtc foot exam/f/u.   Doing good  No new c/o    Last visit:  Foot exam patient prediabetic    Discussed with patient drop foot consider physical therapy repeat in his assessment for ankle-foot orthosis    Ideally patient would benefit from custom molded orthotics patient relates he has some but they did not fit in his shoe agree that he would need a wider shoe  Patient will look at them and bring in for next visit if needed [facility]    With patient's permission, the toenails mentioned above were aggressively reduced and debrided using a nail nipper, removing all offending nail and debris. Utilizing a #15 scalpel, I trimmed the corns and calluses at the above mentioned location x 2.      The patient will continue to monitor the areas daily, inspect the feet, wear protective shoe gear when ambulatory, and moisturizer to maintain skin integrity.     Continue topical cream patient has urea listed in his med list as well as discusses he has diabetic foot cream      MT offloading pad to try for right foot    Consider x-rays  Consider surgical consult however patient relates he had previously been worked up and chose the conservative route        PCP: Lory Pugh MD    Date Last Seen by PCP: 2/2/24    Cardiology: 2/2/24      Current shoe gear:  Affected Foot: Tennis shoes     Unaffected Foot: Tennis  bulmaro        Hemoglobin A1C   Date Value Ref Range Status   08/04/2023 5.6 4.0 - 5.6 % Final     Comment:     ADA Screening Guidelines:  5.7-6.4%  Consistent with prediabetes  >or=6.5%  Consistent with diabetes    High levels of fetal hemoglobin interfere with the HbA1C  assay. Heterozygous hemoglobin variants (HbS, HgC, etc)do  not significantly interfere with this assay.   However, presence of multiple variants may affect accuracy.     05/10/2022 5.9 (H) 4.0 - 5.6 % Final     Comment:     ADA Screening Guidelines:  5.7-6.4%  Consistent with prediabetes  >or=6.5%  Consistent with diabetes    High levels of fetal hemoglobin interfere with the HbA1C  assay. Heterozygous hemoglobin variants (HbS, HgC, etc)do  not significantly interfere with this assay.   However, presence of multiple variants may affect accuracy.     04/29/2021 5.9 (H) 4.0 - 5.6 % Final     Comment:     ADA Screening Guidelines:  5.7-6.4%  Consistent with prediabetes  >or=6.5%  Consistent with diabetes    High levels of fetal hemoglobin interfere with the HbA1C  assay. Heterozygous hemoglobin variants (HbS, HgC, etc)do  not significantly interfere with this assay.   However, presence of multiple variants may affect accuracy.          Past Medical History:   Diagnosis Date    Erectile dysfunction due to arterial insufficiency 7/17/2020    Essential hypertension 3/11/2020    High cholesterol     History of left common carotid artery stent placement 4/17/2020    History of stroke 3/11/2020    Hypertension     Mixed hyperlipidemia 3/11/2020    Prediabetes 7/17/2020    Stroke     sept 3, 2017     Past Surgical History:   Procedure Laterality Date    ANGIOGRAM, CORONARY, WITH LEFT HEART CATHETERIZATION Left 6/24/2023    Procedure: Angiogram, Coronary, with Left Heart Cath;  Surgeon: George Sherwood MD;  Location: [facility] CATH/EP LAB;  Service: Cardiology;  Laterality: Left;    CAROTID ARTERY ANGIOPLASTY Left     left Internal carotid artery stent     "CEREBRAL ANEURYSM REPAIR      COLONOSCOPY N/A 06/30/2020    Procedure: COLONOSCOPY;  Surgeon: Yuan Pizano MD;  Location: [facility] ([address] [place] [address]);  Service: Endoscopy;  Laterality: N/A;  3/30/20- Per Dr. Catherine, Pt to be r/s for later date, "screening"- ER  3/30/20 - LM for Pt to call back to r/s for later date per Dr. Catherine- [facility]  COVID screening on 6/27/20 - [facility] urgent care- [facility]    LUMBAR DISCECTOMY       Current Outpatient Medications on File Prior to Visit   Medication Sig Dispense Refill    amLODIPine (NORVASC) 5 MG tablet Take 1 tablet (5 mg total) by mouth once daily. 90 tablet 3    atorvastatin (LIPITOR) 80 MG tablet TAKE 1 TABLET EVERY EVENING 90 tablet 1    benzonatate (TESSALON) 200 MG capsule Take 1 capsule by mouth 3 times daily as needed.      carvediloL (COREG) 6.25 MG tablet TAKE 1 TABLET TWICE DAILY WITH MEALS 180 tablet 3    cholecalciferol, vitamin D3, 100 mcg (4,000 unit) Tab TAKE ONE TABLET BY MOUTH EVERY DAY AS A VITAMIN SUPPLEMENT      clopidogreL (PLAVIX) 75 mg tablet Take 1 tablet (75 mg total) by mouth once daily. 90 tablet 3    clotrimazole (LOTRIMIN) 1 % Soln APPLY SMALL AMOUNT TOPICALLY TWICE A DAY FOR FUNGAL INFECTION      cromolyn (OPTICROM) 4 % ophthalmic solution INSTILL 2 DROPS IN EACH EYE TWICE A DAY FOR ALLERGIC CONJUNCTIVITIS      erythromycin (ROMYCIN) ophthalmic ointment APPLY THIN RIBBON TO EACH EYE THREE TIMES A DAY FOR EYE INFECTION      ezetimibe (ZETIA) 10 mg tablet TAKE 1 TABLET ONE TIME DAILY 90 tablet 1    isosorbide mononitrate (IMDUR) 30 MG 24 hr tablet Take 30 mg by mouth.      nicotine (NICODERM CQ) 21 mg/24 hr APPLY 1 PATCH (21MG/24HRS) TOPICALLY EVERY DAY TO STOP SMOKING      nicotine polacrilex (NICORETTE) 4 MG Gum CHEW ONE 4MG PIECE IN MOUTH EVERY HOUR AS NEEDED      nicotine polacrilex 4 MG Lozg DISSOLVE 1 LOZENGE IN MOUTH EVERY HOUR AS NEEDED TO STOP SMOKING      tamsulosin (FLOMAX) 0.4 mg Cap Take 1 capsule (0.4 mg total) by mouth once daily. 90 capsule 3    " "tamsulosin (FLOMAX) 0.4 mg Cap Take 1 capsule by mouth once daily.      urea 20 % Crea APPLY LIBERAL AMOUNT TOPICALLY TWICE A DAY AS NEEDED FOR CALLOUSED AREA.       No current facility-administered medications on file prior to visit.     Review of patient's allergies indicates:  No Known Allergies    Review of Systems   Constitutional: Negative for chills, decreased appetite, fever, malaise/fatigue, night sweats, weight gain and weight loss.   Cardiovascular:  Negative for chest pain, claudication, dyspnea on exertion, leg swelling, palpitations and syncope.   Respiratory:  Negative for cough and shortness of breath.    Endocrine: Negative for cold intolerance and heat intolerance.   Hematologic/Lymphatic: Negative for bleeding problem. Bruises/bleeds easily.   Skin:  Positive for dry skin and nail changes. Negative for color change, flushing, itching, poor wound healing, rash, skin cancer, suspicious lesions and unusual hair distribution.   Musculoskeletal:  Positive for muscle weakness and stiffness. Negative for arthritis, back pain, falls, gout, joint pain, joint swelling, muscle cramps, myalgias and neck pain.   Gastrointestinal:  Negative for diarrhea, nausea and vomiting.   Neurological:  Positive for numbness and paresthesias. Negative for dizziness, focal weakness, light-headedness, tremors, vertigo and weakness.   Psychiatric/Behavioral:  Negative for altered mental status and depression. The patient does not have insomnia.    Allergic/Immunologic: Negative.            Objective:       Vitals:    05/24/24 0957   BP: 126/79   Pulse: 68   Weight: 82.6 kg (182 lb 1.6 oz)   Height: 5' 9" (1.753 m)   PainSc:   6   PainLoc: Foot   82.6 kg (182 lb 1.6 oz)     Physical Exam  Vitals reviewed.   Constitutional:       General: He is not in acute distress.     Appearance: He is well-developed. He is not ill-appearing, toxic-appearing or diaphoretic.      Comments:     Cardiovascular:      Pulses:           Dorsalis " pedis pulses are 2+ on the right side and 2+ on the left side.        Posterior tibial pulses are 1+ on the right side and 1+ on the left side.   Musculoskeletal:         General: No swelling.      Right lower leg: No edema.      Left lower leg: Edema (Left ankle nonpitting) present.      Right ankle: Normal.      Right Achilles Tendon: Normal.      Left ankle: Normal.      Left Achilles Tendon: Normal.      Right foot: Decreased range of motion. Deformity, bunion and tenderness present. No bony tenderness.      Left foot: Decreased range of motion. Deformity, bunion and foot drop present. No tenderness or bony tenderness.      Comments:      No pain to digits a foot with range of motion    Left foot decreased eversion plantar flexion and dorsiflexion/strength decreased   Feet:      Right foot:      Protective Sensation: 10 sites tested.  10 sites sensed.      Skin integrity: Callus and dry skin present. No ulcer, blister, skin breakdown, erythema or warmth.      Toenail Condition: Right toenails are abnormally thick. Fungal disease present.     Left foot:      Protective Sensation: 10 sites tested.  10 sites sensed.      Skin integrity: Callus and dry skin present. No ulcer, blister, skin breakdown, erythema or warmth.      Toenail Condition: Left toenails are abnormally thick. Fungal disease present.     Comments: Mild hypersensitivity left foot  Some decreased sensation digits    Focal hyperkeratotic lesion with painful central core consisting entirely of hyperkeratotic tissue without open skin, drainage, pus, fluctuance, malodor, or signs of infection: sub4th right MTPJ  No signs of verrucae    Focal hyperkeratotic lesion with painful central core consisting entirely of hyperkeratotic tissue without open skin, drainage, pus, fluctuance, malodor, or signs of infection: right 1st IPJ hallux                Skin:     General: Skin is warm and dry.      Capillary Refill: Capillary refill takes 2 to 3 seconds.       Coloration: Skin is not pale.      Findings: No erythema or rash.      Nails: There is no clubbing.   Neurological:      Mental Status: He is alert and oriented to person, place, and time.      Gait: Gait abnormal.   Psychiatric:         Attention and Perception: Attention normal.         Mood and Affect: Mood normal.         Speech: Speech normal.         Behavior: Behavior normal.         Thought Content: Thought content normal.         Cognition and Memory: Cognition normal.         Judgment: Judgment normal.               Assessment:       Encounter Diagnoses   Name Primary?    History of stroke Yes    Chronic anticoagulation     Impaired functional mobility, balance, gait, and endurance            Plan:       Piotr was seen today for follow-up.    Diagnoses and all orders for this visit:    History of stroke    Chronic anticoagulation    Impaired functional mobility, balance, gait, and endurance        I counseled the patient on his conditions, their implications and medical management.    - With patient's permission, Utilizing a #15 scalpel, I trimmed the corns and calluses at the above mentioned location.      The patient will continue to monitor the areas daily, inspect the feet, wear protective shoe gear when ambulatory, and moisturizer to maintain skin integrity.         Foot exam    Doing well      Follow up in about 3 months (around 8/24/2024).

## 2024-06-04 ENCOUNTER — OFFICE VISIT (OUTPATIENT)
Dept: PRIMARY CARE CLINIC | Facility: CLINIC | Age: 64
End: 2024-06-04
Payer: MEDICARE

## 2024-06-04 VITALS
OXYGEN SATURATION: 100 % | HEIGHT: 69 IN | HEART RATE: 64 BPM | DIASTOLIC BLOOD PRESSURE: 82 MMHG | SYSTOLIC BLOOD PRESSURE: 120 MMHG | BODY MASS INDEX: 26.77 KG/M2 | WEIGHT: 180.75 LBS | TEMPERATURE: 98 F

## 2024-06-04 DIAGNOSIS — F17.200 NICOTINE DEPENDENCE WITH CURRENT USE: ICD-10-CM

## 2024-06-04 DIAGNOSIS — R73.03 PREDIABETES: Primary | ICD-10-CM

## 2024-06-04 DIAGNOSIS — I21.4 NSTEMI (NON-ST ELEVATED MYOCARDIAL INFARCTION): ICD-10-CM

## 2024-06-04 DIAGNOSIS — D69.6 THROMBOCYTOPENIA, UNSPECIFIED: ICD-10-CM

## 2024-06-04 DIAGNOSIS — E78.2 MIXED HYPERLIPIDEMIA: ICD-10-CM

## 2024-06-04 PROCEDURE — 1160F RVW MEDS BY RX/DR IN RCRD: CPT | Mod: HCNC,CPTII,S$GLB, | Performed by: STUDENT IN AN ORGANIZED HEALTH CARE EDUCATION/TRAINING PROGRAM

## 2024-06-04 PROCEDURE — 99396 PREV VISIT EST AGE 40-64: CPT | Mod: HCNC,S$GLB,, | Performed by: STUDENT IN AN ORGANIZED HEALTH CARE EDUCATION/TRAINING PROGRAM

## 2024-06-04 PROCEDURE — 3079F DIAST BP 80-89 MM HG: CPT | Mod: HCNC,CPTII,S$GLB, | Performed by: STUDENT IN AN ORGANIZED HEALTH CARE EDUCATION/TRAINING PROGRAM

## 2024-06-04 PROCEDURE — 99999 PR PBB SHADOW E&M-EST. PATIENT-LVL IV: CPT | Mod: PBBFAC,HCNC,, | Performed by: STUDENT IN AN ORGANIZED HEALTH CARE EDUCATION/TRAINING PROGRAM

## 2024-06-04 PROCEDURE — 3008F BODY MASS INDEX DOCD: CPT | Mod: HCNC,CPTII,S$GLB, | Performed by: STUDENT IN AN ORGANIZED HEALTH CARE EDUCATION/TRAINING PROGRAM

## 2024-06-04 PROCEDURE — 3074F SYST BP LT 130 MM HG: CPT | Mod: HCNC,CPTII,S$GLB, | Performed by: STUDENT IN AN ORGANIZED HEALTH CARE EDUCATION/TRAINING PROGRAM

## 2024-06-04 PROCEDURE — 1159F MED LIST DOCD IN RCRD: CPT | Mod: HCNC,CPTII,S$GLB, | Performed by: STUDENT IN AN ORGANIZED HEALTH CARE EDUCATION/TRAINING PROGRAM

## 2024-06-04 NOTE — PROGRESS NOTES
Primary Care  Office Visit - In Person  6/4/2024      HPI    Patient is a 63 y.o.   Piotr Crespo  has a past medical history of Erectile dysfunction due to arterial insufficiency (7/17/2020), Essential hypertension (3/11/2020), High cholesterol, History of left common carotid artery stent placement (4/17/2020), History of stroke (3/11/2020), Hypertension, Mixed hyperlipidemia (3/11/2020), Prediabetes (7/17/2020), and Stroke.    Patient presenting for f/u   No acute complaints today   He has had difficulty quitting smoking out of habit. He can typically stop for 6-7 days but after that it becomes more challenging   He has been enrolled in smoking cessation program in the past and has gum/patches at home       Active Medications:  Current Outpatient Medications   Medication Instructions    amLODIPine (NORVASC) 5 mg, Oral, Daily    atorvastatin (LIPITOR) 80 mg, Oral, Nightly    benzonatate (TESSALON) 200 MG capsule 1 capsule, Oral, 3 times daily PRN    carvediloL (COREG) 6.25 mg, Oral, 2 times daily with meals    cholecalciferol, vitamin D3, 100 mcg (4,000 unit) Tab TAKE ONE TABLET BY MOUTH EVERY DAY AS A VITAMIN SUPPLEMENT    clopidogreL (PLAVIX) 75 mg, Oral, Daily    clotrimazole (LOTRIMIN) 1 % Soln APPLY SMALL AMOUNT TOPICALLY TWICE A DAY FOR FUNGAL INFECTION    cromolyn (OPTICROM) 4 % ophthalmic solution INSTILL 2 DROPS IN EACH EYE TWICE A DAY FOR ALLERGIC CONJUNCTIVITIS    erythromycin (ROMYCIN) ophthalmic ointment APPLY THIN RIBBON TO EACH EYE THREE TIMES A DAY FOR EYE INFECTION    ezetimibe (ZETIA) 10 mg, Oral    isosorbide mononitrate (IMDUR) 30 mg, Oral    nicotine (NICODERM CQ) 21 mg/24 hr APPLY 1 PATCH (21MG/24HRS) TOPICALLY EVERY DAY TO STOP SMOKING    nicotine polacrilex (NICORETTE) 4 MG Gum CHEW ONE 4MG PIECE IN MOUTH EVERY HOUR AS NEEDED    nicotine polacrilex 4 MG Lozg DISSOLVE 1 LOZENGE IN MOUTH EVERY HOUR AS NEEDED TO STOP SMOKING    tamsulosin (FLOMAX) 0.4 mg, Oral, Daily    urea 20 % Crea APPLY  "LIBERAL AMOUNT TOPICALLY TWICE A DAY AS NEEDED FOR CALLOUSED AREA.       Vitals:    06/04/24 0911   BP: 120/82   BP Location: Right arm   Pulse: 64   Temp: 97.8 °F (36.6 °C)   SpO2: 100%   Weight: 82 kg (180 lb 12.4 oz)   Height: 5' 9" (1.753 m)       Physical Exam  Vitals reviewed.   Constitutional:       General: He is not in acute distress.  Cardiovascular:      Rate and Rhythm: Normal rate and regular rhythm.      Pulses: Normal pulses.      Heart sounds: Normal heart sounds.   Pulmonary:      Effort: Pulmonary effort is normal.      Breath sounds: Normal breath sounds.   Abdominal:      General: Abdomen is flat. Bowel sounds are normal.      Palpations: Abdomen is soft.   Musculoskeletal:      Right lower leg: No edema.      Left lower leg: No edema.   Neurological:      General: No focal deficit present.      Mental Status: He is oriented to person, place, and time.          Assessment and Plan     1. Prediabetes  -     HEMOGLOBIN A1C; Future; Expected date: 06/04/2024    2. Nicotine dependence with current use  Comments:  Lung cancer screening completed  Benefits to cessation discussed. Patient strongly advised to quit smoking. At this time they are willing to quit.   3 minutes spent counseling patient regarding quitting smoking to improve overall health.    3. Mixed hyperlipidemia  Comments:  Continue Lipitor 80 mg daily and Zetia 10 mg daily    4. Thrombocytopenia, unspecified  Comments:  Continue to monitor   LFTs normal range   Hepatitis C serology negative    5. NSTEMI (non-ST elevated myocardial infarction)  Comments:  S/p [facility] 6/2023   Continue plavix and Coreg 6.25 mg BID        Previous labs, records, and notes reviewed and considered for their impact on clinical decision making today.                          Upcoming Scheduled Appointments and Follow Up:    Future Appointments   Date Time Provider Department Center   6/13/2024 10:00 AM Tricia Nava NP [facility]   8/26/2024 " 10:00 AM Anjelica Briggs DPM [facility]   10/7/2024  9:20 AM Lory Pugh MD [facility]       Follow Up [facility] (with who? when?): Follow up in about 4 months (around 10/4/2024).      Extended Emergency Contact Information  Primary Emergency Contact: Jina Crespo  Address: [address] [place] [address]  Home Phone: [phone]  Mobile Phone: [phone]  Relation: Spouse      Lory Pugh MD   Internal Medicine  6/4/2024 - 9:28 AM    I spent a total of 30 minutes on the day of the visit.This includes face to face time and non-face to face time preparing to see the patient (eg, review of tests), obtaining and/or reviewing separately obtained history, documenting clinical information in the electronic or other health record, independently interpreting results and communicating results to the patient/family/caregiver, or care coordinator.    While patients have the right to access their medical record, it is essential to recognize that progress notes primarily serve as a means of communication among healthcare professionals.

## 2024-06-13 ENCOUNTER — OFFICE VISIT (OUTPATIENT)
Dept: PRIMARY CARE CLINIC | Facility: CLINIC | Age: 64
End: 2024-06-13
Payer: MEDICARE

## 2024-06-13 VITALS
SYSTOLIC BLOOD PRESSURE: 128 MMHG | OXYGEN SATURATION: 96 % | HEART RATE: 76 BPM | BODY MASS INDEX: 26.62 KG/M2 | HEIGHT: 69 IN | DIASTOLIC BLOOD PRESSURE: 72 MMHG | WEIGHT: 179.69 LBS

## 2024-06-13 DIAGNOSIS — I10 ESSENTIAL HYPERTENSION: ICD-10-CM

## 2024-06-13 DIAGNOSIS — G47.33 OSA (OBSTRUCTIVE SLEEP APNEA): ICD-10-CM

## 2024-06-13 DIAGNOSIS — Z00.00 ENCOUNTER FOR PREVENTIVE HEALTH EXAMINATION: Primary | ICD-10-CM

## 2024-06-13 DIAGNOSIS — E78.2 MIXED HYPERLIPIDEMIA: ICD-10-CM

## 2024-06-13 DIAGNOSIS — I25.118 CORONARY ARTERY DISEASE OF NATIVE ARTERY OF NATIVE HEART WITH STABLE ANGINA PECTORIS: ICD-10-CM

## 2024-06-13 DIAGNOSIS — D69.6 THROMBOCYTOPENIA, UNSPECIFIED: ICD-10-CM

## 2024-06-13 DIAGNOSIS — N40.1 BPH WITH URINARY OBSTRUCTION: ICD-10-CM

## 2024-06-13 DIAGNOSIS — Z91.199 NONCOMPLIANCE WITH CPAP TREATMENT: ICD-10-CM

## 2024-06-13 DIAGNOSIS — R20.0 NUMBNESS OF LEFT HAND: ICD-10-CM

## 2024-06-13 DIAGNOSIS — G81.94 LEFT HEMIPARESIS: ICD-10-CM

## 2024-06-13 DIAGNOSIS — Z86.73 HISTORY OF STROKE: ICD-10-CM

## 2024-06-13 DIAGNOSIS — N13.8 BPH WITH URINARY OBSTRUCTION: ICD-10-CM

## 2024-06-13 DIAGNOSIS — Z74.09 IMPAIRED FUNCTIONAL MOBILITY, BALANCE, GAIT, AND ENDURANCE: ICD-10-CM

## 2024-06-13 DIAGNOSIS — J43.9 PULMONARY EMPHYSEMA, UNSPECIFIED EMPHYSEMA TYPE: ICD-10-CM

## 2024-06-13 DIAGNOSIS — N52.01 ERECTILE DYSFUNCTION DUE TO ARTERIAL INSUFFICIENCY: ICD-10-CM

## 2024-06-13 DIAGNOSIS — F17.210 CIGARETTE NICOTINE DEPENDENCE WITHOUT COMPLICATION: ICD-10-CM

## 2024-06-13 PROCEDURE — 99999 PR PBB SHADOW E&M-EST. PATIENT-LVL IV: CPT | Mod: PBBFAC,HCNC,,

## 2024-06-13 PROCEDURE — 3074F SYST BP LT 130 MM HG: CPT | Mod: HCNC,CPTII,S$GLB,

## 2024-06-13 PROCEDURE — G0439 PPPS, SUBSEQ VISIT: HCPCS | Mod: HCNC,S$GLB,,

## 2024-06-13 PROCEDURE — 3078F DIAST BP <80 MM HG: CPT | Mod: HCNC,CPTII,S$GLB,

## 2024-06-13 PROCEDURE — 1159F MED LIST DOCD IN RCRD: CPT | Mod: HCNC,CPTII,S$GLB,

## 2024-06-13 PROCEDURE — 1160F RVW MEDS BY RX/DR IN RCRD: CPT | Mod: HCNC,CPTII,S$GLB,

## 2024-06-13 NOTE — PROGRESS NOTES
"  Piotr Crespo presented for an initial Medicare AWV today. He is a patient of Dr. Pugh and is new to me.  The following components were reviewed and updated:    Medical history  Family History  Social history  Allergies and Current Medications  Health Risk Assessment  Health Maintenance  Care Team    **See Completed Assessments for Annual Wellness visit with in the encounter summary    The following assessments were completed:  Depression Screening  Cognitive function Screening  Timed Get Up Test  Whisper Test      Opioid documentation:      Patient does not have a current opioid prescription.            Vitals:    06/13/24 0958   BP: 128/72   BP Location: Right arm   Patient Position: Sitting   Pulse: 76   SpO2: 96%   Weight: 81.5 kg (179 lb 10.8 oz)   Height: 5' 9" (1.753 m)     Body mass index is 26.53 kg/m².       Physical Exam  Vitals reviewed.   Constitutional:       Appearance: Normal appearance.   HENT:      Head: Normocephalic and atraumatic.   Cardiovascular:      Rate and Rhythm: Normal rate and regular rhythm.      Pulses: Normal pulses.           Radial pulses are 2+ on the right side and 2+ on the left side.        Dorsalis pedis pulses are 2+ on the right side and 2+ on the left side.        Posterior tibial pulses are 2+ on the right side and 2+ on the left side.      Heart sounds: Normal heart sounds.   Pulmonary:      Effort: Pulmonary effort is normal.      Breath sounds: Normal breath sounds.   Musculoskeletal:      Right lower leg: No edema.      Left lower leg: No edema.   Skin:     General: Skin is warm and dry.      Capillary Refill: Capillary refill takes less than 2 seconds.   Neurological:      General: No focal deficit present.      Mental Status: He is alert and oriented to person, place, and time.   Psychiatric:         Mood and Affect: Mood normal.         Behavior: Behavior normal.       Diagnoses and health risks identified today and associated recommendations/orders:  1. Encounter " for preventive health examination  Assessment and evaluation performed as stated above    2. Left hemiparesis  Stable. Occurred after stroke in 2017.  [facility]    3. Numbness of left hand  Stable, [facility]    4. History of stroke  Stable.  Occurred in 2017.    5. Pulmonary emphysema, unspecified emphysema type  Stable.  Seen on CT chest 2/8/24    6. Coronary artery disease of native artery of native heart with stable angina pectoris  Stable.  Followed by pcp and cardiology    7. Essential hypertension  Problem is stable. Will continue current medication and treatment regimen.  Encouraged pt to restrict Na intake.  Increase exercise, at least 10-20 min per day of walking.  Follow up with PCP and cardiology    8. Mixed hyperlipidemia  Problem is stable. Will continue current medication and treatment regimen.  Encouraged pt to follow low fat, low cholesterol, mediterranean diet.  Increase exercise, at least 10-20 min per day of walking.  Follow up with PCP and cardiology    9. BPH with urinary obstruction  Stable on current regimen.  [facility]    10. Erectile dysfunction due to arterial insufficiency  Stable, [facility]    11. Thrombocytopenia, unspecified  Stable, followed by pcp    12. Cigarette nicotine dependence without complication  Pt still smokes approx 1/2 ppd of cigarettes.  Stressed the importance of smoking cessation and the risks of further cardiovascular problems due to continued smoking. Educated pt on the importance of not smoking while using nicotine polacrilex lozengers.  Followed by pcp    13. Impaired functional mobility, balance, gait, and endurance  Stable, followed by pcp    14. KERRY (obstructive sleep apnea)  15. Noncompliance with CPAP treatment  Pt does not wear cpap. Stressed importance of wearing cpap and the impact it has on overall health.  Followed by pcp.      Provided Piotr with a 5-10 year written screening schedule and  personal prevention plan. Recommendations were developed using the USPSTF age appropriate recommendations. Education, counseling, and referrals were provided as needed.  After Visit Summary printed and given to patient which includes a list of additional screenings\tests needed.    Follow up in about 1 year (around 6/13/2025), or if symptoms worsen or fail to improve.      Tricia Nava, RITESH    I offered to discuss advanced care planning, including how to pick a person who would make decisions for you if you were unable to make them for yourself, called a health care power of , and what kind of decisions you might make such as use of life sustaining treatments such as ventilators and tube feeding when faced with a life limiting illness recorded on a living will that they will need to know. (How you want to be cared for as you near the end of your natural life)     X Patient is interested in learning more about how to make advanced directives.  I provided them paperwork and offered to discuss this with them.

## 2024-06-13 NOTE — PATIENT INSTRUCTIONS
Counseling and Referral of Other Preventative  (Italic type indicates deductible and co-insurance are waived)    Patient Name: Piotr Crespo  Today's Date: 6/13/2024    Health Maintenance       Date Due Completion Date    RSV Vaccine (Age 60+ and Pregnant patients) (1 - 1-dose 60+ series) Never done ---    COVID-19 Vaccine (6 - 2023-24 season) 09/01/2023 3/9/2023    PROSTATE-SPECIFIC ANTIGEN 08/04/2024 8/4/2023    Hemoglobin A1c (Prediabetes) 08/04/2024 8/4/2023    LDCT Lung Screen 02/08/2025 2/8/2024    Override on 1/21/2020: Done    High Dose Statin 06/04/2025 6/4/2024    Colorectal Cancer Screening 06/30/2025 6/30/2020    Lipid Panel 01/05/2029 1/5/2024    TETANUS VACCINE 03/11/2030 3/11/2020        No orders of the defined types were placed in this encounter.      The following information is provided to all patients.  This information is to help you find resources for any of the problems found today that may be affecting your health:                  Living healthy guide: www.[facility].[place].gov      Understanding Diabetes: www.diabetes.org      Eating healthy: www.cdc.gov/healthyweight      CDC home safety checklist: www.cdc.gov/steadi/patient.html      Agency on Aging: www.goea.[place].[facility]      Alcoholics anonymous (AA): www.aa.org      Physical Activity: www.latanya.nih.gov/bz0efgq      Tobacco use: www.quitwithusla.org

## 2024-06-20 DIAGNOSIS — I10 ESSENTIAL HYPERTENSION: ICD-10-CM

## 2024-06-20 RX ORDER — CLOPIDOGREL BISULFATE 75 MG/1
75 TABLET ORAL
Qty: 90 TABLET | Refills: 3 | Status: SHIPPED | OUTPATIENT
Start: 2024-06-20

## 2024-06-20 RX ORDER — ISOSORBIDE MONONITRATE 30 MG/1
30 TABLET, EXTENDED RELEASE ORAL
Qty: 90 TABLET | Refills: 3 | Status: SHIPPED | OUTPATIENT
Start: 2024-06-20

## 2024-06-20 RX ORDER — AMLODIPINE BESYLATE 5 MG/1
5 TABLET ORAL
Qty: 90 TABLET | Refills: 3 | Status: SHIPPED | OUTPATIENT
Start: 2024-06-20

## 2024-06-20 NOTE — TELEPHONE ENCOUNTER
Refill Routing Note   Medication(s) are not appropriate for processing by [facility] for the following reason(s):        Drug-disease interaction    [facility] action(s):  Approve  Defer     Requires labs : Yes      Medication Therapy Plan: clopidogreL and Thrombocytopenia, unspecified    Pharmacist review requested: Yes     Appointments  past 12m or future 3m with PCP    Date Provider   Last Visit   6/4/2024 Lory Pugh MD   Next Visit   10/7/2024 Lory Pugh MD   ED visits in past 90 days: 0        Note composed:9:59 AM 06/20/2024

## 2024-06-20 NOTE — TELEPHONE ENCOUNTER
Care Due:                  Date            Visit Type   Department     Provider  --------------------------------------------------------------------------------                                EP -                              PRIMARY      [facility] PRIMARY  Last Visit: 06-      CARE ([facility])   CARE           Lory  Ndceferinoguzman  Next Visit: None Scheduled  None         None Found                                                            Last  Test          Frequency    Reason                     Performed    Due Date  --------------------------------------------------------------------------------    CBC.........  12 months..  clopidogreL..............  06- 06-    CMP.........  12 months..  atorvastatin, ezetimibe..  06- 06-    [employer] Embedded Care Due Messages. Reference number: [id number].   6/20/2024 1:33:49 AM CDT

## 2024-06-20 NOTE — TELEPHONE ENCOUNTER
Refill Routing Note   Medication(s) are not appropriate for processing by [facility] for the following reason(s):        Drug-disease interaction: thrombocytopenia    [facility] action(s):  Approve  Defer     Requires labs : Yes - CBC & CMP outdated          Pharmacist review requested: Yes     Appointments  past 12m or future 3m with PCP    Date Provider   Last Visit   6/4/2024 Lory Pugh MD   Next Visit   10/7/2024 Lory Pugh MD   ED visits in past 90 days: 0        Note composed:1:29 PM 06/20/2024

## 2024-07-17 ENCOUNTER — OFFICE VISIT (OUTPATIENT)
Dept: PODIATRY | Facility: CLINIC | Age: 64
End: 2024-07-17
Payer: MEDICARE

## 2024-07-17 ENCOUNTER — APPOINTMENT (OUTPATIENT)
Dept: RADIOLOGY | Facility: OTHER | Age: 64
End: 2024-07-17
Attending: PODIATRIST
Payer: MEDICARE

## 2024-07-17 VITALS — HEIGHT: 69 IN | WEIGHT: 179.69 LBS | BODY MASS INDEX: 26.62 KG/M2

## 2024-07-17 DIAGNOSIS — M79.672 LEFT FOOT PAIN: Primary | ICD-10-CM

## 2024-07-17 DIAGNOSIS — S90.852A FOREIGN BODY IN LEFT FOOT, INITIAL ENCOUNTER: ICD-10-CM

## 2024-07-17 DIAGNOSIS — M79.672 LEFT FOOT PAIN: ICD-10-CM

## 2024-07-17 PROCEDURE — 99999 PR PBB SHADOW E&M-EST. PATIENT-LVL III: CPT | Mod: PBBFAC,HCNC,, | Performed by: PODIATRIST

## 2024-07-17 PROCEDURE — 99214 OFFICE O/P EST MOD 30 MIN: CPT | Mod: S$GLB,,, | Performed by: PODIATRIST

## 2024-07-17 PROCEDURE — 1159F MED LIST DOCD IN RCRD: CPT | Mod: CPTII,S$GLB,, | Performed by: PODIATRIST

## 2024-07-17 PROCEDURE — 73630 X-RAY EXAM OF FOOT: CPT | Mod: 26,LT,, | Performed by: RADIOLOGY

## 2024-07-17 PROCEDURE — 73630 X-RAY EXAM OF FOOT: CPT | Mod: TC,PN,LT

## 2024-07-17 PROCEDURE — 3008F BODY MASS INDEX DOCD: CPT | Mod: CPTII,S$GLB,, | Performed by: PODIATRIST

## 2024-07-17 RX ORDER — LIDOCAINE AND PRILOCAINE 25; 25 MG/G; MG/G
CREAM TOPICAL
Qty: 30 G | Refills: 3 | Status: SHIPPED | OUTPATIENT
Start: 2024-07-17

## 2024-07-17 NOTE — PROGRESS NOTES
Subjective:      Patient ID: Piotr Crespo is a 63 y.o. male.    Chief Complaint: Foot Problem (Possible glass stuck in foot)    Sharp deep pain and suspected foreign body in the bottom of left foot by the big toe area.  Unknown exact onset-does not remember specific incident, but it feels like there is something inside of his foot that is quite painful.  No prior medical treatment.  No self-treatment.  Denies known trauma and surgery left foot.    Review of Systems   Constitutional: Negative for chills, diaphoresis, fever, malaise/fatigue and night sweats.   Cardiovascular:  Negative for claudication, cyanosis, leg swelling and syncope.   Skin:  Positive for suspicious lesions. Negative for color change, dry skin, nail changes, rash and unusual hair distribution.   Musculoskeletal:  Negative for falls, joint pain, joint swelling, muscle cramps, muscle weakness and stiffness.   Gastrointestinal:  Negative for constipation, diarrhea, nausea and vomiting.   Neurological:  Negative for brief paralysis, disturbances in coordination, focal weakness, numbness, paresthesias, sensory change and tremors.         Objective:      Physical Exam  Constitutional:       General: He is not in acute distress.     Appearance: He is well-developed. He is not diaphoretic.   Cardiovascular:      Pulses:           Popliteal pulses are 2+ on the right side and 2+ on the left side.        Dorsalis pedis pulses are 2+ on the right side and 2+ on the left side.        Posterior tibial pulses are 2+ on the right side and 2+ on the left side.      Comments: Capillary refill 3 seconds all toes/distal feet, all toes/both feet warm to touch.      Negative lymphadenopathy bilateral popliteal fossa and tarsal tunnel.      Negavie lower extremity edema bilateral.    Musculoskeletal:      Right ankle: No swelling, deformity, ecchymosis or lacerations. Normal range of motion. Normal pulse.      Right Achilles Tendon: Normal. No defects. Pineda's test  negative.   Lymphadenopathy:      Lower Body: No right inguinal adenopathy. No left inguinal adenopathy.      Comments: Negative lymphadenopathy bilateral popliteal fossa and tarsal tunnel.    Negative lymphangitic streaking bilateral feet/ankles/legs.   Skin:     General: Skin is warm and dry.      Capillary Refill: Capillary refill takes 2 to 3 seconds.      Coloration: Skin is not pale.      Findings: No abrasion, bruising, burn, ecchymosis, erythema, laceration, lesion or rash.      Nails: There is no clubbing.      Comments:   Sharp deep pain and suspected foreign body to palpation of the plantar aspect of the 1st MTP without open skin drainage pus tracking fluctuance malodor visible foreign body.  No signs infection left foot.   Neurological:      Mental Status: He is alert and oriented to person, place, and time.      Sensory: No sensory deficit.      Motor: No tremor, atrophy or abnormal muscle tone.      Gait: Gait normal.      Deep Tendon Reflexes:      Reflex Scores:       Patellar reflexes are 2+ on the right side and 2+ on the left side.       Achilles reflexes are 2+ on the right side and 2+ on the left side.  Psychiatric:         Behavior: Behavior is cooperative.           Assessment:       Encounter Diagnoses   Name Primary?    Left foot pain Yes    Foreign body in left foot, initial encounter          Plan:       Piotr was seen today for foot problem.    Diagnoses and all orders for this visit:    Left foot pain  -     X-Ray Foot Complete Left; Future  -     US Extremity Non Vascular Limited Left; Future    Foreign body in left foot, initial encounter  -     X-Ray Foot Complete Left; Future  -     US Extremity Non Vascular Limited Left; Future    Other orders  -     LIDOcaine-prilocaine (EMLA) cream; Apply topically as needed.      I counseled the patient on his conditions, their implications and medical management.        As gradually some of the since skin superficially a month able to see or  retrieve any foreign body or entrance wound.      Applied simple Band-Aid dressing after cleansing with alcohol to the plantar left 1st MTP    Offload with aperture padding to help alleviate symptoms.  Topical EMLA cream once nightly for pain relief.      X-rays left foot, ultrasound left foot foreign body?    Follow after ultrasound          No follow-ups on file.

## 2024-07-19 ENCOUNTER — HOSPITAL ENCOUNTER (OUTPATIENT)
Dept: RADIOLOGY | Facility: OTHER | Age: 64
Discharge: HOME | End: 2024-07-19
Attending: PODIATRIST
Payer: MEDICARE

## 2024-07-19 DIAGNOSIS — S90.852A FOREIGN BODY IN LEFT FOOT, INITIAL ENCOUNTER: ICD-10-CM

## 2024-07-19 DIAGNOSIS — M79.672 LEFT FOOT PAIN: ICD-10-CM

## 2024-07-19 PROCEDURE — 76882 US LMTD JT/FCL EVL NVASC XTR: CPT | Mod: 26,LT,, | Performed by: RADIOLOGY

## 2024-07-19 PROCEDURE — 76882 US LMTD JT/FCL EVL NVASC XTR: CPT | Mod: TC,LT

## 2024-07-25 ENCOUNTER — TELEPHONE (OUTPATIENT)
Dept: PODIATRY | Facility: CLINIC | Age: 64
End: 2024-07-25
Payer: MEDICARE

## 2024-07-25 NOTE — TELEPHONE ENCOUNTER
Staff informed patient no foreign body is identified in either the ultrasound or the x-ray. Per Dr. House     Patient verbalized understanding.    Staff scheduled patient for a f/u appointment to discuss other options to help with pain.    Appointment scheduled for 7/26 at 3:30p.    Patient verbalized understanding.        ----- Message from Marin House DPM sent at 7/25/2024  2:32 PM CDT -----  Regarding: RE: results  Contact: Mobile  [phone]  No foreign body is identified in either the ultrasound or the x-ray.  ----- Message -----  From: Lamar Contreras MA  Sent: 7/25/2024   2:21 PM CDT  To: Marin House DPM  Subject: results                                          Patient is asking for results for Ultrasound and X-ray.  ----- Message -----  From: Jenny Ball  Sent: 7/25/2024   1:54 PM CDT  To: Lacy Wright    2TESTRESULTS    Type: Test Results    What test was performed? Ultrasound Us EXT NON VASC     Who ordered the test? Dr. Marin House     When and where were the test performed? 07/19/2024 @ [facility]     Would you like a call back and or thru [facility]: Call        2TESTRESULTS    Type: Test Results    What test was performed? XR Misc     Who ordered the test? Dr. Marin House     When and where were the test performed? 07/17/2024 @ [place]     Would you like a call back and or thru [facility]: Call

## 2024-07-26 ENCOUNTER — OFFICE VISIT (OUTPATIENT)
Dept: PODIATRY | Facility: CLINIC | Age: 64
End: 2024-07-26
Payer: MEDICARE

## 2024-07-26 VITALS
HEIGHT: 69 IN | HEART RATE: 84 BPM | WEIGHT: 179.69 LBS | DIASTOLIC BLOOD PRESSURE: 83 MMHG | SYSTOLIC BLOOD PRESSURE: 125 MMHG | BODY MASS INDEX: 26.62 KG/M2

## 2024-07-26 DIAGNOSIS — L84 CORN OR CALLUS: ICD-10-CM

## 2024-07-26 DIAGNOSIS — M79.672 LEFT FOOT PAIN: Primary | ICD-10-CM

## 2024-07-26 DIAGNOSIS — M77.9 CAPSULITIS: ICD-10-CM

## 2024-07-26 DIAGNOSIS — S90.852A FOREIGN BODY IN LEFT FOOT, INITIAL ENCOUNTER: ICD-10-CM

## 2024-07-26 PROCEDURE — 99999 PR PBB SHADOW E&M-EST. PATIENT-LVL III: CPT | Mod: PBBFAC,,, | Performed by: PODIATRIST

## 2024-07-26 NOTE — PROGRESS NOTES
Subjective:      Patient ID: Piotr Crespo is a 63 y.o. male.    Chief Complaint: Follow-up    Sharp deep pain plantar 1st MTPJ left.  Patient believes it is foreign body.  I was unable to appreciate 1 last time with superficial exploration.  Both x-rays and soft tissue ultrasound of the plantar 1st MTP left are negative for foreign body.  Patient denies trauma and surgery left foot.  He relates that an arch support his wife came from helps a little bit would like me fashion him a strapping to try to offload the area until he can find a similar inserts.    Review of Systems   Constitutional: Negative for chills, diaphoresis, fever, malaise/fatigue and night sweats.   Cardiovascular:  Negative for claudication, cyanosis, leg swelling and syncope.   Skin:  Positive for suspicious lesions. Negative for color change, dry skin, nail changes, rash and unusual hair distribution.   Musculoskeletal:  Positive for joint pain. Negative for falls, joint swelling, muscle cramps, muscle weakness and stiffness.   Gastrointestinal:  Negative for constipation, diarrhea, nausea and vomiting.   Neurological:  Negative for brief paralysis, disturbances in coordination, focal weakness, numbness, paresthesias, sensory change and tremors.         Objective:      Physical Exam  Constitutional:       General: He is not in acute distress.     Appearance: He is well-developed. He is not diaphoretic.   Cardiovascular:      Pulses:           Popliteal pulses are 2+ on the right side and 2+ on the left side.        Dorsalis pedis pulses are 2+ on the right side and 2+ on the left side.        Posterior tibial pulses are 2+ on the right side and 2+ on the left side.      Comments: Capillary refill 3 seconds all toes/distal feet, all toes/both feet warm to touch.      Negative lymphadenopathy bilateral popliteal fossa and tarsal tunnel.      Negavie lower extremity edema bilateral.    Musculoskeletal:      Right ankle: No swelling, deformity,  ecchymosis or lacerations. Normal range of motion. Normal pulse.      Right Achilles Tendon: Normal. No defects. Pineda's test negative.      Comments: Pain to palpation of the plantar aspect of the 1st MTPJ left just distal to the fibular sesamoid without deformity loss of function or signs of acute trauma.   Lymphadenopathy:      Lower Body: No right inguinal adenopathy. No left inguinal adenopathy.      Comments: Negative lymphadenopathy bilateral popliteal fossa and tarsal tunnel.    Negative lymphangitic streaking bilateral feet/ankles/legs.   Skin:     General: Skin is warm and dry.      Capillary Refill: Capillary refill takes 2 to 3 seconds.      Coloration: Skin is not pale.      Findings: No abrasion, bruising, burn, ecchymosis, erythema, laceration, lesion or rash.      Nails: There is no clubbing.      Comments:   Focal hyperkeratotic lesion consisting entirely of hyperkeratotic tissue without open skin, drainage, pus, fluctuance, malodor, or signs of infection plantar lateral 1st MTPJ left     Neurological:      Mental Status: He is alert and oriented to person, place, and time.      Sensory: No sensory deficit.      Motor: No tremor, atrophy or abnormal muscle tone.      Gait: Gait normal.      Deep Tendon Reflexes:      Reflex Scores:       Patellar reflexes are 2+ on the right side and 2+ on the left side.       Achilles reflexes are 2+ on the right side and 2+ on the left side.  Psychiatric:         Behavior: Behavior is cooperative.           Assessment:       Encounter Diagnoses   Name Primary?    Left foot pain Yes    Foreign body in left foot, initial encounter     Capsulitis          Plan:       Piotr was seen today for follow-up.    Diagnoses and all orders for this visit:    Left foot pain    Foreign body in left foot, initial encounter    Capsulitis      I counseled the patient on his conditions, their implications and medical management.        Patient will stretch the tendo achilles  complex three times daily as demonstrated in the office.  Literature was dispensed illustrating proper stretching technique.    I applied a plantar rest strapping to the patient's left foot to offload symptomatic area, support the arch, and relieve pain.    Patient will obtain over the counter arch supports and wear them in shoes whenever possible.  Athletic shoes intended for walking or running are usually best.    Discussed conservative treatment with shoes of adequate dimensions, material, and style to alleviate symptoms and delay or prevent surgical intervention.    Patient should also try help aperture pads, horseshoe offload pads etc. for symptomatic relief.    He relates he [facility] other [facility].  I have recommended he check out having custom orthoses made with a offload accommodation of the plantar left 1st MTPJ.          No follow-ups on file.

## 2024-08-07 DIAGNOSIS — E78.5 HYPERLIPIDEMIA LDL GOAL <70: ICD-10-CM

## 2024-08-07 RX ORDER — EZETIMIBE 10 MG/1
10 TABLET ORAL DAILY
Qty: 90 TABLET | Refills: 3 | Status: SHIPPED | OUTPATIENT
Start: 2024-08-07

## 2024-08-07 RX ORDER — ATORVASTATIN CALCIUM 80 MG/1
80 TABLET, FILM COATED ORAL NIGHTLY
Qty: 90 TABLET | Refills: 3 | Status: SHIPPED | OUTPATIENT
Start: 2024-08-07

## 2024-08-23 ENCOUNTER — TELEPHONE (OUTPATIENT)
Dept: PODIATRY | Facility: CLINIC | Age: 64
End: 2024-08-23
Payer: MEDICARE

## 2024-08-26 ENCOUNTER — OFFICE VISIT (OUTPATIENT)
Dept: PODIATRY | Facility: CLINIC | Age: 64
End: 2024-08-26
Payer: MEDICARE

## 2024-08-26 VITALS
DIASTOLIC BLOOD PRESSURE: 73 MMHG | BODY MASS INDEX: 26.42 KG/M2 | HEART RATE: 72 BPM | SYSTOLIC BLOOD PRESSURE: 143 MMHG | HEIGHT: 69 IN | WEIGHT: 178.38 LBS

## 2024-08-26 DIAGNOSIS — M79.671 RIGHT FOOT PAIN: ICD-10-CM

## 2024-08-26 DIAGNOSIS — L84 CORN OR CALLUS: Primary | ICD-10-CM

## 2024-08-26 PROCEDURE — 3008F BODY MASS INDEX DOCD: CPT | Mod: HCNC,CPTII,S$GLB, | Performed by: PODIATRIST

## 2024-08-26 PROCEDURE — 1159F MED LIST DOCD IN RCRD: CPT | Mod: HCNC,CPTII,S$GLB, | Performed by: PODIATRIST

## 2024-08-26 PROCEDURE — 1160F RVW MEDS BY RX/DR IN RCRD: CPT | Mod: HCNC,CPTII,S$GLB, | Performed by: PODIATRIST

## 2024-08-26 PROCEDURE — 3077F SYST BP >= 140 MM HG: CPT | Mod: HCNC,CPTII,S$GLB, | Performed by: PODIATRIST

## 2024-08-26 PROCEDURE — 99214 OFFICE O/P EST MOD 30 MIN: CPT | Mod: HCNC,S$GLB,, | Performed by: PODIATRIST

## 2024-08-26 PROCEDURE — 99999 PR PBB SHADOW E&M-EST. PATIENT-LVL IV: CPT | Mod: PBBFAC,HCNC,, | Performed by: PODIATRIST

## 2024-08-26 PROCEDURE — 3078F DIAST BP <80 MM HG: CPT | Mod: HCNC,CPTII,S$GLB, | Performed by: PODIATRIST

## 2024-08-26 RX ORDER — CHOLECALCIFEROL (VITAMIN D3) 50 MCG
50 TABLET ORAL
COMMUNITY
Start: 2023-10-23

## 2024-08-26 RX ORDER — ASPIRIN/CALCIUM CARB/MAGNESIUM 325 MG
TABLET ORAL
COMMUNITY
Start: 2024-04-22

## 2024-08-26 RX ORDER — BENZONATATE 200 MG/1
200 CAPSULE ORAL
COMMUNITY
Start: 2024-04-22

## 2024-08-26 RX ORDER — IBUPROFEN 200 MG
TABLET ORAL
COMMUNITY
Start: 2024-04-22

## 2024-08-26 RX ORDER — AMLODIPINE BESYLATE 10 MG/1
5 TABLET ORAL
COMMUNITY
Start: 2023-10-23

## 2024-08-26 NOTE — PROGRESS NOTES
Subjective:      Patient ID: Piotr Crespo is a 63 y.o. male.    Chief Complaint:   Follow-up (Left foot pain/PCP- 06/14/2024/Tricia Nava NP)    Piotr is a 63 y.o. male who presents to the clinic for evaluation and treatment of high risk feet. Piotr has a past medical history of Erectile dysfunction due to arterial insufficiency (7/17/2020), Essential hypertension (3/11/2020), High cholesterol, History of left common carotid artery stent placement (4/17/2020), History of stroke (3/11/2020), Hypertension, Mixed hyperlipidemia (3/11/2020), Prediabetes (7/17/2020), and Stroke.     [facility].  He is here today to discuss what was found on Dr. House's evaluation    Pt relates the area still painful left foot.       Saw Dr. House twice since our last visit.    There was some concern about foreign body in the foot however the ultrasound and xray was negative    Per Dr. House's note there was some suggestion patient should [facility] with the [facility] to modify custom-molded orthotics       7/26/24 Dr. House  Patient will stretch the tendo achilles complex three times daily as demonstrated in the office.  Literature was dispensed illustrating proper stretching technique.     I applied a plantar rest strapping to the patient's left foot to offload symptomatic area, support the arch, and relieve pain.     Patient will obtain over the counter arch supports and wear them in shoes whenever possible.  Athletic shoes intended for walking or running are usually best.     Discussed conservative treatment with shoes of adequate dimensions, material, and style to alleviate symptoms and delay or prevent surgical intervention.     Patient should also try help aperture pads, horseshoe offload pads etc. for symptomatic relief.     He relates he we will [facility] the [facility] and see if they have any other ideas.  I have recommended he check out having custom orthoses made with a offload accommodation of the plantar left  1st MTPJ.      PCP: Lory Pugh MD    Date Last Seen by PCP: 6/14/24  Cardiology: 2/2/24      Current shoe gear:  Affected Foot: Tennis shoes     Unaffected Foot: Tennis shoes        Hemoglobin A1C   Date Value Ref Range Status   08/04/2023 5.6 4.0 - 5.6 % Final     Comment:     ADA Screening Guidelines:  5.7-6.4%  Consistent with prediabetes  >or=6.5%  Consistent with diabetes    High levels of fetal hemoglobin interfere with the HbA1C  assay. Heterozygous hemoglobin variants (HbS, HgC, etc)do  not significantly interfere with this assay.   However, presence of multiple variants may affect accuracy.     05/10/2022 5.9 (H) 4.0 - 5.6 % Final     Comment:     ADA Screening Guidelines:  5.7-6.4%  Consistent with prediabetes  >or=6.5%  Consistent with diabetes    High levels of fetal hemoglobin interfere with the HbA1C  assay. Heterozygous hemoglobin variants (HbS, HgC, etc)do  not significantly interfere with this assay.   However, presence of multiple variants may affect accuracy.     04/29/2021 5.9 (H) 4.0 - 5.6 % Final     Comment:     ADA Screening Guidelines:  5.7-6.4%  Consistent with prediabetes  >or=6.5%  Consistent with diabetes    High levels of fetal hemoglobin interfere with the HbA1C  assay. Heterozygous hemoglobin variants (HbS, HgC, etc)do  not significantly interfere with this assay.   However, presence of multiple variants may affect accuracy.          Past Medical History:   Diagnosis Date    Erectile dysfunction due to arterial insufficiency 7/17/2020    Essential hypertension 3/11/2020    High cholesterol     History of left common carotid artery stent placement 4/17/2020    History of stroke 3/11/2020    Hypertension     Mixed hyperlipidemia 3/11/2020    Prediabetes 7/17/2020    Stroke     sept 3, 2017     Past Surgical History:   Procedure Laterality Date    ANGIOGRAM, CORONARY, WITH LEFT HEART CATHETERIZATION Left 6/24/2023    Procedure: Angiogram, Coronary, with Left Heart Cath;  Surgeon:  "George Sherwood MD;  Location: [facility] CATH/EP LAB;  Service: Cardiology;  Laterality: Left;    CAROTID ARTERY ANGIOPLASTY Left     left Internal carotid artery stent    CEREBRAL ANEURYSM REPAIR      COLONOSCOPY N/A 06/30/2020    Procedure: COLONOSCOPY;  Surgeon: Yuan Pizano MD;  Location: [facility] ENDO (4TH FLR);  Service: Endoscopy;  Laterality: N/A;  3/30/20- Per Dr. Catherine, Pt to be r/s for later date, "screening"- [facility]  3/30/20 - LM for Pt to call back to r/s for later date per Dr. Catherine- [facility]  COVID screening on 6/27/20 - [facility] urgent care- [facility]    LUMBAR DISCECTOMY       Current Outpatient Medications on File Prior to Visit   Medication Sig Dispense Refill    amLODIPine (NORVASC) 10 MG tablet Take 5 mg by mouth.      amLODIPine (NORVASC) 5 MG tablet TAKE 1 TABLET ONE TIME DAILY 90 tablet 3    atorvastatin (LIPITOR) 80 MG tablet Take 1 tablet (80 mg total) by mouth every evening. 90 tablet 3    benzonatate (TESSALON) 200 MG capsule Take 1 capsule by mouth 3 times daily as needed.      benzonatate (TESSALON) 200 MG capsule Take 200 mg by mouth.      carvediloL (COREG) 6.25 MG tablet TAKE 1 TABLET TWICE DAILY WITH MEALS 180 tablet 3    cholecalciferol, vitamin D3, (VITAMIN D3) 50 mcg (2,000 unit) Tab Take 50 mcg by mouth.      cholecalciferol, vitamin D3, 100 mcg (4,000 unit) Tab TAKE ONE TABLET BY MOUTH EVERY DAY AS A VITAMIN SUPPLEMENT      clopidogreL (PLAVIX) 75 mg tablet TAKE 1 TABLET ONE TIME DAILY 90 tablet 3    clotrimazole (LOTRIMIN) 1 % Soln APPLY SMALL AMOUNT TOPICALLY TWICE A DAY FOR FUNGAL INFECTION      cromolyn (OPTICROM) 4 % ophthalmic solution INSTILL 2 DROPS IN EACH EYE TWICE A DAY FOR ALLERGIC CONJUNCTIVITIS      erythromycin (ROMYCIN) ophthalmic ointment       ezetimibe (ZETIA) 10 mg tablet Take 1 tablet (10 mg total) by mouth once daily. 90 tablet 3    isosorbide mononitrate (IMDUR) 30 MG 24 hr tablet TAKE 1 TABLET ONE TIME DAILY 90 tablet 3    LIDOcaine-prilocaine (EMLA) cream Apply " topically as needed. 30 g 3    nicotine (NICODERM CQ) 21 mg/24 hr       nicotine (NICODERM CQ) 21 mg/24 hr Apply topically.      nicotine polacrilex (NICORETTE) 4 MG Gum       nicotine polacrilex 4 MG Lozg DISSOLVE 1 LOZENGE IN MOUTH EVERY HOUR AS NEEDED TO STOP SMOKING      nicotine polacrilex 4 MG Lozg Take by mouth.      RSVPreF3 antigen-AS01E, PF, (AREXVY, PF,) 120 mcg/0.5 mL SusR vaccine Inject into the muscle. 0.5 mL 0    tamsulosin (FLOMAX) 0.4 mg Cap Take 1 capsule (0.4 mg total) by mouth once daily. 90 capsule 3    urea 20 % Crea APPLY LIBERAL AMOUNT TOPICALLY TWICE A DAY AS NEEDED FOR CALLOUSED AREA.       No current facility-administered medications on file prior to visit.     Review of patient's allergies indicates:  No Known Allergies    Review of Systems   Constitutional: Negative for chills, decreased appetite, fever, malaise/fatigue, night sweats, weight gain and weight loss.   Cardiovascular:  Negative for chest pain, claudication, dyspnea on exertion, leg swelling, palpitations and syncope.   Respiratory:  Negative for cough and shortness of breath.    Endocrine: Negative for cold intolerance and heat intolerance.   Hematologic/Lymphatic: Negative for bleeding problem. Bruises/bleeds easily.   Skin:  Positive for dry skin and nail changes. Negative for color change, flushing, itching, poor wound healing, rash, skin cancer, suspicious lesions and unusual hair distribution.   Musculoskeletal:  Positive for muscle weakness and stiffness. Negative for arthritis, back pain, falls, gout, joint pain, joint swelling, muscle cramps, myalgias and neck pain.   Gastrointestinal:  Negative for diarrhea, nausea and vomiting.   Neurological:  Positive for numbness and paresthesias. Negative for dizziness, focal weakness, light-headedness, tremors, vertigo and weakness.   Psychiatric/Behavioral:  Negative for altered mental status and depression. The patient does not have insomnia.    Allergic/Immunologic:  "Negative.          Objective:       Vitals:    08/26/24 0945   BP: (!) 143/73   Pulse: 72   Weight: 80.9 kg (178 lb 5.6 oz)   Height: 5' 9" (1.753 m)   PainSc: 0-No pain   80.9 kg (178 lb 5.6 oz)     Physical Exam  Vitals reviewed.   Constitutional:       General: He is not in acute distress.     Appearance: He is well-developed. He is not ill-appearing, toxic-appearing or diaphoretic.      Comments:     Cardiovascular:      Pulses:           Dorsalis pedis pulses are 2+ on the right side and 2+ on the left side.        Posterior tibial pulses are 1+ on the right side and 1+ on the left side.   Musculoskeletal:         General: No swelling.      Right lower leg: No edema.      Left lower leg: No edema.      Right ankle: Normal.      Right Achilles Tendon: Normal.      Left ankle: Normal.      Left Achilles Tendon: Normal.      Right foot: Decreased range of motion. Deformity and bunion present. No tenderness or bony tenderness.      Left foot: Decreased range of motion. Deformity, bunion, foot drop and tenderness present. No bony tenderness.      Comments:     + pop left sub MTPJ    Left foot decreased eversion plantar flexion and dorsiflexion/strength decreased   Feet:      Right foot:      Protective Sensation: 10 sites tested.  10 sites sensed.      Skin integrity: Callus and dry skin present. No ulcer, blister, skin breakdown, erythema or warmth.      Toenail Condition: Right toenails are abnormally thick. Fungal disease present.     Left foot:      Protective Sensation: 10 sites tested.  10 sites sensed.      Skin integrity: Callus and dry skin present. No ulcer, blister, skin breakdown, erythema or warmth.      Toenail Condition: Left toenails are abnormally thick. Fungal disease present.     Comments: Mild hypersensitivity left foot  Some decreased sensation digits    Focal hyperkeratotic lesion with painful central core consisting entirely of hyperkeratotic tissue without open skin, drainage, pus, " fluctuance, malodor, or signs of infection: left sub1st   No signs of verrucae    Focal hyperkeratotic lesion with painful central core consisting entirely of hyperkeratotic tissue without open skin, drainage, pus, fluctuance, malodor, or signs of infection: right 1st IPJ hallux                Skin:     General: Skin is warm and dry.      Capillary Refill: Capillary refill takes 2 to 3 seconds.      Coloration: Skin is not pale.      Findings: No erythema or rash.      Nails: There is no clubbing.   Neurological:      Mental Status: He is alert and oriented to person, place, and time.      Gait: Gait abnormal.   Psychiatric:         Attention and Perception: Attention normal.         Mood and Affect: Mood normal.         Speech: Speech normal.         Behavior: Behavior normal.         Thought Content: Thought content normal.         Cognition and Memory: Cognition normal.         Judgment: Judgment normal.               Assessment:       Encounter Diagnoses   Name Primary?    Corn or callus Yes    Right foot pain              Plan:       Piotr was seen today for follow-up.    Diagnoses and all orders for this visit:    Corn or callus    Right foot pain          I counseled the patient on his conditions, their implications and medical management.    Patient originally seen for right foot pain now with left foot pain    Per review Dr. pillai's notes an imaging no foreign body noted; discussed with patient    Upon evaluation today continue shaped pads    [facility].    Discussed with patient options are fibular sesamoidectomy versus custom-molded orthotics    Continue shaped pads    P.r.n.        Follow up if symptoms worsen or fail to improve.

## 2024-09-12 ENCOUNTER — TELEPHONE (OUTPATIENT)
Dept: SMOKING CESSATION | Facility: CLINIC | Age: 64
End: 2024-09-12
Payer: MEDICARE

## 2024-09-12 NOTE — TELEPHONE ENCOUNTER
Called patient about 12 month follow up. Left message for patient to call [phone]. Resolved quit episode.

## 2024-09-17 ENCOUNTER — TELEPHONE (OUTPATIENT)
Dept: PRIMARY CARE CLINIC | Facility: CLINIC | Age: 64
End: 2024-09-17
Payer: MEDICARE

## 2024-09-17 NOTE — TELEPHONE ENCOUNTER
----- Message from Titi Berman sent at 9/17/2024  4:01 PM CDT -----  Contact: [phone]@[email or web address]  Good afternoon patient would like a call back to discuss the status of some paperwork he left today for the doc to fill out for him. Please call patient to advise  [phone]

## 2024-10-07 ENCOUNTER — OFFICE VISIT (OUTPATIENT)
Dept: PRIMARY CARE CLINIC | Facility: CLINIC | Age: 64
End: 2024-10-07
Payer: MEDICARE

## 2024-10-07 ENCOUNTER — LAB VISIT (OUTPATIENT)
Dept: LAB | Facility: HOSPITAL | Age: 64
End: 2024-10-07
Attending: STUDENT IN AN ORGANIZED HEALTH CARE EDUCATION/TRAINING PROGRAM
Payer: MEDICARE

## 2024-10-07 VITALS
SYSTOLIC BLOOD PRESSURE: 120 MMHG | HEART RATE: 73 BPM | OXYGEN SATURATION: 99 % | BODY MASS INDEX: 26.45 KG/M2 | HEIGHT: 69 IN | DIASTOLIC BLOOD PRESSURE: 70 MMHG | TEMPERATURE: 98 F | WEIGHT: 178.56 LBS

## 2024-10-07 DIAGNOSIS — R73.03 PREDIABETES: ICD-10-CM

## 2024-10-07 DIAGNOSIS — D69.6 THROMBOCYTOPENIA, UNSPECIFIED: ICD-10-CM

## 2024-10-07 DIAGNOSIS — I10 ESSENTIAL HYPERTENSION: ICD-10-CM

## 2024-10-07 DIAGNOSIS — E78.2 MIXED HYPERLIPIDEMIA: ICD-10-CM

## 2024-10-07 DIAGNOSIS — R19.01 RUQ ABDOMINAL MASS: Primary | ICD-10-CM

## 2024-10-07 DIAGNOSIS — G81.94 LEFT HEMIPARESIS: ICD-10-CM

## 2024-10-07 DIAGNOSIS — Z86.73 HISTORY OF STROKE: ICD-10-CM

## 2024-10-07 DIAGNOSIS — Z87.891 PERSONAL HISTORY OF NICOTINE DEPENDENCE: ICD-10-CM

## 2024-10-07 DIAGNOSIS — Z12.5 SCREENING FOR PROSTATE CANCER: ICD-10-CM

## 2024-10-07 DIAGNOSIS — I25.118 CORONARY ARTERY DISEASE OF NATIVE ARTERY OF NATIVE HEART WITH STABLE ANGINA PECTORIS: ICD-10-CM

## 2024-10-07 DIAGNOSIS — I21.4 NSTEMI (NON-ST ELEVATED MYOCARDIAL INFARCTION): ICD-10-CM

## 2024-10-07 LAB
ALBUMIN SERPL BCP-MCNC: 4.2 G/DL (ref 3.5–5.2)
ALP SERPL-CCNC: 64 U/L (ref 55–135)
ALT SERPL W/O P-5'-P-CCNC: 23 U/L (ref 10–44)
ANION GAP SERPL CALC-SCNC: 11 MMOL/L (ref 8–16)
AST SERPL-CCNC: 20 U/L (ref 10–40)
BASOPHILS # BLD AUTO: 0.06 K/UL (ref 0–0.2)
BASOPHILS NFR BLD: 0.8 % (ref 0–1.9)
BILIRUB SERPL-MCNC: 0.5 MG/DL (ref 0.1–1)
BUN SERPL-MCNC: 10 MG/DL (ref 8–23)
CALCIUM SERPL-MCNC: 10.3 MG/DL (ref 8.7–10.5)
CHLORIDE SERPL-SCNC: 104 MMOL/L (ref 95–110)
CO2 SERPL-SCNC: 28 MMOL/L (ref 23–29)
COMPLEXED PSA SERPL-MCNC: 0.61 NG/ML (ref 0–4)
CREAT SERPL-MCNC: 1.2 MG/DL (ref 0.5–1.4)
DIFFERENTIAL METHOD BLD: NORMAL
EOSINOPHIL # BLD AUTO: 0.1 K/UL (ref 0–0.5)
EOSINOPHIL NFR BLD: 1.1 % (ref 0–8)
ERYTHROCYTE [DISTWIDTH] IN BLOOD BY AUTOMATED COUNT: 13.6 % (ref 11.5–14.5)
EST. GFR  (NO RACE VARIABLE): >60 ML/MIN/1.73 M^2
ESTIMATED AVG GLUCOSE: 120 MG/DL (ref 68–131)
GLUCOSE SERPL-MCNC: 82 MG/DL (ref 70–110)
HBA1C MFR BLD: 5.8 % (ref 4–5.6)
HCT VFR BLD AUTO: 48.7 % (ref 40–54)
HGB BLD-MCNC: 15.7 G/DL (ref 14–18)
IMM GRANULOCYTES # BLD AUTO: 0.02 K/UL (ref 0–0.04)
IMM GRANULOCYTES NFR BLD AUTO: 0.3 % (ref 0–0.5)
LYMPHOCYTES # BLD AUTO: 2.2 K/UL (ref 1–4.8)
LYMPHOCYTES NFR BLD: 30.3 % (ref 18–48)
MCH RBC QN AUTO: 30.5 PG (ref 27–31)
MCHC RBC AUTO-ENTMCNC: 32.2 G/DL (ref 32–36)
MCV RBC AUTO: 95 FL (ref 82–98)
MONOCYTES # BLD AUTO: 0.5 K/UL (ref 0.3–1)
MONOCYTES NFR BLD: 7.1 % (ref 4–15)
NEUTROPHILS # BLD AUTO: 4.5 K/UL (ref 1.8–7.7)
NEUTROPHILS NFR BLD: 60.4 % (ref 38–73)
NRBC BLD-RTO: 0 /100 WBC
PLATELET # BLD AUTO: 150 K/UL (ref 150–450)
PMV BLD AUTO: 12.8 FL (ref 9.2–12.9)
POTASSIUM SERPL-SCNC: 4 MMOL/L (ref 3.5–5.1)
PROT SERPL-MCNC: 7.5 G/DL (ref 6–8.4)
RBC # BLD AUTO: 5.14 M/UL (ref 4.6–6.2)
SODIUM SERPL-SCNC: 143 MMOL/L (ref 136–145)
WBC # BLD AUTO: 7.36 K/UL (ref 3.9–12.7)

## 2024-10-07 PROCEDURE — 3074F SYST BP LT 130 MM HG: CPT | Mod: HCNC,CPTII,S$GLB, | Performed by: STUDENT IN AN ORGANIZED HEALTH CARE EDUCATION/TRAINING PROGRAM

## 2024-10-07 PROCEDURE — 83036 HEMOGLOBIN GLYCOSYLATED A1C: CPT | Mod: HCNC | Performed by: STUDENT IN AN ORGANIZED HEALTH CARE EDUCATION/TRAINING PROGRAM

## 2024-10-07 PROCEDURE — 3078F DIAST BP <80 MM HG: CPT | Mod: HCNC,CPTII,S$GLB, | Performed by: STUDENT IN AN ORGANIZED HEALTH CARE EDUCATION/TRAINING PROGRAM

## 2024-10-07 PROCEDURE — 3008F BODY MASS INDEX DOCD: CPT | Mod: HCNC,CPTII,S$GLB, | Performed by: STUDENT IN AN ORGANIZED HEALTH CARE EDUCATION/TRAINING PROGRAM

## 2024-10-07 PROCEDURE — 99999 PR PBB SHADOW E&M-EST. PATIENT-LVL V: CPT | Mod: PBBFAC,HCNC,, | Performed by: STUDENT IN AN ORGANIZED HEALTH CARE EDUCATION/TRAINING PROGRAM

## 2024-10-07 PROCEDURE — 36415 COLL VENOUS BLD VENIPUNCTURE: CPT | Mod: HCNC,PN | Performed by: STUDENT IN AN ORGANIZED HEALTH CARE EDUCATION/TRAINING PROGRAM

## 2024-10-07 PROCEDURE — 84153 ASSAY OF PSA TOTAL: CPT | Mod: HCNC | Performed by: STUDENT IN AN ORGANIZED HEALTH CARE EDUCATION/TRAINING PROGRAM

## 2024-10-07 PROCEDURE — G2211 COMPLEX E/M VISIT ADD ON: HCPCS | Mod: HCNC,S$GLB,, | Performed by: STUDENT IN AN ORGANIZED HEALTH CARE EDUCATION/TRAINING PROGRAM

## 2024-10-07 PROCEDURE — 1159F MED LIST DOCD IN RCRD: CPT | Mod: HCNC,CPTII,S$GLB, | Performed by: STUDENT IN AN ORGANIZED HEALTH CARE EDUCATION/TRAINING PROGRAM

## 2024-10-07 PROCEDURE — 99214 OFFICE O/P EST MOD 30 MIN: CPT | Mod: HCNC,S$GLB,, | Performed by: STUDENT IN AN ORGANIZED HEALTH CARE EDUCATION/TRAINING PROGRAM

## 2024-10-07 PROCEDURE — 85025 COMPLETE CBC W/AUTO DIFF WBC: CPT | Mod: HCNC | Performed by: STUDENT IN AN ORGANIZED HEALTH CARE EDUCATION/TRAINING PROGRAM

## 2024-10-07 PROCEDURE — 80053 COMPREHEN METABOLIC PANEL: CPT | Mod: HCNC | Performed by: STUDENT IN AN ORGANIZED HEALTH CARE EDUCATION/TRAINING PROGRAM

## 2024-10-07 NOTE — PROGRESS NOTES
Primary Care  Office Visit - In Person  10/7/2024      HPI    Patient is a 63 y.o.   Piotr Crespo  has a past medical history of Erectile dysfunction due to arterial insufficiency (7/17/2020), Essential hypertension (3/11/2020), High cholesterol, History of left common carotid artery stent placement (4/17/2020), History of stroke (3/11/2020), Hypertension, Mixed hyperlipidemia (3/11/2020), Prediabetes (7/17/2020), and Stroke.    Patient presents with     Mass right side of abdomen for 3 years . He reports that [facility] but has been enlarging since his last eval several months ago. He denies pain     He has difficulty quitting smoking. He can go 4-5 days without smoking and then resumes out of habit.   He has patches and lozenges at home   He did not find Chantix helpful     He would like to resume PT for left hemiparesis       Active Medications:  Current Outpatient Medications   Medication Instructions    amLODIPine (NORVASC) 5 mg, Oral    atorvastatin (LIPITOR) 80 mg, Oral, Nightly    carvediloL (COREG) 6.25 mg, Oral, 2 times daily with meals    cholecalciferol (vitamin D3) (VITAMIN D3) 50 mcg    cholecalciferol, vitamin D3, 100 mcg (4,000 unit) Tab TAKE ONE TABLET BY MOUTH EVERY DAY AS A VITAMIN SUPPLEMENT    clopidogreL (PLAVIX) 75 mg, Oral    clotrimazole (LOTRIMIN) 1 % Soln APPLY SMALL AMOUNT TOPICALLY TWICE A DAY FOR FUNGAL INFECTION    cromolyn (OPTICROM) 4 % ophthalmic solution INSTILL 2 DROPS IN EACH EYE TWICE A DAY FOR ALLERGIC CONJUNCTIVITIS    erythromycin (ROMYCIN) ophthalmic ointment     ezetimibe (ZETIA) 10 mg, Oral, Daily    LIDOcaine-prilocaine (EMLA) cream Topical (Top), As needed (PRN)    nicotine (NICODERM CQ) 21 mg/24 hr     nicotine (NICODERM CQ) 21 mg/24 hr Apply topically.    nicotine polacrilex (NICORETTE) 4 MG Gum     nicotine polacrilex 4 MG Lozg DISSOLVE 1 LOZENGE IN MOUTH EVERY HOUR AS NEEDED TO STOP SMOKING    nicotine polacrilex 4 MG Lozg Take by mouth.    RSVPreF3  "antigen-AS01E, PF, (AREXVY, PF,) 120 mcg/0.5 mL SusR vaccine Intramuscular    tamsulosin (FLOMAX) 0.4 mg, Oral, Daily    urea 20 % Crea APPLY LIBERAL AMOUNT TOPICALLY TWICE A DAY AS NEEDED FOR CALLOUSED AREA.       Vitals:    10/07/24 0906   BP: 120/70   BP Location: Right arm   Patient Position: Sitting   Pulse: 73   Temp: 98 °F (36.7 °C)   SpO2: 99%   Weight: 81 kg (178 lb 9.2 oz)   Height: 5' 9" (1.753 m)       Physical Exam  Vitals reviewed.   Constitutional:       General: He is not in acute distress.  Cardiovascular:      Rate and Rhythm: Normal rate and regular rhythm.      Pulses: Normal pulses.      Heart sounds: Normal heart sounds.   Pulmonary:      Effort: Pulmonary effort is normal.      Breath sounds: Normal breath sounds.   Abdominal:      General: Abdomen is flat. Bowel sounds are normal.      Palpations: Abdomen is soft. There is mass (superifical hard, mobile mass RUQ about 4 cm).   Musculoskeletal:      Right lower leg: No edema.      Left lower leg: No edema.   Neurological:      General: No focal deficit present.      Mental Status: He is oriented to person, place, and time.          Assessment and Plan     1. RUQ abdominal mass  -     US Abdomen Limited; Future; Expected date: 10/07/2024    2. Thrombocytopenia, unspecified  -     CBC W/ AUTO DIFFERENTIAL; Future; Expected date: 10/07/2024    3. Prediabetes  -     HEMOGLOBIN A1C; Future; Expected date: 10/07/2024    4. History of stroke  5. Left hemiparesis  -     Ambulatory referral/consult to Physical/Occupational Therapy; Future; Expected date: 10/14/2024    6. Mixed hyperlipidemia  7. NSTEMI (non-ST elevated myocardial infarction)  8. Coronary artery disease of native artery of native heart with stable angina pectoris  S/p [facility] 6/2023   Continue plavix and Coreg 6.25 mg BID  Continue Lipitor 80 mg daily and Zetia 10 mg daily     9. Essential hypertension   Well controlled on amlodipine 5 mg daily    -     COMPREHENSIVE METABOLIC PANEL; " Future; Expected date: 10/07/2024    10. Screening for prostate cancer  -     PSA, SCREENING; Future; Expected date: 10/07/2024    11. Personal Hx of Nicotine Dependence   Benefits to cessation discussed. Patient strongly advised to quit smoking. At this time they are willing to quit.   3 minutes spent counseling patient regarding quitting smoking to improve overall health.       Previous labs, records, and notes reviewed and considered for their impact on clinical decision making today.                Upcoming Scheduled Appointments and Follow Up:    Future Appointments   Date Time Provider Department Center   [facility]   10/10/2024  9:15 AM Anjelica Briggs DPM [facility] POD [facility]   10/14/2024  9:45 AM [facility] WING [facility]   2/10/2025  9:20 AM Lory Pugh MD [facility] PRICARE [place]       Follow Up [facility] (with who? when?): Follow up in about 4 months (around 2/7/2025).      Extended Emergency Contact Information  Primary Emergency Contact: Jina Crespo  Address: [address] [place] [address] of [place]  Home Phone: [phone]  Mobile Phone: [phone]  Relation: Spouse      Lory Pugh MD   Internal Medicine  10/7/2024 - 9:13 AM    I spent a total of 30 minutes on the day of the visit.This includes face to face time and non-face to face time preparing to see the patient (eg, review of tests), obtaining and/or reviewing separately obtained history, documenting clinical information in the electronic or other health record, independently interpreting results and communicating results to the patient/family/caregiver, or care coordinator.    While patients have the right to access their medical record, it is essential to recognize that progress notes primarily serve as a means of communication among healthcare professionals.

## 2024-10-10 ENCOUNTER — OFFICE VISIT (OUTPATIENT)
Dept: PODIATRY | Facility: CLINIC | Age: 64
End: 2024-10-10
Payer: MEDICARE

## 2024-10-10 VITALS
HEIGHT: 69 IN | DIASTOLIC BLOOD PRESSURE: 74 MMHG | BODY MASS INDEX: 26.64 KG/M2 | WEIGHT: 179.88 LBS | HEART RATE: 70 BPM | SYSTOLIC BLOOD PRESSURE: 131 MMHG

## 2024-10-10 DIAGNOSIS — Z86.73 HISTORY OF STROKE: Primary | ICD-10-CM

## 2024-10-10 DIAGNOSIS — R73.03 PREDIABETES: ICD-10-CM

## 2024-10-10 DIAGNOSIS — Z72.0 TOBACCO ABUSE: ICD-10-CM

## 2024-10-10 PROCEDURE — 1160F RVW MEDS BY RX/DR IN RCRD: CPT | Mod: HCNC,CPTII,S$GLB, | Performed by: PODIATRIST

## 2024-10-10 PROCEDURE — 3078F DIAST BP <80 MM HG: CPT | Mod: HCNC,CPTII,S$GLB, | Performed by: PODIATRIST

## 2024-10-10 PROCEDURE — 3008F BODY MASS INDEX DOCD: CPT | Mod: HCNC,CPTII,S$GLB, | Performed by: PODIATRIST

## 2024-10-10 PROCEDURE — 1159F MED LIST DOCD IN RCRD: CPT | Mod: HCNC,CPTII,S$GLB, | Performed by: PODIATRIST

## 2024-10-10 PROCEDURE — 3044F HG A1C LEVEL LT 7.0%: CPT | Mod: HCNC,CPTII,S$GLB, | Performed by: PODIATRIST

## 2024-10-10 PROCEDURE — 99214 OFFICE O/P EST MOD 30 MIN: CPT | Mod: HCNC,S$GLB,, | Performed by: PODIATRIST

## 2024-10-10 PROCEDURE — 3075F SYST BP GE 130 - 139MM HG: CPT | Mod: HCNC,CPTII,S$GLB, | Performed by: PODIATRIST

## 2024-10-10 PROCEDURE — 99999 PR PBB SHADOW E&M-EST. PATIENT-LVL IV: CPT | Mod: PBBFAC,HCNC,, | Performed by: PODIATRIST

## 2024-10-10 NOTE — PROGRESS NOTES
Subjective:      Patient ID: Piotr Crespo is a 63 y.o. male.    Chief Complaint:   Diabetic Foot Exam (Pcp Lory Pugh MD 10/07/2024/Bilateral nail care ) and Plantar Warts (Right foot arch painful )    Piotr is a 63 y.o. male who presents to the clinic for evaluation and treatment of high risk feet. Piotr has a past medical history of Erectile dysfunction due to arterial insufficiency (7/17/2020), Essential hypertension (3/11/2020), High cholesterol, History of left common carotid artery stent placement (4/17/2020), History of stroke (3/11/2020), Hypertension, Mixed hyperlipidemia (3/11/2020), Prediabetes (7/17/2020), and Stroke.     Rt foot exam  [facility]..  He relates they told him they would rather try than surgery at this time    Patient requesting the left callus area not be file down he has been treating it and feels good today about it      Last visit 8/24:    [facility].  He is here today to discuss what was found on Dr. House's evaluation  Pt relates the area still painful left foot.   Saw Dr. House twice since our last visit.    There was some concern about foreign body in the foot however the ultrasound and xray was negative  Per Dr. House's note there was some suggestion patient should check with the [facility] to [facility]     7/26/24 Dr. House  Patient will stretch the tendo achilles complex three times daily as demonstrated in the office.  Literature was dispensed illustrating proper stretching technique.     I applied a plantar rest strapping to the patient's left foot to offload symptomatic area, support the arch, and relieve pain.     Patient will obtain over the counter arch supports and wear them in shoes whenever possible.  Athletic shoes intended for walking or running are usually best.     Discussed conservative treatment with shoes of adequate dimensions, material, and  style to alleviate symptoms and delay or prevent surgical intervention.     Patient should also try help aperture pads, horseshoe offload pads etc. for symptomatic relief.     He relates he we will [facility] up the [facility] and see if they have any other ideas.  I have recommended he check out having custom orthoses made with a offload accommodation of the plantar left 1st MTPJ.    Plan:  Patient originally seen for right foot pain now with left foot pain    Per review Dr. pillai's notes an imaging no foreign body noted; discussed with patient    Upon evaluation today continue shaped pads    [facility].    Discussed with patient options are fibular sesamoidectomy versus custom-molded orthotics    Continue shaped pads    P.r.n.      PCP: Lory Pugh MD    Date Last Seen by PCP: 10/7/24         Current shoe gear:  Affected Foot: Tennis shoes     Unaffected Foot: Tennis shoes        Hemoglobin A1C   Date Value Ref Range Status   10/07/2024 5.8 (H) 4.0 - 5.6 % Final     Comment:     ADA Screening Guidelines:  5.7-6.4%  Consistent with prediabetes  >or=6.5%  Consistent with diabetes    High levels of fetal hemoglobin interfere with the HbA1C  assay. Heterozygous hemoglobin variants (HbS, HgC, etc)do  not significantly interfere with this assay.   However, presence of multiple variants may affect accuracy.     08/04/2023 5.6 4.0 - 5.6 % Final     Comment:     ADA Screening Guidelines:  5.7-6.4%  Consistent with prediabetes  >or=6.5%  Consistent with diabetes    High levels of fetal hemoglobin interfere with the HbA1C  assay. Heterozygous hemoglobin variants (HbS, HgC, etc)do  not significantly interfere with this assay.   However, presence of multiple variants may affect accuracy.     05/10/2022 5.9 (H) 4.0 - 5.6 % Final     Comment:     ADA Screening Guidelines:  5.7-6.4%  Consistent with prediabetes  >or=6.5%  Consistent with diabetes    High levels of fetal hemoglobin interfere  "with the HbA1C  assay. Heterozygous hemoglobin variants (HbS, HgC, etc)do  not significantly interfere with this assay.   However, presence of multiple variants may affect accuracy.          Past Medical History:   Diagnosis Date    Erectile dysfunction due to arterial insufficiency 7/17/2020    Essential hypertension 3/11/2020    High cholesterol     History of left common carotid artery stent placement 4/17/2020    History of stroke 3/11/2020    Hypertension     Mixed hyperlipidemia 3/11/2020    Prediabetes 7/17/2020    Stroke     sept 3, 2017     Past Surgical History:   Procedure Laterality Date    ANGIOGRAM, CORONARY, WITH LEFT HEART CATHETERIZATION Left 6/24/2023    Procedure: Angiogram, Coronary, with Left Heart Cath;  Surgeon: George Sherwood MD;  Location: [facility] CATH/EP LAB;  Service: Cardiology;  Laterality: Left;    CAROTID ARTERY ANGIOPLASTY Left     left Internal carotid artery stent    CEREBRAL ANEURYSM REPAIR      COLONOSCOPY N/A 06/30/2020    Procedure: COLONOSCOPY;  Surgeon: Yuan Pizano MD;  Location: [facility] ENDO ([address] [place] [address]);  Service: Endoscopy;  Laterality: N/A;  3/30/20- Per Dr. Catherine, Pt to be r/s for later date, "screening"- [facility]  3/30/20 -  for Pt to call back to r/s for later date per Dr. Catherine- [facility]  COVID screening on 6/27/20 - [facility] urgent care- ER    LUMBAR DISCECTOMY       Current Outpatient Medications on File Prior to Visit   Medication Sig Dispense Refill    amLODIPine (NORVASC) 5 MG tablet TAKE 1 TABLET ONE TIME DAILY 90 tablet 3    atorvastatin (LIPITOR) 80 MG tablet Take 1 tablet (80 mg total) by mouth every evening. 90 tablet 3    carvediloL (COREG) 6.25 MG tablet TAKE 1 TABLET TWICE DAILY WITH MEALS 180 tablet 3    cholecalciferol, vitamin D3, (VITAMIN D3) 50 mcg (2,000 unit) Tab Take 50 mcg by mouth.      cholecalciferol, vitamin D3, 100 mcg (4,000 unit) Tab TAKE ONE TABLET BY MOUTH EVERY DAY AS A VITAMIN SUPPLEMENT      clopidogreL (PLAVIX) 75 mg tablet TAKE 1 TABLET " ONE TIME DAILY 90 tablet 3    clotrimazole (LOTRIMIN) 1 % Soln APPLY SMALL AMOUNT TOPICALLY TWICE A DAY FOR FUNGAL INFECTION      cromolyn (OPTICROM) 4 % ophthalmic solution INSTILL 2 DROPS IN EACH EYE TWICE A DAY FOR ALLERGIC CONJUNCTIVITIS      erythromycin (ROMYCIN) ophthalmic ointment       ezetimibe (ZETIA) 10 mg tablet Take 1 tablet (10 mg total) by mouth once daily. 90 tablet 3    LIDOcaine-prilocaine (EMLA) cream Apply topically as needed. 30 g 3    nicotine (NICODERM CQ) 21 mg/24 hr       nicotine (NICODERM CQ) 21 mg/24 hr Apply topically.      nicotine polacrilex (NICORETTE) 4 MG Gum       nicotine polacrilex 4 MG Lozg DISSOLVE 1 LOZENGE IN MOUTH EVERY HOUR AS NEEDED TO STOP SMOKING      nicotine polacrilex 4 MG Lozg Take by mouth.      RSVPreF3 antigen-AS01E, PF, (AREXVY, PF,) 120 mcg/0.5 mL SusR vaccine Inject into the muscle. 0.5 mL 0    tamsulosin (FLOMAX) 0.4 mg Cap Take 1 capsule (0.4 mg total) by mouth once daily. 90 capsule 3    urea 20 % Crea APPLY LIBERAL AMOUNT TOPICALLY TWICE A DAY AS NEEDED FOR CALLOUSED AREA.       No current facility-administered medications on file prior to visit.     Review of patient's allergies indicates:  No Known Allergies    Review of Systems   Constitutional: Negative for chills, decreased appetite, fever, malaise/fatigue, night sweats, weight gain and weight loss.   Cardiovascular:  Negative for chest pain, claudication, dyspnea on exertion, leg swelling, palpitations and syncope.   Respiratory:  Negative for cough and shortness of breath.    Endocrine: Negative for cold intolerance and heat intolerance.   Hematologic/Lymphatic: Negative for bleeding problem. Bruises/bleeds easily.   Skin:  Positive for dry skin and nail changes. Negative for color change, flushing, itching, poor wound healing, rash, skin cancer, suspicious lesions and unusual hair distribution.   Musculoskeletal:  Positive for muscle weakness and stiffness. Negative for arthritis, back pain, falls,  "gout, joint pain, joint swelling, muscle cramps, myalgias and neck pain.   Gastrointestinal:  Negative for diarrhea, nausea and vomiting.   Neurological:  Positive for numbness and paresthesias. Negative for dizziness, focal weakness, light-headedness, tremors, vertigo and weakness.   Psychiatric/Behavioral:  Negative for altered mental status and depression. The patient does not have insomnia.    Allergic/Immunologic: Negative.          Objective:       Vitals:    10/10/24 0906   BP: 131/74   Pulse: 70   Weight: 81.6 kg (179 lb 14.3 oz)   Height: 5' 9" (1.753 m)   PainSc:   3   PainLoc: Foot   81.6 kg (179 lb 14.3 oz)     Physical Exam  Vitals reviewed.   Constitutional:       General: He is not in acute distress.     Appearance: He is well-developed. He is not ill-appearing, toxic-appearing or diaphoretic.      Comments:     Cardiovascular:      Pulses:           Dorsalis pedis pulses are 2+ on the right side and 2+ on the left side.        Posterior tibial pulses are 1+ on the right side and 1+ on the left side.   Musculoskeletal:         General: No swelling.      Right lower leg: No edema.      Left lower leg: No edema.      Right ankle: Normal.      Right Achilles Tendon: Normal.      Left ankle: Normal.      Left Achilles Tendon: Normal.      Right foot: Decreased range of motion. Deformity and bunion present. No tenderness or bony tenderness.      Left foot: Decreased range of motion. Deformity, bunion, foot drop and tenderness present. No bony tenderness.      Comments:     - pop left sub MTPJ    Left foot decreased eversion plantar flexion and dorsiflexion/strength decreased   Feet:      Right foot:      Protective Sensation: 10 sites tested.  10 sites sensed.      Skin integrity: Callus and dry skin present. No ulcer, blister, skin breakdown, erythema or warmth.      Toenail Condition: Right toenails are abnormally thick. Fungal disease present.     Left foot:      Protective Sensation: 10 sites tested.  " 10 sites sensed.      Skin integrity: Callus and dry skin present. No ulcer, blister, skin breakdown, erythema or warmth.      Toenail Condition: Left toenails are abnormally thick. Fungal disease present.     Comments: Mild hypersensitivity left foot  Some decreased sensation digits    Focal hyperkeratotic lesion with painful central core consisting entirely of hyperkeratotic tissue without open skin, drainage, pus, fluctuance, malodor, or signs of infection: left sub1st   No signs of verrucae    Focal hyperkeratotic lesion with painful central core consisting entirely of hyperkeratotic tissue without open skin, drainage, pus, fluctuance, malodor, or signs of infection: right 1st 5th MT head                Skin:     General: Skin is warm and dry.      Capillary Refill: Capillary refill takes 2 to 3 seconds.      Coloration: Skin is not pale.      Findings: No erythema or rash.      Nails: There is no clubbing.   Neurological:      Mental Status: He is alert and oriented to person, place, and time.      Gait: Gait abnormal.   Psychiatric:         Attention and Perception: Attention normal.         Mood and Affect: Mood normal.         Speech: Speech normal.         Behavior: Behavior normal.         Thought Content: Thought content normal.         Cognition and Memory: Cognition normal.         Judgment: Judgment normal.               Assessment:       Encounter Diagnoses   Name Primary?    History of stroke Yes    Tobacco abuse     Prediabetes            Plan:       Piotr was seen today for diabetic foot exam and plantar warts.    Diagnoses and all orders for this visit:    History of stroke    Tobacco abuse    Prediabetes        I counseled the patient on his conditions, their implications and medical management.    Pt request no debridement today    Pt moving forward with plan of custom molded orthotics instead of surgery    Patient should be evaluated every 3-4 months  Can chose to be [facility]        Follow up if symptoms worsen or fail to improve.

## 2024-10-18 NOTE — PROGRESS NOTES
[facility]   Occupational Therapy Initial Neurological Evaluation     Name: Piotr Crespo  Clinic Number: [id number]    Therapy Diagnosis:   Encounter Diagnoses   Name Primary?    Left hemiparesis     Coordination impairment Yes    Decreased range of motion of left wrist      Physician: Lory Pugh MD    Physician Orders: Eval and Treat  Medical Diagnosis: G81.94 (ICD-10-CM) - Left hemiparesis  Evaluation Date: 10/21/2024  Insurance Authorization Period Expiration: 10/7/2025  Plan of Care Certification Period: 12/29/2024  Date of Return to MD: no neurology follow ups at this time   FOTO: 1/ 2    Visit # / Visits authorized: 1 / 1    Precautions:  Standard    Time In: 10:55  Time Out: 11:40  Total Billable Time: 45 minutes    Subjective     Date of Onset: CVA 2017    History of Current Condition: CVA Labor Day 2017. He has had Botox shots without positive results. Aneurysm in 2006 with surgical repair with no lingering deficits.    Involved Side: Left  Dominant Side: Right    Surgical Procedure: stent placement in past  Imaging: reviewed within Epic    Previous Therapy: d/c from outpatient in 2021    Pain:  Pain Related Behaviors Observed: No     Occupation:  disability     Functional Limitations/Social History:    Prior Level of Function: Independent with all ADLs until 2017.   Current Level of Function: mod(I) with ADLS, IADLs, driving. [facility] but has not been using it ~2+ years.     Current Functional Status   Home/Living environment: lives with their spouse    - single story home, 4 steps to enter w/ railing present. Tub/shower combo.     - DME: none       Limitation of Functional Status as follows:   ADLs/IADLs:     - Feeding: occasionally needing assistance with cutting thicker or end pieces of meat     - Bathing: indep - standing in shower     - Dressing/Grooming: mod(I) with added time for closures. Wife completes ties.     - Home Management:  "mod(I) with bilateral upper extremities     - Toileting: indep     - Driving: yes      Functional mobility: indep     Tranfers: indep      Leisure: playing and walking to park with grandchildren (6) ; gym 2x/w     Patient's Goals for Therapy: get his left side working better     Past Medical History/Physical Systems Review:     Past Medical History:  Piotr Crespo  has a past medical history of Erectile dysfunction due to arterial insufficiency, Essential hypertension, High cholesterol, History of left common carotid artery stent placement, History of stroke, Hypertension, Mixed hyperlipidemia, Prediabetes, and Stroke.    Past Surgical History:  Piotr Crespo  has a past surgical history that includes Cerebral aneurysm repair; Carotid angioplasty (Left); Colonoscopy (N/A, 06/30/2020); Lumbar discectomy; and ANGIOGRAM, CORONARY, WITH LEFT HEART CATHETERIZATION (Left, 6/24/2023).    Current Medications:  Piotr has a current medication list which includes the following prescription(s): amlodipine, atorvastatin, carvedilol, cholecalciferol (vitamin d3), cholecalciferol (vitamin d3), clopidogrel, clotrimazole, cromolyn, erythromycin, ezetimibe, lidocaine-prilocaine, nicotine, nicotine, nicotine polacrilex, nicotine polacrilex, nicotine polacrilex, arexvy (pf), tamsulosin, and urea.    Allergies:  Review of patient's allergies indicates:  No Known Allergies     Objective     Cognitive Exam:  Oriented: Person  Behaviors: normal, cooperative  Follows Commands/attention: Follows multistep  commands  Communication: clear/fluent    Visual/Perceptual:  Tracking: intact all directions    Saccades: intact functionally    Acuity: corrected by glasses but does not wear as prescribed "Still cannot get used to them"  Convergence: normal   Nystagmus: none noted    Comments: reported sun sensitivity     Physical Exam:  Postural examination/scapula alignment: Rounded shoulder, Head forward, and Posterior pelvic tilt  Joint integrity: Firm " end feeling  Skin integrity: normal   Edema: none noted for bilateral upper extremities      Joint Evaluation  AROM  10/21/2024 PROM   10/21/2024    Left  Left    Shoulder flex 0-180 158 165   Shoulder Abd 0-180 168 173   Shoulder ER 0-90 Normal  90   Shoulder IR 0-90 Normal  90    Shoulder Extension 0-80 80 80   Shoulder Horizontal adduction 0-90 40 55   Elbow flex/ext 0-150 WFL WFL   Wrist flex 0-80 60 80   Wrist ext 0-70 35 65   Right upper extremity: AROM normal/WNL    Fist: normal      Strength 10/21/2024 10/21/2024   **within available ROM** Right  Left    Shoulder flex 5/5 5/5   Shoulder abd 5/5 5/5   Shoulder ER 5/5 5/5   Shoulder IR 5/5 5/5   Shoulder Extension 5/5 4+/5   Shoulder Horizontal adduction 5/5 4+/5   Elbow flex 5/5 5/5   Elbow ext 5/5 5/5   Wrist flex 5/5 4/5   Wrist ext 5/5 4/5      Strength: (DESIREE Dynamometer in lbs.) Average 3 trials, Position II:     10/21/2024 10/21/2024    Right  Left    Rung II 88.2# 63.3#     Pinch Strength (Measured in psi)     10/21/2024 10/21/2024    Right  Left    Key Pinch 20.1 psi 16.4 psi   3pt Pinch 15.6 psi 8.2 psi   2pt Pinch 9.3 psi 8.3 psi     Fine Motor Coordination: 9 Hole Peg Test  Right  10/21/2024 Left    10/21/2024   21.06 s Completion of 2: 49.73 s   [norms for men aged 61-65: R=20.87s +/-3.50s; L=21.60s +/-2.98s (Jasmin et al, 2003)]    Fine Motor Coordination: Box and Block  Right  10/21/2024 Left    10/21/2024   60 26   [norms for men aged 60-64: R=71.3 +/-8.8; L=70.5 /-8.1 (Mohini et al, 1985)]    Gross motor coordination:   MEENAKSHI (Rapid Alternating Movements): impaired left (synergy)  Finger to Nose (5 times): no dysmetria   Finger Flicks (coordination moving from digit flexion to digit extension): delayed recoil on left     Tone:  Modified James Scale: left upper extremity   1-  Slight increase in muscle tone, manifested by a catch and release or by minimal resistance at the end of the range of motino when the affected part(s) is moved  in flexion or extension    Sensation:  Piotr  reports no complains/noted changes. Enjoys warm water on his hand/wrist     Balance:   Static Sit - GOOD+: Takes MAXIMAL challenges from all directions.    Dynamic sit- GOOD+: Takes MAXIMAL challenges from all directions.    Static Stand - GOOD: Takes MODERATE challenges from all directions  Dynamic stand - GOOD-: Takes MODERATE challenges from all directions but inconsistently    Endurance Deficit: mild                    Intake Outcome Measure for FOTO Survey    Therapist reviewed FOTO scores for Piotr Crespo on 10/21/2024.   FOTO documents entered into [employer] - see Media section.    Intake Score: 54         Treatment     Evaluation only     Home Exercises and Patient Education Provided     Education provided:   -role of OT, goals for OT, scheduling      Assessment     Piotr Crespo is a 63 y.o. male referred to outpatient occupational therapy and presents with a medical diagnosis of CVA in 2017 with left (non dominant) hemiparesis. At this time, he demo distal deficits with range of motion and coordination deficits impacting his bilateral manipulation of use of left hand for all prior roles and routines. He has demo great functional gains and progress since CVA, however, his left hand weakness and coordination deficits continue to linger and impact his functional indep. Following medical record review it is determined that pt will benefit from occupational therapy services in order to maximize pain free and/or functional use of left hand. The following goals were discussed with the patient and patient is in agreement with them as to be addressed in the treatment plan. The patient's rehab potential is Good.     Pt arrived hoping today's session would also address his LLE. Reached out to referring provider on this date for PT referral placement.     Anticipated barriers to occupational therapy: none noted; chronic nature     Plan of care discussed with patient: Yes  Patient's  spiritual, cultural and educational needs considered and patient is agreeable to the plan of care and goals as stated below:     Medical Necessity is demonstrated by the following  Occupational Profile/History  Co-morbidities and personal factors that may impact the plan of care [] LOW: Brief chart review  [x] MODERATE: Expanded chart review   [] HIGH: Extensive chart review    Moderate / High Support Documentation: Back pain, Gastrointestinal Disease, Hard of Hearing, Headaches, Heart Attack,  High Blood Pressure, Sleep dysfunction, Stroke or TIA, Visual Impairment     Examination  Performance deficits relating to physical, cognitive or psychosocial skills that result in activity limitations and/or participation restrictions  [] LOW: addressing 1-3 Performance deficits  [] MODERATE: 3-5 Performance deficits  [x] HIGH: 5+ Performance deficits (please support below)    Moderate / High Support Documentation:    Physical:  Joint Mobility  Joint Stability  Muscle Power/Strength   Strength  Pinch Strength  Gross Motor Coordination  Fine Motor Coordination    Cognitive:  No Deficits    Psychosocial:    No Deficits     Treatment Options [] LOW: Limited options  [x] MODERATE: Several options  [] HIGH: Multiple options      Decision Making/ Complexity Score: moderate       The following goals were discussed with the patient and patient is in agreement with them as to be addressed in the treatment plan.     Goals: 8 weeks Long Term=Short Term  - Pt will demo active left wrist extension to 45+ degrees to aid in increased functional indep with ADLs/IADLs.   - Pt will demo 2+ increase in left pinches (all 3 conditions) to aid in increased in hand manipulation tasks.   - Pt will complete Box and Blocks Test with left upper extremity, transferring 30+ blocks, to demonstrate improved gross manual coordination needed for ADL and IADL tasks.   - Pt will improve FOTO score to 56+ for improved self perception of functional  performance with daily activities.   - Pt will be independent with Home Exercise Program (HEP)/Home Activity Program (HAP) to promote long term maintenance of progress made in therapy.     Goals to be added and edited within plan of care as needed/appropriate.       Plan   Certification Period/Plan of care expiration: 10/21/2024 to 12/29/2024 (to allow for scheduling due to busy clinic).    Outpatient Occupational Therapy 1-2 times weekly for 8 weeks to include the following interventions: Moist Heat/ Ice, Neuromuscular Re-ed, Paraffin, Patient Education, Self Care, Therapeutic Activities, and Therapeutic Exercise.    ABBE Barrett        Physician's Signature: _________________________________________ Date: ________________

## 2024-10-21 ENCOUNTER — CLINICAL SUPPORT (OUTPATIENT)
Dept: REHABILITATION | Facility: HOSPITAL | Age: 64
End: 2024-10-21
Attending: STUDENT IN AN ORGANIZED HEALTH CARE EDUCATION/TRAINING PROGRAM
Payer: MEDICARE

## 2024-10-21 DIAGNOSIS — M25.632 DECREASED RANGE OF MOTION OF LEFT WRIST: ICD-10-CM

## 2024-10-21 DIAGNOSIS — R27.8 COORDINATION IMPAIRMENT: Primary | ICD-10-CM

## 2024-10-21 DIAGNOSIS — G81.94 LEFT HEMIPARESIS: ICD-10-CM

## 2024-10-21 PROCEDURE — 97166 OT EVAL MOD COMPLEX 45 MIN: CPT | Mod: HCNC,PO

## 2024-10-21 NOTE — PLAN OF CARE
[facility]   Occupational Therapy Initial Neurological Evaluation     Name: Piotr Crespo  Clinic Number: [id number]    Therapy Diagnosis:   Encounter Diagnoses   Name Primary?    Left hemiparesis     Coordination impairment Yes    Decreased range of motion of left wrist      Physician: Lory Pugh MD    Physician Orders: Eval and Treat  Medical Diagnosis: G81.94 (ICD-10-CM) - Left hemiparesis  Evaluation Date: 10/21/2024  Insurance Authorization Period Expiration: 10/7/2025  Plan of Care Certification Period: 12/29/2024  Date of Return to MD: no neurology follow ups at this time   FOTO: 1/ 2    Visit # / Visits authorized: 1 / 1    Precautions:  Standard    Time In: 10:55  Time Out: 11:40  Total Billable Time: 45 minutes    Subjective     Date of Onset: CVA 2017    History of Current Condition: CVA Labor Day 2017. He has had Botox shots without positive results. Aneurysm in 2006 with surgical repair with no lingering deficits.    Involved Side: Left  Dominant Side: Right    Surgical Procedure: stent placement in past  Imaging: reviewed within Epic    Previous Therapy: d/c from outpatient in 2021    Pain:  Pain Related Behaviors Observed: No     Occupation:  disability     Functional Limitations/Social History:    Prior Level of Function: Independent with all ADLs until 2017.   Current Level of Function: mod(I) with ADLS, IADLs, driving. Has a Left upper extremity [facility] but has not been using it ~2+ years.     Current Functional Status   Home/Living environment: lives with their spouse    - single story home, 4 steps to enter w/ railing present. Tub/shower combo.     - DME: none       Limitation of Functional Status as follows:   ADLs/IADLs:     - Feeding: occasionally needing assistance with cutting thicker or end pieces of meat     - Bathing: indep - standing in shower     - Dressing/Grooming: mod(I) with added time for closures. Wife completes ties.     - Home Management:  "mod(I) with bilateral upper extremities     - Toileting: indep     - Driving: yes      Functional mobility: indep     Tranfers: indep      Leisure: playing and walking to park with grandchildren (6) ; gym 2x/w     Patient's Goals for Therapy: get his left side working better     Past Medical History/Physical Systems Review:     Past Medical History:  Piotr Crespo  has a past medical history of Erectile dysfunction due to arterial insufficiency, Essential hypertension, High cholesterol, History of left common carotid artery stent placement, History of stroke, Hypertension, Mixed hyperlipidemia, Prediabetes, and Stroke.    Past Surgical History:  Piotr Crespo  has a past surgical history that includes Cerebral aneurysm repair; Carotid angioplasty (Left); Colonoscopy (N/A, 06/30/2020); Lumbar discectomy; and ANGIOGRAM, CORONARY, WITH LEFT HEART CATHETERIZATION (Left, 6/24/2023).    Current Medications:  Piotr has a current medication list which includes the following prescription(s): amlodipine, atorvastatin, carvedilol, cholecalciferol (vitamin d3), cholecalciferol (vitamin d3), clopidogrel, clotrimazole, cromolyn, erythromycin, ezetimibe, lidocaine-prilocaine, nicotine, nicotine, nicotine polacrilex, nicotine polacrilex, nicotine polacrilex, arexvy (pf), tamsulosin, and urea.    Allergies:  Review of patient's allergies indicates:  No Known Allergies     Objective     Cognitive Exam:  Oriented: Person  Behaviors: normal, cooperative  Follows Commands/attention: Follows multistep  commands  Communication: clear/fluent    Visual/Perceptual:  Tracking: intact all directions    Saccades: intact functionally    Acuity: corrected by glasses but does not wear as prescribed "Still cannot get used to them"  Convergence: normal   Nystagmus: none noted    Comments: reported sun sensitivity     Physical Exam:  Postural examination/scapula alignment: Rounded shoulder, Head forward, and Posterior pelvic tilt  Joint integrity: Firm " end feeling  Skin integrity: normal   Edema: none noted for bilateral upper extremities      Joint Evaluation  AROM  10/21/2024 PROM   10/21/2024    Left  Left    Shoulder flex 0-180 158 165   Shoulder Abd 0-180 168 173   Shoulder ER 0-90 Normal  90   Shoulder IR 0-90 Normal  90    Shoulder Extension 0-80 80 80   Shoulder Horizontal adduction 0-90 40 55   Elbow flex/ext 0-150 WFL WFL   Wrist flex 0-80 60 80   Wrist ext 0-70 35 65   Right upper extremity: AROM normal/WNL    Fist: normal      Strength 10/21/2024 10/21/2024   **within available ROM** Right  Left    Shoulder flex 5/5 5/5   Shoulder abd 5/5 5/5   Shoulder ER 5/5 5/5   Shoulder IR 5/5 5/5   Shoulder Extension 5/5 4+/5   Shoulder Horizontal adduction 5/5 4+/5   Elbow flex 5/5 5/5   Elbow ext 5/5 5/5   Wrist flex 5/5 4/5   Wrist ext 5/5 4/5      Strength: (DESIREE Dynamometer in lbs.) Average 3 trials, Position II:     10/21/2024 10/21/2024    Right  Left    Rung II 88.2# 63.3#     Pinch Strength (Measured in psi)     10/21/2024 10/21/2024    Right  Left    Key Pinch 20.1 psi 16.4 psi   3pt Pinch 15.6 psi 8.2 psi   2pt Pinch 9.3 psi 8.3 psi     Fine Motor Coordination: 9 Hole Peg Test  Right  10/21/2024 Left    10/21/2024   21.06 s Completion of 2: 49.73 s   [norms for men aged 61-65: R=20.87s +/-3.50s; L=21.60s +/-2.98s (Jasmin et al, 2003)]    Fine Motor Coordination: Box and Block  Right  10/21/2024 Left    10/21/2024   60 26   [norms for men aged 60-64: R=71.3 +/-8.8; L=70.5 /-8.1 (Mohini et al, 1985)]    Gross motor coordination:   MEENAKSHI (Rapid Alternating Movements): impaired left (synergy)  Finger to Nose (5 times): no dysmetria   Finger Flicks (coordination moving from digit flexion to digit extension): delayed recoil on left     Tone:  Modified James Scale: left upper extremity   1-  Slight increase in muscle tone, manifested by a catch and release or by minimal resistance at the end of the range of motino when the affected part(s) is moved  in flexion or extension    Sensation:  Piotr  reports no complains/noted changes. Enjoys warm water on his hand/wrist     Balance:   Static Sit - GOOD+: Takes MAXIMAL challenges from all directions.    Dynamic sit- GOOD+: Takes MAXIMAL challenges from all directions.    Static Stand - GOOD: Takes MODERATE challenges from all directions  Dynamic stand - GOOD-: Takes MODERATE challenges from all directions but inconsistently    Endurance Deficit: mild                    Intake Outcome Measure for FOTO Survey    Therapist reviewed FOTO scores for Piotr Crespo on 10/21/2024.   FOTO documents entered into [employer] - see Media section.    Intake Score: 54         Treatment     Evaluation only     Home Exercises and Patient Education Provided     Education provided:   -role of OT, goals for OT, scheduling      Assessment     Piotr Crespo is a 63 y.o. male referred to outpatient occupational therapy and presents with a medical diagnosis of CVA in 2017 with left (non dominant) hemiparesis. At this time, he demo distal deficits with range of motion and coordination deficits impacting his bilateral manipulation of use of left hand for all prior roles and routines. He has demo great functional gains and progress since CVA, however, his left hand weakness and coordination deficits continue to linger and impact his functional indep. Following medical record review it is determined that pt will benefit from occupational therapy services in order to maximize pain free and/or functional use of left hand. The following goals were discussed with the patient and patient is in agreement with them as to be addressed in the treatment plan. The patient's rehab potential is Good.     Pt arrived hoping today's session would also address his LLE. Reached out to referring provider on this date for PT referral placement.     Anticipated barriers to occupational therapy: none noted; chronic nature     Plan of care discussed with patient: Yes  Patient's  spiritual, cultural and educational needs considered and patient is agreeable to the plan of care and goals as stated below:     Medical Necessity is demonstrated by the following  Occupational Profile/History  Co-morbidities and personal factors that may impact the plan of care [] LOW: Brief chart review  [x] MODERATE: Expanded chart review   [] HIGH: Extensive chart review    Moderate / High Support Documentation: Back pain, Gastrointestinal Disease, Hard of Hearing, Headaches, Heart Attack,  High Blood Pressure, Sleep dysfunction, Stroke or TIA, Visual Impairment     Examination  Performance deficits relating to physical, cognitive or psychosocial skills that result in activity limitations and/or participation restrictions  [] LOW: addressing 1-3 Performance deficits  [] MODERATE: 3-5 Performance deficits  [x] HIGH: 5+ Performance deficits (please support below)    Moderate / High Support Documentation:    Physical:  Joint Mobility  Joint Stability  Muscle Power/Strength   Strength  Pinch Strength  Gross Motor Coordination  Fine Motor Coordination    Cognitive:  No Deficits    Psychosocial:    No Deficits     Treatment Options [] LOW: Limited options  [x] MODERATE: Several options  [] HIGH: Multiple options      Decision Making/ Complexity Score: moderate       The following goals were discussed with the patient and patient is in agreement with them as to be addressed in the treatment plan.     Goals: 8 weeks Long Term=Short Term  - Pt will demo active left wrist extension to 45+ degrees to aid in increased functional indep with ADLs/IADLs.   - Pt will demo 2+ increase in left pinches (all 3 conditions) to aid in increased in hand manipulation tasks.   - Pt will complete Box and Blocks Test with left upper extremity, transferring 30+ blocks, to demonstrate improved gross manual coordination needed for ADL and IADL tasks.   - Pt will improve FOTO score to 56+ for improved self perception of functional  performance with daily activities.   - Pt will be independent with Home Exercise Program (HEP)/Home Activity Program (HAP) to promote long term maintenance of progress made in therapy.     Goals to be added and edited within plan of care as needed/appropriate.       Plan   Certification Period/Plan of care expiration: 10/21/2024 to 12/29/2024 (to allow for scheduling due to busy clinic).    Outpatient Occupational Therapy 1-2 times weekly for 8 weeks to include the following interventions: Moist Heat/ Ice, Neuromuscular Re-ed, Paraffin, Patient Education, Self Care, Therapeutic Activities, and Therapeutic Exercise.    ABBE Barrett        Physician's Signature: _________________________________________ Date: ________________

## 2024-11-08 NOTE — PROGRESS NOTES
[facility] OUTPATIENT THERAPY AND WELLNESS  Occupational Therapy Treatment Note     Date: 11/11/2024  Name: Piotr Crespo  Clinic Number: [id number]    Therapy Diagnosis:   Encounter Diagnoses   Name Primary?    Coordination impairment Yes    Decreased range of motion of left wrist      Physician: Lory Pugh MD    Physician Orders: Eval and Treat  Medical Diagnosis: G81.94 (ICD-10-CM) - Left hemiparesis  Evaluation Date: 10/21/2024  Insurance Authorization Period Expiration: 10/7/2025  Plan of Care Certification Period: 12/29/2024  Date of Return to MD: no neurology follow ups at this time   FOTO: 1/ 2     Visit # / Visits authorized: 1 / 20     Precautions:  Standard     Time In: 11:17  Time Out: 12:04  Total Billable Time: 47 minutes      Subjective     Patient reports: completed dumbbell exercises this morning   Response to previous treatment:evaluation only  Functional change: ongoing   Patient's Goals for Therapy: get his left side working better     Pain: 0/10  Location: n/a    Objective     Objective Measures updated at progress report unless specified.    Treatment     Piotr participated in dynamic functional therapeutic activities to improve functional performance for 47  minutes, including:  Seated at table top: for left upper extremity   -hand exerciser on level 3 for wooden peg  for placement into shoulder height container   -green flex bar: left ulnar and radial deviation and wrist flex/ext x15 reps each    -grooved peg board  -rita nuts and bolts board (vertical) - focus on right hand as stabilizer and left hand for completion of task     Education and exercises seen below in bold - pt verbalized understanding     Patient Education and Home Exercises     Education provided:   - continuation of prior HEP with table slides and wall slides   -OT role in care; scheduling   - Progress towards goals     Written Home Exercises Provided: Yes.  Exercises were reviewed and Piotr was able to demonstrate  them prior to the end of the session.  Piotr demonstrated good  understanding of the home exercise program provided. See electronic medical record under Patient Instructions for exercises provided during therapy sessions.    -wrist exercises for range of motion and strengthening   -left fine motor coordination HAP     Assessment     Mr Piotr tolerated session fairly on this date. Focus on HEP and HAP for carry over outside of therapy. He demo likely learned non use of left hand for fine motor tasks and activities- encourage use and and carry over at home with HAP issued.     Piotr is progressing well towards his goals and there are no updates to goals at this time. Pt prognosis is Good.     Patient will continue to benefit from skilled outpatient occupational therapy to address the deficits listed in the problem list on initial evaluation provide patient/family education and to maximize patient's level of independence in the home and community environment.     Patient's spiritual, cultural and educational needs considered and patient agreeable to plan of care and goals.    Anticipated barriers to occupational therapy: none noted     The following goals were discussed with the patient and patient is in agreement with them as to be addressed in the treatment plan.      Goals: 8 weeks Long Term=Short Term  - Pt will demo active left wrist extension to 45+ degrees to aid in increased functional indep with ADLs/IADLs. Ongoing   - Pt will demo 2+ increase in left pinches (all 3 conditions) to aid in increased in hand manipulation tasks. Ongoing  - Pt will complete Box and Blocks Test with left upper extremity, transferring 30+ blocks, to demonstrate improved gross manual coordination needed for ADL and IADL tasks. Ongoing  - Pt will improve FOTO score to 56+ for improved self perception of functional performance with daily activities. Ongoing  - Pt will be independent with Home Exercise Program (HEP)/Home Activity  Program (HAP) to promote long term maintenance of progress made in therapy. Ongoing     Goals to be added and edited within plan of care as needed/appropriate.     Plan   Certification Period/Plan of care expiration: 10/21/2024 to 12/29/2024 (to allow for scheduling due to busy clinic).     Outpatient Occupational Therapy 1-2 times weekly for 8 weeks to include the following interventions: Moist Heat/ Ice, Neuromuscular Re-ed, Paraffin, Patient Education, Self Care, Therapeutic Activities, and Therapeutic Exercise.    Updates/Grading for next session: scapular and shoulder stability ABBE Yang   11/11/2024

## 2024-11-11 ENCOUNTER — CLINICAL SUPPORT (OUTPATIENT)
Dept: REHABILITATION | Facility: HOSPITAL | Age: 64
End: 2024-11-11
Payer: MEDICARE

## 2024-11-11 DIAGNOSIS — M25.632 DECREASED RANGE OF MOTION OF LEFT WRIST: ICD-10-CM

## 2024-11-11 DIAGNOSIS — R27.8 COORDINATION IMPAIRMENT: Primary | ICD-10-CM

## 2024-11-11 PROCEDURE — 97530 THERAPEUTIC ACTIVITIES: CPT | Mod: HCNC,PO

## 2024-11-11 NOTE — PATIENT INSTRUCTIONS
"  Fine Motor Coordination    1. Place palm on table;raise and lower fingers one at a time.  2. Rest hand on table and spread fingers wide and bring together  3. Keep time to music with each finger.  4. Make an "0" by touching thumb to each finger at a time.  5.  coins, buttons, washers, bolts or beans of assorted sizes one at a time.  6.  a handful of coins, buttons, washers, bolts or beans of assorted sizes and drop one at a time.  7. Stack coins.  8.  Screw and unscrew different sized nuts and bolts.  9.  Crumple a piece of paper into a small ball.  10. Thump the ball of paper with each finger.  11. Hold a deck of cards in hand and drop one at a time.  12. Turn one card over at a time.  13  Shuffle and deal playing cards.  14. Tie knots in a string.  15. Practice buttoning and unbuttoning.  16.  Practice using a computer keyboard.  17. Do simple finger exercises on piano.  18. String beads.  19. Toss small ball or bean bag up in air and catch.  20. Toss small ball  Or bean bag from one hand to the other.  21. Roll small ball on table using fingers.  22. Toss small ball or bean bag in air and clap hands before catching.  23. Work on model car, plane, etc.   24. Play games with pieces like checkers.  25. Turn pages in book or magazine.    Strength:  1. Squeeze clothes pins.  2.  small items with tweezers.  3. Wring out wet wash cloth.  "

## 2024-11-12 DIAGNOSIS — G81.94 LEFT HEMIPARESIS: Primary | ICD-10-CM

## 2024-11-12 NOTE — PROGRESS NOTES
[facility]  Occupational Therapy Treatment Note     Date: 11/13/2024  Name: Piotr Crespo  Clinic Number: [id number]    Therapy Diagnosis:   Encounter Diagnoses   Name Primary?    Coordination impairment Yes    Decreased range of motion of left wrist      Physician: Lory Pugh MD    Physician Orders: Eval and Treat  Medical Diagnosis: G81.94 (ICD-10-CM) - Left hemiparesis  Evaluation Date: 10/21/2024  Insurance Authorization Period Expiration: 10/7/2025  Plan of Care Certification Period: 12/29/2024  Date of Return to MD: no neurology follow ups at this time   FOTO: 1/ 2     Visit # / Visits authorized: 2 / 20     Precautions:  Standard     Time In: 10:33  Time Out: 11:15  Total Billable Time: 42 minutes      Subjective     Patient reports: feeling good, grateful for the help and working on his arm   Response to previous treatment: felt good, has been working his wrist a lot   Functional change: ongoing   Patient's Goals for Therapy: get his left side working better     Pain: 0/10  Location: n/a    Objective     Objective Measures updated at progress report unless specified.    Treatment     Piotr participated in neuromuscular re-education activities to improve: Coordination, Kinesthetic, and Proprioception for 18 minutes. The following activities were included:  Seated edge of mat  - left general wrist stretches including metacarpal spreads, carpal rolls, increasing mobility towards radial/ulnar deviation, increasing mobility of wrist towards extension/flexion, Increasing mobility of wrist towards supination/pronation. PROM of fingers to encourage extension.  - left scapular mobilizations for retraction, depression and elevation   -left upper extremity weightbearing with anterior weight shift on yoga block x10 reps - encouraging wrist extension   -bilateral yoga block for seated push ups   -3# free weights for bilateral scapular retraction x10 reps     Piotr participated in dynamic  functional therapeutic activities to improve functional performance for 24  minutes, including:  Seated edge of mat   -grasp of magnetic darts on floor for placement onto dart board for reaching mid range shoulder flexion, abduction and low range shoulder flexion    -left hand manipulation for medium jar lid for turning in mary hand web   -grasp of medium poms for color sort with rings on higher table top - focus on amplitude with release for full digit extension x multiple reps     Patient Education and Home Exercises     Education provided:   - continuation of prior HEP with table slides and wall slides   -OT role in care; scheduling   - Progress towards goals     Written Home Exercises Provided: Yes.  Exercises were reviewed and Piotr was able to demonstrate them prior to the end of the session.  Piotr demonstrated good  understanding of the home exercise program provided. See electronic medical record under Patient Instructions for exercises provided during therapy sessions.    -wrist exercises for range of motion and strengthening   -left fine motor coordination HAP  -scapular retraction/shoulder squeezes - isometric      Assessment     Mr Saldaña tolerated session well today. Session for ongoing focus for HEP for left upper extremity functional use, avoiding injury and remediation for hand and digit use. He remains with high motivation and reports carry over at home for all education and HEPs provided throughout this plan of care and previous plan of care.     Piotr is progressing well towards his goals and there are no updates to goals at this time. Pt prognosis is Good.     Patient will continue to benefit from skilled outpatient occupational therapy to address the deficits listed in the problem list on initial evaluation provide patient/family education and to maximize patient's level of independence in the home and community environment.     Patient's spiritual, cultural and educational needs considered  and patient agreeable to plan of care and goals.    Anticipated barriers to occupational therapy: none noted     The following goals were discussed with the patient and patient is in agreement with them as to be addressed in the treatment plan.      Goals: 8 weeks Long Term=Short Term  - Pt will demo active left wrist extension to 45+ degrees to aid in increased functional indep with ADLs/IADLs. Ongoing   - Pt will demo 2+ increase in left pinches (all 3 conditions) to aid in increased in hand manipulation tasks. Ongoing  - Pt will complete Box and Blocks Test with left upper extremity, transferring 30+ blocks, to demonstrate improved gross manual coordination needed for ADL and IADL tasks. Ongoing  - Pt will improve FOTO score to 56+ for improved self perception of functional performance with daily activities. Ongoing  - Pt will be independent with Home Exercise Program (HEP)/Home Activity Program (HAP) to promote long term maintenance of progress made in therapy. Ongoing     Goals to be added and edited within plan of care as needed/appropriate.     Plan   Certification Period/Plan of care expiration: 10/21/2024 to 12/29/2024 (to allow for scheduling due to busy clinic).     Outpatient Occupational Therapy 1-2 times weekly for 8 weeks to include the following interventions: Moist Heat/ Ice, Neuromuscular Re-ed, Paraffin, Patient Education, Self Care, Therapeutic Activities, and Therapeutic Exercise.    Updates/Grading for next session: scapular and shoulder stability HEP    ABBE Barrett   11/13/2024

## 2024-11-13 ENCOUNTER — CLINICAL SUPPORT (OUTPATIENT)
Dept: REHABILITATION | Facility: HOSPITAL | Age: 64
End: 2024-11-13
Payer: MEDICARE

## 2024-11-13 DIAGNOSIS — M25.632 DECREASED RANGE OF MOTION OF LEFT WRIST: ICD-10-CM

## 2024-11-13 DIAGNOSIS — R27.8 COORDINATION IMPAIRMENT: Primary | ICD-10-CM

## 2024-11-13 PROCEDURE — 97112 NEUROMUSCULAR REEDUCATION: CPT | Mod: HCNC,PO

## 2024-11-13 PROCEDURE — 97530 THERAPEUTIC ACTIVITIES: CPT | Mod: HCNC,PO

## 2024-11-13 NOTE — PATIENT INSTRUCTIONS
Complete with weights in both hands -- squeezing shoulders together and holding for 5s before relaxing    X2 sets of 10 reps

## 2024-11-15 ENCOUNTER — CLINICAL SUPPORT (OUTPATIENT)
Dept: REHABILITATION | Facility: HOSPITAL | Age: 64
End: 2024-11-15
Attending: STUDENT IN AN ORGANIZED HEALTH CARE EDUCATION/TRAINING PROGRAM
Payer: MEDICARE

## 2024-11-15 DIAGNOSIS — G81.94 LEFT HEMIPARESIS: ICD-10-CM

## 2024-11-15 DIAGNOSIS — Z74.09 IMPAIRED FUNCTIONAL MOBILITY, BALANCE, GAIT, AND ENDURANCE: Primary | ICD-10-CM

## 2024-11-15 PROCEDURE — 97161 PT EVAL LOW COMPLEX 20 MIN: CPT | Mod: HCNC,PO

## 2024-11-15 NOTE — PROGRESS NOTES
[facility]  Occupational Therapy Treatment Note     Date: 11/18/2024  Name: Piotr Crespo  Clinic Number: [id number]    Therapy Diagnosis:   Encounter Diagnoses   Name Primary?    Coordination impairment Yes    Decreased range of motion of left wrist      Physician: Lory Pugh MD    Physician Orders: Eval and Treat  Medical Diagnosis: G81.94 (ICD-10-CM) - Left hemiparesis  Evaluation Date: 10/21/2024  Insurance Authorization Period Expiration: 10/7/2025  Plan of Care Certification Period: 12/29/2024  Date of Return to MD: no neurology follow ups at this time   FOTO: 1/ 2     Visit # / Visits authorized: 3 / 20     Precautions:  Standard     Time In: 10:15  Time Out: 11:15  Total Billable Time: 60 minutes      Subjective     Patient reports: feeling pretty good overall ; ready for Thanksgiving   Response to previous treatment: completed his shoulder HEP provided last session   Functional change: ongoing   Patient's Goals for Therapy: get his left side working better     Pain: 0/10  Location: n/a    Objective     Objective Measures updated at progress report unless specified.    Treatment     Piotr participated in neuromuscular re-education activities to improve: Coordination, Kinesthetic, and Proprioception for 16 minutes. The following activities were included:  Seated edge of mat  - left general wrist stretches including metacarpal spreads, carpal rolls, increasing mobility towards radial/ulnar deviation, increasing mobility of wrist towards extension/flexion, Increasing mobility of wrist towards supination/pronation. PROM of fingers to encourage extension.  - left scapular mobilizations for retraction, depression and elevation   -left upper extremity weightbearing with anterior weight shift on yoga block x10 reps - encouraging wrist extension   -bilateral yoga block for seated push ups x2 sets of 10    Piotr participated in dynamic functional therapeutic activities to improve  functional performance for 34 minutes, including:  Seated edge of mat   - right hand for stabilization of 1/2 in PVC pipe - left hand modulation for removal of foam disc - shoulder flex, abd, ER, IR  - on table top: left hand for BAPS board with focus on wrist to complete and to avoid excess shoulder movt - mirror feedback   - grasp of various sized Squiz from floor for placement onto vertical surface    - grasp and full release for ring on Squiz     Piotr received therapeutic exercises for 10 minutes including:  -seated upper body ergonometer on level 1.0; completed for 10 minutes switching directions mid way. Focus on postural control and breathing techniques with exhale with exertion of force.       Hand off to PT at cessation of session     Patient Education and Home Exercises     Education provided:   - continuation of prior HEP with table slides and wall slides   - OT role in care; scheduling   - Progress towards goals     Written Home Exercises Provided: Yes.  Exercises were reviewed and Piotr was able to demonstrate them prior to the end of the session.  Piotr demonstrated good  understanding of the home exercise program provided. See electronic medical record under Patient Instructions for exercises provided during therapy sessions.    -wrist exercises for range of motion and strengthening   -left fine motor coordination HAP  -scapular retraction/shoulder squeezes - isometric      Assessment     Mr Saldaña tolerated session well today. Session to focus on functional use of left upper extremity - both proximal and distal with focus on full release and extension of wrist and digits. He demo good technique throughout and avoiding using excess shoulder hike and compensatory strategies. Goals remain appropriate at this time. Re-assessment to be completed next week.     Piotr is progressing well towards his goals and there are no updates to goals at this time. Pt prognosis is Good.     Patient will continue to  benefit from skilled outpatient occupational therapy to address the deficits listed in the problem list on initial evaluation provide patient/ family education and to maximize patient's level of independence in the home and community environment.     Patient's spiritual, cultural and educational needs considered and patient agreeable to plan of care and goals.    Anticipated barriers to occupational therapy: none noted     The following goals were discussed with the patient and patient is in agreement with them as to be addressed in the treatment plan.      Goals: 8 weeks Long Term=Short Term  - Pt will demo active left wrist extension to 45+ degrees to aid in increased functional indep with ADLs/IADLs. Ongoing   - Pt will demo 2+ increase in left pinches (all 3 conditions) to aid in increased in hand manipulation tasks. Ongoing  - Pt will complete Box and Blocks Test with left upper extremity, transferring 30+ blocks, to demonstrate improved gross manual coordination needed for ADL and IADL tasks. Ongoing  - Pt will improve FOTO score to 56+ for improved self perception of functional performance with daily activities. Ongoing  - Pt will be independent with Home Exercise Program (HEP)/Home Activity Program (HAP) to promote long term maintenance of progress made in therapy. Ongoing     Goals to be added and edited within plan of care as needed/appropriate.     Plan   Certification Period/Plan of care expiration: 10/21/2024 to 12/29/2024 (to allow for scheduling due to busy clinic).     Outpatient Occupational Therapy 1-2 times weekly for 8 weeks to include the following interventions: Moist Heat/ Ice, Neuromuscular Re-ed, Paraffin, Patient Education, Self Care, Therapeutic Activities, and Therapeutic Exercise.    Re-assessment to be completed next week     ABBE Barrett   11/18/2024

## 2024-11-18 ENCOUNTER — CLINICAL SUPPORT (OUTPATIENT)
Dept: REHABILITATION | Facility: HOSPITAL | Age: 64
End: 2024-11-18
Payer: MEDICARE

## 2024-11-18 DIAGNOSIS — M25.632 DECREASED RANGE OF MOTION OF LEFT WRIST: ICD-10-CM

## 2024-11-18 DIAGNOSIS — Z74.09 IMPAIRED FUNCTIONAL MOBILITY, BALANCE, GAIT, AND ENDURANCE: Primary | ICD-10-CM

## 2024-11-18 DIAGNOSIS — R27.8 COORDINATION IMPAIRMENT: Primary | ICD-10-CM

## 2024-11-18 PROCEDURE — 97530 THERAPEUTIC ACTIVITIES: CPT | Mod: HCNC,PO

## 2024-11-18 PROCEDURE — 97110 THERAPEUTIC EXERCISES: CPT | Mod: HCNC,PO

## 2024-11-18 PROCEDURE — 97112 NEUROMUSCULAR REEDUCATION: CPT | Mod: HCNC,PO

## 2024-11-18 NOTE — PLAN OF CARE
"[facility]  Physical Therapy Neurological Rehabilitation Initial Evaluation     Name: Piotr Crespo  Clinic Number: [id number]    Therapy Diagnosis:   Encounter Diagnoses   Name Primary?    Left hemiparesis     Impaired functional mobility, balance, gait, and endurance Yes     Physician: Lory Pugh MD    Physician Orders: PT Eval and Treat   Medical Diagnosis from Referral: G81.94 (ICD-10-CM) - Left hemiparesis   Evaluation Date: 11/15/2024  Authorization Period Expiration: 12/31/2014  Plan of Care Expiration: 1/10/2024  Progress Note Due: 12/13/2024  Date of Surgery: NA  Visit # / Visits authorized: 01/ 01  FOTO: 1/ 3    Precautions: Standard and Fall    Time In: 1430  Time Out: 1515  Total Billable Time: 45 minutes    Subjective      Date of onset: chronic (since 2017)    History of current condition - Piotr reports: CVA Labor Day 2017, [facility] at  [facility]. Prior bout of neuro PT in 2021. Patient reports everything has been pretty much the same since he was discharged in 2021. Piotr does report some increased weakness in his LLE. He reports he goes to the gym about 2x per week but does not feel like it helps much. He reports his foot "slaps" on the ground when he is walking. He reports no recent falls. Patient reports his balance has declined since previous bout of physical therapy.      HPI  Patient is a 63 y.o.   Piotr Crespo  has a past medical history of Erectile dysfunction due to arterial insufficiency (7/17/2020), Essential hypertension (3/11/2020), High cholesterol, History of left common carotid artery stent placement (4/17/2020), History of stroke (3/11/2020), Hypertension, Mixed hyperlipidemia (3/11/2020), Prediabetes (7/17/2020), and Stroke.  Patient presents with Mass right side of abdomen for 3 years . He reports that [facility] but has been enlarging since his last eval several months ago. He denies pain   He has difficulty quitting " smoking. He can go 4-5 days without smoking and then resumes out of habit.   He has patches and lozenges at home   He did not find Chantix helpful   He would like to resume PT for left hemiparesis           Imaging: all imaging reviewed prior to evaluation    Prior Therapy: prior neuro   Social History: lives with wife   Falls: none    DME:  none   Home Environment: 4 flights of stairs to enter home   Exercise Routine / History: goes to gym 2x per week    Family Present at time of Eval: none   Occupation: on disability   Prior Level of Function: independent with all functional mobility and ADLs  Current Level of Function: independent with all functional mobility and ADLs but requires increased time to perform functional tasks     Pain:  Current 0/10, worst 0/10, best 0/10   Location:  NA  Description: NA  Aggravating Factors: NA  Easing Factors: NA    Patient's goals: improve overall mobility as much as possible, work on stretches to perform on his own      Medical History:   Past Medical History:   Diagnosis Date    Erectile dysfunction due to arterial insufficiency 7/17/2020    Essential hypertension 3/11/2020    High cholesterol     History of left common carotid artery stent placement 4/17/2020    History of stroke 3/11/2020    Hypertension     Mixed hyperlipidemia 3/11/2020    Prediabetes 7/17/2020    Stroke     sept 3, 2017       Surgical History:   Piotr Crespo  has a past surgical history that includes Cerebral aneurysm repair; Carotid angioplasty (Left); Colonoscopy (N/A, 06/30/2020); Lumbar discectomy; and ANGIOGRAM, CORONARY, WITH LEFT HEART CATHETERIZATION (Left, 6/24/2023).    Medications:   Piotr has a current medication list which includes the following prescription(s): amlodipine, atorvastatin, carvedilol, cholecalciferol (vitamin d3), cholecalciferol (vitamin d3), clopidogrel, clotrimazole, cromolyn, erythromycin, ezetimibe, lidocaine-prilocaine, nicotine, nicotine, nicotine polacrilex, nicotine  polacrilex, nicotine polacrilex, arexvy (pf), tamsulosin, and urea.    Allergies:   Review of patient's allergies indicates:  No Known Allergies     Objective      Mental status: alert, oriented to person, place, and time  Appearance: Casually dressed  Behavior:  calm and cooperative  Attention Span and Concentration:  Normal    Dominant hand:  right     Posture Alignment in sitting/ standing :   Head: forward head   Scapulae: slouched posture   Trunk: WNL   Pelvis: WNL   Legs: WNL     Sensation: Light Touch: Intact         Tone: slightly increased extensor tone to R LE       Visual/Auditory: denies changes in vision or hearing outside of age related changes        ROM:    RANGE OF MOTION--LOWER EXTREMITIES  (R) LE Hip: normal   Knee: normal   Ankle: normal    (L) LE: Hip: normal   Knee: normal   Ankle: normal    Strength: manual muscle test grades below     Lower Extremity Strength  Right LE  Left LE    Hip Flexion: 5/5 Hip Flexion: 4/5   Hip Extension:  5/5 Hip Extension: 4/5   Hip Abduction: 5/5 Hip Abduction: 4/5   Hip Adduction: 5/5 Hip Adduction 5/5   Knee Extension: 5/5 Knee Extension: 5/5   Knee Flexion: 5/5 Knee Flexion: 5/5   Ankle Dorsiflexion: 5/5 Ankle Dorsiflexion: 5/5   Ankle Plantarflexion: 5/5 Ankle Plantarflexion: 5/5     Abdominal Strength: 3/5    Flexibility: WNL     Evaluation   Single Limb Stance R LE 30 seconds   (<10 sec = HIGH FALL RISK)   Single Limb Stance L LE 4 seconds   (<10 sec = HIGH FALL RISK)      Evaluation   30 second Chair Rise  (adults > 59 y/o) 12 completed with no arms   5 times sit-stand  (adults 18-63 y/o) 13 seconds with no arms   >12 sec= fall risk for general elderly  >16 sec= fall risk for Parkinson's disease  >10 sec= balance/vestibular dysfunction (<59 y/o)  >14.2 sec= balance/vestibular dysfunction (>59 y/o)  >12 sec= fall risk for CVA         Gait Assessment:   - AD used: none   - Assistance: independent   - Distance: ambulatory throughout session     GAIT  DEVIATIONS:  Piotr displays the following deviations with ambulation: decreased thierno, decreased deceleration of L LE during swing, decreased eccentric control of L ankle dorsiflexors which intensifies at greater speeds. Pt also demonstrates inconsistent L heel strike at initial contact.     Impairments contributing to deviations: impaired motor control, impaired coordination, mildly impaired BLE strength, mildly impaired balance, mildly increase in tone     Endurance Deficit: moderate       Evaluation   Self Selected Walking Speed 1.0 m/sec (6m/6s) with no AD    Fast Walking Speed 1.2 m/sec (6m/5s)   6 MWT  1276 feet with no AD       Functional Gait Assessment:   1. Gait on level surface =  2  2. Change in Gait Speed = 3  3. Gait with horizontal head turns  = 2  4. Gait with vertical head turns = 2  5. Gait with pivot turns = 2  6. Step over obstacle = 3  7. Gait with Narrow GLENDY = 1  8. Gait with eyes closed = 3  9. Ambulating Backwards = 2  10. Steps = 3     Score 23/30   Score:   <22/30 fall risk   <20/30 fall risk in older adults   <18/30 fall risk in Parkinsons        Intake Outcome Measure for FOTO Stroke LE Survey    Therapist reviewed FOTO scores for Piotr Crespo on 11/15/2024.   FOTO report - see Media section or FOTO account episode details.    Intake Score: 61%       Treatment     Treatment not performed     Patient Education and Home Exercises     Education provided:   - POC, purpose of PT,discharge planning     Written Home Exercises Provided: home exercise program to be provided at follow up session     Assessment     Piotr is a 63 y.o. male referred to outpatient Physical Therapy with a medical diagnosis of Left hemiparesis . Patient presents with primary complaints of chronic left sided weakness following stroke about 8 years prior. The patient has received prior bout of neuro PT for the same diagnosis in 2021. Slight decline noted with the patient's objective measures since discharge from  physical therapy in 2021. The patient was educated by therapist on fair prognosis due to the chronicity of the stroke but the therapist did inform the patient that some functional improvements are likely obtainable with PT intervention. The patient verbalizes understanding. The patient presents with slightly impaired LLE hip strength based on the MMT scores. The patient's SSWS places him in the community ambulator with increased time needs category. Th patient's FGA score places him right outside of the elevated fall risk category but the patient will likely be able to make some improvements in this area. The patient would benefit from PT intervention 1x per week to work on the patient's Strength, balance and overall mobility.      Patient prognosis is Fair.   Patient will benefit from skilled outpatient Physical Therapy to address the deficits stated above and in the chart below, provide patient /family education, and to maximize patient's level of independence.     Plan of care discussed with patient: Yes  Patient's spiritual, cultural and educational needs considered and patient is agreeable to the plan of care and goals as stated below:     Anticipated Barriers for therapy: chronicity of condition, co-morbidities     Medical Necessity is demonstrated by the following  History  Co-morbidities and personal factors that may impact the plan of care [] LOW: no personal factors / co-morbidities  [] MODERATE: 1-2 personal factors / co-morbidities  [x] HIGH: 3+ personal factors / co-morbidities    Moderate / High Support Documentation:   Co-morbidities affecting plan of care: CAD, presbyopia, HTN, HLD, prediabetes, KERRY    Personal Factors:   no deficits     Examination  Body Structures and Functions, activity limitations and participation restrictions that may impact the plan of care [x] LOW: addressing 1-2 elements  [] MODERATE: 3+ elements  [] HIGH: 4+ elements (please support below)    Moderate / High Support  Documentation:   NA     Clinical Presentation [x] LOW: stable  [] MODERATE: Evolving  [] HIGH: Unstable     Decision Making/ Complexity Score: low       Goals:  Short Term Goals: 4 weeks   1. Patient to be (I) with established home exercise program.  2. Patient to improve 5 time sit to stand time to 11 seconds for improved functional mobility and strength.   3. Patient to improve 6 MWT distance to 1,315 feet for improved CV endurance with community mobility .   4. Patient to improve BLE SLS time to 7 seconds for decreased risk for falls      Long Term Goals: 8 weeks   1. Patient to be (I) with advanced home exercise program.  2. Patient to improve left  hip flexion by 1/3 MMT to improve balance and functional mobility.   3. Patient to improve left  hip extension strength by 1/3 MMT to improve balance and functional mobility.   4. Patient to improve 5 time sit to stand time to 9 seconds for improved functional mobility and strength.   5. Patient to improve 6 MWT distance to 1,380 feet for improved CV endurance with community mobility .   6. Patient to improve BLE SLS time to 10 seconds for decreased risk for falls   7. Patient to improve SSWS to 1.2  m/sec for improved safety with ambulation.   8. Patient to improve FGA score to 26/30 for decreased risk for falls with community mobility       Plan     Plan of care Certification: 11/15/2024 to 1/10/2025.    Outpatient Physical Therapy 1 times weekly for 8 weeks to include the following interventions: Gait Training, Manual Therapy, Neuromuscular Re-ed, Orthotic Management and Training, Patient Education, Self Care, Therapeutic Activities, and Therapeutic Exercise.     Floridalma Bedolla PT        Physician's Signature: _________________________________________ Date: ________________

## 2024-11-18 NOTE — PROGRESS NOTES
[facility]   Physical Therapy Treatment Note     Name: Piotr Crespo  Clinic Number: [id number]    Therapy Diagnosis:   Encounter Diagnosis   Name Primary?    Impaired functional mobility, balance, gait, and endurance Yes     Physician: Lory Pugh MD    Visit Date: 11/18/2024    Physician Orders: PT Eval and Treat   Medical Diagnosis from Referral: G81.94 (ICD-10-CM) - Left hemiparesis   Evaluation Date: 11/15/2024  Authorization Period Expiration: 12/31/2014  Plan of Care Expiration: 1/10/2024  Progress Note Due: 12/13/2024  Date of Surgery: NA  Visit # / Visits authorized: 01/ 20  FOTO: 1/ 3     Precautions: Standard and Fall       PTA Visit #: 0/5     Time In: 1115  Time Out: 1200  Total Billable Time: 45 minutes    SUBJECTIVE     Pt reports: glad he could get a PT session in today.    He was provided home exercise program for LE strength and endurance on 11/18/2024.   Response to previous treatment: good  Functional change:  ongoing     Pain: 0/10  Location:  NA     OBJECTIVE     Objective Measures updated at progress report unless specified.     Treatment     Piotr received the treatments listed below:      therapeutic exercises to develop strength, endurance, ROM, flexibility, posture, and core stabilization for 25 minutes including:    10 minutes on [employer] seated elliptical for CV endurance and LE strength at level 5.0    Patient performed 3 x 10 reps of the following exercises that are included in the patient's home exercise program. Patient demonstrates and verbalizes understanding of home exercise program.   Sit to stands with no UE support  Standing marches   Piriformis stretch 3 x 30 seconds   Standing hamstring stretch 3 x 30 seconds       neuromuscular re-education activities to improve: Balance, Coordination, Kinesthetic, Sense, Proprioception, and Posture for 20 minutes. The following activities were included:    10 reps BLE leading forward step up to foam fitter with  opposite leg hike, no UE support, CGA      2 x 60 seconds anterior/ posterior weight shifting on two point wooden fitter board, CGA  x 60 seconds left/ right weight shifting on two point wooden fitter board, CGA    10 reps BLE single large cone tapping, CGA    2 x 30 seconds split stance on hard side of BOSU, CGA, occ touchdown support       therapeutic activities to improve functional performance for 0  minutes, including:      gait training to improve functional mobility and safety for 0  minutes, including:            Patient Education and Home Exercises     Home Exercises Provided and Patient Education Provided     Education provided:   - POC, purpose of PT, discharge planning     Written Home Exercises Provided: yes. Exercises were reviewed and Piotr was able to demonstrate them prior to the end of the session.  Piotr demonstrated good  understanding of the education provided. See EMR under Patient Instructions for exercises provided during therapy sessions    ASSESSMENT     Piotr tolerated today's first follow up session very well this morning. Session focused primarily on providing home exercise program for LE strength and endurance. The patient verbalize/ demonstrates understanding of home exercise program. The patient would benefit from continued physical therapy interveniton to address remaining functional mobility deficits.     Piotr Is progressing well towards his goals.   Pt prognosis is Fair.     Pt will continue to benefit from skilled outpatient physical therapy to address the deficits listed in the problem list box on initial evaluation, provide pt/family education and to maximize pt's level of independence in the home and community environment.     Pt's spiritual, cultural and educational needs considered and pt agreeable to plan of care and goals.     Anticipated barriers to physical therapy: co-morbidities, chronicity of stroke     Goals:   Short Term Goals: 4 weeks   1. Patient to be (I)  with established home exercise program. Ongoing   2. Patient to improve 5 time sit to stand time to 11 seconds for improved functional mobility and strength. Ongoing   3. Patient to improve 6 MWT distance to 1,315 feet for improved CV endurance with community mobility . Ongoing   4. Patient to improve BLE SLS time to 7 seconds for decreased risk for falls Ongoing      Long Term Goals: 8 weeks   1. Patient to be (I) with advanced home exercise program.Ongoing   2. Patient to improve left  hip flexion by 1/3 MMT to improve balance and functional mobility. Ongoing   3. Patient to improve left  hip extension strength by 1/3 MMT to improve balance and functional mobility. Ongoing   4. Patient to improve 5 time sit to stand time to 9 seconds for improved functional mobility and strength. Ongoing   5. Patient to improve 6 MWT distance to 1,380 feet for improved CV endurance with community mobility . Ongoing   6. Patient to improve BLE SLS time to 10 seconds for decreased risk for falls Ongoing   7. Patient to improve SSWS to 1.2  m/sec for improved safety with ambulation. Ongoing   8. Patient to improve FGA score to 26/30 for decreased risk for falls with community mobility Ongoing     PLAN     Continue to work on high level static/ dynamic balance and LLE strengthening as tolerated     Floridalma Bedolla, PT

## 2024-11-18 NOTE — PROGRESS NOTES
[facility]  Occupational Therapy Treatment Note     Date: 11/20/2024  Name: Piotr Crespo  Clinic Number: [id number]    Therapy Diagnosis:   Encounter Diagnoses   Name Primary?    Coordination impairment Yes    Decreased range of motion of left wrist        Physician: Lory Pugh MD    Physician Orders: Eval and Treat  Medical Diagnosis: G81.94 (ICD-10-CM) - Left hemiparesis  Evaluation Date: 10/21/2024  Insurance Authorization Period Expiration: 10/7/2025  Plan of Care Certification Period: 12/29/2024  Date of Return to MD: no neurology follow ups at this time   FOTO: 1/ 2     Visit # / Visits authorized: 4 / 20     Precautions:  Standard     Time In: 1:02  Time Out: 1:55  Total Billable Time: 53 minutes      Subjective     Patient reports: has been working his wrist a lot   Response to previous treatment: good   Functional change: ongoing   Patient's Goals for Therapy: get his left side working better     Pain: 0/10  Location: n/a    Objective     Objective Measures updated at progress report unless specified.    Treatment     Piotr participated in neuromuscular re-education activities to improve: Coordination, Kinesthetic, and Proprioception for 9 minutes. The following activities were included:  Seated edge of mat  - left general wrist stretches including metacarpal spreads, carpal rolls, increasing mobility towards radial/ulnar deviation, increasing mobility of wrist towards extension/flexion, Increasing mobility of wrist towards supination/pronation. PROM of fingers to encourage extension.  - left scapular mobilizations for retraction, depression and elevation   -left upper extremity weightbearing with anterior weight shift - encouraging wrist extension     Piotr participated in dynamic functional therapeutic activities to improve functional performance for 30 minutes, including:  Seated at table top  -left hand manipulation of metal tongs for ColorKu  for placement onto  Squiz top x12  -left hand 2 point pinch for ColorKu  and placement into board x12   -left hand manipulation with hand exerciser on level 2 for placement of wooden post at chest level shelf x14 reps   -GRASP board for golf ball transition onto 5 cone tops - alternating tip pinch origin   -left hand hammer taps to cone openings - right hand to provide proximal stability and to aid in avoiding compensatory movement   - left hand hammer for wrist extension x20 reps     Piotr received therapeutic exercises for 14 minutes including:  -seated upper body ergonometer on level 1.5; completed for 10 minutes switching directions mid way. Focus on postural control and breathing techniques with exhale with exertion of force. MET average 1.4      Chair push ups x2 sets of 10 reps    Left hand for isolation of digit extension x multiple rounds       Hand off to PT at cessation of session     Patient Education and Home Exercises     Education provided:   - continuation of prior HEP with table slides and wall slides   - OT role in care; scheduling   - Progress towards goals     Written Home Exercises Provided: Yes.  Exercises were reviewed and Piotr was able to demonstrate them prior to the end of the session.  Piotr demonstrated good  understanding of the home exercise program provided. See electronic medical record under Patient Instructions for exercises provided during therapy sessions.    -wrist exercises for range of motion and strengthening   -left fine motor coordination HAP  -scapular retraction/shoulder squeezes - isometric      Assessment     Mr Saldaña tolerated session well today. He demo subjective improvement with his left wrist movement and range. Re-assessment to be completed next week for objective changes.     Piotr is progressing well towards his goals and there are no updates to goals at this time. Pt prognosis is Good.     Patient will continue to benefit from skilled outpatient occupational therapy to  address the deficits listed in the problem list on initial evaluation provide patient/ family education and to maximize patient's level of independence in the home and community environment.     Patient's spiritual, cultural and educational needs considered and patient agreeable to plan of care and goals.    Anticipated barriers to occupational therapy: none noted     The following goals were discussed with the patient and patient is in agreement with them as to be addressed in the treatment plan.      Goals: 8 weeks Long Term=Short Term  - Pt will demo active left wrist extension to 45+ degrees to aid in increased functional indep with ADLs/IADLs. Ongoing   - Pt will demo 2+ increase in left pinches (all 3 conditions) to aid in increased in hand manipulation tasks. Ongoing  - Pt will complete Box and Blocks Test with left upper extremity, transferring 30+ blocks, to demonstrate improved gross manual coordination needed for ADL and IADL tasks. Ongoing  - Pt will improve FOTO score to 56+ for improved self perception of functional performance with daily activities. Ongoing  - Pt will be independent with Home Exercise Program (HEP)/Home Activity Program (HAP) to promote long term maintenance of progress made in therapy. Ongoing     Goals to be added and edited within plan of care as needed/appropriate.     Plan   Certification Period/Plan of care expiration: 10/21/2024 to 12/29/2024 (to allow for scheduling due to busy clinic).     Outpatient Occupational Therapy 1-2 times weekly for 8 weeks to include the following interventions: Moist Heat/ Ice, Neuromuscular Re-ed, Paraffin, Patient Education, Self Care, Therapeutic Activities, and Therapeutic Exercise.    Re-assessment to be completed next week     ABBE Barrett   11/20/2024

## 2024-11-20 ENCOUNTER — CLINICAL SUPPORT (OUTPATIENT)
Dept: REHABILITATION | Facility: HOSPITAL | Age: 64
End: 2024-11-20
Payer: MEDICARE

## 2024-11-20 DIAGNOSIS — R27.8 COORDINATION IMPAIRMENT: Primary | ICD-10-CM

## 2024-11-20 DIAGNOSIS — M25.632 DECREASED RANGE OF MOTION OF LEFT WRIST: ICD-10-CM

## 2024-11-20 PROCEDURE — 97110 THERAPEUTIC EXERCISES: CPT | Mod: HCNC,PO

## 2024-11-20 PROCEDURE — 97530 THERAPEUTIC ACTIVITIES: CPT | Mod: HCNC,PO

## 2024-11-20 PROCEDURE — 97112 NEUROMUSCULAR REEDUCATION: CPT | Mod: HCNC,PO

## 2024-11-24 NOTE — PROGRESS NOTES
[facility]  Occupational Therapy   Re-Assessment and Treatment Note     Date: 11/25/2024  Name: Piotr Crespo  Clinic Number: [id number]    Therapy Diagnosis:   Encounter Diagnoses   Name Primary?    Coordination impairment Yes    Decreased range of motion of left wrist      Physician: Lory Pugh MD    Physician Orders: Eval and Treat  Medical Diagnosis: G81.94 (ICD-10-CM) - Left hemiparesis  Evaluation Date: 10/21/2024  Insurance Authorization Period Expiration: 10/7/2025  Plan of Care Certification Period: 12/29/2024  Date of Return to MD: no neurology follow ups at this time   FOTO: 1/ 2     Visit # / Visits authorized: 5 / 20     Precautions:  Standard     Time In: 1:00  Time Out: 1:45  Total Billable Time: 45 minutes      Subjective     Patient reports: feeling okay overall   Response to previous treatment: good   Functional change: ongoing   Patient's Goals for Therapy: get his left side working better     Pain: 0/10  Location: n/a    Objective     Physical Exam:  Postural examination/scapula alignment: Rounded shoulder, Head forward, and Posterior pelvic tilt  Joint integrity: Firm end feeling  Skin integrity: normal   Edema: none noted for bilateral upper extremities       Joint Evaluation  AROM  10/21/2024 PROM   10/21/2024 AROM  11/25/2024 PROM  11/25/2024     Left  Left  Left  Left    Shoulder flex 0-180 158 165 167 180   Shoulder Abd 0-180 168 173 165 180   Shoulder ER 0-90 Normal  90 90 90   Shoulder IR 0-90 Normal  90  90 90   Shoulder Extension 0-80 80 80 80 80   Shoulder Horizontal adduction 0-90 40 55 48 65   Elbow flex/ext 0-150 WFL WFL WFL WFL   Wrist flex 0-80 60 80 70 80   Wrist ext 0-70 35 65 55 70   Right upper extremity: AROM normal/WNL     Fist: normal        Strength 10/21/2024 10/21/2024 11/25/2024   **within available ROM** Right  Left  Left    Shoulder flex 5/5 5/5    Shoulder abd 5/5 5/5    Shoulder ER 5/5 5/5    Shoulder IR 5/5 5/5    Shoulder Extension  5/5 4+/5 5   Shoulder Horizontal adduction 5/5 4+/5 4+   Elbow flex 5/5 5/5    Elbow ext 5/5 5/5    Wrist flex 5/5 4/5 4+   Wrist ext 5/5 4/5 4+       Strength: (DESIREE Dynamometer in lbs.) Average 3 trials, Position II:       10/21/2024 10/21/2024 11/25/2024     Right  Left  Left    Rung II 88.2# 63.3# 59.8      Pinch Strength (Measured in psi)       10/21/2024 10/21/2024 11/25/2024     Right  Left  Left    Key Pinch 20.1 psi 16.4 psi 16.2   3pt Pinch 15.6 psi 8.2 psi 9.6   2pt Pinch 9.3 psi 8.3 psi 6.0      Fine Motor Coordination: 9 Hole Peg Test  Right  10/21/2024 Left    10/21/2024 Left   11/25/2024   21.06 s Completion of 2: 49.73 s 1:59.81 min    Completion of 2 in 12.84 s    [norms for men aged 61-65: R=20.87s +/-3.50s; L=21.60s +/-2.98s (Jasmin et al, 2003)]     Fine Motor Coordination: Box and Block  Right  10/21/2024 Left    10/21/2024 Left   11/25/2024   60 26 29   [norms for men aged 60-64: R=71.3 +/-8.8; L=70.5 /-8.1 (Mohini et al, 1985)]     Gross motor coordination:   MEENAKSHI (Rapid Alternating Movements): impaired left (synergy)  Finger to Nose (5 times): no dysmetria   Finger Flicks (coordination moving from digit flexion to digit extension): delayed recoil on left      Tone:  Modified James Scale: left upper extremity   1-  Slight increase in muscle tone, manifested by a catch and release or by minimal resistance at the end of the range of motino when the affected part(s) is moved in flexion or extension     Sensation:  Piotr  reports no complains/noted changes. Enjoys warm water on his hand/wrist      Balance:   Static Sit - GOOD+: Takes MAXIMAL challenges from all directions.    Dynamic sit- GOOD+: Takes MAXIMAL challenges from all directions.    Static Stand - GOOD: Takes MODERATE challenges from all directions  Dynamic stand - GOOD-: Takes MODERATE challenges from all directions but inconsistently     Endurance Deficit: mild       Treatment     Piotr received therapeutic exercises for  21 minutes including:  -objective measurements as seen above  -shoulder slider for shoulder flex and abduction x10 reps each     Piotr participated in dynamic functional therapeutic activities to improve functional performance for 24 minutes, including:  Seated at table top  -left hand manipulation of rings from floor to placement onto cones (color coded) placed at various heights on table top   -left 2 point pinch for colored pom  for placement into cone tops     Hand off to PT at cessation of session     Patient Education and Home Exercises     Education provided:   - continuation of prior HEP with table slides and wall slides   - OT role in care; scheduling   - Progress towards goals     Written Home Exercises Provided: Yes.  Exercises were reviewed and Piotr was able to demonstrate them prior to the end of the session.  Piotr demonstrated good  understanding of the home exercise program provided. See electronic medical record under Patient Instructions for exercises provided during therapy sessions.    -wrist exercises for range of motion and strengthening   -left fine motor coordination HAP  -scapular retraction/shoulder squeezes - isometric      Assessment     Mr Piotr demo improvement in active wrist extension and shoulder flexion. Moreover, he demo significant improvement in left fine motor coordination as evident on 9 hole peg assessment and able to fully complete tasks. He remains with generalized stiffness (proximal to distal) at this time. He continues to demo great carry over for all education and HEPs at this time.     Piotr is progressing well towards his goals and there are no updates to goals at this time. Pt prognosis is Good.     Patient will continue to benefit from skilled outpatient occupational therapy to address the deficits listed in the problem list on initial evaluation provide patient/ family education and to maximize patient's level of independence in the home and community  environment.     Patient's spiritual, cultural and educational needs considered and patient agreeable to plan of care and goals.    Anticipated barriers to occupational therapy: none noted     The following goals were discussed with the patient and patient is in agreement with them as to be addressed in the treatment plan.      Goals: 8 weeks Long Term=Short Term  - Pt will demo active left wrist extension to 45+ degrees to aid in increased functional indep with ADLs/IADLs. MET 55  - Pt will demo 2+ increase in left pinches (all 3 conditions) to aid in increased in hand manipulation tasks. Ongoing  - Pt will complete Box and Blocks Test with left upper extremity, transferring 30+ blocks, to demonstrate improved gross manual coordination needed for ADL and IADL tasks. Progressing; 29 on 11/25/2024  - Pt will improve FOTO score to 56+ for improved self perception of functional performance with daily activities. Ongoing  - Pt will be independent with Home Exercise Program (HEP)/Home Activity Program (HAP) to promote long term maintenance of progress made in therapy. Ongoing     Goals to be added and edited within plan of care as needed/appropriate.     Plan   Certification Period/Plan of care expiration: 10/21/2024 to 12/29/2024 (to allow for scheduling due to busy clinic).     Outpatient Occupational Therapy 1-2 times weekly for 8 weeks to include the following interventions: Moist Heat/ Ice, Neuromuscular Re-ed, Paraffin, Patient Education, Self Care, Therapeutic Activities, and Therapeutic Exercise.    Next session: FOTO; scapular mobilization     ABBE Barrett   11/25/2024

## 2024-11-25 ENCOUNTER — CLINICAL SUPPORT (OUTPATIENT)
Dept: REHABILITATION | Facility: HOSPITAL | Age: 64
End: 2024-11-25
Payer: MEDICARE

## 2024-11-25 DIAGNOSIS — R27.8 COORDINATION IMPAIRMENT: Primary | ICD-10-CM

## 2024-11-25 DIAGNOSIS — Z74.09 IMPAIRED FUNCTIONAL MOBILITY, BALANCE, GAIT, AND ENDURANCE: Primary | ICD-10-CM

## 2024-11-25 DIAGNOSIS — M25.632 DECREASED RANGE OF MOTION OF LEFT WRIST: ICD-10-CM

## 2024-11-25 PROCEDURE — 97530 THERAPEUTIC ACTIVITIES: CPT | Mod: KX,HCNC,PO

## 2024-11-25 PROCEDURE — 97110 THERAPEUTIC EXERCISES: CPT | Mod: KX,HCNC,PO

## 2024-11-25 PROCEDURE — 97112 NEUROMUSCULAR REEDUCATION: CPT | Mod: HCNC,PO,CQ

## 2024-11-25 PROCEDURE — 97110 THERAPEUTIC EXERCISES: CPT | Mod: HCNC,PO,CQ

## 2024-11-25 NOTE — PROGRESS NOTES
"  [facility]   Physical Therapy Treatment Note     Name: Piotr Crespo  Clinic Number: [id number]    Therapy Diagnosis:   Encounter Diagnosis   Name Primary?    Impaired functional mobility, balance, gait, and endurance Yes     Physician: Lory Pugh MD    Visit Date: 11/25/2024    Physician Orders: PT Eval and Treat   Medical Diagnosis from Referral: G81.94 (ICD-10-CM) - Left hemiparesis   Evaluation Date: 11/15/2024  Authorization Period Expiration: 12/31/2014  Plan of Care Expiration: 1/10/2025  Progress Note Due: 12/13/2024  Date of Surgery: NA  Visit # / Visits authorized: 2/ 20  FOTO: 1/ 3     Precautions: Standard and Fall       PTA Visit #: 1/5     Time In: 1345  Time Out: 1430  Total Billable Time: 45 minutes    SUBJECTIVE     Pt reports: " He is doing pretty good."    He was compliant with his HEP  Response to previous treatment: good  Functional change:  ongoing     Pain: 0/10  Location:  NA     OBJECTIVE     Objective Measures updated at progress report unless specified.     Treatment     Piotr received the treatments listed below:      therapeutic exercises to develop strength, endurance, ROM, flexibility, posture, and core stabilization for 25 minutes including:    10 minutes on ClipcopiaFIT seated elliptical for CV endurance and LE strength at level 5.0    Leg press:  3 x 10 reps of B LE squats with #100lbs applied.   3 x 10 reps of LLE single leg squat with #80lbs applied.   3 x 10 reps of B LE heel raises with #80lbs applied. o      neuromuscular re-education activities to improve: Balance, Coordination, Kinesthetic, Sense, Proprioception, and Posture for 20 minutes. The following activities were included:  Near ballet bar: CGA  2 x 30 sec of tandem stance with eyes opened   X 30 sec each of tandem stance with eyes closed    Large bosu ( round side up): CGA  2 x 30 sec of B LE single leg stance with unilateral support, no UE support  2 x 30 sec of B LE single leg stance with " unilateral support, eyes closed and no Ue support  1 x 15 sec each of B LE single leg stance with 1 UE support    Small Bosu ( Flat side up) : CGA  X 60 sec of static standing with no UE support  X 60 sec of medial/lateral weight shifting with no UE support  X 60 sec of anterior/posterior weight shifting with no UE support  X 60 sec of static standing with head turns right to left, no UE support  X 60 sec of static standing with head nods up/down, no UE support        therapeutic activities to improve functional performance for 0  minutes, including:      gait training to improve functional mobility and safety for 0  minutes, including:            Patient Education and Home Exercises     Home Exercises Provided and Patient Education Provided     Education provided:   - POC, purpose of PT, discharge planning     Written Home Exercises Provided: yes. Exercises were reviewed and Piotr was able to demonstrate them prior to the end of the session.  Piotr demonstrated good  understanding of the education provided. See EMR under Patient Instructions for exercises provided during therapy sessions    ASSESSMENT     Pitor tolerated his tx session well and reports compliance with HEP.  Piotr began squats on the leg press and dynamic balance on the small and large Bosu.  Piotr performed most balance on the small Bosu.  Piotr required minimal UE support and did not require any sitting rest breaks.  Cont with plan of care.      Piotr Is progressing well towards his goals.   Pt prognosis is Fair.     Pt will continue to benefit from skilled outpatient physical therapy to address the deficits listed in the problem list box on initial evaluation, provide pt/family education and to maximize pt's level of independence in the home and community environment.     Pt's spiritual, cultural and educational needs considered and pt agreeable to plan of care and goals.     Anticipated barriers to physical therapy: co-morbidities,  chronicity of stroke     Goals:   Short Term Goals: 4 weeks   1. Patient to be (I) with established home exercise program. Ongoing   2. Patient to improve 5 time sit to stand time to 11 seconds for improved functional mobility and strength. Ongoing   3. Patient to improve 6 MWT distance to 1,315 feet for improved CV endurance with community mobility . Ongoing   4. Patient to improve BLE SLS time to 7 seconds for decreased risk for falls Ongoing      Long Term Goals: 8 weeks   1. Patient to be (I) with advanced home exercise program.Ongoing   2. Patient to improve left  hip flexion by 1/3 MMT to improve balance and functional mobility. Ongoing   3. Patient to improve left  hip extension strength by 1/3 MMT to improve balance and functional mobility. Ongoing   4. Patient to improve 5 time sit to stand time to 9 seconds for improved functional mobility and strength. Ongoing   5. Patient to improve 6 MWT distance to 1,380 feet for improved CV endurance with community mobility . Ongoing   6. Patient to improve BLE SLS time to 10 seconds for decreased risk for falls Ongoing   7. Patient to improve SSWS to 1.2  m/sec for improved safety with ambulation. Ongoing   8. Patient to improve FGA score to 26/30 for decreased risk for falls with community mobility Ongoing     PLAN     Continue to work on high level static/ dynamic balance and LLE strengthening as tolerated     Sandi Cast, PTA

## 2024-11-26 NOTE — PROGRESS NOTES
[facility]  Occupational Therapy Treatment Note     Date: 11/27/2024  Name: Piotr Crespo  Clinic Number: [id number]    Therapy Diagnosis:   Encounter Diagnoses   Name Primary?    Coordination impairment Yes    Decreased range of motion of left wrist        Physician: Lory Pugh MD    Physician Orders: Eval and Treat  Medical Diagnosis: G81.94 (ICD-10-CM) - Left hemiparesis  Evaluation Date: 10/21/2024  Insurance Authorization Period Expiration: 10/7/2025  Plan of Care Certification Period: 12/29/2024  Date of Return to MD: no neurology follow ups at this time   FOTO: 1/ 2     Visit # / Visits authorized: 6 / 20     Precautions:  Standard     Time In: 1120  Time Out: 1200  Total Billable Time: 40 minutes    Subjective     Patient reports: he's been doing alright using left hand as much as possible   Response to previous treatment: good   Functional change: ongoing   Patient's Goals for Therapy: get his left side working better     Pain: 3/10   Location: L thumb     Objective     Objective measures can be seen in most recent RA on 11/25/2024.     FOTO Administered:   Intake: 54%   11/27/2024: 58%     Treatment     Piotr received therapeutic exercises for 15 minutes including:  - LUE shoulder flexion wall slides, 1 x 10  - Ball walks with shoulder flexion stretch and alternating UE lifts, 1 x 10  - LUE theraball roll outs into horizontal abduction, 1 x 10   - Chair push ups, 1 x 20    To end session:   - Shoulder flexion stretch with unweighted dowel, 1 x 10    Piotr participated in dynamic functional therapeutic activities to improve functional performance for 25 minutes, including:  - FOTO Administered: see above for details; extended time for technical difficulties     Seated at table top, the following were completed with LUE:   - Tip pinch to remove small pegs from pink theraputty > release into holes of 5 wiffle balls set at various heights, x3 rounds   - Loosened/tightened BAPS  knobs 1-3 (smallest 3)     Patient Education and Home Exercises     Education provided:   - continuation of prior HEP with table slides and wall slides   - OT role in care; scheduling   - Progress towards goals     Written Home Exercises Provided: Yes.  Exercises were reviewed and Piotr was able to demonstrate them prior to the end of the session.  Piotr demonstrated good  understanding of the home exercise program provided. See electronic medical record under Patient Instructions for exercises provided during therapy sessions.    -wrist exercises for range of motion and strengthening   -left fine motor coordination HAP  -scapular retraction/shoulder squeezes - isometric      Assessment     Pt tolerated today's session well. Good LUE ROM with all stretches and activities, but left arm did fatigue with functional reaching task. Occasional drops of pegs but overall good accuracy with reaching. Pt continues to demonstrate good performance and progress towards goals. Pt would continue to benefit from skilled occupational therapy services to maximize functional use of LUE.      Piotr is progressing well towards his goals and there are no updates to goals at this time. Pt prognosis is Good.     Patient will continue to benefit from skilled outpatient occupational therapy to address the deficits listed in the problem list on initial evaluation provide patient/ family education and to maximize patient's level of independence in the home and community environment.     Patient's spiritual, cultural and educational needs considered and patient agreeable to plan of care and goals.    Anticipated barriers to occupational therapy: none noted     The following goals were discussed with the patient and patient is in agreement with them as to be addressed in the treatment plan.      Goals: 8 weeks Long Term=Short Term  - Pt will demo active left wrist extension to 45+ degrees to aid in increased functional indep with ADLs/IADLs.  MET 55  - Pt will demo 2+ increase in left pinches (all 3 conditions) to aid in increased in hand manipulation tasks. Ongoing  - Pt will complete Box and Blocks Test with left upper extremity, transferring 30+ blocks, to demonstrate improved gross manual coordination needed for ADL and IADL tasks. Progressing; 29 on 11/25/2024  - Pt will improve FOTO score to 56+ for improved self perception of functional performance with daily activities. Ongoing  - Pt will be independent with Home Exercise Program (HEP)/Home Activity Program (HAP) to promote long term maintenance of progress made in therapy. Ongoing     Goals to be added and edited within plan of care as needed/appropriate.     Plan     Certification Period/Plan of care expiration: 10/21/2024 to 12/29/2024 (to allow for scheduling due to busy clinic).     Outpatient Occupational Therapy 1-2 times weekly for 8 weeks to include the following interventions: Moist Heat/ Ice, Neuromuscular Re-ed, Paraffin, Patient Education, Self Care, Therapeutic Activities, and Therapeutic Exercise.    Next session: FOTO; scapular mobilization     Emily Shelby OT   11/27/2024

## 2024-11-27 ENCOUNTER — CLINICAL SUPPORT (OUTPATIENT)
Dept: REHABILITATION | Facility: HOSPITAL | Age: 64
End: 2024-11-27
Payer: MEDICARE

## 2024-11-27 DIAGNOSIS — M25.632 DECREASED RANGE OF MOTION OF LEFT WRIST: ICD-10-CM

## 2024-11-27 DIAGNOSIS — R27.8 COORDINATION IMPAIRMENT: Primary | ICD-10-CM

## 2024-11-27 PROCEDURE — 97530 THERAPEUTIC ACTIVITIES: CPT | Mod: HCNC,PO

## 2024-11-27 PROCEDURE — 97110 THERAPEUTIC EXERCISES: CPT | Mod: HCNC,PO

## 2024-12-01 NOTE — PROGRESS NOTES
[facility]  Occupational Therapy Treatment Note     Date: 12/2/2024  Name: Piotr Crespo  Clinic Number: [id number]    Therapy Diagnosis:   Encounter Diagnoses   Name Primary?    Coordination impairment Yes    Decreased range of motion of left wrist      Physician: Lory Pugh MD    Physician Orders: Eval and Treat  Medical Diagnosis: G81.94 (ICD-10-CM) - Left hemiparesis  Evaluation Date: 10/21/2024  Insurance Authorization Period Expiration: 10/7/2025  Plan of Care Certification Period: 12/29/2024  Date of Return to MD: no neurology follow ups at this time   FOTO:  2     Visit # / Visits authorized: 6 / 20     Precautions:  Standard     Time In: 10:45  Time Out: 11:45  Total Billable Time: 60 minutes    Subjective     Patient reports: used left hand as much as possible in the kitchen over thanksgiving when prepping and cooking his gumbo  Response to previous treatment: good   Functional change: using left hand as much as possible; working his wrist at home   Patient's Goals for Therapy: get his left side working better     Pain: 0/10   Location: n/a    Objective     Objective measures can be seen in most recent RA on 11/25/2024.     FOTO Administered:   Intake: 54%   11/27/2024: 58%     Treatment     Piotr received therapeutic exercises for 19 minutes including:  -seated upper body ergonometer on level 2.5; completed for 10 minutes switching directions mid way. Focus on postural control and sustained grasp. MET average 1.6     Seated in chair: mirror feedback to avoid compensatory strategies   -bilateral shoulder flex with 3# dowel: x3 sets of 10 reps   -wrist flex/ext with 3# dowel x15    Chair push ups between trials x3 sets of 10 reps total     Piotr participated in dynamic functional therapeutic activities to improve functional performance for 41 minutes, including:  Seated at table top, the following were completed with left upper extremity:   -grasp and release with slitted  tennis ball for stone  x20 reps   -placement of waffle balls on 6 cones, posting for golf jodie to top of ball x6  -2 point pinch for modulation of donning Color[employer]u ball to top   -gross left grasp for removal of 6 waffle balls for placement into basket on left side for shoulder abduction   -left hand manipulation and coordination for completion of 6 pieces on screwdriver board - manual insertion     Patient Education and Home Exercises     Education provided:   - continuation of prior HEP with table slides and wall slides   - OT role in care; scheduling   - Progress towards goals     Written Home Exercises Provided: Yes.  Exercises were reviewed and Piotr was able to demonstrate them prior to the end of the session.  Piotr demonstrated good  understanding of the home exercise program provided. See electronic medical record under Patient Instructions for exercises provided during therapy sessions.    -wrist exercises for range of motion and strengthening   -left fine motor coordination HAP  -scapular retraction/shoulder squeezes - isometric      Assessment     Mr Saldaña tolerated session fairly on this date. He demo mild tightness with shoulder flex/reaching with fine motor component on this date with difficulty distally with release. He continues to express carry over at home and demo good understanding for form and technique. Goals remain appropriate. Plan for discharge at or before visit 12.     Piotr is progressing well towards his goals and there are no updates to goals at this time. Pt prognosis is Good.     Patient will continue to benefit from skilled outpatient occupational therapy to address the deficits listed in the problem list on initial evaluation provide patient/ family education and to maximize patient's level of independence in the home and community environment.     Patient's spiritual, cultural and educational needs considered and patient agreeable to plan of care and goals.    Anticipated  barriers to occupational therapy: none noted     The following goals were discussed with the patient and patient is in agreement with them as to be addressed in the treatment plan.      Goals: 8 weeks Long Term=Short Term  - Pt will demo active left wrist extension to 45+ degrees to aid in increased functional indep with ADLs/IADLs. MET 55  - Pt will demo 2+ increase in left pinches (all 3 conditions) to aid in increased in hand manipulation tasks. Ongoing  - Pt will complete Box and Blocks Test with left upper extremity, transferring 30+ blocks, to demonstrate improved gross manual coordination needed for ADL and IADL tasks. Progressing; 29 on 11/25/2024  - Pt will improve FOTO score to 56+ for improved self perception of functional performance with daily activities. Met to 58  - Pt will be independent with Home Exercise Program (HEP)/Home Activity Program (HAP) to promote long term maintenance of progress made in therapy. Ongoing     Goals to be added and edited within plan of care as needed/appropriate.     Plan     Certification Period/Plan of care expiration: 10/21/2024 to 12/29/2024 (to allow for scheduling due to busy clinic).     Outpatient Occupational Therapy 1-2 times weekly for 8 weeks to include the following interventions: Moist Heat/ Ice, Neuromuscular Re-ed, Paraffin, Patient Education, Self Care, Therapeutic Activities, and Therapeutic Exercise.    ABBE Barrett   12/2/2024

## 2024-12-02 ENCOUNTER — CLINICAL SUPPORT (OUTPATIENT)
Dept: REHABILITATION | Facility: HOSPITAL | Age: 64
End: 2024-12-02
Payer: MEDICARE

## 2024-12-02 DIAGNOSIS — R27.8 COORDINATION IMPAIRMENT: Primary | ICD-10-CM

## 2024-12-02 DIAGNOSIS — M25.632 DECREASED RANGE OF MOTION OF LEFT WRIST: ICD-10-CM

## 2024-12-02 PROCEDURE — 97530 THERAPEUTIC ACTIVITIES: CPT | Mod: KX,HCNC,PO

## 2024-12-02 PROCEDURE — 97110 THERAPEUTIC EXERCISES: CPT | Mod: KX,HCNC,PO

## 2024-12-04 ENCOUNTER — CLINICAL SUPPORT (OUTPATIENT)
Dept: REHABILITATION | Facility: HOSPITAL | Age: 64
End: 2024-12-04
Payer: MEDICARE

## 2024-12-04 DIAGNOSIS — R27.8 COORDINATION IMPAIRMENT: Primary | ICD-10-CM

## 2024-12-04 DIAGNOSIS — M25.632 DECREASED RANGE OF MOTION OF LEFT WRIST: ICD-10-CM

## 2024-12-04 PROCEDURE — 97530 THERAPEUTIC ACTIVITIES: CPT | Mod: HCNC,PO

## 2024-12-04 PROCEDURE — 97110 THERAPEUTIC EXERCISES: CPT | Mod: HCNC,PO

## 2024-12-04 NOTE — PROGRESS NOTES
[facility] OUTPATIENT THERAPY AND WELLNESS  Occupational Therapy Treatment Note     Date: 12/4/2024  Name: Piotr Crespo  Clinic Number: [id number]    Therapy Diagnosis:   Encounter Diagnoses   Name Primary?    Coordination impairment Yes    Decreased range of motion of left wrist        Physician: Lory Pugh MD    Physician Orders: Eval and Treat  Medical Diagnosis: G81.94 (ICD-10-CM) - Left hemiparesis  Evaluation Date: 10/21/2024  Insurance Authorization Period Expiration: 10/7/2025  Plan of Care Certification Period: 12/29/2024  Date of Return to MD: no neurology follow ups at this time , 2/10/25 pcp  FOTO:  2     Visit # / Visits authorized:7 / 20     Precautions:  Standard     Time In: 1255  Time Out: 1345  Total Billable Time: 50 minutes    Subjective     Patient reports: used left hand as much as possible in the kitchen over thanksgiving when prepping and cooking his gumbo  Response to previous treatment: good   Functional change: using left hand as much as possible; working his wrist at home   Patient's Goals for Therapy: get his left side working better     Pain: 0/10   Location: n/a    Objective     Objective measures can be seen in most recent RA on 11/25/2024.     FOTO Administered:   Intake: 54%   11/27/2024: 58%     Treatment     Piotr received therapeutic exercises for 20   minutes including:  -seated upper body ergonometer on level 2.5; Height 7,  completed for 10 minutes switching directions mid way. Focus on postural control and sustained grasp. MET average 1.6   Closed chain 3# wrist weights for shoulder flexion, abduction, and circles    Chair push ups between trials x3 sets of 10 reps total     Piotr participated in dynamic functional therapeutic activities to improve functional performance for 30 minutes, including:  Standing balancing tennis balls on cones with 3 # weight on wrist.    Seated at table top, the following were completed with left upper extremity:   -fine motor manipulation of  stones, obtaining 3 and trying to place 3 one at a time with translation in palm with help of gravity.   -grasp and pour stones from cups  -bean bag toss to target on table top underhand 8 feet away  63% accurate trial on, performance declined on 2nd trial due to fatigue.  Patient Education and Home Exercises     Education provided:   - continuation of prior HEP with table slides and wall slides   - OT role in care; scheduling   - Progress towards goals     Written Home Exercises Provided: Pt instructed to continue prior HEP.  Exercises were reviewed and Piotr was able to demonstrate them prior to the end of the session.  Piotr demonstrated good  understanding of the home exercise program provided. See electronic medical record under Patient Instructions for exercises provided during therapy sessions.    -wrist exercises for range of motion and strengthening   -left fine motor coordination HAP  -scapular retraction/shoulder squeezes - isometric      Assessment     Mr Saldaña tolerated session. He needs cues to not hike shoulder.   He demo mild tightness with shoulder flex/reaching with fine motor component on this date with difficulty distally with release. Left upper extremity fatigues quickly. He continues to express carry over at home and demo good understanding for form and technique. Goals remain appropriate. Plan for discharge at or before visit 12.     Piotr is progressing well towards his goals and there are no updates to goals at this time. Pt prognosis is Good.     Patient will continue to benefit from skilled outpatient occupational therapy to address the deficits listed in the problem list on initial evaluation provide patient/ family education and to maximize patient's level of independence in the home and community environment.     Patient's spiritual, cultural and educational needs considered and patient agreeable to plan of care and goals.    Anticipated barriers to occupational therapy: none noted      The following goals were discussed with the patient and patient is in agreement with them as to be addressed in the treatment plan.      Goals: 8 weeks Long Term=Short Term  - Pt will demo active left wrist extension to 45+ degrees to aid in increased functional indep with ADLs/IADLs. MET 55  - Pt will demo 2+ increase in left pinches (all 3 conditions) to aid in increased in hand manipulation tasks. Ongoing  - Pt will complete Box and Blocks Test with left upper extremity, transferring 30+ blocks, to demonstrate improved gross manual coordination needed for ADL and IADL tasks. Progressing; 29 on 11/25/2024  - Pt will improve FOTO score to 56+ for improved self perception of functional performance with daily activities. Met to 58  - Pt will be independent with Home Exercise Program (HEP)/Home Activity Program (HAP) to promote long term maintenance of progress made in therapy. Ongoing     Goals to be added and edited within plan of care as needed/appropriate.     Plan     Certification Period/Plan of care expiration: 10/21/2024 to 12/29/2024 (to allow for scheduling due to busy clinic).     Outpatient Occupational Therapy 1-2 times weekly for 8 weeks to include the following interventions: Moist Heat/ Ice, Neuromuscular Re-ed, Paraffin, Patient Education, Self Care, Therapeutic Activities, and Therapeutic Exercise.    Next visit: shoulder stretch, grasp tasks with wrist extension, fine motor tasks  Yessica Reeves OT   12/4/2024

## 2024-12-06 NOTE — PROGRESS NOTES
[facility]  Occupational Therapy Treatment Note     Date: 12/9/2024  Name: Piotr Crespo  Clinic Number: [id number]    Therapy Diagnosis:   Encounter Diagnoses   Name Primary?    Coordination impairment Yes    Decreased range of motion of left wrist        Physician: Lory Pugh MD    Physician Orders: Eval and Treat  Medical Diagnosis: G81.94 (ICD-10-CM) - Left hemiparesis  Evaluation Date: 10/21/2024  Insurance Authorization Period Expiration: 10/7/2025  Plan of Care Certification Period: 12/29/2024  Date of Return to MD: no neurology follow ups at this time , 2/10/25 pcp  FOTO:  2     Visit # / Visits authorized: 8 / 20     Precautions:  Standard     Time In: 1035   Time Out: 1116  Total Billable Time: 41 minutes    Subjective     Patient reports: that he is dropping things sometimes when gets things off overhead shelves   Response to previous treatment: good   Functional change: using left hand as much as possible; working his wrist at home   Patient's Goals for Therapy: get his left side working better     Pain: 0/10   Location: n/a    Objective     Objective measures can be seen in most recent RA on 11/25/2024.     Treatment     Piotr participated in neuromuscular re-education activities to improve: tone management for 10 minutes. The following activities were included:  - Wall push ups, 1 x 10     Quadruped:   - Push ups, 1 x 10  - Alternating shoulder taps, 1 x 10; focus on LUE WB and finger extension    Piotr participated in dynamic functional therapeutic activities to improve functional performance for 31 minutes, including:  Seated at tabletop with LUE:   - Placed pegs into holes of handweb, x10   - Tip pinch to place poms on top pegs, x10; focus on modulation  - Use of tweezers to remove poms from pegs and released into container, x10   - Removed pegs from handweb for overhead placement into container, x10    - Functional mobility around therapy gym focusing on tone  inhibition in LUE    Standing with LUE:   - Transferred hangar from door handle<>overhead rung, 5x   - Transferred cones from overhead>low shelf, x8  - Practiced 'overshoot' technique     Patient Education and Home Exercises     Education provided:   - continuation of prior HEP with table slides and wall slides   - OT role in care; scheduling   - Progress towards goals     Written Home Exercises Provided: Pt instructed to continue prior HEP.  Exercises were reviewed and Piotr was able to demonstrate them prior to the end of the session.  Piotr demonstrated good  understanding of the home exercise program provided. See electronic medical record under Patient Instructions for exercises provided during therapy sessions.    -wrist exercises for range of motion and strengthening   -left fine motor coordination HAP  -scapular retraction/shoulder squeezes - isometric      Assessment     Pt tolerated today's session well. Interventions focused on tone inhibition, force modulation, and motor planning. Pt responded well to 'overshoot technique' demonstrating improved overhead grasping when focused on prolonged and exaggerated hand extension. Pt is also demonstrating improved in hand manipulation skills. Pt would continue to benefit from skilled occupational therapy services to maximize functional use of LUE for ADLs, IADLs, and all meaningful occupations.     Piotr is progressing well towards his goals and there are no updates to goals at this time. Pt prognosis is Good.     Patient will continue to benefit from skilled outpatient occupational therapy to address the deficits listed in the problem list on initial evaluation provide patient/ family education and to maximize patient's level of independence in the home and community environment.     Patient's spiritual, cultural and educational needs considered and patient agreeable to plan of care and goals.    Anticipated barriers to occupational therapy: none noted     The  following goals were discussed with the patient and patient is in agreement with them as to be addressed in the treatment plan.      Goals: 8 weeks Long Term=Short Term  - Pt will demo active left wrist extension to 45+ degrees to aid in increased functional indep with ADLs/IADLs. MET 55  - Pt will demo 2+ increase in left pinches (all 3 conditions) to aid in increased in hand manipulation tasks. Ongoing  - Pt will complete Box and Blocks Test with left upper extremity, transferring 30+ blocks, to demonstrate improved gross manual coordination needed for ADL and IADL tasks. Progressing; 29 on 11/25/2024  - Pt will improve FOTO score to 56+ for improved self perception of functional performance with daily activities. Met to 58  - Pt will be independent with Home Exercise Program (HEP)/Home Activity Program (HAP) to promote long term maintenance of progress made in therapy. Ongoing     Goals to be added and edited within plan of care as needed/appropriate.     Plan     Certification Period/Plan of care expiration: 10/21/2024 to 12/29/2024 (to allow for scheduling due to busy clinic).     Outpatient Occupational Therapy 1-2 times weekly for 8 weeks to include the following interventions: Moist Heat/ Ice, Neuromuscular Re-ed, Paraffin, Patient Education, Self Care, Therapeutic Activities, and Therapeutic Exercise.    Next visit: shoulder stretch, grasp tasks with wrist extension, fine motor tasks  Emily Shelby OT   12/9/2024

## 2024-12-09 ENCOUNTER — CLINICAL SUPPORT (OUTPATIENT)
Dept: REHABILITATION | Facility: HOSPITAL | Age: 64
End: 2024-12-09
Payer: MEDICARE

## 2024-12-09 DIAGNOSIS — R27.8 COORDINATION IMPAIRMENT: Primary | ICD-10-CM

## 2024-12-09 DIAGNOSIS — M25.632 DECREASED RANGE OF MOTION OF LEFT WRIST: ICD-10-CM

## 2024-12-09 PROCEDURE — 97112 NEUROMUSCULAR REEDUCATION: CPT | Mod: HCNC,PO

## 2024-12-09 PROCEDURE — 97530 THERAPEUTIC ACTIVITIES: CPT | Mod: HCNC,PO

## 2024-12-10 NOTE — PROGRESS NOTES
"  [facility] OUTPAT[facility] THERAPY AND [facility]   Physical Therapy Treatment Note     Name: Piotr Crespo  Clinic Number: [id number]    Therapy Diagnosis:   No diagnosis found.    Physician: Lory Pugh MD    Visit Date: 12/11/2024    Physician Orders: PT Eval and Treat   Medical Diagnosis from Referral: G81.94 (ICD-10-CM) - Left hemiparesis   Evaluation Date: 11/15/2024  Authorization Period Expiration: 12/31/2014  Plan of Care Expiration: 1/10/2025  Progress Note Due: 12/13/2024  Date of Surgery: NA  Visit # / Visits authorized: 3/ 20  FOTO: 1/ 3     Precautions: Standard and Fall       PTA Visit #: 1/5     Time In: 0805  Time Out: 0845  Total Billable Time: 40 minutes    SUBJECTIVE     Pt reports: " He is doing pretty good. My leg feels so tight. Can you show me some stretches?"    He was compliant with his HEP  Response to previous treatment: good  Functional change:  ongoing     Pain: 0/10  Location:  NA     OBJECTIVE     Objective Measures updated at progress report unless specified.     Treatment     Piotr received the treatments listed below:      therapeutic exercises to develop strength, endurance, ROM, flexibility, posture, and core stabilization for 10 minutes including:    Upright stationary Bike 07 minutes level 2     Leg press: NP (patient goes to [employer])  3 x 10 reps of B LE squats with #100lbs applied.   3 x 10 reps of LLE single leg squat with #80lbs applied.   3 x 10 reps of B LE heel raises with #80lbs applied.     Left closed chain right heel taps from 6" step : 3x10      neuromuscular re-education activities to improve: Balance, Coordination, Kinesthetic, Sense, Proprioception, and Posture for 25 minutes. The following activities were included:  Near ballet bar: CGA  2 x 30 sec of tandem stance with eyes opened   X 30 sec each of tandem stance with eyes closed    Large bosu ( round side up): CGA  2 x 30 sec of B LE single leg stance with unilateral support, no UE support  2 x 30 sec of B LE " "single leg stance with unilateral support, eyes closed and no Ue support  1 x 15 sec each of B LE single leg stance with 1 UE support    Small Bosu ( Flat side up) : CGA  X 60 sec of static standing with no UE support  X 60 sec of medial/lateral weight shifting with no UE support  X 60 sec of anterior/posterior weight shifting with no UE support  X 60 sec of static standing with head turns right to left, no UE support  X 60 sec of static standing with head nods up/down, no UE support        therapeutic activities to improve functional performance for 10 minutes, including:   Prone quad stretch 3x30" with strap  Seated hamstring stretch 3x30"   Standing calf stretch 3x30"     gait training to improve functional mobility and safety for 0  minutes, including:            Patient Education and Home Exercises     Home Exercises Provided and Patient Education Provided     Education provided:   - POC, purpose of PT, discharge planning     Written Home Exercises Provided: yes. Exercises were reviewed and Piotr was able to demonstrate them prior to the end of the session.  Piotr demonstrated good  understanding of the education provided. See EMR under Patient Instructions for exercises provided during therapy sessions    ASSESSMENT     Piotr tolerated his tx session well and reports compliance with HEP.  Instructed in  some left lower extremity stretches and added  to home exercise program and provided a printed copy.  Piotr continued  squats on the leg press and dynamic balance on the small and large Bosu.  Piotr performed most balance on the small Bosu.  Piotr required minimal UE support and did not require any sitting rest breaks.  Cont with plan of care.      Piotr Is progressing well towards his goals.   Pt prognosis is Fair.     Pt will continue to benefit from skilled outpatient physical therapy to address the deficits listed in the problem list box on initial evaluation, provide pt/family education and to " maximize pt's level of independence in the home and community environment.     Pt's spiritual, cultural and educational needs considered and pt agreeable to plan of care and goals.     Anticipated barriers to physical therapy: co-morbidities, chronicity of stroke     Goals:   Short Term Goals: 4 weeks   1. Patient to be (I) with established home exercise program. Ongoing   2. Patient to improve 5 time sit to stand time to 11 seconds for improved functional mobility and strength. Ongoing   3. Patient to improve 6 MWT distance to 1,315 feet for improved CV endurance with community mobility . Ongoing   4. Patient to improve BLE SLS time to 7 seconds for decreased risk for falls Ongoing      Long Term Goals: 8 weeks   1. Patient to be (I) with advanced home exercise program.Ongoing   2. Patient to improve left  hip flexion by 1/3 MMT to improve balance and functional mobility. Ongoing   3. Patient to improve left  hip extension strength by 1/3 MMT to improve balance and functional mobility. Ongoing   4. Patient to improve 5 time sit to stand time to 9 seconds for improved functional mobility and strength. Ongoing   5. Patient to improve 6 MWT distance to 1,380 feet for improved CV endurance with community mobility . Ongoing   6. Patient to improve BLE SLS time to 10 seconds for decreased risk for falls Ongoing   7. Patient to improve SSWS to 1.2  m/sec for improved safety with ambulation. Ongoing   8. Patient to improve FGA score to 26/30 for decreased risk for falls with community mobility Ongoing     PLAN     Continue to work on high level static/ dynamic balance and LLE strengthening as tolerated     Leighton Chiang, PTA

## 2024-12-11 ENCOUNTER — CLINICAL SUPPORT (OUTPATIENT)
Dept: REHABILITATION | Facility: HOSPITAL | Age: 64
End: 2024-12-11
Payer: MEDICARE

## 2024-12-11 DIAGNOSIS — R27.8 COORDINATION IMPAIRMENT: Primary | ICD-10-CM

## 2024-12-11 DIAGNOSIS — Z74.09 IMPAIRED FUNCTIONAL MOBILITY, BALANCE, GAIT, AND ENDURANCE: Primary | ICD-10-CM

## 2024-12-11 DIAGNOSIS — M25.632 DECREASED RANGE OF MOTION OF LEFT WRIST: ICD-10-CM

## 2024-12-11 PROCEDURE — 97112 NEUROMUSCULAR REEDUCATION: CPT | Mod: HCNC,PO

## 2024-12-11 PROCEDURE — 97530 THERAPEUTIC ACTIVITIES: CPT | Mod: HCNC,PO

## 2024-12-11 PROCEDURE — 97112 NEUROMUSCULAR REEDUCATION: CPT | Mod: HCNC,PO,CQ

## 2024-12-11 PROCEDURE — 97110 THERAPEUTIC EXERCISES: CPT | Mod: HCNC,PO,CQ

## 2024-12-11 PROCEDURE — 97530 THERAPEUTIC ACTIVITIES: CPT | Mod: HCNC,PO,CQ

## 2024-12-11 NOTE — PATIENT INSTRUCTIONS
HOME EXERCISE PROGRAM  Created by dheeraj Chiang PTA, LMT  Dec 11th, 2024  View videos at [email or web address]        PRONE QUAD STRETCH WITH MULTI-LOOP STRAP    Start by lying on your stomach with a stretching strap linked looped around your affected side foot.    Next, use the belt to pull the knee into a bent position allowing for a stretch as shown.    Video # [id number] Repeat 4 Times   Hold 30 Seconds   Complete 1 Set   Perform 1 Times a Day          Hamstring Stretch    Sit on edge of chair with heel on floor or stool. Keeping knee straight, lean forward over leg until stretch is felt in hamstrings. Repeat 4 Times   Hold 30 Seconds   Complete 1 Set   Perform 1 Times a Day          Calf Stretch off Step    Standing with your toes on a step, gently allow your heels to drop down until a stretch is felt in the back of your calves. Repeat 3 Times   Hold 1 Minute   Complete 1 Set   Perform 1 Times a Day

## 2024-12-11 NOTE — PROGRESS NOTES
[facility]  Occupational Therapy Treatment Note     Date: 12/11/2024  Name: Piotr Crespo  Clinic Number: [id number]    Therapy Diagnosis:   Encounter Diagnoses   Name Primary?    Coordination impairment Yes    Decreased range of motion of left wrist        Physician: Lory Pugh MD    Physician Orders: Eval and Treat  Medical Diagnosis: G81.94 (ICD-10-CM) - Left hemiparesis  Evaluation Date: 10/21/2024  Insurance Authorization Period Expiration: 10/7/2025  Plan of Care Certification Period: 12/29/2024  Date of Return to MD: no neurology follow ups at this time , 2/10/25 pcp  FOTO:  2     Visit # / Visits authorized: 9 / 20     Precautions:  Standard     Time In: 1032   Time Out: 1115  Total Billable Time: 45 minutes    Subjective     Patient reports: I didn't do much cause it was my birthday. I did [employer] stuff and went out to dinner.    Response to previous treatment: good   Functional change: using left hand as much as possible; working his wrist at home   Patient's Goals for Therapy: get his left side working better     Pain: 0/10   Location: n/a    Objective     Objective measures can be seen in most recent RA on 11/25/2024.     Treatment     Piotr participated in neuromuscular re-education activities to improve: tone management for 25 minutes. The following activities were included:  - Wall push ups, 1 x 10 with cues to abduct fingers and to complete push up  -green therapy ball walk up wall x 10    Reverse Quadraped lift offs x 10 and lift off with lateral weight shifts x 10  Quadruped:   a/p weight shifts, lateral weight shifts, and step ups on 6 inch step    Stretch of left upper extremity in reflex inhibiting posture and external rotation/extension stretch. This stretch reviewed   For home. Pectoralis inhibition during stretch.   Piotr participated in dynamic functional therapeutic activities to improve functional performance for 20 minutes, including:  Seated at tabletop  with LUE:   - Functional mobility around therapy gym left upper extremity weight bearing on table top holding cup  -grasp and release of cup at varying heights working on open hand, reaching with RD/UD with remembering of overshoot learned in previous session.  -grasp and place hangar at varying heights concentrating on arm only on stable body      Patient Education and Home Exercises     Education provided:   - continuation of prior HEP with table slides and wall slides, stretching   - OT role in care; scheduling   - Progress towards goals     Written Home Exercises Provided: Pt instructed to continue prior HEP.  Exercises were reviewed and Piotr was able to demonstrate them prior to the end of the session.  Piotr demonstrated good  understanding of the home exercise program provided. See electronic medical record under Patient Instructions for exercises provided during therapy sessions.    -wrist exercises for range of motion and strengthening   -left fine motor coordination HAP  -scapular retraction/shoulder squeezes - isometric      Assessment     Pt tolerated today's session well. Interventions focused on tone inhibition, force modulation, and motor planning. Pt has learned 'overshoot technique' using it without cues. Pt is also demonstrating improved in hand manipulation skills. Pt would continue to benefit from skilled occupational therapy services to maximize functional use of LUE for ADLs, IADLs, and all meaningful occupations.     Piotr is progressing well towards his goals and there are no updates to goals at this time. Pt prognosis is Good.     Patient will continue to benefit from skilled outpatient occupational therapy to address the deficits listed in the problem list on initial evaluation provide patient/ family education and to maximize patient's level of independence in the home and community environment.     Patient's spiritual, cultural and educational needs considered and patient agreeable to  plan of care and goals.    Anticipated barriers to occupational therapy: none noted     The following goals were discussed with the patient and patient is in agreement with them as to be addressed in the treatment plan.      Goals: 8 weeks Long Term=Short Term  - Pt will demo active left wrist extension to 45+ degrees to aid in increased functional indep with ADLs/IADLs. MET 55  - Pt will demo 2+ increase in left pinches (all 3 conditions) to aid in increased in hand manipulation tasks. Ongoing  - Pt will complete Box and Blocks Test with left upper extremity, transferring 30+ blocks, to demonstrate improved gross manual coordination needed for ADL and IADL tasks. Progressing; 29 on 11/25/2024  - Pt will improve FOTO score to 56+ for improved self perception of functional performance with daily activities. Met to 58  - Pt will be independent with Home Exercise Program (HEP)/Home Activity Program (HAP) to promote long term maintenance of progress made in therapy. Ongoing     Goals to be added and edited within plan of care as needed/appropriate.     Plan     Certification Period/Plan of care expiration: 10/21/2024 to 12/29/2024 (to allow for scheduling due to busy clinic).     Outpatient Occupational Therapy 1-2 times weekly for 8 weeks to include the following interventions: Moist Heat/ Ice, Neuromuscular Re-ed, Paraffin, Patient Education, Self Care, Therapeutic Activities, and Therapeutic Exercise.    Next visit: shoulder stretch, grasp tasks with wrist extension, fine motor tasks  Yessica Reeves OT   12/11/2024

## 2024-12-16 ENCOUNTER — CLINICAL SUPPORT (OUTPATIENT)
Dept: REHABILITATION | Facility: HOSPITAL | Age: 64
End: 2024-12-16
Payer: MEDICARE

## 2024-12-16 DIAGNOSIS — M25.632 DECREASED RANGE OF MOTION OF LEFT WRIST: ICD-10-CM

## 2024-12-16 DIAGNOSIS — R27.8 COORDINATION IMPAIRMENT: Primary | ICD-10-CM

## 2024-12-16 PROCEDURE — 97112 NEUROMUSCULAR REEDUCATION: CPT | Mod: KX,HCNC,PO

## 2024-12-16 PROCEDURE — 97530 THERAPEUTIC ACTIVITIES: CPT | Mod: KX,HCNC,PO

## 2024-12-17 NOTE — PROGRESS NOTES
"[facility] OUTPATIENT THERAPY AND WELLNESS  Occupational Therapy Treatment Note     Date: 12/18/2024  Name: Piotr Crespo  Clinic Number: [id number]    Therapy Diagnosis:   Encounter Diagnoses   Name Primary?    Coordination impairment Yes    Decreased range of motion of left wrist      Physician: Lory Pugh MD    Physician Orders: Eval and Treat  Medical Diagnosis: G81.94 (ICD-10-CM) - Left hemiparesis  Evaluation Date: 10/21/2024  Insurance Authorization Period Expiration: 10/7/2025  Plan of Care Certification Period: 12/29/2024  Date of Return to MD: no neurology follow ups at this time , 2/10/25 pcp  FOTO:  2     Visit # / Visits authorized: 11 / 20     Precautions:  Standard     Time In: 9:45  Time Out: 10:30  Total Billable Time: 45 minutes    Subjective     Patient reports: good session with PT just "wishes this hand would work"  Response to previous treatment: good -- feels like he is always learning something with how to help his body  Functional change: using left hand as much as possible; working his wrist at home   Patient's Goals for Therapy: get his left side working better     Pain: 0/10   Location: n/a    Objective     Objective measures can be seen in most recent RA on 11/25/2024.     Treatment     Piotr participated in neuromuscular re-education activities to improve: tone management for 20 minutes. The following activities were included:  Seated edge of mat  - PROM for left hand general wrist stretches including metacarpal spreads, carpal rolls, increasing mobility towards radial/ulnar deviation, increasing mobility of wrist towards extension/flexion, Increasing mobility of wrist towards supination/pronation. PROM of fingers to encourage extension.  -left upper extremity weightbearing with anterior weight shift x10 reps     -PVC pipe slide on long dowel - shoulder flex, abd, add x15 reps each - focus on modulation and control     Piotr received therapeutic exercises for 8 minutes " including:  Seated edge of mat   -tricep dips x10  -lateral weight shifts in dip position     Piotr participated in dynamic functional therapeutic activities to improve functional performance for 17 minutes, including:  Seated edge of mat : left upper extremity low and mid range reaching   -grasp of rings for placement on squiz on lateral surface x multiple reps   -left 2-3 point pinch for nut  for placement into cup x 8      Patient Education and Home Exercises     Education provided:   - continuation of prior HEP with table slides and wall slides, stretching   - OT role in care; scheduling   - Progress towards goals     Written Home Exercises Provided: Pt instructed to continue prior HEP.  Exercises were reviewed and Piotr was able to demonstrate them prior to the end of the session.  Piotr demonstrated good  understanding of the home exercise program provided. See electronic medical record under Patient Instructions for exercises provided during therapy sessions.    -wrist exercises for range of motion and strengthening   -left fine motor coordination HAP  -scapular retraction/shoulder squeezes - isometric      Assessment     Piotr tolerated session well. He has good functional use of left upper extremity but requires cues for use. He demo ongoing carry over at home for all education and exercises at this time. Plan for d/c at end of plan of care.     Piotr is progressing well towards his goals and there are no updates to goals at this time. Pt prognosis is Good.     Patient will continue to benefit from skilled outpatient occupational therapy to address the deficits listed in the problem list on initial evaluation provide patient/ family education and to maximize patient's level of independence in the home and community environment.     Patient's spiritual, cultural and educational needs considered and patient agreeable to plan of care and goals.    Anticipated barriers to occupational therapy: none  noted     The following goals were discussed with the patient and patient is in agreement with them as to be addressed in the treatment plan.      Goals: 8 weeks Long Term=Short Term  - Pt will demo active left wrist extension to 45+ degrees to aid in increased functional indep with ADLs/IADLs. MET at 55 degrees   - Pt will demo 2+ increase in left pinches (all 3 conditions) to aid in increased in hand manipulation tasks. Ongoing  - Pt will complete Box and Blocks Test with left upper extremity, transferring 30+ blocks, to demonstrate improved gross manual coordination needed for ADL and IADL tasks. Progressing; 29 on 11/25/2024  - Pt will improve FOTO score to 56+ for improved self perception of functional performance with daily activities. Met to 58  - Pt will be independent with Home Exercise Program (HEP)/Home Activity Program (HAP) to promote long term maintenance of progress made in therapy. Ongoing     Goals to be added and edited within plan of care as needed/appropriate.     Plan     Certification Period/Plan of care expiration: 10/21/2024 to 12/29/2024 (to allow for scheduling due to busy clinic).     Outpatient Occupational Therapy 1-2 times weekly for 8 weeks to include the following interventions: Moist Heat/ Ice, Neuromuscular Re-ed, Paraffin, Patient Education, Self Care, Therapeutic Activities, and Therapeutic Exercise.    Next visit: shoulder stretch, grasp tasks with wrist extension, fine motor tasks    ABBE Barrett   12/18/2024

## 2024-12-18 ENCOUNTER — CLINICAL SUPPORT (OUTPATIENT)
Dept: REHABILITATION | Facility: HOSPITAL | Age: 64
End: 2024-12-18
Payer: MEDICARE

## 2024-12-18 DIAGNOSIS — Z74.09 IMPAIRED FUNCTIONAL MOBILITY, BALANCE, GAIT, AND ENDURANCE: Primary | ICD-10-CM

## 2024-12-18 DIAGNOSIS — M25.632 DECREASED RANGE OF MOTION OF LEFT WRIST: ICD-10-CM

## 2024-12-18 DIAGNOSIS — R27.8 COORDINATION IMPAIRMENT: Primary | ICD-10-CM

## 2024-12-18 PROCEDURE — 97112 NEUROMUSCULAR REEDUCATION: CPT | Mod: HCNC,PO,CQ

## 2024-12-18 PROCEDURE — 97110 THERAPEUTIC EXERCISES: CPT | Mod: KX,HCNC,PO

## 2024-12-18 PROCEDURE — 97112 NEUROMUSCULAR REEDUCATION: CPT | Mod: KX,HCNC,PO

## 2024-12-18 PROCEDURE — 97530 THERAPEUTIC ACTIVITIES: CPT | Mod: KX,HCNC,PO

## 2024-12-18 PROCEDURE — 97110 THERAPEUTIC EXERCISES: CPT | Mod: HCNC,PO,CQ

## 2024-12-18 NOTE — PROGRESS NOTES
"  [facility]   Physical Therapy Treatment Note     Name: Piotr Crespo  Clinic Number: [id number]    Therapy Diagnosis:   Encounter Diagnosis   Name Primary?    Impaired functional mobility, balance, gait, and endurance Yes       Physician: Lory Pugh MD    Visit Date: 12/18/2024    Physician Orders: PT Eval and Treat   Medical Diagnosis from Referral: G81.94 (ICD-10-CM) - Left hemiparesis   Evaluation Date: 11/15/2024  Authorization Period Expiration: 12/31/2014  Plan of Care Expiration: 1/10/2025  Progress Note Due: 12/13/2024  Date of Surgery: NA  Visit # / Visits authorized: 3/ 20  FOTO: 1/ 3     Precautions: Standard and Fall       PTA Visit #: 1/5     Time In: 0805  Time Out: 0845  Total Billable Time: 40 minutes    SUBJECTIVE     Pt reports: " He is doing pretty good. My leg feels so tight. Can you show me some stretches?"    He was compliant with his HEP  Response to previous treatment: good  Functional change:  ongoing     Pain: 0/10  Location:  NA     OBJECTIVE     Objective Measures updated at progress report unless specified.     Treatment     Piotr received the treatments listed below:      therapeutic exercises to develop strength, endurance, ROM, flexibility, posture, and core stabilization for 10 minutes including:    Bilateral upper extremity and lower extremity reciprocation 10 minutes level 6     Leg press: NP (patient goes to [employer])  3 x 10 reps of B LE squats with #100lbs applied.   3 x 10 reps of LLE single leg squat with #80lbs applied.   3 x 10 reps of B LE heel raises with #80lbs applied.       neuromuscular re-education activities to improve: Balance, Coordination, Kinesthetic, Sense, Proprioception, and Posture for 35 minutes. The following activities were included:  //Bars  CGA/ no upper extremity support  2 x 30 sec of tandem stance with eyes opened   X 30 sec each of tandem stance with eyes closed  2x10 reps of step up fwd/down retro to 4 point " "fitter, CGA   2x30" static stance on Airex foam, narrow base of support,eyes closed, CGA  2x30" of static tandem stance each foot fwd, eyes closed , CGA  2x10 reps of step ups fwd/down retroc onto Bosu round side up, CGA Min A     2x10 reps of quadruped Hip/knee extension (used push up blocks )    Large bosu ( round side up): CG  2 x 30 sec of B LE single leg stance with unilateral support, no UE support  2 x 30 sec of B LE single leg stance with unilateral support, eyes closed and no Ue support  1 x 15 sec each of B LE single leg stance with 1 UE support  X 60 sec of static standing with no UE support  X 60 sec of medial/lateral weight shifting with no UE support  X 60 sec of anterior/posterior weight shifting with no UE support  X 60 sec of static standing with head turns right to left, no UE support  X 60 sec of static standing with head nods up/down, no UE support        therapeutic activities to improve functional performance for 00 minutes, including:  Prone quad stretch 3x30" with strap NP D/C to home exercise program   Seated hamstring stretch 3x30" NP D/C to home exercise program   Standing calf stretch 3x30" NP D/C to home exercise program     gait training to improve functional mobility and safety for 0  minutes, including:            Patient Education and Home Exercises     Home Exercises Provided and Patient Education Provided     Education provided:   - POC, purpose of PT, discharge planning     Written Home Exercises Provided: yes. Exercises were reviewed and Piotr was able to demonstrate them prior to the end of the session.  Piotr demonstrated good  understanding of the education provided. See EMR under Patient Instructions for exercises provided during therapy sessions    ASSESSMENT     Piotr presents for follow up. Good subjective feedback regarding first visit benefits especially stretching.  Good compliance with HEP. Piotr continued  squats on the leg press and dynamic balance on the small " and large Bosu.  Piotr required minimal UE support and did not require any sitting rest breaks.  Cont with plan of care.      Piotr Is progressing well towards his goals.   Pt prognosis is Fair.     Pt will continue to benefit from skilled outpatient physical therapy to address the deficits listed in the problem list box on initial evaluation, provide pt/family education and to maximize pt's level of independence in the home and community environment.     Pt's spiritual, cultural and educational needs considered and pt agreeable to plan of care and goals.     Anticipated barriers to physical therapy: co-morbidities, chronicity of stroke     Goals:   Short Term Goals: 4 weeks   1. Patient to be (I) with established home exercise program. Ongoing   2. Patient to improve 5 time sit to stand time to 11 seconds for improved functional mobility and strength. Ongoing   3. Patient to improve 6 MWT distance to 1,315 feet for improved CV endurance with community mobility . Ongoing   4. Patient to improve BLE SLS time to 7 seconds for decreased risk for falls Ongoing      Long Term Goals: 8 weeks   1. Patient to be (I) with advanced home exercise program.Ongoing   2. Patient to improve left  hip flexion by 1/3 MMT to improve balance and functional mobility. Ongoing   3. Patient to improve left  hip extension strength by 1/3 MMT to improve balance and functional mobility. Ongoing   4. Patient to improve 5 time sit to stand time to 9 seconds for improved functional mobility and strength. Ongoing   5. Patient to improve 6 MWT distance to 1,380 feet for improved CV endurance with community mobility . Ongoing   6. Patient to improve BLE SLS time to 10 seconds for decreased risk for falls Ongoing   7. Patient to improve SSWS to 1.2  m/sec for improved safety with ambulation. Ongoing   8. Patient to improve FGA score to 26/30 for decreased risk for falls with community mobility Ongoing     PLAN     Continue to work on high level  static/ dynamic balance and LLE strengthening as tolerated     Leighton Chiang, [place]

## 2024-12-19 NOTE — PROGRESS NOTES
[facility]  Occupational Therapy Treatment Note/reassessment     Date: 12/23/2024  Name: Piotr Crespo  Clinic Number: [id number]    Therapy Diagnosis:   Encounter Diagnoses   Name Primary?    Coordination impairment Yes    Decreased range of motion of left wrist        Physician: Lory Pugh MD    Physician Orders: Eval and Treat  Medical Diagnosis: G81.94 (ICD-10-CM) - Left hemiparesis  Evaluation Date: 10/21/2024  Insurance Authorization Period Expiration: 10/7/2025  Plan of Care Certification Period: 12/29/2024 2/6/2025  Date of Return to MD: no neurology follow ups at this time , 2/10/25 pcp  FOTO: 3     Visit # / Visits authorized: 12 / 20     Precautions:  Standard     Time In: 1028  Time Out: 1115  Total Billable Time: 47 minutes    Subjective     Patient reports: I am tired today, I have been doing my stretches.  My thumb is always getting in the way.   Response to previous treatment: good -- feels like he is always learning something with how to help his body  Functional change: using left hand as much as possible; working his wrist at home , Using tricks to retrieve items with left hand to get hand to open.   Patient's Goals for Therapy: get his left side working better     Pain: 0/10   Location: n/a    Objective     Objective measures can be seen in most recent RA on 12/23/2024:   Physical Exam:  Postural examination/scapula alignment: Good posture  Joint integrity: Firm end feeling  Skin integrity: normal   Edema: none noted for bilateral upper extremities       Joint Evaluation  AROM  10/21/2024 PROM   10/21/2024 AROM  11/25/2024 PROM  11/25/2024 A/P range of motion 12/23/2024     Left  Left  Left  Left  Left   Shoulder flex 0-180 158 165 167 180 160/180   Shoulder Abd 0-180 168 173 165 180 170/180   Shoulder ER 0-90 Normal  90 90 90 90/90   Shoulder IR 0-90 Normal  90  90 90 35/52   Shoulder Extension 0-80 80 80 80 80 WNL   Shoulder Horizontal adduction 0-90 40 55 48 65  WNL   Elbow flex/ext 0-150 WFL WFL WFL WFL WFL   Wrist flex 0-80 60 80 70 80 50/80   Wrist ext 0-70 35 65 55 70 45/60   Right upper extremity: AROM normal/WNL     Fist: normal to fist, opens slowly with index lag,   Left upper extremity supination: 70A/90P     Strength 10/21/2024 10/21/2024 12/23/2024   **within available ROM** Right  Left  Left    Shoulder flex 5/5 5/5  5/5   Shoulder abd 5/5 5/5  5/5   Shoulder ER 5/5 5/5  5/5   Shoulder IR 5/5 5/5  5/5   Shoulder Extension 5/5 4+/5 5/5   Shoulder Horizontal adduction 5/5 4+/5 5/5   Elbow flex 5/5 5/5  5/5   Elbow ext 5/5 5/5  5/5   Wrist flex 5/5 4/5 4+/5   Wrist ext 5/5 4/5 4+/5       Strength: (DESIREE Dynamometer in lbs.) Average 3 trials, Position II:       10/21/2024 10/21/2024 11/25/2024 12/24/24     Right  Left  Left  LEFT   Rung II 88.2# 63.3# 59.8 65.4      Pinch Strength (Measured in psi)       10/21/2024 10/21/2024 11/25/2024 12/23/2024     Right  Left  Left  Left   Key Pinch 20.1 psi 16.4 psi 16.2 16.6   3pt Pinch 15.6 psi 8.2 psi 9.6 8.1   2pt Pinch 9.3 psi 8.3 psi 6.0 6.5      Fine Motor Coordination: 9 Hole Peg Test  Right  10/21/2024 Left    10/21/2024 Left   11/25/2024 Left 12/23/2024   21.06 s Completion of 2: 49.73 s 1:59.81 min     Completion of 2 in 12.84 s  1min 29 sec one drop   [norms for men aged 61-65: R=20.87s +/-3.50s; L=21.60s +/-2.98s (Jasmin et al, 2003)]     Fine Motor Coordination: Box and Block  Right  10/21/2024 Left    10/21/2024 Left   11/25/2024 Left 12/23/2024   60 26 29 24   [norms for men aged 60-64: R=71.3 +/-8.8; L=70.5 /-8.1 (Mohini et al, 1985)]     Gross motor coordination:   MEENAKSHI (Rapid Alternating Movements): impaired left due to limited motion and synergy  Finger to Nose (5 times): no dysmetria   Finger Flicks (coordination moving from digit flexion to digit extension): delayed open on left      Tone:  Modified James Scale: left upper extremity   1-  Slight increase in muscle tone, manifested by a catch and  release or by minimal resistance at the end of the range of motino when the affected part(s) is moved in flexion or extension in flexion synergy     Sensation:  Piotr  reports no complains/noted changes.      Balance:   Static Sit - GOOD+: Takes MAXIMAL challenges from all directions.    Dynamic sit- GOOD+: Takes MAXIMAL challenges from all directions.    Static Stand - GOOD: Takes MODERATE challenges from all directions  Dynamic stand - GOOD-: Takes MODERATE challenges from all directions but inconsistently     Endurance Deficit: mild         Treatment     Piotr participated in neuromuscular re-education activities to improve: tone management for 25 minutes. The following activities were included:  Reassessed for function    Piotr participated in dynamic functional therapeutic activities to improve functional performance for 20 minutes, including:  Seated in chair at table : left upper extremity lateral, across midline and forward with cues to reach from hand and not shoulder.   - tennis ball placement  - 1 inch block placement       Patient Education and Home Exercises     Education provided:   - continuation of prior HEP with table slides and wall slides, stretching   - OT role in care; scheduling   - Progress towards goals   - lifetime need to do HEP    Written Home Exercises Provided: Pt instructed to continue prior HEP.  Exercises were reviewed and Piotr was able to demonstrate them prior to the end of the session.  Piotr demonstrated good  understanding of the home exercise program provided. See electronic medical record under Patient Instructions for exercises provided during therapy sessions.    -wrist exercises for range of motion and strengthening   -left fine motor coordination HAP  -scapular retraction/shoulder squeezes - isometric      Assessment     Piotr tolerated session well. He has good functional use of left upper extremity but requires cues to lead with hand and rest/stretch with fatigue.  Cues also to fully open left hand for grasp and release. Function vacillates as does tone.  He demo ongoing carry over at home for all education and exercises at this time. Extend 1  month to assure Home exercise program.     Piotr is progressing well towards his goals and there are no updates to goals at this time. Pt prognosis is Good.     Patient will continue to benefit from skilled outpatient occupational therapy to address the deficits listed in the problem list on initial evaluation provide patient/ family education and to maximize patient's level of independence in the home and community environment.     Patient's spiritual, cultural and educational needs considered and patient agreeable to plan of care and goals.    Anticipated barriers to occupational therapy: none noted     The following goals were discussed with the patient and patient is in agreement with them as to be addressed in the treatment plan.      Goals: 8 weeks Long Term=Short Term  - Pt will demo active left wrist extension to 45+ degrees to aid in increased functional indep with ADLs/IADLs. MET at 55 degrees   - Pt will demo 2+ increase in left pinches (all 3 conditions) to aid in increased in hand manipulation tasks. Ongoing  - Pt will complete Box and Blocks Test with left upper extremity, transferring 30+ blocks, to demonstrate improved gross manual coordination needed for ADL and IADL tasks. Progressing; 29 on 11/25/2024  - Pt will improve FOTO score to 56+ for improved self perception of functional performance with daily activities. Met to 58  - Pt will be independent with Home Exercise Program (HEP)/Home Activity Program (HAP) to promote long term maintenance of progress made in therapy. Ongoing     Goals to be added and edited within plan of care as needed/appropriate.     Plan     Certification Period/Plan of care expiration: 10/21/2024 to 12/29/2024 (to allow for scheduling due to busy clinic). Extend 1 month to assure Home  exercise program until 2/6/2025.      Outpatient Occupational Therapy 1-2 times weekly for 4 more weeks to include the following interventions: Moist Heat/ Ice, Neuromuscular Re-ed, Paraffin, Patient Education, Self Care, Therapeutic Activities, and Therapeutic Exercise.    Next visit: shoulder stretch, grasp tasks with wrist extension, fine motor tasks    Yessica Reeves OT   12/23/2024

## 2024-12-23 ENCOUNTER — CLINICAL SUPPORT (OUTPATIENT)
Dept: REHABILITATION | Facility: HOSPITAL | Age: 64
End: 2024-12-23
Payer: MEDICARE

## 2024-12-23 DIAGNOSIS — R27.8 COORDINATION IMPAIRMENT: Primary | ICD-10-CM

## 2024-12-23 DIAGNOSIS — M25.632 DECREASED RANGE OF MOTION OF LEFT WRIST: ICD-10-CM

## 2024-12-23 PROCEDURE — 97530 THERAPEUTIC ACTIVITIES: CPT | Mod: HCNC,PO

## 2024-12-23 PROCEDURE — 97112 NEUROMUSCULAR REEDUCATION: CPT | Mod: HCNC,PO

## 2024-12-23 NOTE — PLAN OF CARE
[facility]  Occupational Therapy Treatment Note/reassessment     Date: 12/23/2024  Name: Piotr Crespo  Clinic Number: [id number]    Therapy Diagnosis:   Encounter Diagnoses   Name Primary?    Coordination impairment Yes    Decreased range of motion of left wrist        Physician: Lory Pugh MD    Physician Orders: Eval and Treat  Medical Diagnosis: G81.94 (ICD-10-CM) - Left hemiparesis  Evaluation Date: 10/21/2024  Insurance Authorization Period Expiration: 10/7/2025  Plan of Care Certification Period: 12/29/2024 2/6/2025  Date of Return to MD: no neurology follow ups at this time , 2/10/25 pcp  FOTO: 3     Visit # / Visits authorized: 12 / 20     Precautions:  Standard     Time In: 1028  Time Out: 1115  Total Billable Time: 47 minutes    Subjective     Patient reports: I am tired today, I have been doing my stretches.  My thumb is always getting in the way.   Response to previous treatment: good -- feels like he is always learning something with how to help his body  Functional change: using left hand as much as possible; working his wrist at home , Using tricks to retrieve items with left hand to get hand to open.   Patient's Goals for Therapy: get his left side working better     Pain: 0/10   Location: n/a    Objective     Objective measures can be seen in most recent RA on 12/23/2024:   Physical Exam:  Postural examination/scapula alignment: Good posture  Joint integrity: Firm end feeling  Skin integrity: normal   Edema: none noted for bilateral upper extremities       Joint Evaluation  AROM  10/21/2024 PROM   10/21/2024 AROM  11/25/2024 PROM  11/25/2024 A/P range of motion 12/23/2024     Left  Left  Left  Left  Left   Shoulder flex 0-180 158 165 167 180 160/180   Shoulder Abd 0-180 168 173 165 180 170/180   Shoulder ER 0-90 Normal  90 90 90 90/90   Shoulder IR 0-90 Normal  90  90 90 35/52   Shoulder Extension 0-80 80 80 80 80 WNL   Shoulder Horizontal adduction 0-90 40 55 48 65  WNL   Elbow flex/ext 0-150 WFL WFL WFL WFL WFL   Wrist flex 0-80 60 80 70 80 50/80   Wrist ext 0-70 35 65 55 70 45/60   Right upper extremity: AROM normal/WNL     Fist: normal to fist, opens slowly with index lag,   Left upper extremity supination: 70A/90P     Strength 10/21/2024 10/21/2024 12/23/2024   **within available ROM** Right  Left  Left    Shoulder flex 5/5 5/5  5/5   Shoulder abd 5/5 5/5  5/5   Shoulder ER 5/5 5/5  5/5   Shoulder IR 5/5 5/5  5/5   Shoulder Extension 5/5 4+/5 5/5   Shoulder Horizontal adduction 5/5 4+/5 5/5   Elbow flex 5/5 5/5  5/5   Elbow ext 5/5 5/5  5/5   Wrist flex 5/5 4/5 4+/5   Wrist ext 5/5 4/5 4+/5       Strength: (DESIREE Dynamometer in lbs.) Average 3 trials, Position II:       10/21/2024 10/21/2024 11/25/2024 12/24/24     Right  Left  Left  LEFT   Rung II 88.2# 63.3# 59.8 65.4      Pinch Strength (Measured in psi)       10/21/2024 10/21/2024 11/25/2024 12/23/2024     Right  Left  Left  Left   Key Pinch 20.1 psi 16.4 psi 16.2 16.6   3pt Pinch 15.6 psi 8.2 psi 9.6 8.1   2pt Pinch 9.3 psi 8.3 psi 6.0 6.5      Fine Motor Coordination: 9 Hole Peg Test  Right  10/21/2024 Left    10/21/2024 Left   11/25/2024 Left 12/23/2024   21.06 s Completion of 2: 49.73 s 1:59.81 min     Completion of 2 in 12.84 s  1min 29 sec one drop   [norms for men aged 61-65: R=20.87s +/-3.50s; L=21.60s +/-2.98s (Jasmin et al, 2003)]     Fine Motor Coordination: Box and Block  Right  10/21/2024 Left    10/21/2024 Left   11/25/2024 Left 12/23/2024   60 26 29 24   [norms for men aged 60-64: R=71.3 +/-8.8; L=70.5 /-8.1 (Mohini et al, 1985)]     Gross motor coordination:   MEENAKSHI (Rapid Alternating Movements): impaired left due to limited motion and synergy  Finger to Nose (5 times): no dysmetria   Finger Flicks (coordination moving from digit flexion to digit extension): delayed open on left      Tone:  Modified James Scale: left upper extremity   1-  Slight increase in muscle tone, manifested by a catch and  release or by minimal resistance at the end of the range of motino when the affected part(s) is moved in flexion or extension in flexion synergy     Sensation:  Piotr  reports no complains/noted changes.      Balance:   Static Sit - GOOD+: Takes MAXIMAL challenges from all directions.    Dynamic sit- GOOD+: Takes MAXIMAL challenges from all directions.    Static Stand - GOOD: Takes MODERATE challenges from all directions  Dynamic stand - GOOD-: Takes MODERATE challenges from all directions but inconsistently     Endurance Deficit: mild         Treatment     Piotr participated in neuromuscular re-education activities to improve: tone management for 25 minutes. The following activities were included:  Reassessed for function    Piotr participated in dynamic functional therapeutic activities to improve functional performance for 20 minutes, including:  Seated in chair at table : left upper extremity lateral, across midline and forward with cues to reach from hand and not shoulder.   - tennis ball placement  - 1 inch block placement       Patient Education and Home Exercises     Education provided:   - continuation of prior HEP with table slides and wall slides, stretching   - OT role in care; scheduling   - Progress towards goals   - lifetime need to do HEP    Written Home Exercises Provided: Pt instructed to continue prior HEP.  Exercises were reviewed and Piotr was able to demonstrate them prior to the end of the session.  Piotr demonstrated good  understanding of the home exercise program provided. See electronic medical record under Patient Instructions for exercises provided during therapy sessions.    -wrist exercises for range of motion and strengthening   -left fine motor coordination HAP  -scapular retraction/shoulder squeezes - isometric      Assessment     Piotr tolerated session well. He has good functional use of left upper extremity but requires cues to lead with hand and rest/stretch with fatigue.  Cues also to fully open left hand for grasp and release. Function vacillates as does tone.  He demo ongoing carry over at home for all education and exercises at this time. Extend 1  month to assure Home exercise program.     Piotr is progressing well towards his goals and there are no updates to goals at this time. Pt prognosis is Good.     Patient will continue to benefit from skilled outpatient occupational therapy to address the deficits listed in the problem list on initial evaluation provide patient/ family education and to maximize patient's level of independence in the home and community environment.     Patient's spiritual, cultural and educational needs considered and patient agreeable to plan of care and goals.    Anticipated barriers to occupational therapy: none noted     The following goals were discussed with the patient and patient is in agreement with them as to be addressed in the treatment plan.      Goals: 8 weeks Long Term=Short Term  - Pt will demo active left wrist extension to 45+ degrees to aid in increased functional indep with ADLs/IADLs. MET at 55 degrees   - Pt will demo 2+ increase in left pinches (all 3 conditions) to aid in increased in hand manipulation tasks. Ongoing  - Pt will complete Box and Blocks Test with left upper extremity, transferring 30+ blocks, to demonstrate improved gross manual coordination needed for ADL and IADL tasks. Progressing; 29 on 11/25/2024  - Pt will improve FOTO score to 56+ for improved self perception of functional performance with daily activities. Met to 58  - Pt will be independent with Home Exercise Program (HEP)/Home Activity Program (HAP) to promote long term maintenance of progress made in therapy. Ongoing     Goals to be added and edited within plan of care as needed/appropriate.     Plan     Certification Period/Plan of care expiration: 10/21/2024 to 12/29/2024 (to allow for scheduling due to busy clinic). Extend 1 month to assure Home  exercise program until 2/6/2025.      Outpatient Occupational Therapy 1-2 times weekly for 4 more weeks to include the following interventions: Moist Heat/ Ice, Neuromuscular Re-ed, Paraffin, Patient Education, Self Care, Therapeutic Activities, and Therapeutic Exercise.    Next visit: shoulder stretch, grasp tasks with wrist extension, fine motor tasks    Yessica Reeves OT   12/23/2024

## 2024-12-26 ENCOUNTER — DOCUMENTATION ONLY (OUTPATIENT)
Dept: REHABILITATION | Facility: HOSPITAL | Age: 64
End: 2024-12-26
Payer: MEDICARE

## 2024-12-26 NOTE — PROGRESS NOTES
Outpatient Occupational Therapy No Show:  Piotr did not show for Occupational Therapy appointment this date. As this is the day after Christmas, I suspect the busy season caused this error.     Yessica Reeves, OT

## 2024-12-30 ENCOUNTER — CLINICAL SUPPORT (OUTPATIENT)
Dept: REHABILITATION | Facility: HOSPITAL | Age: 64
End: 2024-12-30
Payer: MEDICARE

## 2024-12-30 DIAGNOSIS — M25.632 DECREASED RANGE OF MOTION OF LEFT WRIST: ICD-10-CM

## 2024-12-30 DIAGNOSIS — R27.8 COORDINATION IMPAIRMENT: Primary | ICD-10-CM

## 2024-12-30 PROCEDURE — 97112 NEUROMUSCULAR REEDUCATION: CPT | Mod: HCNC,PO

## 2024-12-30 PROCEDURE — 97530 THERAPEUTIC ACTIVITIES: CPT | Mod: HCNC,PO

## 2024-12-30 NOTE — PROGRESS NOTES
[facility]  Occupational Therapy Treatment Note     Date: 12/30/2024  Name: Piotr Crespo  Clinic Number: [id number]    Therapy Diagnosis:   Encounter Diagnoses   Name Primary?    Coordination impairment Yes    Decreased range of motion of left wrist        Physician: Lory Pugh MD    Physician Orders: Eval and Treat  Medical Diagnosis: G81.94 (ICD-10-CM) - Left hemiparesis  Evaluation Date: 10/21/2024  Insurance Authorization Period Expiration: 10/7/2025  Plan of Care Certification Period: 12/29/2024 2/6/2025  Date of Return to MD: no neurology follow ups at this time , 2/10/25 pcp  FOTO: 3     Visit # / Visits authorized: 14 / 20     Precautions:  Standard     Time In: 845  Time Out: 930  Total Billable Time: 45 minutes    Subjective     Patient reports: I missed last appt because I did not realize I had one.  I did a lot of [employer] work over the weekend.  I have been doing my stretches.   Response to previous treatment: good -- feels like he is always learning something with how to help his body  Functional change: using left hand as much as possible; working his wrist at home , Using tricks to retrieve items with left hand to get hand to open.   Patient's Goals for Therapy: get his left side working better     Pain: 0/10   Location: n/a    Objective     Objective measures can be seen in most recent RA on 12/23/2024:    Treatment     Piotr participated in neuromuscular re-education activities to improve: tone management for 25 minutes. The following activities were included:  Ergometer level 1.5 height 8 for active range of motion.  Bilateral Weight bearing on large ball on wall with push ups  BWB on large ball on wall with body on arm weight shifts  Weight bearing left upper extremity on ball on wall with body on arm weight shifts.  Walk ball up/down wall using bilateral upper extremity with cues to keep left fingers spread.     Piotr participated in dynamic functional therapeutic  activities to improve functional performance for 20 minutes, including:  Seated in chair at table : left upper extremity lateral, across midline and forward with cues to reach from hand and not shoulder.   - Carried basket of tennis balls in right with reaching and placing with left upper extremity at various heights around room  - chest to overhead placement of basket with BUE with cues to step away to clear shelf vs. Leaning back x 10  -Floor to chest bilateral list of basket of balls x 10  - 1 inch block placement using tripod  with cues to flex fingers 4 and 5 on left and to relax right during task as tension of right increases tone on left       Patient Education and Home Exercises     Education provided:   - continuation of prior HEP with table slides and wall slides, stretching   - OT role in care; scheduling   - Progress towards goals   - lifetime need to do HEP    Written Home Exercises Provided: Pt instructed to continue prior HEP.  Exercises were reviewed and Piotr was able to demonstrate them prior to the end of the session.  Piotr demonstrated good  understanding of the home exercise program provided. See electronic medical record under Patient Instructions for exercises provided during therapy sessions.    -wrist exercises for range of motion and strengthening   -left fine motor coordination HAP  -scapular retraction/shoulder squeezes - isometric      Assessment     Piotr tolerated session well. He has good functional use of left upper extremity but requires cues to lead with hand and rest/stretch with fatigue. Cues also to fully open left hand for grasp and release. Function vacillates as does tone. Tension on right to help left increases tone.   He demo ongoing carry over at home for all education and exercises at this time. Extend 1  month to assure Home exercise program.     Piotr is progressing well towards his goals and there are no updates to goals at this time. Pt prognosis is Good.      Patient will continue to benefit from skilled outpatient occupational therapy to address the deficits listed in the problem list on initial evaluation provide patient/ family education and to maximize patient's level of independence in the home and community environment.     Patient's spiritual, cultural and educational needs considered and patient agreeable to plan of care and goals.    Anticipated barriers to occupational therapy: none noted     The following goals were discussed with the patient and patient is in agreement with them as to be addressed in the treatment plan.      Goals: 8 weeks Long Term=Short Term  - Pt will demo active left wrist extension to 45+ degrees to aid in increased functional indep with ADLs/IADLs. MET at 55 degrees   - Pt will demo 2+ increase in left pinches (all 3 conditions) to aid in increased in hand manipulation tasks. Ongoing  - Pt will complete Box and Blocks Test with left upper extremity, transferring 30+ blocks, to demonstrate improved gross manual coordination needed for ADL and IADL tasks. Progressing; 29 on 11/25/2024  - Pt will improve FOTO score to 56+ for improved self perception of functional performance with daily activities. Met to 58  - Pt will be independent with Home Exercise Program (HEP)/Home Activity Program (HAP) to promote long term maintenance of progress made in therapy. Ongoing     Goals to be added and edited within plan of care as needed/appropriate.     Plan     Certification Period/Plan of care expiration: 10/21/2024 to 12/29/2024 (to allow for scheduling due to busy clinic). Extend 1 month to assure Home exercise program until 2/6/2025.      Outpatient Occupational Therapy 1-2 times weekly for 4 more weeks to include the following interventions: Moist Heat/ Ice, Neuromuscular Re-ed, Paraffin, Patient Education, Self Care, Therapeutic Activities, and Therapeutic Exercise.    Next visit: shoulder stretch, grasp tasks with wrist extension, fine  motor tasks    Yessica Reeves, OT   12/30/2024

## 2025-01-01 DIAGNOSIS — I10 ESSENTIAL HYPERTENSION: ICD-10-CM

## 2025-01-01 NOTE — TELEPHONE ENCOUNTER
Care Due:                  Date            Visit Type   Department     Provider  --------------------------------------------------------------------------------                                EP -                              PRIMARY      [facility] PR[facility]  Last Visit: 10-      CARE ([facility])   CARE           Mwengregino Pugh                              EP -                              PRIMARY      [facility] PRIMARY  Next Visit: 02-      CARE ([facility])   CARE           Mwengwe  Ndhlovu                                                            Last  Test          Frequency    Reason                     Performed    Due Date  --------------------------------------------------------------------------------    Lipid Panel.  12 months..  atorvastatin, ezetimibe..  01- 12-    Health [facility] Embedded Care Due Messages. Reference number: [id number].   1/01/2025 4:20:19 AM CST

## 2025-01-02 RX ORDER — CARVEDILOL 6.25 MG/1
6.25 TABLET ORAL 2 TIMES DAILY WITH MEALS
Qty: 180 TABLET | Refills: 3 | Status: SHIPPED | OUTPATIENT
Start: 2025-01-02

## 2025-01-02 NOTE — PROGRESS NOTES
[facility]   Physical Therapy Treatment and Plan of Care Note     Name: Piotr Crespo  Clinic Number: [id number]    Therapy Diagnosis:   Encounter Diagnosis   Name Primary?    Impaired functional mobility, balance, gait, and endurance Yes         Physician: Lory Pugh MD    Visit Date: 1/3/2025    Physician Orders: PT Eval and Treat   Medical Diagnosis from Referral: G81.94 (ICD-10-CM) - Left hemiparesis   Evaluation Date: 11/15/2024  Authorization Period Expiration: 12/31/2014  Plan of Care Expiration: 1/31/2025  Progress Note Due: 1/31/2025  Date of Surgery: NA  Visit # / Visits authorized: 4/ 20  FOTO: 1/ 3     Precautions: Standard and Fall       PTA Visit #: 0/5     Time In: 1300  Time Out: 1345  Total Billable Time: 45 minutes    SUBJECTIVE     Pt reports: been doing well with no new complaints     He was compliant with his HEP  Response to previous treatment: good  Functional change:  ongoing     Pain: 0/10  Location:  NA     OBJECTIVE     Objective Measures updated at progress report unless specified.               Evaluation 1/3/2024   30 second Chair Rise  (adults > 59 y/o) 12 completed with no arms 18 completed with no arms    5 times sit-stand  (adults 18-65 y/o) 13 seconds with no arms   >12 sec= fall risk for general elderly  >16 sec= fall risk for Parkinson's disease  >10 sec= balance/vestibular dysfunction (<59 y/o)  >14.2 sec= balance/vestibular dysfunction (>59 y/o)  >12 sec= fall risk for CVA 7 seconds  with no arms             Evaluation 1/3/2025   Self Selected Walking Speed 1.0 m/sec (6m/6s) with no AD  NT   Fast Walking Speed 1.2 m/sec (6m/5s) NT   6 MWT  1276 feet with no AD 1294 feet          Functional Gait Assessment:   1. Gait on level surface =  2  2. Change in Gait Speed = 3  3. Gait with horizontal head turns  = 2  4. Gait with vertical head turns = 2  5. Gait with pivot turns = 2  6. Step over obstacle = 3  7. Gait with Narrow GLENDY = 1  8. Gait with eyes  closed = 3  9. Ambulating Backwards = 2  10. Steps = 3      Score 23/30   Score:   <22/30 fall risk   <20/30 fall risk in older adults   <18/30 fall risk in Parkinsons         Intake Outcome Measure for FOTO Stroke LE Survey     Therapist reviewed FOTO scores for Piotr Crespo on 11/15/2024.   FOTO report - see Media section or FOTO account episode details.     Intake Score: 61%           Treatment     Piotr received the treatments listed below:      therapeutic exercises to develop strength, endurance, ROM, flexibility, posture, and core stabilization for 15   minutes including:    SCIFIT Bilateral upper extremity and lower extremity reciprocation 10 minutes level 6       2 x 30 seconds standing hamstring stretch at step   2 x 45 seconds standing calf stretch at wedge     Leg press:   3 x 10 reps of B LE squats with #120lbs applied.   3 x 10 reps of LLE single leg squat with #80lbs applied.         neuromuscular re-education activities to improve: Balance, Coordination, Kinesthetic, Sense, Proprioception, and Posture for 10  minutes. The following activities were included:    At ballet bar:   4 laps tandem ambulation, SBA   4 laps marching with three second holds, CGA   2x10 reps of step up fwd/down retro to 4 point fitter, CGA   X 30 sec each of tandem stance with eyes closed      therapeutic activities to improve functional performance for 20  minutes, including:    Time above includes time for objective measures and discussion of objective scores    gait training to improve functional mobility and safety for 0  minutes, including:            Patient Education and Home Exercises     Home Exercises Provided and Patient Education Provided     Education provided:   - POC, purpose of PT, discharge planning     Written Home Exercises Provided: yes. Exercises were reviewed and Piotr was able to demonstrate them prior to the end of the session.  Piotr demonstrated good  understanding of the education provided. See EMR  under Patient Instructions for exercises provided during therapy sessions    ASSESSMENT     Patient reassessed for plan of care (POC) update. Reassessment period: 11/15/2024 to 1/3/2025. Piotr has made fair progress with therapy. Slight improvements noted with Piotr's BLE strength, endurance and balance based on the patient's objective test scores. Only fair progress is to be expected due to fair prognosis with therapy as a result of the chronicity of the stroke and high activity levels of the patient. Significant improvement noted with Piotr's 30 second chair rise score, indicating improved BLE strength. Very minimal improvement noted with his 6 MWT, and  he reports high RPE following this test indicating need for emphasis on endurance training for remainder of POC.  Piotr would benefit from additional month of PT intervention to further improve his dynamic balance, strength and endurance.         Piotr Is progressing well towards his goals.   Pt prognosis is Fair.     Pt will continue to benefit from skilled outpatient physical therapy to address the deficits listed in the problem list box on initial evaluation, provide pt/family education and to maximize pt's level of independence in the home and community environment.     Pt's spiritual, cultural and educational needs considered and pt agreeable to plan of care and goals.     Anticipated barriers to physical therapy: co-morbidities, chronicity of stroke     Goals:   Short Term Goals: 4 weeks   1. Patient to be (I) with established home exercise program. MET 1/3/2025   2. Patient to improve 5 time sit to stand time to 11 seconds for improved functional mobility and strength. MET 1/3/2025   3. Patient to improve 6 MWT distance to 1,315 feet for improved CV endurance with community mobility . Ongoing   4. Patient to improve BLE SLS time to 7 seconds for decreased risk for falls Ongoing      Long Term Goals: 8 weeks   1. Patient to be (I) with advanced home  exercise program.Ongoing   2. Patient to improve left  hip flexion by 1/3 MMT to improve balance and functional mobility. Ongoing   3. Patient to improve left  hip extension strength by 1/3 MMT to improve balance and functional mobility. Ongoing   4. Patient to improve 5 time sit to stand time to 9 seconds for improved functional mobility and strength. Ongoing   5. Patient to improve 6 MWT distance to 1,380 feet for improved CV endurance with community mobility . Ongoing   6. Patient to improve BLE SLS time to 10 seconds for decreased risk for falls Ongoing   7. Patient to improve SSWS to 1.2  m/sec for improved safety with ambulation. Ongoing   8. Patient to improve FGA score to 26/30 for decreased risk for falls with community mobility Ongoing     PLAN     Continue to work on high level static/ dynamic balance and LLE strengthening as tolerated     PT to extend plan of care (POC) x 4 weeks.     Continue outpatient physical therapy 1x weekly under current established Plan of Care, 1/3/2025 to 1/31/2025, with treatment to include: pt education, HEP, therapeutic exercises, neuromuscular re-education/balance exercises, therapeutic activities, joint mobilizations, and modalities PRN, to work towards established goals. Pt may be seen by PTA to carry out plan of care.      Floridalma Bedolla, PT

## 2025-01-02 NOTE — TELEPHONE ENCOUNTER
Piotr Crespo  is requesting a refill authorization.  Brief Assessment and Rationale for Refill:  Approve     Medication Therapy Plan:         Pharmacist review requested: Yes   Extended chart review required: Yes   Comments:     Note composed:1:03 PM 01/02/2025

## 2025-01-03 ENCOUNTER — CLINICAL SUPPORT (OUTPATIENT)
Dept: REHABILITATION | Facility: HOSPITAL | Age: 65
End: 2025-01-03
Payer: MEDICARE

## 2025-01-03 DIAGNOSIS — Z74.09 IMPAIRED FUNCTIONAL MOBILITY, BALANCE, GAIT, AND ENDURANCE: Primary | ICD-10-CM

## 2025-01-03 PROCEDURE — 97530 THERAPEUTIC ACTIVITIES: CPT | Mod: HCNC,PO

## 2025-01-03 PROCEDURE — 97110 THERAPEUTIC EXERCISES: CPT | Mod: HCNC,PO

## 2025-01-03 PROCEDURE — 97112 NEUROMUSCULAR REEDUCATION: CPT | Mod: HCNC,PO

## 2025-01-09 NOTE — PROGRESS NOTES
"  [facility]   Physical Therapy Treatment Note     Name: Piotr Crespo  Clinic Number: [id number]    Therapy Diagnosis:   Encounter Diagnosis   Name Primary?    Impaired functional mobility, balance, gait, and endurance Yes           Physician: Lory Pugh MD    Visit Date: 1/10/2025    Physician Orders: PT Eval and Treat   Medical Diagnosis from Referral: G81.94 (ICD-10-CM) - Left hemiparesis   Evaluation Date: 11/15/2024  Authorization Period Expiration: 1/31/2025  Plan of Care Expiration: 1/31/2025  Progress Note Due: 1/31/2025  Date of Surgery: NA  Visit # / Visits authorized: 6/10  FOTO: 2/ 3         Precautions: Standard and Fall       PTA Visit #: 0/5     Time In: 1255   Time Out: 1340   Total Billable Time: 45  minutes    SUBJECTIVE     Pt reports: doing well with no new complaints. " I just need to get this leg a little better."  He was compliant with his HEP  Response to previous treatment: good  Functional change:  ongoing     Pain: 0/10  Location:  NA     OBJECTIVE     Objective Measures updated on 1/3/25.         Treatment     Piotr received the treatments listed below:      therapeutic exercises to develop strength, endurance, ROM, flexibility, posture, and core stabilization for 12  minutes including:       Upright stationary bike x 7 minutes, level 5~ pt attempts to keep RPM at 60       Prone:  2 x 10 L LE hamstring curls with 4 # ankle weight added~ emphasis placed on eccentric lowering        neuromuscular re-education activities to improve: Balance, Coordination, Kinesthetic, Sense, Proprioception, and Posture for 33  minutes. The following activities were included:    4 quadruped crawls across mat with [place] wrist extension orthosis donned to L UE~ CGA~ pt rises and lowers with L LE    X 2 minutes seated orange physioball bounce with PT    1 x 10 sit<> stand trials from high/low mat while holding 5 # horseshoe weight, feet on foam fitter, CGA  1 x 10 sit<> " "stand trials from high/low mat while holding 5 # horseshoe weight, feet on foam fitter, placement of R foot onto 12 " block, back to fitter and repeat, CGA  1 x 10 sit<> stand trials from high/low mat while holding 5 # horseshoe weight, feet on foam fitter, placement of R foot onto 14 " block, back to fitter and repeat, CGA to slight min A for R knee guarding  (After each set, pt releases  on weight and fully opens hand)    1 x 10 trials of L LE SLS and placement of R foot onto 16" block, feet on foam fitter, CGA to slight min A for R knee guarding      Pilates Box:  1 x 10 B LE hip and knee flexion/extension, working foot pad up and down, 2 white springs and 1 black spring set on level 1, no UE support, CGA      Scooter activity in hallway:  3 x 100 feet propelling scooter with R LE, min A  3 x 100 feet propelling scooter with L LE, min A      therapeutic activities to improve functional performance for 0  minutes, including:      gait training to improve functional mobility and safety for 0  minutes, including:            Patient Education and Home Exercises     Home Exercises Provided and Patient Education Provided     Education provided:   - POC, purpose of PT, discharge planning     Written Home Exercises Provided: yes. Exercises were reviewed and Piotr was able to demonstrate them prior to the end of the session.  Piotr demonstrated good  understanding of the education provided. See EMR under Patient Instructions for exercises provided during therapy sessions    ASSESSMENT     Piotr tolerated today's session well and demonstrated great motivation to try some new activities and exercises. PT had pt work on upright bicycle and perform prone hamstring curls to target L knee flexors. Pt did well with the latter exercise, with cues to control his limb during eccentric lowering. The majority of today's session was spent working on various neuromuscular reeducation activities. PT attempted to target L UE, " along with L LE, during most tasks. Pt was also educated in the importance of opening his L hand after each activity which required him to close it or  an object. PT was pleased with pt's performance during all tasks, but especially during scooter propulsion. This was a great activity for him to work on balance, sequencing his LE movement and using his L hand to steer. Piotr remains appropriate for additional PT intervention to further improve his dynamic balance, strength and endurance.         Piotr Is progressing well towards his goals.   Pt prognosis is Fair.     Pt will continue to benefit from skilled outpatient physical therapy to address the deficits listed in the problem list box on initial evaluation, provide pt/family education and to maximize pt's level of independence in the home and community environment.     Pt's spiritual, cultural and educational needs considered and pt agreeable to plan of care and goals.     Anticipated barriers to physical therapy: co-morbidities, chronicity of stroke     Goals:   Short Term Goals: 4 weeks   1. Patient to be (I) with established home exercise program. MET 1/3/2025   2. Patient to improve 5 time sit to stand time to 11 seconds for improved functional mobility and strength. MET 1/3/2025   3. Patient to improve 6 MWT distance to 1,315 feet for improved CV endurance with community mobility . Ongoing   4. Patient to improve BLE SLS time to 7 seconds for decreased risk for falls Ongoing      Long Term Goals: 8 weeks   1. Patient to be (I) with advanced home exercise program.Ongoing   2. Patient to improve left  hip flexion by 1/3 MMT to improve balance and functional mobility. Ongoing   3. Patient to improve left  hip extension strength by 1/3 MMT to improve balance and functional mobility. Ongoing   4. Patient to improve 5 time sit to stand time to 9 seconds for improved functional mobility and strength. Ongoing   5. Patient to improve 6 MWT distance to 1,380  feet for improved CV endurance with community mobility . Ongoing   6. Patient to improve BLE SLS time to 10 seconds for decreased risk for falls Ongoing   7. Patient to improve SSWS to 1.2  m/sec for improved safety with ambulation. Ongoing   8. Patient to improve FGA score to 26/30 for decreased risk for falls with community mobility Ongoing     PLAN     Continue to work on high level static/ dynamic balance and L LE strengthening as tolerated       Walter Zambrano, PT    1/10/2025

## 2025-01-10 ENCOUNTER — CLINICAL SUPPORT (OUTPATIENT)
Dept: REHABILITATION | Facility: HOSPITAL | Age: 65
End: 2025-01-10
Payer: MEDICARE

## 2025-01-10 DIAGNOSIS — Z74.09 IMPAIRED FUNCTIONAL MOBILITY, BALANCE, GAIT, AND ENDURANCE: Primary | ICD-10-CM

## 2025-01-10 PROCEDURE — 97110 THERAPEUTIC EXERCISES: CPT | Mod: HCNC,PO

## 2025-01-10 PROCEDURE — 97112 NEUROMUSCULAR REEDUCATION: CPT | Mod: HCNC,PO

## 2025-01-13 ENCOUNTER — OFFICE VISIT (OUTPATIENT)
Dept: PODIATRY | Facility: CLINIC | Age: 65
End: 2025-01-13
Payer: MEDICARE

## 2025-01-13 VITALS — HEIGHT: 69 IN | WEIGHT: 182.56 LBS | BODY MASS INDEX: 27.04 KG/M2

## 2025-01-13 DIAGNOSIS — R73.03 PREDIABETES: Primary | ICD-10-CM

## 2025-01-13 DIAGNOSIS — S91.332A PENETRATING FOOT WOUND, LEFT, INITIAL ENCOUNTER: ICD-10-CM

## 2025-01-13 DIAGNOSIS — S90.852A FOREIGN BODY IN LEFT FOOT, INITIAL ENCOUNTER: ICD-10-CM

## 2025-01-13 DIAGNOSIS — M79.672 LEFT FOOT PAIN: ICD-10-CM

## 2025-01-13 DIAGNOSIS — Z86.73 HISTORY OF STROKE: ICD-10-CM

## 2025-01-13 PROCEDURE — 3008F BODY MASS INDEX DOCD: CPT | Mod: HCNC,CPTII,S$GLB, | Performed by: PODIATRIST

## 2025-01-13 PROCEDURE — 99214 OFFICE O/P EST MOD 30 MIN: CPT | Mod: 25,HCNC,S$GLB, | Performed by: PODIATRIST

## 2025-01-13 PROCEDURE — 1125F AMNT PAIN NOTED PAIN PRSNT: CPT | Mod: HCNC,CPTII,S$GLB, | Performed by: PODIATRIST

## 2025-01-13 PROCEDURE — 3288F FALL RISK ASSESSMENT DOCD: CPT | Mod: HCNC,CPTII,S$GLB, | Performed by: PODIATRIST

## 2025-01-13 PROCEDURE — 1101F PT FALLS ASSESS-DOCD LE1/YR: CPT | Mod: HCNC,CPTII,S$GLB, | Performed by: PODIATRIST

## 2025-01-13 PROCEDURE — 1160F RVW MEDS BY RX/DR IN RCRD: CPT | Mod: HCNC,CPTII,S$GLB, | Performed by: PODIATRIST

## 2025-01-13 PROCEDURE — 28190 REMOVAL OF FOOT FOREIGN BODY: CPT | Mod: HCNC,LT,S$GLB, | Performed by: PODIATRIST

## 2025-01-13 PROCEDURE — 99999 PR PBB SHADOW E&M-EST. PATIENT-LVL II: CPT | Mod: PBBFAC,HCNC,, | Performed by: PODIATRIST

## 2025-01-13 PROCEDURE — 1159F MED LIST DOCD IN RCRD: CPT | Mod: HCNC,CPTII,S$GLB, | Performed by: PODIATRIST

## 2025-01-13 NOTE — PROCEDURES
"Wound Debridement    Date/Time: 1/13/2025 10:00 AM    Performed by: Anjelica Briggs DPM  Authorized by: Anjelica Briggs DPM    Time out: Immediately prior to procedure a "time out" was called to verify the correct patient, procedure, equipment, support staff and site/side marked as required.    Consent Done?:  Yes (Written)    Preparation: Patient was prepped and draped in usual sterile fashion    Local anesthesia used?: Yes    Anesthesia:  Local infiltration  Local anesthetic:  Lidocaine 1% without epinephrine  Anesthetic total (ml):  1    Wound Details:    Location:  Left foot    Location:  Left Plantar    Type of Debridement:  Excisional       Length (cm):  0.2       Width (cm):  0.3       Depth (cm):  0.2       Area (sq cm):  0.06       Percent Debrided (%):  100       Total Area Debrided (sq cm):  0.06    Depth of debridement:  Subcutaneous tissue    Instruments:  Blade, Curette and Forceps  Bleeding:  Minimal  Hemostasis Achieved: Yes  Method Used:  Silver Nitrate     Foreign Body Removal Procedure Note    Indications: Deep foreign body, left foot      Pre-operative Diagnosis: Foreign Body left foot     Post-operative Diagnosis: Foreign Body, left foot     Surgeon: avery          Anesthesia: local     Procedure Details   The patient was seen at bedside. The risks, benefits, complications, treatment options, and expected outcomes were discussed with the patient. The risks and potential complications of their problem and purposed treatment include but are not limited to infection, nerve injury, vascular injury, persistent pain, potential skin necrosis, deep vein thrombosis, possible pulmonary embolus,and complications of the anesthetics. The patient concurred with the proposed plan, giving informed consent.     The foot was prepped with   draped in the usual aseptic fashion. Attention was directed to the plantar aspect of the foot where approximately a 2 mm cicatrix was palpated and visualized. This was the " origin and entry point of the puncture wound from the foreign body. This cicatrix was found in the distal aspect of the plantar fatpad sub 2nd MTPJ. Next, a #15 blade was used to make an approximately a 3mm stab incision over the area where the foreign body was located. Due to the small nature of the foreign body and the large amount of fat on the plantar aspect of the foot. Once the wound was opened, a pickup was used to locate the glass and the foreign body was removed   It measured ~ 2 mm in length and was approximately . Next, copious amounts of sterile normal saline was instilled in the wound for irrigation. Silver nitrate controlled bleeding. Abx ointment and mepilex     postoperative instructions

## 2025-01-13 NOTE — PROGRESS NOTES
Subjective:      Patient ID: Piotr Crespo is a 64 y.o. male.    Chief Complaint:   Diabetic Foot Exam and Foot Pain (B/L, feels like pins and needles)    Piotr is a 64 y.o. male who presents to the clinic for evaluation and treatment of high risk feet. Piotr has a past medical history of Erectile dysfunction due to arterial insufficiency (7/17/2020), Essential hypertension (3/11/2020), High cholesterol, History of left common carotid artery stent placement (4/17/2020), History of stroke (3/11/2020), Hypertension, Mixed hyperlipidemia (3/11/2020), Prediabetes (7/17/2020), and Stroke.     Rtc foot exam   Patient relates that the spot on the bottom of the left foot still hurts  He discusses that nothing was found on imaging  Denies stepping on something however [place]foot [place] the [place]  Pt continuing with plan of custom molded orthotics instead of surgery          PCP: Lory Pugh MD    Date Last Seen by PCP: 10/7/24         Current shoe gear:  Affected Foot: Tennis shoes     Unaffected Foot: Tennis shoes        Hemoglobin A1C   Date Value Ref Range Status   10/07/2024 5.8 (H) 4.0 - 5.6 % Final     Comment:     ADA Screening Guidelines:  5.7-6.4%  Consistent with prediabetes  >or=6.5%  Consistent with diabetes    High levels of fetal hemoglobin interfere with the HbA1C  assay. Heterozygous hemoglobin variants (HbS, HgC, etc)do  not significantly interfere with this assay.   However, presence of multiple variants may affect accuracy.     08/04/2023 5.6 4.0 - 5.6 % Final     Comment:     ADA Screening Guidelines:  5.7-6.4%  Consistent with prediabetes  >or=6.5%  Consistent with diabetes    High levels of fetal hemoglobin interfere with the HbA1C  assay. Heterozygous hemoglobin variants (HbS, HgC, etc)do  not significantly interfere with this assay.   However, presence of multiple variants may affect accuracy.     05/10/2022 5.9 (H) 4.0 - 5.6 % Final     Comment:     ADA Screening Guidelines:  5.7-6.4%   "Consistent with prediabetes  >or=6.5%  Consistent with diabetes    High levels of fetal hemoglobin interfere with the HbA1C  assay. Heterozygous hemoglobin variants (HbS, HgC, etc)do  not significantly interfere with this assay.   However, presence of multiple variants may affect accuracy.          Past Medical History:   Diagnosis Date    Erectile dysfunction due to arterial insufficiency 7/17/2020    Essential hypertension 3/11/2020    High cholesterol     History of left common carotid artery stent placement 4/17/2020    History of stroke 3/11/2020    Hypertension     Mixed hyperlipidemia 3/11/2020    Prediabetes 7/17/2020    Stroke     sept 3, 2017     Past Surgical History:   Procedure Laterality Date    ANGIOGRAM, CORONARY, WITH LEFT HEART CATHETERIZATION Left 6/24/2023    Procedure: Angiogram, Coronary, with Left Heart Cath;  Surgeon: George Sherwood MD;  Location: [facility] CATH/EP LAB;  Service: Cardiology;  Laterality: Left;    CAROTID ARTERY ANGIOPLASTY Left     left Internal carotid artery stent    CEREBRAL ANEURYSM REPAIR      COLONOSCOPY N/A 06/30/2020    Procedure: COLONOSCOPY;  Surgeon: Yuan Pizano MD;  Location: [facility] ENDO ([address] [place] [address]);  Service: Endoscopy;  Laterality: N/A;  3/30/20- Per Dr. Catherine, Pt to be r/s for later date, "screening"- ER  3/30/20 -  for Pt to call back to r/s for later date per Dr. Catherine- [facility]  COVID screening on 6/27/20 - [facility] urgent care- [facility]    LUMBAR DISCECTOMY       Current Outpatient Medications on File Prior to Visit   Medication Sig Dispense Refill    amLODIPine (NORVASC) 5 MG tablet TAKE 1 TABLET ONE TIME DAILY 90 tablet 3    artificial tears,hypromellose,,GENTEAL/SUSTANE, 0.3 % Gel Apply to eye.      atorvastatin (LIPITOR) 80 MG tablet Take 1 tablet (80 mg total) by mouth every evening. 90 tablet 3    carvediloL (COREG) 6.25 MG tablet TAKE 1 TABLET TWICE DAILY WITH MEALS 180 tablet 3    cholecalciferol, vitamin D3, (VITAMIN D3) 50 mcg (2,000 unit) Tab Take 50 " mcg by mouth.      cholecalciferol, vitamin D3, 100 mcg (4,000 unit) Tab TAKE ONE TABLET BY MOUTH EVERY DAY AS A VITAMIN SUPPLEMENT      clopidogreL (PLAVIX) 75 mg tablet TAKE 1 TABLET ONE TIME DAILY 90 tablet 3    clotrimazole (LOTRIMIN) 1 % Soln APPLY SMALL AMOUNT TOPICALLY TWICE A DAY FOR FUNGAL INFECTION      cromolyn (OPTICROM) 4 % ophthalmic solution INSTILL 2 DROPS IN EACH EYE TWICE A DAY FOR ALLERGIC CONJUNCTIVITIS      erythromycin (ROMYCIN) ophthalmic ointment       ezetimibe (ZETIA) 10 mg tablet Take 1 tablet (10 mg total) by mouth once daily. 90 tablet 3    LIDOcaine-prilocaine (EMLA) cream Apply topically as needed. 30 g 3    nicotine (NICODERM CQ) 21 mg/24 hr Apply topically.      nicotine polacrilex 4 MG Lozg DISSOLVE 1 LOZENGE IN MOUTH EVERY HOUR AS NEEDED TO STOP SMOKING      tamsulosin (FLOMAX) 0.4 mg Cap Take 1 capsule (0.4 mg total) by mouth once daily. 90 capsule 3    urea 20 % Crea APPLY LIBERAL AMOUNT TOPICALLY TWICE A DAY AS NEEDED FOR CALLOUSED AREA.      [DISCONTINUED] nicotine (NICODERM CQ) 21 mg/24 hr       [DISCONTINUED] nicotine polacrilex (NICORETTE) 4 MG Gum       [DISCONTINUED] nicotine polacrilex 4 MG Lozg Take by mouth.      [DISCONTINUED] RSVPreF3 antigen-AS01E, PF, (AREXVY, PF,) 120 mcg/0.5 mL SusR vaccine Inject into the muscle. 0.5 mL 0     No current facility-administered medications on file prior to visit.     Review of patient's allergies indicates:  No Known Allergies    Review of Systems   Constitutional: Negative for chills, decreased appetite, fever, malaise/fatigue, night sweats, weight gain and weight loss.   Cardiovascular:  Negative for chest pain, claudication, dyspnea on exertion, leg swelling, palpitations and syncope.   Respiratory:  Negative for cough and shortness of breath.    Endocrine: Negative for cold intolerance and heat intolerance.   Hematologic/Lymphatic: Negative for bleeding problem. Bruises/bleeds easily.   Skin:  Positive for dry skin and nail  "changes. Negative for color change, flushing, itching, poor wound healing, rash, skin cancer, suspicious lesions and unusual hair distribution.   Musculoskeletal:  Positive for muscle weakness and stiffness. Negative for arthritis, back pain, falls, gout, joint pain, joint swelling, muscle cramps, myalgias and neck pain.   Gastrointestinal:  Negative for diarrhea, nausea and vomiting.   Neurological:  Positive for numbness and paresthesias. Negative for dizziness, focal weakness, light-headedness, tremors, vertigo and weakness.   Psychiatric/Behavioral:  Negative for altered mental status and depression. The patient does not have insomnia.    Allergic/Immunologic: Negative.          Objective:       Vitals:    01/13/25 0959   Weight: 82.8 kg (182 lb 8.7 oz)   Height: 5' 9" (1.753 m)   PainSc:   4   PainLoc: Foot   82.8 kg (182 lb 8.7 oz)     Physical Exam  Vitals reviewed.   Constitutional:       General: He is not in acute distress.     Appearance: He is well-developed. He is not ill-appearing, toxic-appearing or diaphoretic.      Comments:     Cardiovascular:      Pulses:           Dorsalis pedis pulses are 2+ on the right side and 2+ on the left side.        Posterior tibial pulses are 1+ on the right side and 1+ on the left side.   Musculoskeletal:         General: No swelling.      Right lower leg: No edema.      Left lower leg: No edema.      Right ankle: Normal.      Right Achilles Tendon: Normal.      Left ankle: Normal.      Left Achilles Tendon: Normal.      Right foot: Decreased range of motion. Deformity and bunion present. No tenderness or bony tenderness.      Left foot: Decreased range of motion. Deformity, bunion, foot drop and tenderness present. No bony tenderness.      Comments:    + pop left sub MTPJ 1st interspace/HPK foreign body    Left foot decreased eversion plantar flexion and dorsiflexion/strength decreased   Feet:      Right foot:      Protective Sensation: 10 sites tested.  10 sites " sensed.      Skin integrity: Ulcer, callus and dry skin present. No blister, skin breakdown, erythema or warmth.      Toenail Condition: Right toenails are abnormally thick. Fungal disease present.     Left foot:      Protective Sensation: 10 sites tested.  10 sites sensed.      Skin integrity: Callus and dry skin present. No ulcer, blister, skin breakdown, erythema or warmth.      Toenail Condition: Left toenails are abnormally thick. Fungal disease present.     Comments: Mild hypersensitivity left foot  Some decreased sensation digits    Focal hyperkeratotic lesion with painful central core consisting entirely of hyperkeratotic tissue without open skin, drainage, pus, fluctuance, malodor, or signs of infection: left sub1st  = wound foreign body consistent with glass removed upon debridement noted see procedure note  No signs of verrucae    Focal hyperkeratotic lesion with painful central core consisting entirely of hyperkeratotic tissue without open skin, drainage, pus, fluctuance, malodor, or signs of infection: right 1st 5th MT head                Skin:     General: Skin is warm and dry.      Capillary Refill: Capillary refill takes 2 to 3 seconds.      Coloration: Skin is not pale.      Findings: No erythema or rash.      Nails: There is no clubbing.   Neurological:      Mental Status: He is alert and oriented to person, place, and time.      Gait: Gait abnormal.   Psychiatric:         Attention and Perception: Attention normal.         Mood and Affect: Mood normal.         Speech: Speech normal.         Behavior: Behavior normal.         Thought Content: Thought content normal.         Cognition and Memory: Cognition normal.         Judgment: Judgment normal.               Assessment:       Encounter Diagnoses   Name Primary?    Prediabetes Yes    History of stroke     Left foot pain     Foreign body in left foot, initial encounter     Penetrating foot wound, left, initial encounter              Plan:        Piotr was seen today for diabetic foot exam and foot pain.    Diagnoses and all orders for this visit:    Prediabetes    History of stroke    Left foot pain    Foreign body in left foot, initial encounter  -     Wound Debridement    Penetrating foot wound, left, initial encounter  -     Wound Debridement    Other orders  -     Cancel: Incision and Drainage          I counseled the patient on his conditions, their implications and medical management.    - see procedure note  foot evaluation    - rediscussed options and debridement upon debridement of left foot procedure  glass was noted and removed despite previous imaging negative    Discussed with patient to avoid barefoot\    Postprocedure instructions discussed    Follow-up in a few weeks sooner if needed        Follow up in about 2 weeks (around 1/27/2025).

## 2025-01-16 NOTE — PROGRESS NOTES
[facility]  Occupational Therapy Treatment Note     Date: 1/17/2025  Name: Piotr Crespo  Clinic Number: [id number]    Therapy Diagnosis:   Encounter Diagnoses   Name Primary?    Coordination impairment Yes    Decreased range of motion of left wrist      Physician: Lory Pugh MD    Physician Orders: Eval and Treat  Medical Diagnosis: G81.94 (ICD-10-CM) - Left hemiparesis  Evaluation Date: 10/21/2024  Insurance Authorization Period Expiration: 10/7/2025  Plan of Care Certification Period: 12/29/2024 2/6/2025  Date of Return to MD: no neurology follow ups at this time , 2/10/25 pcp  FOTO: 3     Visit # / Visits authorized: 1 / 20 (+14 in 2024)     Precautions:  Standard     Time In: 9:30  Time Out: 10:28  Total Billable Time: 58 minutes    Subjective     Patient reports:my wife sees a hand specialist and they use a hot wax, would that help me with my wrist?  Response to previous treatment: good   Functional change: using left hand as much as possible; working his wrist at home , Using tricks to retrieve items with left hand to get hand to open.   Patient's Goals for Therapy: get his left side working better     Pain: 0/10   Location: n/a    Objective     Objective measures can be seen in most recent RA on 12/23/2024:    Treatment     Piotr received therapeutic exercises for 49 minutes including:  -upper body ergonometer on level 1.5; completed for 10 minutes switching directions mid way. Focus on active assistive stretch     Standing:   -left upper extremity for incline ball walks (small yellow block) - shoulder flex and abduction     Standing:   -bilateral grasp on ball for shoulder extension   -ball pass for shoulder ER/IR x10 reps each   -ball to target (basketball goal rim) x10 reps each for shoulder flex and abduction     Following modality:   -green flex bar for wrist flex/ext, radial and ulnar deviation   -active digit extension on table top     Supervised Modalities: x10 min for  left hand and wrist, pt tapped for increased prolonged stretch for wrist extension   Paraffin wax bath is a heat modality (mixture of paraffin wax and mineral oil) that delivers heat through superficial conduction to relieve pain and inflammation, increase superficial blood flow, and may increase extensibility of collagen tissue.      Piotr participated in neuromuscular re-education activities to improve: Coordination and Posture for 9 minutes. The following activities were included:  While in parafin   -AAROM for left scapular mobilization for elevation, depression, retraction   -AAROM with end range stretch for shoulder ext, flex and abduction        Patient Education and Home Exercises     Education provided:   - continuation of prior HEP with table slides and wall slides, stretching   - OT role in care; scheduling   - Progress towards goals   - lifetime need to do HEP    Written Home Exercises Provided: Pt instructed to continue prior HEP.  Exercises were reviewed and Piotr was able to demonstrate them prior to the end of the session.  Piotr demonstrated good  understanding of the home exercise program provided. See electronic medical record under Patient Instructions for exercises provided during therapy sessions.    -wrist exercises for range of motion and strengthening   -left fine motor coordination HAP  -scapular retraction/shoulder squeezes - isometric      Assessment     Edu patient on heat modality being a prep for exercise and active stretch and not as a long term solution to his tightness for his hemiparetic wrist. He demo and verbalizes good carry over at home for all education and exercises within plan of care at this time.     Piotr is progressing well towards his goals and there are no updates to goals at this time. Pt prognosis is Good.     Patient will continue to benefit from skilled outpatient occupational therapy to address the deficits listed in the problem list on initial evaluation  provide patient/ family education and to maximize patient's level of independence in the home and community environment.     Patient's spiritual, cultural and educational needs considered and patient agreeable to plan of care and goals.    Anticipated barriers to occupational therapy: none noted     The following goals were discussed with the patient and patient is in agreement with them as to be addressed in the treatment plan.      Goals: 8 weeks Long Term=Short Term  - Pt will demo active left wrist extension to 45+ degrees to aid in increased functional indep with ADLs/IADLs. MET at 55 degrees   - Pt will demo 2+ increase in left pinches (all 3 conditions) to aid in increased in hand manipulation tasks. Ongoing  - Pt will complete Box and Blocks Test with left upper extremity, transferring 30+ blocks, to demonstrate improved gross manual coordination needed for ADL and IADL tasks. Progressing; 29 on 11/25/2024  - Pt will improve FOTO score to 56+ for improved self perception of functional performance with daily activities. Met to 58  - Pt will be independent with Home Exercise Program (HEP)/Home Activity Program (HAP) to promote long term maintenance of progress made in therapy. Ongoing     Goals to be added and edited within plan of care as needed/appropriate.     Plan     Certification Period/Plan of care expiration: 10/21/2024 to 12/29/2024 (to allow for scheduling due to busy clinic). Extend 1 month to assure Home exercise program until 2/6/2025.      Outpatient Occupational Therapy 1-2 times weekly for 4 more weeks to include the following interventions: Moist Heat/ Ice, Neuromuscular Re-ed, Paraffin, Patient Education, Self Care, Therapeutic Activities, and Therapeutic Exercise.    Next visit: shoulder stretch, grasp tasks with wrist extension, fine motor tasks    ABBE Barrett   1/17/2025

## 2025-01-16 NOTE — PROGRESS NOTES
[facility]   Physical Therapy Treatment Note     Name: Mariano Crespo  Clinic Number: [id number]    Therapy Diagnosis:   Encounter Diagnosis   Name Primary?    Impaired functional mobility, balance, gait, and endurance Yes       Physician: Lory Pugh MD    Visit Date: 1/17/2025    Physician Orders: PT Eval and Treat   Medical Diagnosis from Referral: G81.94 (ICD-10-CM) - Left hemiparesis   Evaluation Date: 11/15/2024  Authorization Period Expiration: 1/31/2025  Plan of Care Expiration: 1/31/2025  Progress Note Due: 1/31/2025  Date of Surgery: NA  Visit # / Visits authorized: 7/10  FOTO: 2/ 3    Precautions: Standard and Fall       PTA Visit #: 0/5     Time In: 1300  Time Out: 1345   Total Billable Time: 45 minutes    SUBJECTIVE     Pt reports:feeling good with no new complaints     He was compliant with his HEP  Response to previous treatment: good  Functional change:  ongoing     Pain: 0/10  Location:  NA     OBJECTIVE     Objective Measures updated on 1/3/25.         Treatment     Mariano received the treatments listed below:      therapeutic exercises to develop strength, endurance, ROM, flexibility, posture, and core stabilization for 20 minutes including:       Upright stationary bike x 8 minutes, level 5~ pt attempts to keep RPM at 60     Prone:  2 x 10 L LE hamstring curls with 4 # ankle weight added~ emphasis placed on eccentric lowering    2 x 10 reps LLE leg press 90#    neuromuscular re-education activities to improve: Balance, Coordination, Kinesthetic, Sense, Proprioception, and Posture for 10 minutes. The following activities were included:    GaitBettter:   Username: [email or web address] [id number]  Speed: 1.5 MPH  UE support: bilateral   Time: 10 minutes     therapeutic activities to improve functional performance for 10 minutes, including:    2 x 10 reps sit to stand on foam fitter  - holding 5# weight     10 reps BLE leading forward step up to sand dune, SBA, no UE support     gait  training to improve functional mobility and safety for 0  minutes, including:            Patient Education and Home Exercises     Home Exercises Provided and Patient Education Provided     Education provided:   - POC, purpose of PT, discharge planning     Written Home Exercises Provided: yes. Exercises were reviewed and Piotr was able to demonstrate them prior to the end of the session.  Piotr demonstrated good  understanding of the education provided. See EMR under Patient Instructions for exercises provided during therapy sessions    ASSESSMENT     Piotr tolerated today's session well. Gait better treadmill training initiated today for  dynamic walking balance. The patient required BUE support with this activity but tolerated it very well. No seated rest breaks required and Piotr remains very motivated.   Piotr remains appropriate for additional PT intervention to further improve his dynamic balance, strength and endurance.       Piotr Is progressing well towards his goals.   Pt prognosis is Fair.     Pt will continue to benefit from skilled outpatient physical therapy to address the deficits listed in the problem list box on initial evaluation, provide pt/family education and to maximize pt's level of independence in the home and community environment.     Pt's spiritual, cultural and educational needs considered and pt agreeable to plan of care and goals.     Anticipated barriers to physical therapy: co-morbidities, chronicity of stroke     Goals:   Short Term Goals: 4 weeks   1. Patient to be (I) with established home exercise program. MET 1/3/2025   2. Patient to improve 5 time sit to stand time to 11 seconds for improved functional mobility and strength. MET 1/3/2025   3. Patient to improve 6 MWT distance to 1,315 feet for improved CV endurance with community mobility . Ongoing   4. Patient to improve BLE SLS time to 7 seconds for decreased risk for falls Ongoing      Long Term Goals: 8 weeks   1.  Patient to be (I) with advanced home exercise program.Ongoing   2. Patient to improve left  hip flexion by 1/3 MMT to improve balance and functional mobility. Ongoing   3. Patient to improve left  hip extension strength by 1/3 MMT to improve balance and functional mobility. Ongoing   4. Patient to improve 5 time sit to stand time to 9 seconds for improved functional mobility and strength. Ongoing   5. Patient to improve 6 MWT distance to 1,380 feet for improved CV endurance with community mobility . Ongoing   6. Patient to improve BLE SLS time to 10 seconds for decreased risk for falls Ongoing   7. Patient to improve SSWS to 1.2  m/sec for improved safety with ambulation. Ongoing   8. Patient to improve FGA score to 26/30 for decreased risk for falls with community mobility Ongoing     PLAN     Continue to work on high level static/ dynamic balance and L LE strengthening as tolerated       Floridalma Bedolla, PT    1/17/2025

## 2025-01-17 ENCOUNTER — CLINICAL SUPPORT (OUTPATIENT)
Dept: REHABILITATION | Facility: HOSPITAL | Age: 65
End: 2025-01-17
Payer: MEDICARE

## 2025-01-17 DIAGNOSIS — Z74.09 IMPAIRED FUNCTIONAL MOBILITY, BALANCE, GAIT, AND ENDURANCE: Primary | ICD-10-CM

## 2025-01-17 DIAGNOSIS — R27.8 COORDINATION IMPAIRMENT: Primary | ICD-10-CM

## 2025-01-17 DIAGNOSIS — M25.632 DECREASED RANGE OF MOTION OF LEFT WRIST: ICD-10-CM

## 2025-01-17 PROCEDURE — 97112 NEUROMUSCULAR REEDUCATION: CPT | Mod: HCNC,PO

## 2025-01-17 PROCEDURE — 97530 THERAPEUTIC ACTIVITIES: CPT | Mod: HCNC,PO

## 2025-01-17 PROCEDURE — 97018 PARAFFIN BATH THERAPY: CPT | Mod: HCNC,PO

## 2025-01-17 PROCEDURE — 97110 THERAPEUTIC EXERCISES: CPT | Mod: HCNC,PO

## 2025-01-27 ENCOUNTER — CLINICAL SUPPORT (OUTPATIENT)
Dept: REHABILITATION | Facility: HOSPITAL | Age: 65
End: 2025-01-27
Payer: MEDICARE

## 2025-01-27 DIAGNOSIS — R27.8 COORDINATION IMPAIRMENT: Primary | ICD-10-CM

## 2025-01-27 DIAGNOSIS — M25.632 DECREASED RANGE OF MOTION OF LEFT WRIST: ICD-10-CM

## 2025-01-27 PROCEDURE — 97110 THERAPEUTIC EXERCISES: CPT | Mod: HCNC,PO

## 2025-01-27 PROCEDURE — 97530 THERAPEUTIC ACTIVITIES: CPT | Mod: HCNC,PO

## 2025-01-27 NOTE — PROGRESS NOTES
"[facility]  Occupational Therapy Treatment Note     Date: 1/27/2025  Name: Piotr Crespo  Clinic Number: [id number]    Therapy Diagnosis:   Encounter Diagnoses   Name Primary?    Coordination impairment Yes    Decreased range of motion of left wrist      Physician: Lory Pugh MD    Physician Orders: Eval and Treat  Medical Diagnosis: G81.94 (ICD-10-CM) - Left hemiparesis  Evaluation Date: 10/21/2024  Insurance Authorization Period Expiration: 10/7/2025  Plan of Care Certification Period: 12/29/2024 2/6/2025  Date of Return to MD: no neurology follow ups at this time , 2/10/25 pcp  FOTO: 3     Visit # / Visits authorized: 2 / 20 (+14 in 2024)     Precautions:  Standard     Time In: 0935  Time Out: 1026   Total Billable Time: 51 minutes    Subjective     Patient reports: that his shoulder still feels tight  Response to previous treatment: good   Functional change: using left hand as much as possible; working his wrist at home , Using tricks to retrieve items with left hand to get hand to open.   Patient's Goals for Therapy: get his left side working better     Pain: 3/10   Location: Left shoulder - tightness     Objective     Objective measures can be seen in most recent RA on 12/23/2024    Treatment     Piotr received therapeutic exercises for 22 minutes including:  - Standing UBE level 2.0 x 6 minutes reversing midway for UE shoulder ROM and strength; pt educated to focus to emphasizing movement on LUE   - Ball walks with alternating UE lifts, 1 x 10   - LUE shoulder flexion on shoulder slider (to 31" on latch 2), 1 x 10 with 1.5# wrist weight donned, 1 x 10 unweighted     Therapeutic activities to improve functional performance for 29 minutes, including:  The following activities were completed with LUE:  - PNF D2 F/E to transfer PVC pipe pieces from elevated cone tree<>standard chair, x10 pieces   - Sustained reaching in various functional directions to tap numbered targets (1-10) " with 2.2# (red) medicine ball, x3 rounds   - Pom  and release into container set in various functional directions/heights:  2pt pinch with blue clothespin, x10  3pt pinch with blue then downgraded to green clothespin due to fatigue, x10  - Administered and reviewed WB HEP    Patient Education and Home Exercises     Education provided:   - continuation of prior HEP with table slides and wall slides, stretching   - OT role in care; scheduling   - Progress towards goals   - lifetime need to do HEP    Written Home Exercises Provided: Pt instructed to continue prior HEP.  Exercises were reviewed and Piotr was able to demonstrate them prior to the end of the session.  Piotr demonstrated good  understanding of the home exercise program provided. See electronic medical record under Patient Instructions for exercises provided during therapy sessions.    -wrist exercises for range of motion and strengthening   -left fine motor coordination HAP  -scapular retraction/shoulder squeezes - isometric   -1/27/25: WB HEP    Assessment     Pt tolerated today's session well. Good performance with all functional activities and therapeutic exercises. Pt demonstrated good ROM with all functional reaching tasks and good shoulder, proximal, and intrinsic hand strength throughout. Discussed discharging from therapy at end of POC next week. Pt appeared understanding and agreeable. Pt would continue to benefit from skilled occupational therapy services to address the below goals and maximize functional use of LUE needed for all daily activities and meaningful occupations.     Piotr is progressing well towards his goals and there are no updates to goals at this time. Pt prognosis is Good.     Patient will continue to benefit from skilled outpatient occupational therapy to address the deficits listed in the problem list on initial evaluation provide patient/ family education and to maximize patient's level of independence in the home  and community environment.     Patient's spiritual, cultural and educational needs considered and patient agreeable to plan of care and goals.    Anticipated barriers to occupational therapy: none noted     The following goals were discussed with the patient and patient is in agreement with them as to be addressed in the treatment plan.      Goals: 8 weeks Long Term=Short Term  - Pt will demo active left wrist extension to 45+ degrees to aid in increased functional indep with ADLs/IADLs. MET at 55 degrees   - Pt will demo 2+ increase in left pinches (all 3 conditions) to aid in increased in hand manipulation tasks. Ongoing  - Pt will complete Box and Blocks Test with left upper extremity, transferring 30+ blocks, to demonstrate improved gross manual coordination needed for ADL and IADL tasks. Progressing; 29 on 11/25/2024  - Pt will improve FOTO score to 56+ for improved self perception of functional performance with daily activities. Met to 58  - Pt will be independent with Home Exercise Program (HEP)/Home Activity Program (HAP) to promote long term maintenance of progress made in therapy. Ongoing     Goals to be added and edited within plan of care as needed/appropriate.     Plan     Certification Period/Plan of care expiration: 10/21/2024 to 12/29/2024 (to allow for scheduling due to busy clinic). Extend 1 month to assure Home exercise program until 2/6/2025.      Outpatient Occupational Therapy 1-2 times weekly for 4 more weeks to include the following interventions: Moist Heat/ Ice, Neuromuscular Re-ed, Paraffin, Patient Education, Self Care, Therapeutic Activities, and Therapeutic Exercise.    Next week: shoulder stretch, grasp tasks with wrist extension, fine motor tasks; d/c scheduled for 2/7 at this time - adjust as needed per primary OT    Emily Shelby, OT   1/27/2025

## 2025-01-30 NOTE — PROGRESS NOTES
[facility]  Occupational Therapy   RE-Assessment Note     Date: 1/31/2025  Name: Piotr Crespo  Clinic Number: [id number]    Therapy Diagnosis:   Encounter Diagnoses   Name Primary?    Coordination impairment Yes    Decreased range of motion of left wrist        Physician: Lory Pugh MD    Physician Orders: Eval and Treat  Medical Diagnosis: G81.94 (ICD-10-CM) - Left hemiparesis  Evaluation Date: 10/21/2024  Insurance Authorization Period Expiration: 10/7/2025  Plan of Care Certification Period: 12/29/2024 2/6/2025  Date of Return to MD: no neurology follow ups at this time , 2/10/25 pcp  FOTO: 4     Visit # / Visits authorized: 3 / 20 (+14 in 2024)     Precautions:  Standard     Time In: 8:45  Time Out: 9:25  Total Billable Time: 40 minutes    Subjective     Patient reports: feeling good overall - sad to be discharging soon   Response to previous treatment: good   Functional change: using left hand as much as possible; working his wrist at home , Using tricks to retrieve items with left hand to get hand to open.   Patient's Goals for Therapy: get his left side working better     Pain: 0/10   Location: Left shoulder     Objective     Physical Exam:  Postural examination/scapula alignment: Good posture  Joint integrity: Firm end feeling  Skin integrity: normal   Edema: none noted for bilateral upper extremities       Joint Evaluation  AROM  10/21/2024 PROM   10/21/2024 AROM  11/25/2024 PROM  11/25/2024 A/P range of motion 12/23/2024 AROM  1/31/2025     Left  Left  Left  Left  Left Left    Shoulder flex 0-180 158 165 167 180 160/180 170/180   Shoulder Abd 0-180 168 173 165 180 170/180 173/180   Shoulder ER 0-90 Normal  90 90 90 90/90 WFL   Shoulder IR 0-90 Normal  90  90 90 35/52 WFL   Shoulder Extension 0-80 80 80 80 80 WNL 80/80   Shoulder Horizontal adduction 0-90 40 55 48 65 WNL WFL   Elbow flex/ext 0-150 WFL WFL WFL WFL WFL WFL   Wrist flex 0-80 60 80 70 80 50/80 60/80   Wrist ext  0-70 35 65 55 70 45/60 40/70   Right upper extremity: AROM normal/WNL     Fist: normal to fist, opens slowly with index lag,   Left upper extremity supination: 70A/90P     Strength 10/21/2024 10/21/2024 12/23/2024 1/31/2025   **within available ROM** Right  Left  Left  Left    Shoulder flex 5/5 5/5  5/5 5   Shoulder abd 5/5 5/5  5/5 5   Shoulder ER 5/5 5/5  5/5 5   Shoulder IR 5/5 5/5  5/5 5   Shoulder Extension 5/5 4+/5 5/5 5   Shoulder Horizontal adduction 5/5 4+/5 5/5 5   Elbow flex 5/5 5/5  5/5 5   Elbow ext 5/5 5/5  5/5 5   Wrist flex 5/5 4/5 4+/5 4+   Wrist ext 5/5 4/5 4+/5 4+       Strength: (DESIREE Dynamometer in lbs.) Average 3 trials, Position II:       10/21/2024 10/21/2024 11/25/2024 12/24/24 1/31/2025     Right  Left  Left  Left Left    Rung II 88.2# 63.3# 59.8 65.4 67.5      Pinch Strength (Measured in psi)       10/21/2024 10/21/2024 11/25/2024 12/23/2024 1/31/25     Right  Left  Left  Left Left    Key Pinch 20.1 psi 16.4 psi 16.2 16.6 12.7   3pt Pinch 15.6 psi 8.2 psi 9.6 8.1 6.2   2pt Pinch 9.3 psi 8.3 psi 6.0 6.5 7.6    3 point: difficulty with sustained hold     Fine Motor Coordination: 9 Hole Peg Test  Right  10/21/2024 Left    10/21/2024 Left   11/25/2024 Left 12/23/2024 Left   1/31/25   21.06 s Completion of 2: 49.73 s 1:59.81 min     Completion of 2 in 12.84 s  1min 29 sec one drop 1:24.43 min    [norms for men aged 61-65: R=20.87s +/-3.50s; L=21.60s +/-2.98s (Jasmin et al, 2003)]     Fine Motor Coordination: Box and Block  Right  10/21/2024 Left    10/21/2024 Left   11/25/2024 Left 12/23/2024 Left   1/31/25   60 26 29 24 30   [norms for men aged 60-64: R=71.3 +/-8.8; L=70.5 /-8.1 (Mohini et al, 1985)]     Gross motor coordination:   MEENAKSHI (Rapid Alternating Movements): impaired left due to limited motion and synergy  Finger to Nose (5 times): no dysmetria   Finger Flicks (coordination moving from digit flexion to digit extension): delayed recoil for radial aspect of left hand   Finger  tapping: delayed for left      Tone:  Modified James Scale: left upper extremity   1-  Slight increase in muscle tone, manifested by a catch and release or by minimal resistance at the end of the range of motino when the affected part(s) is moved in flexion or extension in flexion synergy     Sensation:  Piotr  reports no complains/noted changes.      Balance:   Static Sit - GOOD+: Takes MAXIMAL challenges from all directions.    Dynamic sit- GOOD+: Takes MAXIMAL challenges from all directions.    Static Stand - GOOD: Takes MODERATE challenges from all directions  Dynamic stand - GOOD-: Takes MODERATE challenges from all directions but inconsistently     Endurance Deficit: mild  Treatment     Piotr received therapeutic exercises for 10 minutes including:  - Standing UBE level 3.0 x 10 minutes reversing midway for UE shoulder ROM and strength; pt educated to focus to emphasizing movement on LUE     Therapeutic activities to improve functional performance for 30 minutes, including:  -objective measurements as seen above   -FOTO    Patient Education and Home Exercises     Education provided:   - continuation of prior HEP with table slides and wall slides, stretching   - OT role in care; scheduling   - Progress towards goals   - lifetime need to do HEP    Written Home Exercises Provided: Pt instructed to continue prior HEP.  Exercises were reviewed and Piotr was able to demonstrate them prior to the end of the session.  Piotr demonstrated good  understanding of the home exercise program provided. See electronic medical record under Patient Instructions for exercises provided during therapy sessions.    -wrist exercises for range of motion and strengthening   -left fine motor coordination HAP  -scapular retraction/shoulder squeezes - isometric   -1/27/25: WB HEP    Assessment     Piotr has demo great gains and progress towards his goals at this time. He also verbalizes good carry over for all education and HEPs.  Next session planned for discharge and re-edu on all POCs.           Piotr is progressing well towards his goals and there are no updates to goals at this time. Pt prognosis is Good.     Patient will continue to benefit from skilled outpatient occupational therapy to address the deficits listed in the problem list on initial evaluation provide patient/ family education and to maximize patient's level of independence in the home and community environment.     Patient's spiritual, cultural and educational needs considered and patient agreeable to plan of care and goals.    Anticipated barriers to occupational therapy: none noted     The following goals were discussed with the patient and patient is in agreement with them as to be addressed in the treatment plan.      Goals: 8 weeks Long Term=Short Term  - Pt will demo active left wrist extension to 45+ degrees to aid in increased functional indep with ADLs/IADLs. MET at 55 degrees   - Pt will demo 2+ increase in left pinches (all 3 conditions) to aid in increased in hand manipulation tasks. Not met   - Pt will complete Box and Blocks Test with left upper extremity, transferring 30+ blocks, to demonstrate improved gross manual coordination needed for ADL and IADL tasks. MET  - Pt will improve FOTO score to 56+ for improved self perception of functional performance with daily activities. Met   - Pt will be independent with Home Exercise Program (HEP)/Home Activity Program (HAP) to promote long term maintenance of progress made in therapy. Ongoing/Progressing      Goals to be added and edited within plan of care as needed/appropriate.     Plan     Certification Period/Plan of care expiration: 10/21/2024 to 12/29/2024 (to allow for scheduling due to busy clinic). Extend 1 month to assure Home exercise program until 2/6/2025.      Outpatient Occupational Therapy 1-2 times weekly for 4 more weeks to include the following interventions: Moist Heat/ Ice, Neuromuscular Re-ed,  Paraffin, Patient Education, Self Care, Therapeutic Activities, and Therapeutic Exercise.    Next week: discharge     ABBE Barrett   1/31/2025

## 2025-01-31 ENCOUNTER — TELEPHONE (OUTPATIENT)
Dept: PODIATRY | Facility: CLINIC | Age: 65
End: 2025-01-31
Payer: MEDICARE

## 2025-01-31 ENCOUNTER — CLINICAL SUPPORT (OUTPATIENT)
Dept: REHABILITATION | Facility: HOSPITAL | Age: 65
End: 2025-01-31
Payer: MEDICARE

## 2025-01-31 ENCOUNTER — PATIENT MESSAGE (OUTPATIENT)
Dept: PODIATRY | Facility: CLINIC | Age: 65
End: 2025-01-31
Payer: MEDICARE

## 2025-01-31 DIAGNOSIS — M25.632 DECREASED RANGE OF MOTION OF LEFT WRIST: ICD-10-CM

## 2025-01-31 DIAGNOSIS — R27.8 COORDINATION IMPAIRMENT: Primary | ICD-10-CM

## 2025-01-31 PROCEDURE — 97110 THERAPEUTIC EXERCISES: CPT | Mod: HCNC,PO

## 2025-01-31 PROCEDURE — 97530 THERAPEUTIC ACTIVITIES: CPT | Mod: HCNC,PO

## 2025-01-31 NOTE — TELEPHONE ENCOUNTER
Left vm message for patient to confirm his appointment on 02/03/2025 w/Dr. Briggs(Podiatry)  
General
General

## 2025-02-03 ENCOUNTER — CLINICAL SUPPORT (OUTPATIENT)
Dept: REHABILITATION | Facility: HOSPITAL | Age: 65
End: 2025-02-03
Payer: MEDICARE

## 2025-02-03 ENCOUNTER — OFFICE VISIT (OUTPATIENT)
Dept: PODIATRY | Facility: CLINIC | Age: 65
End: 2025-02-03
Payer: MEDICARE

## 2025-02-03 VITALS
SYSTOLIC BLOOD PRESSURE: 117 MMHG | BODY MASS INDEX: 26.36 KG/M2 | DIASTOLIC BLOOD PRESSURE: 74 MMHG | WEIGHT: 178 LBS | RESPIRATION RATE: 18 BRPM | HEIGHT: 69 IN | HEART RATE: 73 BPM

## 2025-02-03 DIAGNOSIS — M79.672 LEFT FOOT PAIN: ICD-10-CM

## 2025-02-03 DIAGNOSIS — M25.632 DECREASED RANGE OF MOTION OF LEFT WRIST: ICD-10-CM

## 2025-02-03 DIAGNOSIS — R27.8 COORDINATION IMPAIRMENT: Primary | ICD-10-CM

## 2025-02-03 DIAGNOSIS — S90.852D FOREIGN BODY IN LEFT FOOT, SUBSEQUENT ENCOUNTER: Primary | ICD-10-CM

## 2025-02-03 PROCEDURE — 99214 OFFICE O/P EST MOD 30 MIN: CPT | Mod: HCNC,S$GLB,, | Performed by: PODIATRIST

## 2025-02-03 PROCEDURE — 1126F AMNT PAIN NOTED NONE PRSNT: CPT | Mod: HCNC,CPTII,S$GLB, | Performed by: PODIATRIST

## 2025-02-03 PROCEDURE — 97110 THERAPEUTIC EXERCISES: CPT | Mod: HCNC,PO

## 2025-02-03 PROCEDURE — 3008F BODY MASS INDEX DOCD: CPT | Mod: HCNC,CPTII,S$GLB, | Performed by: PODIATRIST

## 2025-02-03 PROCEDURE — 1159F MED LIST DOCD IN RCRD: CPT | Mod: HCNC,CPTII,S$GLB, | Performed by: PODIATRIST

## 2025-02-03 PROCEDURE — 99999 PR PBB SHADOW E&M-EST. PATIENT-LVL IV: CPT | Mod: PBBFAC,HCNC,, | Performed by: PODIATRIST

## 2025-02-03 PROCEDURE — 1160F RVW MEDS BY RX/DR IN RCRD: CPT | Mod: HCNC,CPTII,S$GLB, | Performed by: PODIATRIST

## 2025-02-03 PROCEDURE — 3078F DIAST BP <80 MM HG: CPT | Mod: HCNC,CPTII,S$GLB, | Performed by: PODIATRIST

## 2025-02-03 PROCEDURE — 3074F SYST BP LT 130 MM HG: CPT | Mod: HCNC,CPTII,S$GLB, | Performed by: PODIATRIST

## 2025-02-03 NOTE — PROGRESS NOTES
[facility]  Occupational Therapy   DISCHARGE     Date: 2/3/2025  Name: Piotr Crespo  Clinic Number: [id number]    Therapy Diagnosis:   Encounter Diagnoses   Name Primary?    Coordination impairment Yes    Decreased range of motion of left wrist      Physician: Lory Pugh MD    Physician Orders: Eval and Treat  Medical Diagnosis: G81.94 (ICD-10-CM) - Left hemiparesis  Evaluation Date: 10/21/2024  Insurance Authorization Period Expiration: 10/7/2025  Plan of Care Certification Period: 12/29/2024 2/6/2025  Date of Return to MD: no neurology follow ups at this time , 2/10/25 pcp  FOTO: 4     Visit # / Visits authorized: 4 / 20 (+14 in 2024)     Precautions:  Standard     Time In: 9:30  Time Out: 10:02  Total Billable Time: 32 minutes    Subjective     Patient reports: feeling good overall - sad to be discharging soon   Response to previous treatment: good   Functional change: using left hand as much as possible; working his wrist at home , Using tricks to retrieve items with left hand to get hand to open.   Patient's Goals for Therapy: get his left side working better     Pain: 0/10   Location: Left shoulder     Objective     See re-assessment on 1/31/2025 for objective measurements   Treatment     Piotr received therapeutic exercises for 32 minutes including:  - Standing UBE level 3.0 x 10 minutes reversing midway for UE shoulder ROM and strength; pt educated to focus to emphasizing movement on LUE     Re-edu on HEP/HAPs provided throughout plan of care   Weightbearing at edge of mat for anterior and lateral movements  Green flex bar for wrist flex, ext and radial and ulnar deviation   Wall push ups - bicep x20; tricep x15  Wall slides - all directions     All questions and concerns addressed within OT scope     Patient Education and Home Exercises     Education provided:   - continuation of prior HEP with table slides and wall slides, stretching   - OT role in care; scheduling   -  Progress towards goals   - lifetime need to do HEP    Written Home Exercises Provided: Pt instructed to continue prior HEP.  Exercises were reviewed and Piotr was able to demonstrate them prior to the end of the session.  Piotr demonstrated good  understanding of the home exercise program provided. See electronic medical record under Patient Instructions for exercises provided during therapy sessions.    -wrist exercises for range of motion and strengthening   -left fine motor coordination HAP  -scapular retraction/shoulder squeezes - isometric   -1/27/25: WB HEP    Assessment     Piotr demo great gains and progress throughout this plan of care. He demo good understanding for all education and home exercises for continuation at home at this time. He has great functional use of his left upper extremity for all ADLs and IADLs at this time. It will be key that he carry over his HEP daily for continuation of results.                Goals: 8 weeks Long Term=Short Term  - Pt will demo active left wrist extension to 45+ degrees to aid in increased functional indep with ADLs/IADLs. MET at 55 degrees   - Pt will demo 2+ increase in left pinches (all 3 conditions) to aid in increased in hand manipulation tasks. Not met   - Pt will complete Box and Blocks Test with left upper extremity, transferring 30+ blocks, to demonstrate improved gross manual coordination needed for ADL and IADL tasks. MET  - Pt will improve FOTO score to 56+ for improved self perception of functional performance with daily activities. Met   - Pt will be independent with Home Exercise Program (HEP)/Home Activity Program (HAP) to promote long term maintenance of progress made in therapy. MET     Goals to be added and edited within plan of care as needed/appropriate.     Plan     Pt is discharged from outpatient OT at this time.     ABBE Barrett   2/3/2025

## 2025-02-03 NOTE — PROGRESS NOTES
Subjective:      Patient ID: Piotr Crespo is a 64 y.o. male.    Chief Complaint:   Wound Care (Left ball of the foot ) and Dressing Change    Piotr is a 64 y.o. male who presents to the clinic for evaluation and treatment of high risk feet. Piotr has a past medical history of Erectile dysfunction due to arterial insufficiency (7/17/2020), Essential hypertension (3/11/2020), High cholesterol, History of left common carotid artery stent placement (4/17/2020), History of stroke (3/11/2020), Hypertension, Mixed hyperlipidemia (3/11/2020), Prediabetes (7/17/2020), and Stroke.     Pt rtc f/u left foot foreign body/glass removal.  Patient relates he is doing okay  Had any drainage or bleeding to the area  Only mild tenderness however overall feels much better             PCP: Lory Pugh MD    Date Last Seen by PCP: 10/7/24         Current shoe gear:  Affected Foot: Tennis shoes     Unaffected Foot: Tennis shoes        Hemoglobin A1C   Date Value Ref Range Status   10/07/2024 5.8 (H) 4.0 - 5.6 % Final     Comment:     ADA Screening Guidelines:  5.7-6.4%  Consistent with prediabetes  >or=6.5%  Consistent with diabetes    High levels of fetal hemoglobin interfere with the HbA1C  assay. Heterozygous hemoglobin variants (HbS, HgC, etc)do  not significantly interfere with this assay.   However, presence of multiple variants may affect accuracy.     08/04/2023 5.6 4.0 - 5.6 % Final     Comment:     ADA Screening Guidelines:  5.7-6.4%  Consistent with prediabetes  >or=6.5%  Consistent with diabetes    High levels of fetal hemoglobin interfere with the HbA1C  assay. Heterozygous hemoglobin variants (HbS, HgC, etc)do  not significantly interfere with this assay.   However, presence of multiple variants may affect accuracy.     05/10/2022 5.9 (H) 4.0 - 5.6 % Final     Comment:     ADA Screening Guidelines:  5.7-6.4%  Consistent with prediabetes  >or=6.5%  Consistent with diabetes    High levels of fetal hemoglobin interfere  "with the HbA1C  assay. Heterozygous hemoglobin variants (HbS, HgC, etc)do  not significantly interfere with this assay.   However, presence of multiple variants may affect accuracy.          Past Medical History:   Diagnosis Date    Erectile dysfunction due to arterial insufficiency 7/17/2020    Essential hypertension 3/11/2020    High cholesterol     History of left common carotid artery stent placement 4/17/2020    History of stroke 3/11/2020    Hypertension     Mixed hyperlipidemia 3/11/2020    Prediabetes 7/17/2020    Stroke     sept 3, 2017     Past Surgical History:   Procedure Laterality Date    ANGIOGRAM, CORONARY, WITH LEFT HEART CATHETERIZATION Left 6/24/2023    Procedure: Angiogram, Coronary, with Left Heart Cath;  Surgeon: George Sherwood MD;  Location: [facility] CATH/EP LAB;  Service: Cardiology;  Laterality: Left;    CAROTID ARTERY ANGIOPLASTY Left     left Internal carotid artery stent    CEREBRAL ANEURYSM REPAIR      COLONOSCOPY N/A 06/30/2020    Procedure: COLONOSCOPY;  Surgeon: Yuan Pizano MD;  Location: [facility] ENDO ([address] [place] [address]);  Service: Endoscopy;  Laterality: N/A;  3/30/20- Per Dr. Catherine, Pt to be r/s for later date, "screening"- [facility]  3/30/20 -  for Pt to call back to r/s for later date per Dr. Catherine- [facility]  COVID screening on 6/27/20 - [facility] urgent care- ER    LUMBAR DISCECTOMY       Current Outpatient Medications on File Prior to Visit   Medication Sig Dispense Refill    amLODIPine (NORVASC) 5 MG tablet TAKE 1 TABLET ONE TIME DAILY 90 tablet 3    artificial tears,hypromellose,,GENTEAL/SUSTANE, 0.3 % Gel Apply to eye.      atorvastatin (LIPITOR) 80 MG tablet Take 1 tablet (80 mg total) by mouth every evening. 90 tablet 3    carvediloL (COREG) 6.25 MG tablet TAKE 1 TABLET TWICE DAILY WITH MEALS 180 tablet 3    cholecalciferol, vitamin D3, (VITAMIN D3) 50 mcg (2,000 unit) Tab Take 50 mcg by mouth.      cholecalciferol, vitamin D3, 100 mcg (4,000 unit) Tab TAKE ONE TABLET BY MOUTH EVERY DAY " AS A VITAMIN SUPPLEMENT      clopidogreL (PLAVIX) 75 mg tablet TAKE 1 TABLET ONE TIME DAILY 90 tablet 3    clotrimazole (LOTRIMIN) 1 % Soln APPLY SMALL AMOUNT TOPICALLY TWICE A DAY FOR FUNGAL INFECTION      cromolyn (OPTICROM) 4 % ophthalmic solution INSTILL 2 DROPS IN EACH EYE TWICE A DAY FOR ALLERGIC CONJUNCTIVITIS      erythromycin (ROMYCIN) ophthalmic ointment       ezetimibe (ZETIA) 10 mg tablet Take 1 tablet (10 mg total) by mouth once daily. 90 tablet 3    LIDOcaine-prilocaine (EMLA) cream Apply topically as needed. 30 g 3    nicotine (NICODERM CQ) 21 mg/24 hr Apply topically.      nicotine polacrilex 4 MG Lozg DISSOLVE 1 LOZENGE IN MOUTH EVERY HOUR AS NEEDED TO STOP SMOKING      tamsulosin (FLOMAX) 0.4 mg Cap Take 1 capsule (0.4 mg total) by mouth once daily. 90 capsule 3    urea 20 % Crea APPLY LIBERAL AMOUNT TOPICALLY TWICE A DAY AS NEEDED FOR CALLOUSED AREA.       No current facility-administered medications on file prior to visit.     Review of patient's allergies indicates:  No Known Allergies    Review of Systems   Constitutional: Negative for chills, decreased appetite, fever, malaise/fatigue, night sweats, weight gain and weight loss.   Cardiovascular:  Negative for chest pain, claudication, dyspnea on exertion, leg swelling, palpitations and syncope.   Respiratory:  Negative for cough and shortness of breath.    Endocrine: Negative for cold intolerance and heat intolerance.   Hematologic/Lymphatic: Negative for bleeding problem. Bruises/bleeds easily.   Skin:  Positive for dry skin and nail changes. Negative for color change, flushing, itching, poor wound healing, rash, skin cancer, suspicious lesions and unusual hair distribution.   Musculoskeletal:  Positive for muscle weakness and stiffness. Negative for arthritis, back pain, falls, gout, joint pain, joint swelling, muscle cramps, myalgias and neck pain.   Gastrointestinal:  Negative for diarrhea, nausea and vomiting.   Neurological:  Positive  "for numbness and paresthesias. Negative for dizziness, focal weakness, light-headedness, tremors, vertigo and weakness.   Psychiatric/Behavioral:  Negative for altered mental status and depression. The patient does not have insomnia.    Allergic/Immunologic: Negative.          Objective:       Vitals:    02/03/25 1303   BP: 117/74   Pulse: 73   Resp: 18   Weight: 80.7 kg (178 lb)   Height: 5' 9" (1.753 m)   PainSc: 0-No pain   80.7 kg (178 lb)     Physical Exam  Vitals reviewed.   Constitutional:       General: He is not in acute distress.     Appearance: He is well-developed. He is not ill-appearing, toxic-appearing or diaphoretic.      Comments:     Cardiovascular:      Pulses:           Dorsalis pedis pulses are 2+ on the right side and 2+ on the left side.        Posterior tibial pulses are 1+ on the right side and 1+ on the left side.   Musculoskeletal:         General: No swelling.      Right lower leg: No edema.      Left lower leg: No edema.      Right ankle: Normal.      Right Achilles Tendon: Normal.      Left ankle: Normal.      Left Achilles Tendon: Normal.      Right foot: Decreased range of motion. Deformity and bunion present. No tenderness or bony tenderness.      Left foot: Decreased range of motion. Deformity, bunion, foot drop and tenderness present. No bony tenderness.      Comments:    No pop left sub MTPJ 1st interspace/HPK foreign body      Feet:      Right foot:      Protective Sensation: 10 sites tested.  10 sites sensed.      Skin integrity: Callus and dry skin present. No ulcer, blister, skin breakdown, erythema or warmth.      Toenail Condition: Right toenails are abnormally thick.      Left foot:      Protective Sensation: 10 sites tested.  10 sites sensed.      Skin integrity: Callus and dry skin present. No ulcer, blister, skin breakdown, erythema or warmth.      Toenail Condition: Left toenails are abnormally thick.      Comments: Mild hypersensitivity left foot  Some decreased " sensation digits      left sub1st   = healing well abrasion scab removed  Healthy epithelial base mild pinpoint bleeding  No signs of infection        Various porokeratosis right HPK IPJ medial borders of hallux         Skin:     General: Skin is warm and dry.      Capillary Refill: Capillary refill takes 2 to 3 seconds.      Coloration: Skin is not pale.      Findings: No erythema or rash.      Nails: There is no clubbing.   Neurological:      Mental Status: He is alert and oriented to person, place, and time.      Gait: Gait abnormal.   Psychiatric:         Attention and Perception: Attention normal.         Mood and Affect: Mood normal.         Speech: Speech normal.         Behavior: Behavior normal.         Thought Content: Thought content normal.         Cognition and Memory: Cognition normal.         Judgment: Judgment normal.               Assessment:       Encounter Diagnoses   Name Primary?    Foreign body in left foot, subsequent encounter Yes    Left foot pain                Plan:       Piotr was seen today for wound care and dressing change.    Diagnoses and all orders for this visit:    Foreign body in left foot, subsequent encounter    Left foot pain            I counseled the patient on his conditions, their implications and medical management.      -overall doing well    - - With patient's permission, Utilizing a #15 scalpel, I trimmed the eschar callus area at the above mentioned location.      The patient will continue to monitor the areas daily, inspect the feet, wear protective shoe gear when ambulatory, and moisturizer to maintain skin integrity.   Antibiotic ointment and Band-Aid applied    Continue to monitor and follow up in 4 months

## 2025-02-10 ENCOUNTER — OFFICE VISIT (OUTPATIENT)
Dept: PRIMARY CARE CLINIC | Facility: CLINIC | Age: 65
End: 2025-02-10
Payer: MEDICARE

## 2025-02-10 VITALS
HEART RATE: 59 BPM | DIASTOLIC BLOOD PRESSURE: 80 MMHG | HEIGHT: 69 IN | OXYGEN SATURATION: 95 % | SYSTOLIC BLOOD PRESSURE: 136 MMHG | BODY MASS INDEX: 26.77 KG/M2 | TEMPERATURE: 98 F | WEIGHT: 180.75 LBS

## 2025-02-10 DIAGNOSIS — R73.03 PREDIABETES: ICD-10-CM

## 2025-02-10 DIAGNOSIS — I21.4 NSTEMI (NON-ST ELEVATED MYOCARDIAL INFARCTION): ICD-10-CM

## 2025-02-10 DIAGNOSIS — Z87.891 PERSONAL HISTORY OF NICOTINE DEPENDENCE: Primary | ICD-10-CM

## 2025-02-10 DIAGNOSIS — I25.118 CORONARY ARTERY DISEASE OF NATIVE ARTERY OF NATIVE HEART WITH STABLE ANGINA PECTORIS: ICD-10-CM

## 2025-02-10 DIAGNOSIS — E78.2 MIXED HYPERLIPIDEMIA: ICD-10-CM

## 2025-02-10 DIAGNOSIS — I10 ESSENTIAL HYPERTENSION: ICD-10-CM

## 2025-02-10 DIAGNOSIS — Z86.73 HISTORY OF STROKE: ICD-10-CM

## 2025-02-10 DIAGNOSIS — G81.94 LEFT HEMIPARESIS: ICD-10-CM

## 2025-02-10 PROCEDURE — 99999 PR PBB SHADOW E&M-EST. PATIENT-LVL IV: CPT | Mod: PBBFAC,HCNC,, | Performed by: STUDENT IN AN ORGANIZED HEALTH CARE EDUCATION/TRAINING PROGRAM

## 2025-02-10 NOTE — PROGRESS NOTES
Primary Care  Annual Office Visit - In Person  2/10/2025        Patient is a 64 y.o.   Piotr Crespo  has a past medical history of Erectile dysfunction due to arterial insufficiency (7/17/2020), Essential hypertension (3/11/2020), High cholesterol, History of left common carotid artery stent placement (4/17/2020), History of stroke (3/11/2020), Hypertension, Mixed hyperlipidemia (3/11/2020), Prediabetes (7/17/2020), and Stroke.    History of Present Illness    CHIEF COMPLAINT:  Patient presents today for follow up    SOCIAL HISTORY:  He currently smokes 10-12 cigarettes per day. He has smoking cessation aids including patches and gum available. He has not found the smoking cessation program helpful. He feels that he smokes more when spending time at the [employer].            Active Medications  Current Outpatient Medications   Medication Instructions    amLODIPine (NORVASC) 5 mg, Oral    artificial tears,hypromellose,,GENTEAL/SUSTANE, 0.3 % Gel Apply to eye.    atorvastatin (LIPITOR) 80 mg, Oral, Nightly    carvediloL (COREG) 6.25 mg, Oral, 2 times daily with meals    cholecalciferol (vitamin D3) (VITAMIN D3) 50 mcg    cholecalciferol, vitamin D3, 100 mcg (4,000 unit) Tab TAKE ONE TABLET BY MOUTH EVERY DAY AS A VITAMIN SUPPLEMENT    clopidogreL (PLAVIX) 75 mg, Oral    clotrimazole (LOTRIMIN) 1 % Soln APPLY SMALL AMOUNT TOPICALLY TWICE A DAY FOR FUNGAL INFECTION    cromolyn (OPTICROM) 4 % ophthalmic solution INSTILL 2 DROPS IN EACH EYE TWICE A DAY FOR ALLERGIC CONJUNCTIVITIS    erythromycin (ROMYCIN) ophthalmic ointment     ezetimibe (ZETIA) 10 mg, Oral, Daily    LIDOcaine-prilocaine (EMLA) cream Topical (Top), As needed (PRN)    nicotine (NICODERM CQ) 21 mg/24 hr Apply topically.    nicotine polacrilex 4 MG Lozg DISSOLVE 1 LOZENGE IN MOUTH EVERY HOUR AS NEEDED TO STOP SMOKING    tamsulosin (FLOMAX) 0.4 mg, Oral, Daily    urea 20 % Crea APPLY LIBERAL AMOUNT TOPICALLY TWICE A DAY AS NEEDED FOR CALLOUSED AREA.       Annual  Review of Preventative Care    Health Maintenance Due   Topic Date Due    Pneumococcal Vaccines (Age 50+) (3 of 3 - PCV20 or PCV21) 09/14/2022    COVID-19 Vaccine (6 - 2024-25 season) 09/01/2024    Abdominal Aortic Aneurysm Screening  Never done    LDCT Lung Screen  02/08/2025     Last PSA:   Lab Results   Component Value Date    PSA 0.61 10/07/2024    PSA 0.39 08/04/2023    PSA 0.50 05/10/2022     Diabetes Screening:    Lab Results   Component Value Date    HGBA1C 5.8 (H) 10/07/2024    HGBA1C 5.6 08/04/2023    HGBA1C 5.9 (H) 05/10/2022     BP Readings from Last 3 Encounters:   02/10/25 136/80   02/03/25 117/74   10/10/24 131/74     Wt Readings from Last 3 Encounters:   02/10/25 0906 82 kg (180 lb 12.4 oz)   02/03/25 1303 80.7 kg (178 lb)   01/13/25 0959 82.8 kg (182 lb 8.7 oz)         Physical Exam  Vitals reviewed.   Constitutional:       General: He is not in acute distress.  Eyes:      Pupils: Pupils are equal, round, and reactive to light.   Cardiovascular:      Rate and Rhythm: Normal rate and regular rhythm.      Pulses: Normal pulses.      Heart sounds: Normal heart sounds.   Pulmonary:      Effort: Pulmonary effort is normal.      Breath sounds: Normal breath sounds.   Abdominal:      General: Abdomen is flat. Bowel sounds are normal.      Palpations: Abdomen is soft.   Musculoskeletal:      Right lower leg: No edema.      Left lower leg: No edema.                 Assessment & Plan      PERSONAL HX OF NICOTINE DEPENDENCE:   Benefits to cessation discussed. Patient strongly advised to quit smoking. At this time they are not willing to quit.   Monitored the patient's smoking habits, noting an increase from 8 to 10-12 cigarettes per day.  [place]ed [place] to reduce smoking opportunities.  3 minutes spent counseling patient regarding quitting smoking to improve overall health.    LUNG CANCER SCREENING:  Referred for CT     AORTIC ANEURYSM SCREENING:  Referred for US     HX  STROKE   HEMIPARESIS:   Patient recently completed PT which he found helpful with ambulation    HLD   NSTEMI   CAD:  S/p [facility] 6/2023   Continue plavix and Coreg 6.25 mg BID  Continue Lipitor 80 mg daily and Zetia 10 mg daily     HTN:   Well controlled on amlodipine 5 mg daily      PREDIABETES:   Controlled with diet            Orders Placed This Encounter    CT Chest Lung Screening Low Dose    US Abdominal Aorta                              Upcoming Scheduled Appointments and Follow Up:    Future Appointments   Date Time Provider Department Center   2/21/2025  9:30 AM [facility] USOP6 [facility] USOUNDO [facility]   2/21/2025 10:15 AM [facility] CT OP LIMIT 450 LBS [facility] CTSCANO [facility]   6/2/2025  9:15 AM Anjelica Briggs DPM [facility]   8/11/2025  9:20 AM Lory Pugh MD [facility]       Follow Up [facility] (with who? when?): Follow up in about 6 months (around 8/10/2025).        Extended Emergency Contact Information  Primary Emergency Contact: Jina Crespo  Address: [address] [place] [address] of [facility]  Home Phone: [phone]  Mobile Phone: [phone]  Relation: Spouse      Lory Pugh MD   Internal Medicine  2/10/2025 - 10:19 AM    I spent a total of 20 minutes on the day of the visit.This includes face to face time and non-face to face time preparing to see the patient (eg, review of tests), obtaining and/or reviewing separately obtained history, documenting clinical information in the electronic or other health record, independently interpreting results and communicating results to the patient/family/caregiver, or care coordinator.    This note was generated with the assistance of ambient listening technology. Verbal consent was obtained by the patient and accompanying visitor(s) for the recording of patient appointment to facilitate this note. I attest to having reviewed and edited the generated note for accuracy, though some syntax or spelling  errors may persist. Please contact the author of this note for any clarification.      While patients have the right to access their medical record, it is essential to recognize that progress notes primarily serve as a means of communication among healthcare professionals.

## 2025-02-21 ENCOUNTER — RESULTS FOLLOW-UP (OUTPATIENT)
Dept: PRIMARY CARE CLINIC | Facility: CLINIC | Age: 65
End: 2025-02-21
Payer: MEDICARE

## 2025-02-21 ENCOUNTER — HOSPITAL ENCOUNTER (OUTPATIENT)
Dept: RADIOLOGY | Facility: OTHER | Age: 65
Discharge: HOME | End: 2025-02-21
Attending: STUDENT IN AN ORGANIZED HEALTH CARE EDUCATION/TRAINING PROGRAM
Payer: MEDICARE

## 2025-02-21 DIAGNOSIS — Z87.891 PERSONAL HISTORY OF NICOTINE DEPENDENCE: ICD-10-CM

## 2025-02-21 DIAGNOSIS — N28.9 LESION OF LEFT NATIVE KIDNEY: Primary | ICD-10-CM

## 2025-02-21 PROCEDURE — 71271 CT THORAX LUNG CANCER SCR C-: CPT | Mod: TC,HCNC

## 2025-02-21 PROCEDURE — 76775 US EXAM ABDO BACK WALL LIM: CPT | Mod: TC,HCNC

## 2025-02-21 PROCEDURE — 71271 CT THORAX LUNG CANCER SCR C-: CPT | Mod: 26,HCNC,, | Performed by: RADIOLOGY

## 2025-03-17 ENCOUNTER — HOSPITAL ENCOUNTER (OUTPATIENT)
Dept: RADIOLOGY | Facility: OTHER | Age: 65
Discharge: HOME | End: 2025-03-17
Attending: STUDENT IN AN ORGANIZED HEALTH CARE EDUCATION/TRAINING PROGRAM
Payer: MEDICARE

## 2025-03-17 ENCOUNTER — RESULTS FOLLOW-UP (OUTPATIENT)
Dept: PRIMARY CARE CLINIC | Facility: CLINIC | Age: 65
End: 2025-03-17

## 2025-03-17 DIAGNOSIS — N28.9 LESION OF LEFT NATIVE KIDNEY: ICD-10-CM

## 2025-03-17 PROCEDURE — 76770 US EXAM ABDO BACK WALL COMP: CPT | Mod: TC,HCNC

## 2025-03-17 PROCEDURE — 76770 US EXAM ABDO BACK WALL COMP: CPT | Mod: 26,HCNC,, | Performed by: RADIOLOGY

## 2025-05-25 NOTE — PROGRESS NOTES
[facility]  Occupational Therapy Treatment Note     Date: 12/16/2024  Name: Piotr Crespo  Clinic Number: [id number]    Therapy Diagnosis:   Encounter Diagnoses   Name Primary?    Coordination impairment Yes    Decreased range of motion of left wrist        Physician: Lory Pugh MD    Physician Orders: Eval and Treat  Medical Diagnosis: G81.94 (ICD-10-CM) - Left hemiparesis  Evaluation Date: 10/21/2024  Insurance Authorization Period Expiration: 10/7/2025  Plan of Care Certification Period: 12/29/2024  Date of Return to MD: no neurology follow ups at this time , 2/10/25 pcp  FOTO:  2     Visit # / Visits authorized: 10 / 20     Precautions:  Standard     Time In: 10:30  Time Out: 11:15  Total Billable Time: 45 minutes    Subjective     Patient reports: he has been feeling good   Response to previous treatment: good -- feels like he is always learning something with how to help his body  Functional change: using left hand as much as possible; working his wrist at home   Patient's Goals for Therapy: get his left side working better     Pain: 0/10   Location: n/a    Objective     Objective measures can be seen in most recent RA on 11/25/2024.     Treatment     Piotr participated in neuromuscular re-education activities to improve: tone management for 19 minutes. The following activities were included:  Seated edge of mat  - PROM for left hand general wrist stretches including metacarpal spreads, carpal rolls, increasing mobility towards radial/ulnar deviation, increasing mobility of wrist towards extension/flexion, Increasing mobility of wrist towards supination/pronation. PROM of fingers to encourage extension.  -PROM/AAROM for left scapular mobilizations for protraction, retraction, elevation and depression     Prone:   -AROM for left hand for grasp and release of bean bags to target - shoulder ER, abduction and flexion     Seated edge of mat:   -Pec stretch -- AAROM with trunk rotation  Susan VIOLET Haroon   89 yta    @WT@  MRN/Room: [id number]    Subjective:   Retaining  Started flomax    Objective:     Meds:   apixaban, 2.5 mg, BID  atorvastatin, 40 mg, Nightly  brimonidine, 1 drop, BID  cholecalciferol, 50 mcg, Daily  dorzolamide-timoloL, 1 drop, BID  latanoprost, 1 drop, Nightly  [Held by provider] lisinopril, 10 mg, Daily  metoprolol succinate XL, 100 mg, Daily  mirtazapine, 7.5 mg, Nightly  sennosides-docusate sodium, 2 tablet, BID  sertraline, 25 mg, Daily  sodium chloride, 1 spray, BID      lactated Ringer's, Last Rate: 75 mL/hr (05/25/25 1235)      acetaminophen, 650 mg, q4h PRN   Or  acetaminophen, 650 mg, q4h PRN   Or  acetaminophen, 650 mg, q4h PRN  diclofenac sodium, 4 g, 4x daily PRN  ipratropium-albuteroL, 3 mL, q6h PRN  labetaloL, 10 mg, q6h PRN  OLANZapine zydis, 2.5 mg, q6h PRN   Or  OLANZapine, 2.5 mg, q6h PRN  ondansetron, 4 mg, q8h PRN   Or  ondansetron, 4 mg, q8h PRN        Vitals:    05/25/25 0737   BP: 110/51   Pulse: 61   Resp: 16   Temp: 36.6 °C (97.9 °F)   SpO2: 97%          Intake/Output Summary (Last 24 hours) at 5/25/2025 1543  Last data filed at 5/25/2025 1423  Gross per 24 hour   Intake 820.17 ml   Output 165 ml   Net 655.17 ml       Flat  On 2L NC  No peripheral edema  No distress  Hard of hearing    Blood Labs:  Results for orders placed or performed during the hospital encounter of 05/15/25 (from the past 24 hours)   POCT GLUCOSE   Result Value Ref Range    POCT Glucose 126 (H) 74 - 99 mg/dL   CBC   Result Value Ref Range    WBC 12.2 (H) 4.4 - 11.3 x10*3/uL    nRBC 0.0 0.0 - 0.0 /100 WBCs    RBC 3.39 (L) 4.00 - 5.20 x10*6/uL    Hemoglobin 9.8 (L) 12.0 - 16.0 g/dL    Hematocrit 33.5 (L) 36.0 - 46.0 %    MCV 99 80 - 100 fL    MCH 28.9 26.0 - 34.0 pg    MCHC 29.3 (L) 32.0 - 36.0 g/dL    RDW 15.6 (H) 11.5 - 14.5 %    Platelets 109 (L) 150 - 450 x10*3/uL   Comprehensive metabolic panel   Result Value Ref Range    Glucose 95 74 - 99 mg/dL    Sodium 139 136 -        Piotr participated in dynamic functional therapeutic activities to improve functional performance for 26 minutes, including:  Seated edge of mat with table top:   -placement of container on GRASP board x5 (small cone, medium cone, open mouth cup)  -left hand for grasp of stones for translation into containers (1-3 stone translation)    Standing:   -left upper extremity reaching for dart placement to numbers ; 2-3 point pinch for  of of dart from table top   -ring placement to dart x7 reps       Patient Education and Home Exercises     Education provided:   - continuation of prior HEP with table slides and wall slides, stretching   - OT role in care; scheduling   - Progress towards goals     Written Home Exercises Provided: Pt instructed to continue prior HEP.  Exercises were reviewed and Piotr was able to demonstrate them prior to the end of the session.  Piotr demonstrated good  understanding of the home exercise program provided. See electronic medical record under Patient Instructions for exercises provided during therapy sessions.    -wrist exercises for range of motion and strengthening   -left fine motor coordination HAP  -scapular retraction/shoulder squeezes - isometric      Assessment     Piotr continues to progress and demo functional improvements. He continues to verbalize and demo carry over for all home exercises and education at this time. Plan for d/c at end of this plan of care.     Piotr is progressing well towards his goals and there are no updates to goals at this time. Pt prognosis is Good.     Patient will continue to benefit from skilled outpatient occupational therapy to address the deficits listed in the problem list on initial evaluation provide patient/ family education and to maximize patient's level of independence in the home and community environment.     Patient's spiritual, cultural and educational needs considered and patient agreeable to plan of care and  145 mmol/L    Potassium 3.2 (L) 3.5 - 5.3 mmol/L    Chloride 105 98 - 107 mmol/L    Bicarbonate 22 21 - 32 mmol/L    Anion Gap 15 10 - 20 mmol/L    Urea Nitrogen 64 (H) 6 - 23 mg/dL    Creatinine 3.26 (H) 0.50 - 1.05 mg/dL    eGFR 12 (L) >60 mL/min/1.73m*2    Calcium 8.6 8.6 - 10.6 mg/dL    Albumin 2.9 (L) 3.4 - 5.0 g/dL    Alkaline Phosphatase 108 33 - 136 U/L    Total Protein 5.6 (L) 6.4 - 8.2 g/dL    AST 28 9 - 39 U/L    Bilirubin, Total 0.5 0.0 - 1.2 mg/dL    ALT 25 7 - 45 U/L   POCT GLUCOSE   Result Value Ref Range    POCT Glucose 197 (H) 74 - 99 mg/dL              ASSESSMENT:  Susan Patiño is a  89 y.o.  Year old , with PMHx of CKD 3 at baseline creatinine 1.3. Admitted with AMS, CODY, UTI.    #CODY  - Baseline cr -0.9-1.3  - UA-1+ bacteria and nitrite positive  - Imaging with obstruction  - Etiology: Likely contrast along with ATN 2/2 hemodynamic given rapid fluctuation in blood pressure and old age with poor kidney reserves. Persistent of renal impairment likely secondary to Episodes of Hypotension (Possibly Related to Flomax)    #Urine Retention    #Cystitis  #Afib     RECOMMENDATIONS:  - Stop Flomax  - Keep MAP >65  - Daily Bladder Scans  - Avoid Nephrotoxins, Contrast, Hypotension  - Strict I/O chart & Daily Weight  - Adjust medication doses for eGFR      Princess Phelps MD  Nephrology Fellow   Daytime / Weekend Renal Pager [phone]  After 7 pm Emergencies [phone] Pager [phone]   goals.    Anticipated barriers to occupational therapy: none noted     The following goals were discussed with the patient and patient is in agreement with them as to be addressed in the treatment plan.      Goals: 8 weeks Long Term=Short Term  - Pt will demo active left wrist extension to 45+ degrees to aid in increased functional indep with ADLs/IADLs. MET at 55 degrees   - Pt will demo 2+ increase in left pinches (all 3 conditions) to aid in increased in hand manipulation tasks. Ongoing  - Pt will complete Box and Blocks Test with left upper extremity, transferring 30+ blocks, to demonstrate improved gross manual coordination needed for ADL and IADL tasks. Progressing; 29 on 11/25/2024  - Pt will improve FOTO score to 56+ for improved self perception of functional performance with daily activities. Met to 58  - Pt will be independent with Home Exercise Program (HEP)/Home Activity Program (HAP) to promote long term maintenance of progress made in therapy. Ongoing     Goals to be added and edited within plan of care as needed/appropriate.     Plan     Certification Period/Plan of care expiration: 10/21/2024 to 12/29/2024 (to allow for scheduling due to busy clinic).     Outpatient Occupational Therapy 1-2 times weekly for 8 weeks to include the following interventions: Moist Heat/ Ice, Neuromuscular Re-ed, Paraffin, Patient Education, Self Care, Therapeutic Activities, and Therapeutic Exercise.    Next visit: shoulder stretch, grasp tasks with wrist extension, fine motor tasks    ABBE Barrett   12/16/2024

## 2025-05-28 DIAGNOSIS — E78.5 HYPERLIPIDEMIA LDL GOAL <70: ICD-10-CM

## 2025-05-28 RX ORDER — ATORVASTATIN CALCIUM 80 MG/1
80 TABLET, FILM COATED ORAL NIGHTLY
Qty: 90 TABLET | Refills: 3 | Status: SHIPPED | OUTPATIENT
Start: 2025-05-28

## 2025-05-28 RX ORDER — EZETIMIBE 10 MG/1
10 TABLET ORAL
Qty: 90 TABLET | Refills: 3 | Status: SHIPPED | OUTPATIENT
Start: 2025-05-28

## 2025-05-28 NOTE — TELEPHONE ENCOUNTER
No care due was identified.  [facility] Embedded Care Due Messages. Reference number: [id number].   5/28/2025 3:10:20 AM CDT

## 2025-05-28 NOTE — TELEPHONE ENCOUNTER
Refill Routing Note   Medication(s) are not appropriate for processing by [facility] for the following reason(s):        Required labs outdated    [facility] action(s):  Defer             Appointments  past 12m or future 3m with PCP    Date Provider   Last Visit   2/10/2025 Lory Pugh MD   Next Visit   8/11/2025 Lory Pugh MD   ED visits in past 90 days: 0        Note composed:7:05 AM 05/28/2025

## 2025-06-02 ENCOUNTER — OFFICE VISIT (OUTPATIENT)
Dept: PODIATRY | Facility: CLINIC | Age: 65
End: 2025-06-02
Payer: MEDICARE

## 2025-06-02 VITALS
SYSTOLIC BLOOD PRESSURE: 132 MMHG | HEART RATE: 86 BPM | BODY MASS INDEX: 27.17 KG/M2 | DIASTOLIC BLOOD PRESSURE: 85 MMHG | WEIGHT: 183.44 LBS | HEIGHT: 69 IN

## 2025-06-02 DIAGNOSIS — M79.671 RIGHT FOOT PAIN: Primary | ICD-10-CM

## 2025-06-02 DIAGNOSIS — Z79.01 CHRONIC ANTICOAGULATION: ICD-10-CM

## 2025-06-02 DIAGNOSIS — L84 CORN OR CALLUS: ICD-10-CM

## 2025-06-02 PROCEDURE — 3008F BODY MASS INDEX DOCD: CPT | Mod: CPTII,S$GLB,, | Performed by: PODIATRIST

## 2025-06-02 PROCEDURE — 1159F MED LIST DOCD IN RCRD: CPT | Mod: CPTII,S$GLB,, | Performed by: PODIATRIST

## 2025-06-02 PROCEDURE — 99999 PR PBB SHADOW E&M-EST. PATIENT-LVL III: CPT | Mod: PBBFAC,,, | Performed by: PODIATRIST

## 2025-06-02 PROCEDURE — 3288F FALL RISK ASSESSMENT DOCD: CPT | Mod: CPTII,S$GLB,, | Performed by: PODIATRIST

## 2025-06-02 PROCEDURE — 1101F PT FALLS ASSESS-DOCD LE1/YR: CPT | Mod: CPTII,S$GLB,, | Performed by: PODIATRIST

## 2025-06-02 PROCEDURE — 99214 OFFICE O/P EST MOD 30 MIN: CPT | Mod: S$GLB,,, | Performed by: PODIATRIST

## 2025-06-02 PROCEDURE — 1160F RVW MEDS BY RX/DR IN RCRD: CPT | Mod: CPTII,S$GLB,, | Performed by: PODIATRIST

## 2025-06-02 PROCEDURE — 1125F AMNT PAIN NOTED PAIN PRSNT: CPT | Mod: CPTII,S$GLB,, | Performed by: PODIATRIST

## 2025-06-02 PROCEDURE — 3079F DIAST BP 80-89 MM HG: CPT | Mod: CPTII,S$GLB,, | Performed by: PODIATRIST

## 2025-06-02 PROCEDURE — 3075F SYST BP GE 130 - 139MM HG: CPT | Mod: CPTII,S$GLB,, | Performed by: PODIATRIST

## 2025-07-17 ENCOUNTER — TELEPHONE (OUTPATIENT)
Dept: CARDIOLOGY | Facility: CLINIC | Age: 65
End: 2025-07-17

## 2025-07-17 ENCOUNTER — OFFICE VISIT (OUTPATIENT)
Dept: CARDIOLOGY | Facility: CLINIC | Age: 65
End: 2025-07-17
Payer: MEDICARE

## 2025-07-17 ENCOUNTER — PATIENT MESSAGE (OUTPATIENT)
Dept: CARDIOLOGY | Facility: CLINIC | Age: 65
End: 2025-07-17

## 2025-07-17 ENCOUNTER — TELEPHONE (OUTPATIENT)
Dept: CARDIOLOGY | Facility: CLINIC | Age: 65
End: 2025-07-17
Payer: MEDICARE

## 2025-07-17 ENCOUNTER — HOSPITAL ENCOUNTER (OUTPATIENT)
Dept: CARDIOLOGY | Facility: CLINIC | Age: 65
Discharge: HOME | End: 2025-07-17
Payer: MEDICARE

## 2025-07-17 VITALS
HEART RATE: 67 BPM | SYSTOLIC BLOOD PRESSURE: 124 MMHG | BODY MASS INDEX: 27.11 KG/M2 | WEIGHT: 183 LBS | HEIGHT: 69 IN | DIASTOLIC BLOOD PRESSURE: 72 MMHG

## 2025-07-17 DIAGNOSIS — F17.210 CIGARETTE NICOTINE DEPENDENCE WITHOUT COMPLICATION: ICD-10-CM

## 2025-07-17 DIAGNOSIS — I10 ESSENTIAL HYPERTENSION: ICD-10-CM

## 2025-07-17 DIAGNOSIS — Z71.6 TOBACCO ABUSE COUNSELING: ICD-10-CM

## 2025-07-17 DIAGNOSIS — Z87.891 SMOKING HISTORY: ICD-10-CM

## 2025-07-17 DIAGNOSIS — I21.4 NSTEMI (NON-ST ELEVATED MYOCARDIAL INFARCTION): ICD-10-CM

## 2025-07-17 DIAGNOSIS — Z01.810 PREOP CARDIOVASCULAR EXAM: Primary | ICD-10-CM

## 2025-07-17 DIAGNOSIS — I25.118 CORONARY ARTERY DISEASE OF NATIVE ARTERY OF NATIVE HEART WITH STABLE ANGINA PECTORIS: ICD-10-CM

## 2025-07-17 DIAGNOSIS — Z01.818 PREOPERATIVE CLEARANCE: ICD-10-CM

## 2025-07-17 DIAGNOSIS — I10 ESSENTIAL HYPERTENSION: Primary | ICD-10-CM

## 2025-07-17 LAB
OHS QRS DURATION: 92 MS
OHS QTC CALCULATION: 394 MS

## 2025-07-17 PROCEDURE — 93000 ELECTROCARDIOGRAM COMPLETE: CPT | Mod: HCNC,S$GLB,, | Performed by: STUDENT IN AN ORGANIZED HEALTH CARE EDUCATION/TRAINING PROGRAM

## 2025-07-17 PROCEDURE — 99999 PR PBB SHADOW E&M-EST. PATIENT-LVL III: CPT | Mod: PBBFAC,HCNC,GC, | Performed by: STUDENT IN AN ORGANIZED HEALTH CARE EDUCATION/TRAINING PROGRAM

## 2025-07-17 NOTE — PROGRESS NOTES
"    Cardiology Clinic Note  Reason for Visit: Preop evaluation    HPI:     65yo M patient who comes for preop evaluation.     He is trying to go to the gym, about an hour. He does treadmill 10 minutes and bicycle 10 minutes, and then he does abs and some leg training, without getting short of breath. He sometimes get short of breath when he have sexual activities.     Medical problems  - CAD s/p NSTEMI - no intervention - LHC below  - CVA 2017 with left sided weakness s/p left carotid stent  - HTN  - Prediabetes  - Current tobacco smoker  - Aneurysm on the "right side of the face and eye" s/p procedure    Last visit 01/02/2024   63 year male who had been under care of Dr Wayne at [facility].  He presented to [facility] 6/24/23 with chest pain and NSTEMI [ tn 1.1]underwent cardiac catheterization which showed  of mid RCA right to right and left-to-right collaterals and there was severe stenosis of distal part of the OM2 which was managed medically.He is a smoker, post CVA and left Carotid stent , hyperlipidemia, hypertension and prediabetes.He completed cardiac rehab a [facility] and has discontinued smoking but not walking. He denies chest pain or SAL. There is no family history  of CAD.     Patient denies chest pain with exertion or at rest, palpitations, shortness of breath at rest, dizziness, syncope, edema, orthopnea, paroxysmal nocturnal dyspnea, or claudication.    ROS:    Constitution: Negative for fever, chills, weight loss or gain.   HENT: Negative for sore throat, rhinorrhea, or headache.  Eyes: Negative for blurred or double vision.   Cardiovascular: See above  Pulmonary: Negative for SOB   Gastrointestinal: Negative for abdominal pain, nausea, vomiting, or diarrhea.   : Negative for dysuria.   Neurological: Negative for focal weakness or sensory changes.  PMH:     Past Medical History:   Diagnosis Date    Erectile dysfunction due to arterial insufficiency 7/17/2020    Essential hypertension " "3/11/2020    High cholesterol     History of left common carotid artery stent placement 4/17/2020    History of stroke 3/11/2020    Hypertension     Mixed hyperlipidemia 3/11/2020    Prediabetes 7/17/2020    Stroke     sept 3, 2017     Past Surgical History:   Procedure Laterality Date    ANGIOGRAM, CORONARY, WITH LEFT HEART CATHETERIZATION Left 6/24/2023    Procedure: Angiogram, Coronary, with Left Heart Cath;  Surgeon: George Sherwood MD;  Location: [facility] CATH/EP LAB;  Service: Cardiology;  Laterality: Left;    CAROTID ARTERY ANGIOPLASTY Left     left Internal carotid artery stent    CEREBRAL ANEURYSM REPAIR      COLONOSCOPY N/A 06/30/2020    Procedure: COLONOSCOPY;  Surgeon: Yuan Pizano MD;  [facility]  Service: Endoscopy;  Laterality: N/A;  3/30/20- Per Dr. Catherine, Pt to be r/s for later date, "screening"- ERW  3/30/20 -  for Pt to call back to r/s for later date per Dr. Catherine- ER  COVID screening on 6/27/20 - [facility] urgent care- ERW    LUMBAR DISCECTOMY       Allergies:   Review of patient's allergies indicates:  No Known Allergies  Medications:   Medications Ordered Prior to Encounter[1]  Social History:     Social History     Tobacco Use    Smoking status: Every Day     Current packs/day: 0.35     Average packs/day: 0.5 packs/day for 50.6 years (25.0 ttl pk-yrs)     Types: Cigarettes     Start date: 12/8/1972     Last attempt to quit: 12/8/1987    Smokeless tobacco: Never   Substance Use Topics    Alcohol use: Yes     Comment: once a month     Family History:     Family History   Problem Relation Name Age of Onset    Stroke Mother      Hypertension Mother      Dementia Mother      Cancer Father      Other Sister Yelitza         stomach issue    No Known Problems Sister Anjelica     Cancer Brother Elijah     Brain cancer Brother Elijah     No Known Problems Brother Dameon     Siddiqui's palsy Brother Ramesh     Cancer Brother Karthik     No Known Problems Brother Scot     Cataracts Neg Hx " "     Glaucoma Neg Hx      Macular degeneration Neg Hx      Heart attacks under age 50 Neg Hx       Physical Exam:   /72 (BP Location: Right arm, Patient Position: Sitting)   Pulse 67   Ht 5' 9" (1.753 m)   Wt 83 kg (182 lb 15.7 oz)   BMI 27.02 kg/m²    Wt Readings from Last 4 Encounters:   07/17/25 83 kg (182 lb 15.7 oz)   06/02/25 83.2 kg (183 lb 6.8 oz)   02/10/25 82 kg (180 lb 12.4 oz)   02/03/25 80.7 kg (178 lb)     Constitutional: No distress, obese, conversant  HEENT: Sclera anicteric, PERRLA  Neck: No JVD, no masses, good movement  CV: RRR, S1 and S2 normal, no additional heart sounds or murmurs. Pulses 2+ and equal bilaterally in radial arteries and femoral, DP and PT areas bilaterally  Pulm: Clear to auscultation bilaterally with symmetrical expansion.   GI: Abdomen soft, non-tender, good bowel sounds  Extremities: Both extremities intact and grossly normal, skin is warm, no edema noted  Skin: No ecchymosis, erythema, or ulcers  Psych: AOx3, appropriate affect  Neuro: CNII-XII intact, no focal deficits    Labs:     Lab Results   Component Value Date     10/07/2024    K 4.0 10/07/2024     10/07/2024    CO2 28 10/07/2024    BUN 10 10/07/2024    CREATININE 1.2 10/07/2024    ANIONGAP 11 10/07/2024     Lab Results   Component Value Date    HGBA1C 5.8 (H) 10/07/2024     Lab Results   Component Value Date    BNP 11 06/23/2023    Lab Results   Component Value Date    WBC 7.36 10/07/2024    HGB 15.7 10/07/2024    HCT 48.7 10/07/2024     10/07/2024    GRAN 4.5 10/07/2024    GRAN 60.4 10/07/2024     Lab Results   Component Value Date    CHOL 158 01/05/2024    HDL 47 01/05/2024    LDLCALC 94.6 01/05/2024    TRIG 82 01/05/2024          Imaging:        EF   Date Value Ref Range Status   06/24/2023 58 % Final       EKG 07/17/25:  NSR, nonspecific ST-T wave changes    TTE 06/24/23:   The left ventricle is normal in size with normal systolic function.  The estimated ejection fraction is " 58%.  Normal left ventricular diastolic function.  Atrial fibrillation not observed.  Normal right ventricular size with normal right ventricular systolic function.  Normal central venous pressure (3 mmHg).  The estimated PA systolic pressure is 25 mmHg.  No wall motion abnormality. No pulmonary hypertension, mean left atrial pressure normal    Coronary Angiography 06/24/23:    Left Main:  No disease     LAD:  Mild disease in proximal LAD.  Tubular 30-40% stenosis in mid LAD.  Mild disease in distal LAD.   D1:  Small-sized, patent  D2:  Medium-sized, minor luminal irregularities     Ramus intermedius:  Medium to large-sized, mild disease     LCx:  Mild disease in proximal circumflex.  Diffuse 40% stenosis in distal circumflex.  OM1:  Small-sized, mild disease.  OM2:  Medium-sized.  There was a discrete 90-95% stenosis in distal segment of the vessel at the bifurcation of a small branch.  There was RAQUEL 3 flow through the upper branch and RAQUEL 2 flow through the lower small branch. This lesion is likely culprit for the MI. The lesion is not readily amenable to intervention due to small-caliber vessel.     RCA:  Tubular 80% stenosis in proximal RCA.   of mid RCA with left-to-right and right to right collaterals.       Assessment and Plan:       Preop cardiovascular exam  - Patient comes for preop evaluation prior to colonoscopy and recommendations regarding clopidogrel  - He goes to the gym and is able to do exercise for 1 hour    Revised Cardiac Risk Index   High risk surgery: No  History of ischemic heart disease: Yes  History of congestive heart failure: No  History of stroke: Yes  Insulin dependent diabetes: No  Cr > 2: No  2 points, class III risk, 10.1% risk of cardiac event 2014 ACC/AHA Perioperative Guidelines  Known or risk factors for CAD: Yes  High risk of MACE: Yes  Functional capacity < 4 METs: No, proceed without further testing     Patient is a high risk patient for a low risk procedure.    Patient  has no active cardiac condition (ACS/USA, decompenstated CHF, significant arrhythmias or severe valvular disease) and can easily achieve 4 METS.  As such, pt does not require further cardiac evaluation prior to undergoing surgery.  Pt should remain on beta-blockers throughout the entire alesia-procedure time period.  Plavix can be held 5 days before and restarted as soon as possible. The remaining cardiac meds can be held as needed but should also be restarted after the procedure.     For other cardiac problems including coronary disease, HTN and lipid management, patient has to follow up with as whenever possible.     Cigarette nicotine dependence without complication  Dangers of cigarette smoking were reviewed with patient in detail. Patient was Counseled for 10 minutes or greater. Nicotine replacement options were discussed. Nicotine replacement was discussed- not prescribed per patient's request      PREVENTION   Educated and strongly counseled on smoking cessation.   Adopt a heart healthy diet  Aerobic exercise 30 minutes 5 times/ week.     COMMUNICATION   Patients are encouraged to call clinic if they have any questions or concerns.   Clinic line is not for emergency purposes.  If they are feeling unwell and especially if they have certain red flag symptoms such as new or worsening  chest pain, chest pressure, shortness of breath, palpitations, dizziness, fall (especially on anticoagulants),   low blood pressure, focal weakness, etc., they should go to the emergency room.       Case discussed with Dr. Dominguez. Unless there are intervening problems, patient should return for re-evaluation in 1 month.    Signed:  Gaetano Hardy M.D.  Cardiology Fellow  [facility]  7/17/2025 4:08 PM        Tobacco Use/Cessation:  I assessed Piotr Crespo and discussed smoking cessation with him for 3-10 minutes. He reports that he has been smoking cigarettes. He started smoking about 52 years ago. He has a  25 pack-year smoking history. He has never used smokeless tobacco.         [1]   Current Outpatient Medications on File Prior to Visit   Medication Sig Dispense Refill    amLODIPine (NORVASC) 5 MG tablet TAKE 1 TABLET EVERY DAY 90 tablet 3    artificial tears,hypromellose,,GENTEAL/SUSTANE, 0.3 % Gel Apply to eye.      atorvastatin (LIPITOR) 80 MG tablet TAKE 1 TABLET EVERY EVENING 90 tablet 3    carvediloL (COREG) 6.25 MG tablet TAKE 1 TABLET TWICE DAILY WITH MEALS 180 tablet 3    cholecalciferol, vitamin D3, (VITAMIN D3) 50 mcg (2,000 unit) Tab Take 50 mcg by mouth.      cholecalciferol, vitamin D3, 100 mcg (4,000 unit) Tab TAKE ONE TABLET BY MOUTH EVERY DAY AS A VITAMIN SUPPLEMENT      clopidogreL (PLAVIX) 75 mg tablet TAKE 1 TABLET EVERY DAY 90 tablet 1    cromolyn (OPTICROM) 4 % ophthalmic solution INSTILL 2 DROPS IN EACH EYE TWICE A DAY FOR ALLERGIC CONJUNCTIVITIS      erythromycin (ROMYCIN) ophthalmic ointment       ezetimibe (ZETIA) 10 mg tablet TAKE 1 TABLET EVERY DAY 90 tablet 3    nicotine (NICODERM CQ) 21 mg/24 hr Apply topically.      nicotine polacrilex 4 MG Lozg DISSOLVE 1 LOZENGE IN MOUTH EVERY HOUR AS NEEDED TO STOP SMOKING      urea 20 % Crea APPLY LIBERAL AMOUNT TOPICALLY TWICE A DAY AS NEEDED FOR CALLOUSED AREA.      clotrimazole (LOTRIMIN) 1 % Soln APPLY SMALL AMOUNT TOPICALLY TWICE A DAY FOR FUNGAL INFECTION (Patient not taking: Reported on 7/17/2025)      LIDOcaine-prilocaine (EMLA) cream Apply topically as needed. (Patient not taking: Reported on 7/17/2025) 30 g 3    tamsulosin (FLOMAX) 0.4 mg Cap Take 1 capsule (0.4 mg total) by mouth once daily. (Patient not taking: Reported on 7/17/2025) 90 capsule 3     No current facility-administered medications on file prior to visit.

## 2025-07-17 NOTE — TELEPHONE ENCOUNTER
Dr Elisa Hardy's visit notes and EKG addressing pt's perioperative cardiac risk assessment and instructions were fax[facility] at [phone].

## 2025-07-17 NOTE — TELEPHONE ENCOUNTER
"----- Message from SACHA Santana sent at 7/17/2025 11:19 AM CDT -----  Regarding: FW: Clearance  Called pt at 2 phone number(s), left voicemail, and sent a  " [facility]" message.  ----- Message -----  From: Esther Simmons RN  Sent: 7/17/2025  11:11 AM CDT  To: Esther Simmons RN  Subject: FW: Clearance                                    Spoke to Mayra at [phone]. She will refax the request. Pt scheduled 7/24 for colonoscopy, lv dr Murphy jan 2024. Pt on Plavix.  ----- Message -----  From: Heidi Mendez MA  Sent: 7/17/2025   9:55 AM CDT  To: Esther Simmons RN  Subject: Clearance                                        Mayra from [facility] is calling to request a clearance for an upcoming procedure and to stop his Plavix. She can be reached at [phone] Ext: [phone].    Thank you.  "

## 2025-07-17 NOTE — ASSESSMENT & PLAN NOTE
- Patient comes for preop evaluation prior to colonoscopy and recommendations regarding clopidogrel  - He goes to the gym and is able to do exercise for 1 hour    Revised Cardiac Risk Index   High risk surgery: No  History of ischemic heart disease: Yes  History of congestive heart failure: No  History of stroke: Yes  Insulin dependent diabetes: No  Cr > 2: No  2 points, class III risk, 10.1% risk of cardiac event 2014 ACC/AHA Perioperative Guidelines  Known or risk factors for CAD: Yes  High risk of MACE: Yes  Functional capacity < 4 METs: No, proceed without further testing     Patient is a high risk patient for a low risk procedure.    Patient has no active cardiac condition (ACS/USA, decompenstated CHF, significant arrhythmias or severe valvular disease) and can easily achieve 4 METS.  As such, pt does not require further cardiac evaluation prior to undergoing surgery.  Pt should remain on beta-blockers throughout the entire alesia-procedure time period.  Plavix can be held 5 days before and restarted as soon as possible. The remaining cardiac meds can be held as needed but should also be restarted after the procedure.     For other cardiac problems including coronary disease, HTN and lipid management, patient has to follow up with as whenever possible.

## 2025-07-17 NOTE — PROGRESS NOTES
I have reviewed the notes, assessments, and/or procedures performed this visit, and I concur with the documentation.    I have explained the importance of tobacco cessation and the consequences if the patient continues to smoke.  I have given a set of steps that the patient needs to follow.

## 2025-07-17 NOTE — TELEPHONE ENCOUNTER
Pt called back. He was updated on needed cards clearance and verbalized understanding. I scheduled him today w. dr Pérez + EKG and he agreed to date/time of appointment(s).

## 2025-07-25 LAB — CRC RECOMMENDATION EXT: NORMAL

## 2025-08-11 ENCOUNTER — OFFICE VISIT (OUTPATIENT)
Dept: PRIMARY CARE CLINIC | Facility: CLINIC | Age: 65
End: 2025-08-11
Payer: MEDICARE

## 2025-08-11 ENCOUNTER — PATIENT OUTREACH (OUTPATIENT)
Dept: ADMINISTRATIVE | Facility: HOSPITAL | Age: 65
End: 2025-08-11
Payer: MEDICARE

## 2025-08-11 ENCOUNTER — LAB VISIT (OUTPATIENT)
Dept: LAB | Facility: HOSPITAL | Age: 65
End: 2025-08-11
Attending: STUDENT IN AN ORGANIZED HEALTH CARE EDUCATION/TRAINING PROGRAM
Payer: MEDICARE

## 2025-08-11 VITALS
SYSTOLIC BLOOD PRESSURE: 96 MMHG | BODY MASS INDEX: 27.11 KG/M2 | OXYGEN SATURATION: 99 % | HEIGHT: 69 IN | HEART RATE: 76 BPM | WEIGHT: 183 LBS | DIASTOLIC BLOOD PRESSURE: 60 MMHG

## 2025-08-11 DIAGNOSIS — R73.03 PREDIABETES: ICD-10-CM

## 2025-08-11 DIAGNOSIS — Z86.73 HISTORY OF STROKE: ICD-10-CM

## 2025-08-11 DIAGNOSIS — N20.0 LEFT RENAL STONE: ICD-10-CM

## 2025-08-11 DIAGNOSIS — M54.50 LUMBAR BACK PAIN: ICD-10-CM

## 2025-08-11 DIAGNOSIS — E78.5 HYPERLIPIDEMIA LDL GOAL <70: ICD-10-CM

## 2025-08-11 DIAGNOSIS — I21.4 NSTEMI (NON-ST ELEVATED MYOCARDIAL INFARCTION): ICD-10-CM

## 2025-08-11 DIAGNOSIS — I10 ESSENTIAL HYPERTENSION: ICD-10-CM

## 2025-08-11 DIAGNOSIS — G81.94 LEFT HEMIPARESIS: ICD-10-CM

## 2025-08-11 DIAGNOSIS — I25.118 CORONARY ARTERY DISEASE OF NATIVE ARTERY OF NATIVE HEART WITH STABLE ANGINA PECTORIS: ICD-10-CM

## 2025-08-11 DIAGNOSIS — Z87.891 PERSONAL HISTORY OF NICOTINE DEPENDENCE: ICD-10-CM

## 2025-08-11 DIAGNOSIS — I10 ESSENTIAL HYPERTENSION: Primary | ICD-10-CM

## 2025-08-11 PROBLEM — J43.9 PULMONARY EMPHYSEMA, UNSPECIFIED EMPHYSEMA TYPE: Status: RESOLVED | Noted: 2023-05-10 | Resolved: 2025-08-11

## 2025-08-11 LAB
ALBUMIN SERPL BCP-MCNC: 4 G/DL (ref 3.5–5.2)
ALP SERPL-CCNC: 68 UNIT/L (ref 40–150)
ALT SERPL W/O P-5'-P-CCNC: 22 UNIT/L (ref 0–55)
ANION GAP (OHS): 6 MMOL/L (ref 8–16)
AST SERPL-CCNC: 25 UNIT/L (ref 0–50)
BILIRUB SERPL-MCNC: 0.3 MG/DL (ref 0.1–1)
BUN SERPL-MCNC: 12 MG/DL (ref 8–23)
CALCIUM SERPL-MCNC: 9.3 MG/DL (ref 8.7–10.5)
CHLORIDE SERPL-SCNC: 106 MMOL/L (ref 95–110)
CO2 SERPL-SCNC: 30 MMOL/L (ref 23–29)
CREAT SERPL-MCNC: 1.2 MG/DL (ref 0.5–1.4)
GFR SERPLBLD CREATININE-BSD FMLA CKD-EPI: >60 ML/MIN/1.73/M2
GLUCOSE SERPL-MCNC: 102 MG/DL (ref 70–110)
POTASSIUM SERPL-SCNC: 4.4 MMOL/L (ref 3.5–5.1)
PROT SERPL-MCNC: 7.2 GM/DL (ref 6–8.4)
SODIUM SERPL-SCNC: 142 MMOL/L (ref 136–145)

## 2025-08-11 PROCEDURE — 82040 ASSAY OF SERUM ALBUMIN: CPT | Mod: HCNC

## 2025-08-11 PROCEDURE — 36415 COLL VENOUS BLD VENIPUNCTURE: CPT | Mod: HCNC,PN

## 2025-08-11 PROCEDURE — 99999 PR PBB SHADOW E&M-EST. PATIENT-LVL III: CPT | Mod: PBBFAC,HCNC,, | Performed by: STUDENT IN AN ORGANIZED HEALTH CARE EDUCATION/TRAINING PROGRAM

## (undated) DEVICE — GUIDEWIRE J TIP EXCHANGE FIXED CORE 260

## (undated) DEVICE — SHEATH INTRODUCER SLENDER 6FX10CM

## (undated) DEVICE — CATHETER DIAGNOSTIC DXTERITY 6FR JL 4

## (undated) DEVICE — VASC BAND

## (undated) DEVICE — CATHETER DIAGNOSTIC DXTERITY 5FR JR4.0